# Patient Record
Sex: FEMALE | Race: BLACK OR AFRICAN AMERICAN | NOT HISPANIC OR LATINO | Employment: UNEMPLOYED | URBAN - METROPOLITAN AREA
[De-identification: names, ages, dates, MRNs, and addresses within clinical notes are randomized per-mention and may not be internally consistent; named-entity substitution may affect disease eponyms.]

---

## 2023-03-14 ENCOUNTER — OFFICE VISIT (OUTPATIENT)
Dept: PODIATRY | Facility: CLINIC | Age: 68
End: 2023-03-14
Payer: MEDICARE

## 2023-03-14 DIAGNOSIS — M72.2 PLANTAR FASCIITIS: Primary | ICD-10-CM

## 2023-03-14 DIAGNOSIS — M20.41 HAMMER TOES OF BOTH FEET: ICD-10-CM

## 2023-03-14 DIAGNOSIS — E66.01 MORBID OBESITY: ICD-10-CM

## 2023-03-14 DIAGNOSIS — E11.8 TYPE 2 DIABETES WITH COMPLICATION: ICD-10-CM

## 2023-03-14 DIAGNOSIS — M20.42 HAMMER TOES OF BOTH FEET: ICD-10-CM

## 2023-03-14 DIAGNOSIS — E11.69 TYPE 2 DIABETES MELLITUS WITH MORBID OBESITY: ICD-10-CM

## 2023-03-14 DIAGNOSIS — E66.01 TYPE 2 DIABETES MELLITUS WITH MORBID OBESITY: ICD-10-CM

## 2023-03-14 PROCEDURE — 3288F PR FALLS RISK ASSESSMENT DOCUMENTED: ICD-10-PCS | Mod: CPTII,S$GLB,, | Performed by: PODIATRIST

## 2023-03-14 PROCEDURE — 99999 PR PBB SHADOW E&M-NEW PATIENT-LVL II: CPT | Mod: PBBFAC,,, | Performed by: PODIATRIST

## 2023-03-14 PROCEDURE — 3288F FALL RISK ASSESSMENT DOCD: CPT | Mod: CPTII,S$GLB,, | Performed by: PODIATRIST

## 2023-03-14 PROCEDURE — 1159F PR MEDICATION LIST DOCUMENTED IN MEDICAL RECORD: ICD-10-PCS | Mod: CPTII,S$GLB,, | Performed by: PODIATRIST

## 2023-03-14 PROCEDURE — 99204 PR OFFICE/OUTPT VISIT, NEW, LEVL IV, 45-59 MIN: ICD-10-PCS | Mod: S$GLB,,, | Performed by: PODIATRIST

## 2023-03-14 PROCEDURE — 1160F RVW MEDS BY RX/DR IN RCRD: CPT | Mod: CPTII,S$GLB,, | Performed by: PODIATRIST

## 2023-03-14 PROCEDURE — 1101F PR PT FALLS ASSESS DOC 0-1 FALLS W/OUT INJ PAST YR: ICD-10-PCS | Mod: CPTII,S$GLB,, | Performed by: PODIATRIST

## 2023-03-14 PROCEDURE — 1160F PR REVIEW ALL MEDS BY PRESCRIBER/CLIN PHARMACIST DOCUMENTED: ICD-10-PCS | Mod: CPTII,S$GLB,, | Performed by: PODIATRIST

## 2023-03-14 PROCEDURE — 1159F MED LIST DOCD IN RCRD: CPT | Mod: CPTII,S$GLB,, | Performed by: PODIATRIST

## 2023-03-14 PROCEDURE — 99999 PR PBB SHADOW E&M-NEW PATIENT-LVL II: ICD-10-PCS | Mod: PBBFAC,,, | Performed by: PODIATRIST

## 2023-03-14 PROCEDURE — 99204 OFFICE O/P NEW MOD 45 MIN: CPT | Mod: S$GLB,,, | Performed by: PODIATRIST

## 2023-03-14 PROCEDURE — 1101F PT FALLS ASSESS-DOCD LE1/YR: CPT | Mod: CPTII,S$GLB,, | Performed by: PODIATRIST

## 2023-03-14 RX ORDER — POTASSIUM CHLORIDE 20 MEQ/1
TABLET, EXTENDED RELEASE ORAL
COMMUNITY
Start: 2023-02-28 | End: 2024-02-19 | Stop reason: SDUPTHER

## 2023-03-14 RX ORDER — PEN NEEDLE, DIABETIC 31 GX5/16"
NEEDLE, DISPOSABLE MISCELLANEOUS
COMMUNITY
Start: 2023-02-28 | End: 2024-02-07

## 2023-03-14 RX ORDER — GABAPENTIN 300 MG/1
CAPSULE ORAL
COMMUNITY
Start: 2023-02-27

## 2023-03-14 RX ORDER — ATORVASTATIN CALCIUM 20 MG/1
TABLET, FILM COATED ORAL
COMMUNITY
Start: 2023-02-28 | End: 2024-02-19 | Stop reason: SDUPTHER

## 2023-03-14 RX ORDER — METOPROLOL SUCCINATE 50 MG/1
TABLET, EXTENDED RELEASE ORAL
COMMUNITY
Start: 2023-02-28 | End: 2024-02-19 | Stop reason: SDUPTHER

## 2023-03-14 RX ORDER — SEMAGLUTIDE 1.34 MG/ML
INJECTION, SOLUTION SUBCUTANEOUS
COMMUNITY
Start: 2023-03-01 | End: 2023-12-17

## 2023-03-14 RX ORDER — MIRABEGRON 50 MG/1
TABLET, FILM COATED, EXTENDED RELEASE ORAL
COMMUNITY
Start: 2023-02-28

## 2023-03-14 RX ORDER — INSULIN DEGLUDEC 200 U/ML
INJECTION, SOLUTION SUBCUTANEOUS
COMMUNITY
Start: 2023-02-20 | End: 2024-03-28

## 2023-03-14 RX ORDER — DILTIAZEM HYDROCHLORIDE 120 MG/1
CAPSULE, EXTENDED RELEASE ORAL
COMMUNITY
Start: 2023-02-28 | End: 2024-02-19 | Stop reason: SDUPTHER

## 2023-03-14 RX ORDER — ALBUTEROL SULFATE 90 UG/1
AEROSOL, METERED RESPIRATORY (INHALATION)
COMMUNITY
Start: 2023-03-10 | End: 2024-02-19 | Stop reason: SDUPTHER

## 2023-03-14 RX ORDER — FUROSEMIDE 40 MG/1
TABLET ORAL
COMMUNITY
Start: 2023-02-28 | End: 2024-02-19 | Stop reason: SDUPTHER

## 2023-03-14 RX ORDER — FLUTICASONE PROPIONATE AND SALMETEROL 250; 50 UG/1; UG/1
POWDER RESPIRATORY (INHALATION)
COMMUNITY
Start: 2023-02-28

## 2023-03-14 RX ORDER — OLMESARTAN MEDOXOMIL AND HYDROCHLOROTHIAZIDE 40/25 40; 25 MG/1; MG/1
TABLET ORAL
COMMUNITY
Start: 2023-02-28 | End: 2024-02-19 | Stop reason: SDUPTHER

## 2023-03-14 RX ORDER — ESOMEPRAZOLE MAGNESIUM 40 MG/1
CAPSULE, DELAYED RELEASE ORAL
COMMUNITY
Start: 2023-02-28 | End: 2024-02-19 | Stop reason: SDUPTHER

## 2023-03-14 RX ORDER — BUPROPION HYDROCHLORIDE 100 MG/1
TABLET, EXTENDED RELEASE ORAL
COMMUNITY
Start: 2023-01-10 | End: 2023-11-02

## 2023-03-14 NOTE — PROGRESS NOTES
Subjective:       Patient ID: Stephanie Raza is a 67 y.o. female.    Chief Complaint: Diabetic Foot Exam (Patient is a diabetic and denies pain at present. )      HPI: Stephanie Raza complains of Moderate pains to the left foot. States pains are sharp and stabbing-like in nature. Pains are to the plantar foot, mostly with walking and standing. Rates the pains at approx. 0/10 currently but does increase with weight-bearing.  She states this is been ongoing for some time.  She does have history of diabetes mellitus type 2.  She also has pain to the right 2nd toe secondary to callus formation and hammertoe deformity.  Denies any recent trauma or injury.  Not currently utilizing any medication. States walking and standing causes and/or exacerbates the symptoms. Patient has had 0 corticosteroid injection(s) prior. Patient's Primary Care Provider is Reyna Pate MD.     Review of patient's allergies indicates:  No Known Allergies    History reviewed. No pertinent past medical history.    History reviewed. No pertinent family history.    Social History     Socioeconomic History    Marital status:        History reviewed. No pertinent surgical history.    Review of Systems      Objective:   There were no vitals taken for this visit.    No image results found.      Physical Exam  LOWER EXTREMITY PHYSICAL EXAMINATION    VASCULAR: The right DP pulse is 2/4 and the left DP is 2/4. The right PT pulse is 2/4 and the left PT pulse is 2/4. Proximal to distal, warm to warm. No dependent rubor or elevation palor is noted. Capillary refill time is less than 3 seconds. Hair growth is appreciated to the dorsal foot and digits.    NEUROLOGY: Proprioception is intact, bilateral. Sensation to light touch is intact. Negative Tinel's Sign and negative Valleix Sign. No neurological sensations with compression of the area of Moore's Nerve in the area of the Abductor Hallucis muscle belly.    ORTHOPEDIC:  Moderate  tenderness to palpation of the area around the plantar medial calcaneal tubercle on the left foot. Pains are characterized as sharp and stabbing-like with direct palpation of the area. There is also mild pain to palpation of the immediate plantar aspect of the heel, and no pains to the lateral band of the fascia. No edema is noted. No fullness is noted. No pains or defects are noted within the plantar fascia at the arch. No plantar fibromas are noted. No defects are noted within the ligament. Dorsiflexion of the MTPJs with simultaneous palpation of the fascia at the arch, does worsen and exacerbate the pains. No pains with medial to lateral compression of the heel. Equinus contracture is noted. Antalgic gait pattern is noted.  Reducible hammertoe deformity present to the right 2nd toe.  Small callus formation noted distally without underlying ulceration    DERMATOLOGY: No ecchymosis is noted.  Skin is supple, dry and intact. Skin is supple.  No hyperkeratosis noted. No calluses.  No open wounds or ulcerations are noted.  No palpable plantar fibromas noted.    Assessment:     1. Plantar fasciitis - Left Foot    2. Type 2 diabetes with complication    3. Hammer toes of both feet    4. Morbid obesity    5. Type 2 diabetes mellitus with morbid obesity          Plan:     Plantar fasciitis - Left Foot    Type 2 diabetes with complication  -     DIABETIC SHOES FOR HOME USE    Hammer toes of both feet  -     DIABETIC SHOES FOR HOME USE    Morbid obesity    Type 2 diabetes mellitus with morbid obesity        Thorough discussion is had with the patient today concerning the diagnosis, its etiology, and the treatment algorithm at present.    I explained to the patient that etiology and all treatment options for heel pain including rest,  ice messages, stretching exercises, strappings/tappings, NSAID's, injections, new shoegear with orthotic inserts, and/or surgical treatment. I also discussed a possible injection of steroid to  help calm down the inflammation sooner. I expressed the importance of wearing the orthotics in culmination of other treatment modalities. Patient agreed to stretching exercises and inserts. I gave written and verbal instructions on heel cord stretching and this was demonstrated for the patient. Patient expressed understanding and had the patient teach back the instructions.  Patient instructed on adequate icing techniques which should be done for acute pain and inflammation.    Discussed the importance of stretching to the posterior muscle groups of the gastrocnemius and the soleus.  A stretching sheet was provided to the patient in conjunction with a Thera-Band.  I do recommend patient perform stretching exercises 4-6 times per day and holding the stretches for approximately 15-30 seconds apiece.  We discussed importance of stretching as relates to lengthening the posterior muscle group which can decrease drain on the posterior aspect of the heel as well as the plantar aspect of the heel.  This will also decrease pain associated with post static dyskinesia.  Teach back mechanism was performed with the patient demonstrating the stretching exercises.    Patient is counseled and reminded concerning the importance of good nutrition and healthy eating habits, which may include blood sugar control, to prevent and/or help podiatric foot and ankle complications.    Patient would benefit from diabetic shoes with custom insert given her history of plantar fasciitis, diabetes mellitus with complications, and hammertoe deformity with evidence of callus formation to the distal aspect the toe.  This will help offload high pressure area and prevent recurrent formation and can prevent possible wound formation as well.  Patient to use the shoes at all periods of time.      Future Appointments   Date Time Provider Department Center   3/16/2023 10:45 AM Pedro Pablo Mares OD [facility]

## 2023-03-24 ENCOUNTER — OFFICE VISIT (OUTPATIENT)
Dept: OPHTHALMOLOGY | Facility: CLINIC | Age: 68
End: 2023-03-24
Payer: MEDICARE

## 2023-03-24 DIAGNOSIS — H52.221 REGULAR ASTIGMATISM OF RIGHT EYE: ICD-10-CM

## 2023-03-24 DIAGNOSIS — H52.4 PRESBYOPIA: ICD-10-CM

## 2023-03-24 DIAGNOSIS — E11.9 TYPE 2 DIABETES MELLITUS WITHOUT RETINOPATHY: Primary | ICD-10-CM

## 2023-03-24 DIAGNOSIS — H25.13 NUCLEAR SCLEROSIS, BILATERAL: ICD-10-CM

## 2023-03-24 DIAGNOSIS — H25.013 CORTICAL AGE-RELATED CATARACT OF BOTH EYES: ICD-10-CM

## 2023-03-24 DIAGNOSIS — E11.36 DIABETIC CATARACT OF BOTH EYES: ICD-10-CM

## 2023-03-24 PROCEDURE — 1159F MED LIST DOCD IN RCRD: CPT | Mod: CPTII,S$GLB,, | Performed by: OPTOMETRIST

## 2023-03-24 PROCEDURE — 99999 PR PBB SHADOW E&M-EST. PATIENT-LVL I: ICD-10-PCS | Mod: PBBFAC,,, | Performed by: OPTOMETRIST

## 2023-03-24 PROCEDURE — 92014 COMPRE OPH EXAM EST PT 1/>: CPT | Mod: S$GLB,,, | Performed by: OPTOMETRIST

## 2023-03-24 PROCEDURE — 2023F PR DILATED RETINAL EXAM W/O EVID OF RETINOPATHY: ICD-10-PCS | Mod: CPTII,S$GLB,, | Performed by: OPTOMETRIST

## 2023-03-24 PROCEDURE — 1160F RVW MEDS BY RX/DR IN RCRD: CPT | Mod: CPTII,S$GLB,, | Performed by: OPTOMETRIST

## 2023-03-24 PROCEDURE — 1159F PR MEDICATION LIST DOCUMENTED IN MEDICAL RECORD: ICD-10-PCS | Mod: CPTII,S$GLB,, | Performed by: OPTOMETRIST

## 2023-03-24 PROCEDURE — 92015 PR REFRACTION: ICD-10-PCS | Mod: S$GLB,,, | Performed by: OPTOMETRIST

## 2023-03-24 PROCEDURE — 1160F PR REVIEW ALL MEDS BY PRESCRIBER/CLIN PHARMACIST DOCUMENTED: ICD-10-PCS | Mod: CPTII,S$GLB,, | Performed by: OPTOMETRIST

## 2023-03-24 PROCEDURE — 2023F DILAT RTA XM W/O RTNOPTHY: CPT | Mod: CPTII,S$GLB,, | Performed by: OPTOMETRIST

## 2023-03-24 PROCEDURE — 92015 DETERMINE REFRACTIVE STATE: CPT | Mod: S$GLB,,, | Performed by: OPTOMETRIST

## 2023-03-24 PROCEDURE — 99999 PR PBB SHADOW E&M-EST. PATIENT-LVL I: CPT | Mod: PBBFAC,,, | Performed by: OPTOMETRIST

## 2023-03-24 PROCEDURE — 92014 PR EYE EXAM, EST PATIENT,COMPREHESV: ICD-10-PCS | Mod: S$GLB,,, | Performed by: OPTOMETRIST

## 2023-03-24 NOTE — PROGRESS NOTES
HPI     Annual Exam            Comments: NP          Comments    Vision changes since last eye exam?: stable for her distance but wears   reading glasses but forgot them today.     Any eye pain today: no    Other ocular symptoms: no    Interested in contact lens fitting today? no                     Last edited by Roxi Bernal on 3/24/2023 10:49 AM.            Assessment /Plan     For exam results, see Encounter Report.    Type 2 diabetes mellitus without retinopathy  There was no diabetic retinopathy present in either eye today.   Recommended that pt continue care with PCP and/or specialists regarding diabetes.  Follow-up dilated eye exam recommended in 12 months, sooner with any vision changes or new concerns.    Nuclear sclerosis, bilateral  Cortical age-related cataract of both eyes  Diabetic cataract of both eyes  Cataracts not significantly affecting activities of daily living and therefore surgery is not indicated at this time.   Will continue to monitor over the next 12 months. Pt to call or RTC with any significant change in vision prior to next visit.     Regular astigmatism of right eye  Presbyopia  Eyeglass Final Rx       Eyeglass Final Rx         Sphere Cylinder Axis Add    Right [place] +1.25 180 +2.50    Left +1.00   +2.50      Expiration Date: 3/24/2024                    RTC 1 yr for dilated eye exam or PRN if any problems.   Discussed above and answered questions.

## 2023-11-02 ENCOUNTER — OFFICE VISIT (OUTPATIENT)
Dept: INTERNAL MEDICINE | Facility: CLINIC | Age: 68
End: 2023-11-02
Payer: MEDICARE

## 2023-11-02 ENCOUNTER — LAB VISIT (OUTPATIENT)
Dept: LAB | Facility: HOSPITAL | Age: 68
End: 2023-11-02
Attending: FAMILY MEDICINE
Payer: MEDICARE

## 2023-11-02 VITALS
SYSTOLIC BLOOD PRESSURE: 130 MMHG | WEIGHT: 262.38 LBS | DIASTOLIC BLOOD PRESSURE: 80 MMHG | HEART RATE: 102 BPM | BODY MASS INDEX: 48.28 KG/M2 | OXYGEN SATURATION: 97 % | TEMPERATURE: 98 F | HEIGHT: 62 IN

## 2023-11-02 DIAGNOSIS — E11.42 TYPE 2 DIABETES MELLITUS WITH DIABETIC POLYNEUROPATHY, WITH LONG-TERM CURRENT USE OF INSULIN: ICD-10-CM

## 2023-11-02 DIAGNOSIS — Z13.31 POSITIVE DEPRESSION SCREENING: ICD-10-CM

## 2023-11-02 DIAGNOSIS — Z79.4 TYPE 2 DIABETES MELLITUS WITH DIABETIC POLYNEUROPATHY, WITH LONG-TERM CURRENT USE OF INSULIN: ICD-10-CM

## 2023-11-02 DIAGNOSIS — M16.0 PRIMARY OSTEOARTHRITIS OF BOTH HIPS: ICD-10-CM

## 2023-11-02 DIAGNOSIS — F33.0 MILD EPISODE OF RECURRENT MAJOR DEPRESSIVE DISORDER: ICD-10-CM

## 2023-11-02 DIAGNOSIS — I10 PRIMARY HYPERTENSION: ICD-10-CM

## 2023-11-02 DIAGNOSIS — E78.5 HYPERLIPIDEMIA, UNSPECIFIED HYPERLIPIDEMIA TYPE: ICD-10-CM

## 2023-11-02 DIAGNOSIS — Z11.3 SCREEN FOR STD (SEXUALLY TRANSMITTED DISEASE): ICD-10-CM

## 2023-11-02 DIAGNOSIS — I10 PRIMARY HYPERTENSION: Primary | ICD-10-CM

## 2023-11-02 LAB
ALBUMIN SERPL BCP-MCNC: 3.7 G/DL (ref 3.5–5.2)
ALBUMIN/CREAT UR: 34.6 UG/MG (ref 0–30)
ALP SERPL-CCNC: 83 U/L (ref 55–135)
ALT SERPL W/O P-5'-P-CCNC: 13 U/L (ref 10–44)
ANION GAP SERPL CALC-SCNC: 19 MMOL/L (ref 8–16)
AST SERPL-CCNC: 17 U/L (ref 10–40)
BASOPHILS # BLD AUTO: 0.05 K/UL (ref 0–0.2)
BASOPHILS NFR BLD: 0.8 % (ref 0–1.9)
BILIRUB SERPL-MCNC: 0.5 MG/DL (ref 0.1–1)
BUN SERPL-MCNC: 26 MG/DL (ref 8–23)
CALCIUM SERPL-MCNC: 9.9 MG/DL (ref 8.7–10.5)
CHLORIDE SERPL-SCNC: 100 MMOL/L (ref 95–110)
CHOLEST SERPL-MCNC: 176 MG/DL (ref 120–199)
CHOLEST/HDLC SERPL: 3.7 {RATIO} (ref 2–5)
CO2 SERPL-SCNC: 24 MMOL/L (ref 23–29)
CREAT SERPL-MCNC: 0.9 MG/DL (ref 0.5–1.4)
CREAT UR-MCNC: 136 MG/DL (ref 15–325)
DIFFERENTIAL METHOD: ABNORMAL
EOSINOPHIL # BLD AUTO: 0.2 K/UL (ref 0–0.5)
EOSINOPHIL NFR BLD: 3.6 % (ref 0–8)
ERYTHROCYTE [DISTWIDTH] IN BLOOD BY AUTOMATED COUNT: 14.2 % (ref 11.5–14.5)
EST. GFR  (NO RACE VARIABLE): >60 ML/MIN/1.73 M^2
ESTIMATED AVG GLUCOSE: 143 MG/DL (ref 68–131)
GLUCOSE SERPL-MCNC: 98 MG/DL (ref 70–110)
HBA1C MFR BLD: 6.6 % (ref 4–5.6)
HCT VFR BLD AUTO: 44.7 % (ref 37–48.5)
HCV AB SERPL QL IA: NORMAL
HDLC SERPL-MCNC: 47 MG/DL (ref 40–75)
HDLC SERPL: 26.7 % (ref 20–50)
HGB BLD-MCNC: 14.1 G/DL (ref 12–16)
HIV 1+2 AB+HIV1 P24 AG SERPL QL IA: NORMAL
IMM GRANULOCYTES # BLD AUTO: 0.01 K/UL (ref 0–0.04)
IMM GRANULOCYTES NFR BLD AUTO: 0.2 % (ref 0–0.5)
LDLC SERPL CALC-MCNC: 106.2 MG/DL (ref 63–159)
LYMPHOCYTES # BLD AUTO: 2.7 K/UL (ref 1–4.8)
LYMPHOCYTES NFR BLD: 43.5 % (ref 18–48)
MCH RBC QN AUTO: 26.2 PG (ref 27–31)
MCHC RBC AUTO-ENTMCNC: 31.5 G/DL (ref 32–36)
MCV RBC AUTO: 83 FL (ref 82–98)
MICROALBUMIN UR DL<=1MG/L-MCNC: 47 UG/ML
MONOCYTES # BLD AUTO: 0.6 K/UL (ref 0.3–1)
MONOCYTES NFR BLD: 10 % (ref 4–15)
NEUTROPHILS # BLD AUTO: 2.6 K/UL (ref 1.8–7.7)
NEUTROPHILS NFR BLD: 41.9 % (ref 38–73)
NONHDLC SERPL-MCNC: 129 MG/DL
NRBC BLD-RTO: 0 /100 WBC
PLATELET # BLD AUTO: 240 K/UL (ref 150–450)
PMV BLD AUTO: 13.4 FL (ref 9.2–12.9)
POTASSIUM SERPL-SCNC: 4.2 MMOL/L (ref 3.5–5.1)
PROT SERPL-MCNC: 7.7 G/DL (ref 6–8.4)
RBC # BLD AUTO: 5.39 M/UL (ref 4–5.4)
SODIUM SERPL-SCNC: 143 MMOL/L (ref 136–145)
T4 FREE SERPL-MCNC: 0.99 NG/DL (ref 0.71–1.51)
TRIGL SERPL-MCNC: 114 MG/DL (ref 30–150)
TSH SERPL DL<=0.005 MIU/L-ACNC: 1.58 UIU/ML (ref 0.4–4)
WBC # BLD AUTO: 6.18 K/UL (ref 3.9–12.7)

## 2023-11-02 PROCEDURE — 3060F PR POS MICROALBUMINURIA RESULT DOCUMENTED/REVIEW: ICD-10-PCS | Mod: CPTII,S$GLB,, | Performed by: FAMILY MEDICINE

## 2023-11-02 PROCEDURE — 82043 UR ALBUMIN QUANTITATIVE: CPT | Performed by: FAMILY MEDICINE

## 2023-11-02 PROCEDURE — 84443 ASSAY THYROID STIM HORMONE: CPT | Performed by: FAMILY MEDICINE

## 2023-11-02 PROCEDURE — 99999 PR PBB SHADOW E&M-EST. PATIENT-LVL IV: CPT | Mod: PBBFAC,,, | Performed by: FAMILY MEDICINE

## 2023-11-02 PROCEDURE — 99214 PR OFFICE/OUTPT VISIT, EST, LEVL IV, 30-39 MIN: ICD-10-PCS | Mod: S$GLB,,, | Performed by: FAMILY MEDICINE

## 2023-11-02 PROCEDURE — 87389 HIV-1 AG W/HIV-1&-2 AB AG IA: CPT | Performed by: FAMILY MEDICINE

## 2023-11-02 PROCEDURE — 1160F RVW MEDS BY RX/DR IN RCRD: CPT | Mod: CPTII,S$GLB,, | Performed by: FAMILY MEDICINE

## 2023-11-02 PROCEDURE — 1160F PR REVIEW ALL MEDS BY PRESCRIBER/CLIN PHARMACIST DOCUMENTED: ICD-10-PCS | Mod: CPTII,S$GLB,, | Performed by: FAMILY MEDICINE

## 2023-11-02 PROCEDURE — 80061 LIPID PANEL: CPT | Performed by: FAMILY MEDICINE

## 2023-11-02 PROCEDURE — 3066F NEPHROPATHY DOC TX: CPT | Mod: CPTII,S$GLB,, | Performed by: FAMILY MEDICINE

## 2023-11-02 PROCEDURE — 85025 COMPLETE CBC W/AUTO DIFF WBC: CPT | Performed by: FAMILY MEDICINE

## 2023-11-02 PROCEDURE — 3066F PR DOCUMENTATION OF TREATMENT FOR NEPHROPATHY: ICD-10-PCS | Mod: CPTII,S$GLB,, | Performed by: FAMILY MEDICINE

## 2023-11-02 PROCEDURE — 3079F PR MOST RECENT DIASTOLIC BLOOD PRESSURE 80-89 MM HG: ICD-10-PCS | Mod: CPTII,S$GLB,, | Performed by: FAMILY MEDICINE

## 2023-11-02 PROCEDURE — 83036 HEMOGLOBIN GLYCOSYLATED A1C: CPT | Performed by: FAMILY MEDICINE

## 2023-11-02 PROCEDURE — 3060F POS MICROALBUMINURIA REV: CPT | Mod: CPTII,S$GLB,, | Performed by: FAMILY MEDICINE

## 2023-11-02 PROCEDURE — 3075F SYST BP GE 130 - 139MM HG: CPT | Mod: CPTII,S$GLB,, | Performed by: FAMILY MEDICINE

## 2023-11-02 PROCEDURE — 1125F AMNT PAIN NOTED PAIN PRSNT: CPT | Mod: CPTII,S$GLB,, | Performed by: FAMILY MEDICINE

## 2023-11-02 PROCEDURE — 99999 PR PBB SHADOW E&M-EST. PATIENT-LVL IV: ICD-10-PCS | Mod: PBBFAC,,, | Performed by: FAMILY MEDICINE

## 2023-11-02 PROCEDURE — 3044F HG A1C LEVEL LT 7.0%: CPT | Mod: CPTII,S$GLB,, | Performed by: FAMILY MEDICINE

## 2023-11-02 PROCEDURE — 80053 COMPREHEN METABOLIC PANEL: CPT | Performed by: FAMILY MEDICINE

## 2023-11-02 PROCEDURE — 3075F PR MOST RECENT SYSTOLIC BLOOD PRESS GE 130-139MM HG: ICD-10-PCS | Mod: CPTII,S$GLB,, | Performed by: FAMILY MEDICINE

## 2023-11-02 PROCEDURE — 84439 ASSAY OF FREE THYROXINE: CPT | Performed by: FAMILY MEDICINE

## 2023-11-02 PROCEDURE — 1125F PR PAIN SEVERITY QUANTIFIED, PAIN PRESENT: ICD-10-PCS | Mod: CPTII,S$GLB,, | Performed by: FAMILY MEDICINE

## 2023-11-02 PROCEDURE — 3288F PR FALLS RISK ASSESSMENT DOCUMENTED: ICD-10-PCS | Mod: CPTII,S$GLB,, | Performed by: FAMILY MEDICINE

## 2023-11-02 PROCEDURE — 1159F PR MEDICATION LIST DOCUMENTED IN MEDICAL RECORD: ICD-10-PCS | Mod: CPTII,S$GLB,, | Performed by: FAMILY MEDICINE

## 2023-11-02 PROCEDURE — 3079F DIAST BP 80-89 MM HG: CPT | Mod: CPTII,S$GLB,, | Performed by: FAMILY MEDICINE

## 2023-11-02 PROCEDURE — 3008F BODY MASS INDEX DOCD: CPT | Mod: CPTII,S$GLB,, | Performed by: FAMILY MEDICINE

## 2023-11-02 PROCEDURE — 3288F FALL RISK ASSESSMENT DOCD: CPT | Mod: CPTII,S$GLB,, | Performed by: FAMILY MEDICINE

## 2023-11-02 PROCEDURE — 36415 COLL VENOUS BLD VENIPUNCTURE: CPT | Mod: PO | Performed by: FAMILY MEDICINE

## 2023-11-02 PROCEDURE — 86803 HEPATITIS C AB TEST: CPT | Performed by: FAMILY MEDICINE

## 2023-11-02 PROCEDURE — 1159F MED LIST DOCD IN RCRD: CPT | Mod: CPTII,S$GLB,, | Performed by: FAMILY MEDICINE

## 2023-11-02 PROCEDURE — 1100F PR PT FALLS ASSESS DOC 2+ FALLS/FALL W/INJURY/YR: ICD-10-PCS | Mod: CPTII,S$GLB,, | Performed by: FAMILY MEDICINE

## 2023-11-02 PROCEDURE — 3008F PR BODY MASS INDEX (BMI) DOCUMENTED: ICD-10-PCS | Mod: CPTII,S$GLB,, | Performed by: FAMILY MEDICINE

## 2023-11-02 PROCEDURE — 99214 OFFICE O/P EST MOD 30 MIN: CPT | Mod: S$GLB,,, | Performed by: FAMILY MEDICINE

## 2023-11-02 PROCEDURE — 3044F PR MOST RECENT HEMOGLOBIN A1C LEVEL <7.0%: ICD-10-PCS | Mod: CPTII,S$GLB,, | Performed by: FAMILY MEDICINE

## 2023-11-02 PROCEDURE — 1100F PTFALLS ASSESS-DOCD GE2>/YR: CPT | Mod: CPTII,S$GLB,, | Performed by: FAMILY MEDICINE

## 2023-11-02 RX ORDER — DICLOFENAC SODIUM 10 MG/G
GEL TOPICAL 4 TIMES DAILY
COMMUNITY
Start: 2023-08-22

## 2023-11-02 RX ORDER — BUPROPION HYDROCHLORIDE 150 MG/1
150 TABLET ORAL DAILY
Qty: 30 TABLET | Refills: 3 | Status: SHIPPED | OUTPATIENT
Start: 2023-11-02 | End: 2024-02-19 | Stop reason: SDUPTHER

## 2023-11-02 RX ORDER — VALSARTAN AND HYDROCHLOROTHIAZIDE 320; 25 MG/1; MG/1
TABLET, FILM COATED ORAL
COMMUNITY

## 2023-11-02 RX ORDER — OXYBUTYNIN CHLORIDE 5 MG/5ML
SYRUP ORAL
COMMUNITY

## 2023-11-02 RX ORDER — SEMAGLUTIDE 2.68 MG/ML
INJECTION, SOLUTION SUBCUTANEOUS
COMMUNITY
Start: 2023-11-01 | End: 2024-02-19 | Stop reason: SDUPTHER

## 2023-11-02 RX ORDER — TOBRAMYCIN 3 MG/ML
SOLUTION/ DROPS OPHTHALMIC
COMMUNITY
Start: 2023-08-06 | End: 2023-12-17

## 2023-11-02 RX ORDER — CALCIUM CARBONATE 160(400)MG
1 TABLET,CHEWABLE ORAL DAILY
Qty: 1 EACH | Refills: 0 | Status: SHIPPED | OUTPATIENT
Start: 2023-11-02

## 2023-11-02 RX ORDER — TOBRAMYCIN 3 MG/ML
SOLUTION/ DROPS OPHTHALMIC
COMMUNITY
Start: 2023-08-06

## 2023-11-02 NOTE — PROGRESS NOTES
Subjective     Patient ID: Stephanie Raza is a 68 y.o. female.    Chief Complaint: Establish Care    Patient presents to [facility]. She suffers with hypertension and diabetes. Patient she was down slightly but she moved and is doing much better.  She is having problems focusing.  She is not going to  on aging 3x week. She is using her cpap for nura and on 2mg of ozempic.  Patient requesting a walker.      Review of Systems   Constitutional: Negative.    HENT: Negative.     Eyes:  Negative for discharge and redness.   Respiratory: Negative.     Cardiovascular: Negative.  Negative for chest pain, palpitations and leg swelling.   Gastrointestinal: Negative.  Negative for constipation and diarrhea.   Musculoskeletal: Negative.  Negative for arthralgias and gait problem.   Neurological: Negative.  Negative for coordination difficulties.   Psychiatric/Behavioral: Negative.            Objective     Physical Exam  Vitals and nursing note reviewed.   Constitutional:       Appearance: Normal appearance. She is normal weight.   HENT:      Head: Normocephalic and atraumatic.   Eyes:      Extraocular Movements: Extraocular movements intact.   Cardiovascular:      Rate and Rhythm: Normal rate and regular rhythm.      Pulses: Normal pulses.      Heart sounds: Normal heart sounds.   Pulmonary:      Effort: Pulmonary effort is normal.      Breath sounds: Normal breath sounds.   Abdominal:      General: Abdomen is flat. Bowel sounds are normal.      Palpations: Abdomen is soft.   Musculoskeletal:      Right lower leg: Edema present.      Left lower leg: Edema present.   Skin:     General: Skin is warm and dry.   Neurological:      General: No focal deficit present.      Mental Status: She is alert and oriented to person, place, and time.   Psychiatric:         Mood and Affect: Mood normal.         Behavior: Behavior normal.            Assessment and Plan     1. Primary hypertension  Comments:  bp controlled cont  current tx  Orders:  -     CBC W/ AUTO DIFFERENTIAL; Future; Expected date: 11/02/2023  -     COMPREHENSIVE METABOLIC PANEL; Future; Expected date: 11/02/2023  -     Microalbumin/creatinine urine ratio  -     TSH; Future; Expected date: 11/02/2023  -     T4, free; Future; Expected date: 11/02/2023    2. Type 2 diabetes mellitus with diabetic polyneuropathy, with long-term current use of insulin  Comments:  will check a1c level cont current tx for now  Orders:  -     HEMOGLOBIN A1C; Future; Expected date: 11/02/2023    3. Mild episode of recurrent major depressive disorder  Comments:  increase dosage of wellbutrin to also help with focus  Orders:  -     buPROPion (WELLBUTRIN XL) 150 MG TB24 tablet; Take 1 tablet (150 mg total) by mouth once daily.  Dispense: 30 tablet; Refill: 3    4. Primary osteoarthritis of both hips  Comments:  will order rollator to provide support while ambulating  Orders:  -     walker (ULTRA-LIGHT ROLLATOR) Misc; 1 Device by Misc.(Non-Drug; Combo Route) route once daily.  Dispense: 1 each; Refill: 0    5. Hyperlipidemia, unspecified hyperlipidemia type  Comments:  on statin therapy, will check lipid levels  Orders:  -     LIPID PANEL; Future; Expected date: 11/02/2023    6. Screen for STD (sexually transmitted disease)  -     HIV 1/2 Ag/Ab (4th Gen); Future; Expected date: 11/02/2023  -     HEPATITIS C ANTIBODY; Future; Expected date: 11/02/2023    7. Positive depression screening  Comments:  I have reviewed the positive depression score which warrants active treatment with psychotherapy and/or medications.                 Follow up in about 3 months (around 2/2/2024).

## 2023-11-08 ENCOUNTER — TELEPHONE (OUTPATIENT)
Dept: INTERNAL MEDICINE | Facility: CLINIC | Age: 68
End: 2023-11-08
Payer: MEDICARE

## 2024-01-04 ENCOUNTER — TELEPHONE (OUTPATIENT)
Dept: INTERNAL MEDICINE | Facility: CLINIC | Age: 69
End: 2024-01-04
Payer: MEDICARE

## 2024-01-04 NOTE — TELEPHONE ENCOUNTER
----- Message from Shilpa Garcia sent at 1/4/2024  2:29 PM CST -----  Contact: Laurence/daughter  Pt daughter is calling in regards to needing an medical brianna so she is  needing a sooner appt.please call back at [phone]           Thanks  ANDRE

## 2024-01-04 NOTE — TELEPHONE ENCOUNTER
----- Message from Shilpa Garcia sent at 1/4/2024  2:29 PM CST -----  Contact: Laurence/daughter  Pt daughter is calling in regards to needing an medical brianna so she is  needing a sooner appt.please call back at [phone]           Thanks  ANDRE

## 2024-01-18 ENCOUNTER — OFFICE VISIT (OUTPATIENT)
Dept: INTERNAL MEDICINE | Facility: CLINIC | Age: 69
End: 2024-01-18
Payer: MEDICARE

## 2024-01-18 VITALS
DIASTOLIC BLOOD PRESSURE: 70 MMHG | BODY MASS INDEX: 48.28 KG/M2 | HEART RATE: 81 BPM | WEIGHT: 262.38 LBS | TEMPERATURE: 97 F | SYSTOLIC BLOOD PRESSURE: 120 MMHG | HEIGHT: 62 IN

## 2024-01-18 DIAGNOSIS — Z79.4 TYPE 2 DIABETES MELLITUS WITH DIABETIC POLYNEUROPATHY, WITH LONG-TERM CURRENT USE OF INSULIN: ICD-10-CM

## 2024-01-18 DIAGNOSIS — E66.01 CLASS 3 SEVERE OBESITY DUE TO EXCESS CALORIES WITH SERIOUS COMORBIDITY AND BODY MASS INDEX (BMI) OF 45.0 TO 49.9 IN ADULT: ICD-10-CM

## 2024-01-18 DIAGNOSIS — F33.0 MILD EPISODE OF RECURRENT MAJOR DEPRESSIVE DISORDER: ICD-10-CM

## 2024-01-18 DIAGNOSIS — R94.31 ABNORMAL EKG: ICD-10-CM

## 2024-01-18 DIAGNOSIS — E11.42 TYPE 2 DIABETES MELLITUS WITH DIABETIC POLYNEUROPATHY, WITH LONG-TERM CURRENT USE OF INSULIN: ICD-10-CM

## 2024-01-18 DIAGNOSIS — Z01.818 PREOP EXAMINATION: Primary | ICD-10-CM

## 2024-01-18 DIAGNOSIS — M16.12 PRIMARY OSTEOARTHRITIS OF LEFT HIP: ICD-10-CM

## 2024-01-18 PROCEDURE — 3008F BODY MASS INDEX DOCD: CPT | Mod: CPTII,S$GLB,, | Performed by: FAMILY MEDICINE

## 2024-01-18 PROCEDURE — 3078F DIAST BP <80 MM HG: CPT | Mod: CPTII,S$GLB,, | Performed by: FAMILY MEDICINE

## 2024-01-18 PROCEDURE — 99214 OFFICE O/P EST MOD 30 MIN: CPT | Mod: S$GLB,,, | Performed by: FAMILY MEDICINE

## 2024-01-18 PROCEDURE — 3288F FALL RISK ASSESSMENT DOCD: CPT | Mod: CPTII,S$GLB,, | Performed by: FAMILY MEDICINE

## 2024-01-18 PROCEDURE — 3074F SYST BP LT 130 MM HG: CPT | Mod: CPTII,S$GLB,, | Performed by: FAMILY MEDICINE

## 2024-01-18 PROCEDURE — 3072F LOW RISK FOR RETINOPATHY: CPT | Mod: CPTII,S$GLB,, | Performed by: FAMILY MEDICINE

## 2024-01-18 PROCEDURE — 1160F RVW MEDS BY RX/DR IN RCRD: CPT | Mod: CPTII,S$GLB,, | Performed by: FAMILY MEDICINE

## 2024-01-18 PROCEDURE — 1159F MED LIST DOCD IN RCRD: CPT | Mod: CPTII,S$GLB,, | Performed by: FAMILY MEDICINE

## 2024-01-18 PROCEDURE — 1125F AMNT PAIN NOTED PAIN PRSNT: CPT | Mod: CPTII,S$GLB,, | Performed by: FAMILY MEDICINE

## 2024-01-18 PROCEDURE — 1100F PTFALLS ASSESS-DOCD GE2>/YR: CPT | Mod: CPTII,S$GLB,, | Performed by: FAMILY MEDICINE

## 2024-01-18 PROCEDURE — 99999 PR PBB SHADOW E&M-EST. PATIENT-LVL IV: CPT | Mod: PBBFAC,,, | Performed by: FAMILY MEDICINE

## 2024-01-18 NOTE — PROGRESS NOTES
" Subjective:     Stephanie Raza is a 68 y.o. female who presents to the office today for a preoperative consultation at the request of surgeon Dr. Alex Hamlin who plans on performing Left JULIO-Revision on . This consultation is requested for the specific conditions prompting preoperative evaluation (i.e. because of potential affect on operative risk). Planned anesthesia: general. The patient has no known known anesthesia issues. Patients bleeding risk: no recent abnormal bleeding. Patient does not have objections to receiving blood products if needed.    The following portions of the patient's history were reviewed and updated as appropriate:  Past Medical History:   Diagnosis Date    Diabetes mellitus type I     Heart murmur     Hypertension      Past Medical History:   Diagnosis Date    Diabetes mellitus type I     Heart murmur     Hypertension      Family History   Problem Relation Age of Onset    Lung cancer Mother     Hypertension Mother     Stroke Father     Cancer Father      Past Surgical History:   Procedure Laterality Date     SECTION       Social History     Socioeconomic History    Marital status:    Tobacco Use    Smoking status: Never    Smokeless tobacco: Never     Review of patient's allergies indicates:   Allergen Reactions    Adhesive tape-silicones Swelling     Paper tape     Medication List with Changes/Refills   Current Medications    ALBUTEROL (PROVENTIL/VENTOLIN HFA) 90 MCG/ACTUATION INHALER    Inhale into the lungs.    ATORVASTATIN (LIPITOR) 20 MG TABLET    Take by mouth.    BUPROPION (WELLBUTRIN XL) 150 MG TB24 TABLET    Take 1 tablet (150 mg total) by mouth once daily.    DICLOFENAC SODIUM (VOLTAREN) 1 % GEL    Apply topically 4 (four) times daily.    DILTIAZEM  MG CP12    Take by mouth.    DROPLET PEN NEEDLE 31 GAUGE X 5/16" NDLE        ESOMEPRAZOLE (NEXIUM) 40 MG CAPSULE    Take by mouth.    FLUTICASONE-SALMETEROL DISKUS INHALER 250-50 MCG    Inhale " into the lungs.    FUROSEMIDE (LASIX) 40 MG TABLET    Take by mouth.    GABAPENTIN (NEURONTIN) 300 MG CAPSULE    Take by mouth.    METOPROLOL SUCCINATE (TOPROL-XL) 50 MG 24 HR TABLET    Take by mouth.    MYRBETRIQ 50 MG TB24    Take by mouth.    OLMESARTAN-HYDROCHLOROTHIAZIDE (BENICAR HCT) 40-25 MG PER TABLET    Take by mouth.    OXYBUTYNIN (DITROPAN) 5 MG/5 ML SYRUP    oxybutynin chloride Take No date recorded No form recorded No frequency recorded No route recorded No set duration recorded No set duration amount recorded active No dosage strength recorded No dosage strength units of measure recorded    OZEMPIC 2 MG/DOSE (8 MG/3 ML) PNIJ    Inject into the skin.    POTASSIUM CHLORIDE SA (K-DUR,KLOR-CON) 20 MEQ TABLET    Take by mouth.    TOBRAMYCIN SULFATE 0.3% (TOBREX) 0.3 % OPHTHALMIC SOLUTION    Tobrex Apply 1-2 drops (ophthalmic) 4 times per day for 7 Days into the affected eye [id number] drops 4 times per day ophthalmic 7 Days active 0.3 %    TRESIBA FLEXTOUCH U-200 200 UNIT/ML (3 ML) INSULIN PEN    Inject into the skin.    VALSARTAN-HYDROCHLOROTHIAZIDE (DIOVAN-HCT) 320-25 MG PER TABLET    valsartan-hydrochlorothiazide Take 1 time per day No date recorded tablet 1 time per day No route recorded No set duration recorded No set duration amount recorded active 320-25 mg    WALKER (ULTRA-LIGHT ROLLATOR) MISC    1 Device by Misc.(Non-Drug; Combo Route) route once daily.     Patient Active Problem List   Diagnosis    Arthralgia of hip    Arthralgia of shoulder    Primary hypertension    Type 2 diabetes mellitus with diabetic polyneuropathy, with long-term current use of insulin    Mild episode of recurrent major depressive disorder    Class 3 severe obesity due to excess calories with serious comorbidity and body mass index (BMI) of 45.0 to 49.9 in adult       Review of Systems  Review of Systems   Constitutional: Negative.    HENT:  Negative for ear discharge and sore throat.    Eyes:  Negative for pain, discharge,  redness and itching.   Respiratory:  Negative for cough, chest tightness and shortness of breath.    Cardiovascular: Negative.    Gastrointestinal:  Negative for abdominal distention, abdominal pain, blood in stool, constipation, diarrhea and nausea.   Musculoskeletal:  Positive for arthralgias.   Neurological:  Negative for dizziness, weakness, light-headedness, numbness and headaches.   Psychiatric/Behavioral: Negative.          Objective:     Vitals:    01/18/24 1423   BP: 120/70   Pulse: 81   Temp: 96.8 °F (36 °C)       Physical Exam  Vitals and nursing note reviewed.   Constitutional:       Appearance: Normal appearance. She is obese.   HENT:      Head: Normocephalic and atraumatic.   Eyes:      Extraocular Movements: Extraocular movements intact.      Conjunctiva/sclera: Conjunctivae normal.   Cardiovascular:      Rate and Rhythm: Normal rate and regular rhythm.      Pulses: Normal pulses.      Heart sounds: Normal heart sounds.   Pulmonary:      Effort: Pulmonary effort is normal.      Breath sounds: Normal breath sounds.   Abdominal:      General: Abdomen is flat. Bowel sounds are normal.      Palpations: Abdomen is soft.   Musculoskeletal:      Right lower leg: No edema.      Left lower leg: No edema.   Lymphadenopathy:      Cervical: No cervical adenopathy.   Skin:     General: Skin is warm and dry.   Neurological:      General: No focal deficit present.      Mental Status: She is alert and oriented to person, place, and time.   Psychiatric:         Mood and Affect: Mood normal.         Behavior: Behavior normal.          Assessment:       Encounter Diagnoses   Name Primary?    Preop examination Yes    Primary osteoarthritis of left hip     Type 2 diabetes mellitus with diabetic polyneuropathy, with long-term current use of insulin     Mild episode of recurrent major depressive disorder     Class 3 severe obesity due to excess calories with serious comorbidity and body mass index (BMI) of 45.0 to 49.9 in  adult     Abnormal EKG         Plan:     Preop examination  Comments:  I have reviewed labs and imaging provided by surgeon. Patient is medically cleared for surgery but she will require cardiac clearance    Primary osteoarthritis of left hip    Type 2 diabetes mellitus with diabetic polyneuropathy, with long-term current use of insulin    Mild episode of recurrent major depressive disorder    Class 3 severe obesity due to excess calories with serious comorbidity and body mass index (BMI) of 45.0 to 49.9 in adult    Abnormal EKG  Comments:  It has been 1 to 2 years since cards has seen patient. She will require cardiac clearance prior to surgery        I reviewed the patient's past medical, surgical, social and family history and with  physical exam findings and the proposed surgery and I make the following recommendations:       From a pulmonary standpoint the patient presents as a good candidate as well. The patient has no history of lung disease or pulmonary symptoms. Good pulmonary toilet, incentive spirometry, early ambulation are all recommended to improve the pulmonary outcome. No further pulmonary workup as noted prior to surgery.     DVT prophylaxis should be per standard. Venous compression devices are recommended. Early ambulation. Patient has been educated on signs and symptoms of both DVT and pulmonary embolus and instructed on what to do if there are symptoms postop.     The patient has been instructed to take blood pressure medication the morning of surgery with a sip of water. Avoid any aspirin or anti-inflammatories between now and surgery.     If there is any further I can do to assist in the care of this patient please not hesitate to contact me. I will forward the lab results upon my receipt.    RAJ MILLARD MD

## 2024-01-22 PROBLEM — E11.42 TYPE 2 DIABETES MELLITUS WITH DIABETIC POLYNEUROPATHY, WITH LONG-TERM CURRENT USE OF INSULIN: Status: ACTIVE | Noted: 2024-01-22

## 2024-01-22 PROBLEM — M25.519 ARTHRALGIA OF SHOULDER: Status: ACTIVE | Noted: 2020-07-26

## 2024-01-22 PROBLEM — Z79.4 TYPE 2 DIABETES MELLITUS WITH DIABETIC POLYNEUROPATHY, WITH LONG-TERM CURRENT USE OF INSULIN: Status: ACTIVE | Noted: 2024-01-22

## 2024-01-22 PROBLEM — M25.559 ARTHRALGIA OF HIP: Status: ACTIVE | Noted: 2020-07-26

## 2024-01-22 PROBLEM — F33.0 MILD EPISODE OF RECURRENT MAJOR DEPRESSIVE DISORDER: Status: ACTIVE | Noted: 2024-01-22

## 2024-01-22 PROBLEM — E66.813 CLASS 3 SEVERE OBESITY DUE TO EXCESS CALORIES WITH SERIOUS COMORBIDITY AND BODY MASS INDEX (BMI) OF 45.0 TO 49.9 IN ADULT: Status: ACTIVE | Noted: 2024-01-22

## 2024-01-22 PROBLEM — I10 PRIMARY HYPERTENSION: Status: ACTIVE | Noted: 2018-10-29

## 2024-01-22 PROBLEM — E66.01 CLASS 3 SEVERE OBESITY DUE TO EXCESS CALORIES WITH SERIOUS COMORBIDITY AND BODY MASS INDEX (BMI) OF 45.0 TO 49.9 IN ADULT: Status: ACTIVE | Noted: 2024-01-22

## 2024-01-31 DIAGNOSIS — Z78.0 MENOPAUSE: ICD-10-CM

## 2024-02-06 DIAGNOSIS — E11.42 TYPE 2 DIABETES MELLITUS WITH DIABETIC POLYNEUROPATHY, WITH LONG-TERM CURRENT USE OF INSULIN: Primary | ICD-10-CM

## 2024-02-06 DIAGNOSIS — Z12.11 COLON CANCER SCREENING: ICD-10-CM

## 2024-02-06 DIAGNOSIS — Z79.4 TYPE 2 DIABETES MELLITUS WITH DIABETIC POLYNEUROPATHY, WITH LONG-TERM CURRENT USE OF INSULIN: Primary | ICD-10-CM

## 2024-02-07 RX ORDER — FLASH GLUCOSE SCANNING READER
EACH MISCELLANEOUS
Qty: 1 EACH | Refills: 3 | Status: SHIPPED | OUTPATIENT
Start: 2024-02-07 | End: 2024-02-19

## 2024-02-07 RX ORDER — FLASH GLUCOSE SENSOR
KIT MISCELLANEOUS
Qty: 6 KIT | Refills: 3 | Status: SHIPPED | OUTPATIENT
Start: 2024-02-07 | End: 2024-02-19

## 2024-02-07 RX ORDER — PEN NEEDLE, DIABETIC 31 GX5/16"
NEEDLE, DISPOSABLE MISCELLANEOUS
Qty: 100 EACH | Refills: 3 | Status: SHIPPED | OUTPATIENT
Start: 2024-02-07

## 2024-02-19 ENCOUNTER — OFFICE VISIT (OUTPATIENT)
Dept: INTERNAL MEDICINE | Facility: CLINIC | Age: 69
End: 2024-02-19
Payer: MEDICARE

## 2024-02-19 ENCOUNTER — LAB VISIT (OUTPATIENT)
Dept: LAB | Facility: HOSPITAL | Age: 69
End: 2024-02-19
Attending: FAMILY MEDICINE
Payer: MEDICARE

## 2024-02-19 VITALS
HEART RATE: 85 BPM | WEIGHT: 254 LBS | OXYGEN SATURATION: 98 % | DIASTOLIC BLOOD PRESSURE: 82 MMHG | SYSTOLIC BLOOD PRESSURE: 128 MMHG | HEIGHT: 62 IN | BODY MASS INDEX: 46.74 KG/M2 | TEMPERATURE: 98 F | RESPIRATION RATE: 16 BRPM

## 2024-02-19 DIAGNOSIS — Z79.4 TYPE 2 DIABETES MELLITUS WITH DIABETIC POLYNEUROPATHY, WITH LONG-TERM CURRENT USE OF INSULIN: Chronic | ICD-10-CM

## 2024-02-19 DIAGNOSIS — E11.42 TYPE 2 DIABETES MELLITUS WITH DIABETIC POLYNEUROPATHY, WITH LONG-TERM CURRENT USE OF INSULIN: ICD-10-CM

## 2024-02-19 DIAGNOSIS — K21.9 GASTROESOPHAGEAL REFLUX DISEASE WITHOUT ESOPHAGITIS: Chronic | ICD-10-CM

## 2024-02-19 DIAGNOSIS — E11.42 TYPE 2 DIABETES MELLITUS WITH DIABETIC POLYNEUROPATHY, WITH LONG-TERM CURRENT USE OF INSULIN: Chronic | ICD-10-CM

## 2024-02-19 DIAGNOSIS — D50.0 IRON DEFICIENCY ANEMIA DUE TO CHRONIC BLOOD LOSS: Primary | ICD-10-CM

## 2024-02-19 DIAGNOSIS — I10 PRIMARY HYPERTENSION: Chronic | ICD-10-CM

## 2024-02-19 DIAGNOSIS — F33.0 MILD EPISODE OF RECURRENT MAJOR DEPRESSIVE DISORDER: Chronic | ICD-10-CM

## 2024-02-19 DIAGNOSIS — Z79.4 TYPE 2 DIABETES MELLITUS WITH DIABETIC POLYNEUROPATHY, WITH LONG-TERM CURRENT USE OF INSULIN: ICD-10-CM

## 2024-02-19 DIAGNOSIS — J45.20 MILD INTERMITTENT ASTHMA WITHOUT COMPLICATION: Chronic | ICD-10-CM

## 2024-02-19 DIAGNOSIS — Z96.649 S/P REVISION OF TOTAL HIP: ICD-10-CM

## 2024-02-19 DIAGNOSIS — E78.5 HYPERLIPIDEMIA, UNSPECIFIED HYPERLIPIDEMIA TYPE: Chronic | ICD-10-CM

## 2024-02-19 DIAGNOSIS — D50.0 IRON DEFICIENCY ANEMIA DUE TO CHRONIC BLOOD LOSS: ICD-10-CM

## 2024-02-19 LAB
ALBUMIN SERPL BCP-MCNC: 3.3 G/DL (ref 3.5–5.2)
ALP SERPL-CCNC: 110 U/L (ref 55–135)
ALT SERPL W/O P-5'-P-CCNC: 11 U/L (ref 10–44)
ANION GAP SERPL CALC-SCNC: 8 MMOL/L (ref 8–16)
AST SERPL-CCNC: 12 U/L (ref 10–40)
BASOPHILS # BLD AUTO: 0.06 K/UL (ref 0–0.2)
BASOPHILS NFR BLD: 0.8 % (ref 0–1.9)
BILIRUB SERPL-MCNC: 0.4 MG/DL (ref 0.1–1)
BUN SERPL-MCNC: 24 MG/DL (ref 8–23)
CALCIUM SERPL-MCNC: 9.6 MG/DL (ref 8.7–10.5)
CHLORIDE SERPL-SCNC: 105 MMOL/L (ref 95–110)
CO2 SERPL-SCNC: 32 MMOL/L (ref 23–29)
CREAT SERPL-MCNC: 0.7 MG/DL (ref 0.5–1.4)
DIFFERENTIAL METHOD BLD: ABNORMAL
EOSINOPHIL # BLD AUTO: 0.6 K/UL (ref 0–0.5)
EOSINOPHIL NFR BLD: 7.9 % (ref 0–8)
ERYTHROCYTE [DISTWIDTH] IN BLOOD BY AUTOMATED COUNT: 14.1 % (ref 11.5–14.5)
EST. GFR  (NO RACE VARIABLE): >60 ML/MIN/1.73 M^2
FERRITIN SERPL-MCNC: 197 NG/ML (ref 20–300)
GLUCOSE SERPL-MCNC: 130 MG/DL (ref 70–110)
HCT VFR BLD AUTO: 36 % (ref 37–48.5)
HGB BLD-MCNC: 11.3 G/DL (ref 12–16)
IMM GRANULOCYTES # BLD AUTO: 0.02 K/UL (ref 0–0.04)
IMM GRANULOCYTES NFR BLD AUTO: 0.3 % (ref 0–0.5)
IRON SERPL-MCNC: 36 UG/DL (ref 30–160)
LYMPHOCYTES # BLD AUTO: 2.5 K/UL (ref 1–4.8)
LYMPHOCYTES NFR BLD: 32.9 % (ref 18–48)
MCH RBC QN AUTO: 27 PG (ref 27–31)
MCHC RBC AUTO-ENTMCNC: 31.4 G/DL (ref 32–36)
MCV RBC AUTO: 86 FL (ref 82–98)
MONOCYTES # BLD AUTO: 0.6 K/UL (ref 0.3–1)
MONOCYTES NFR BLD: 7.9 % (ref 4–15)
NEUTROPHILS # BLD AUTO: 3.9 K/UL (ref 1.8–7.7)
NEUTROPHILS NFR BLD: 50.2 % (ref 38–73)
NRBC BLD-RTO: 0 /100 WBC
PLATELET # BLD AUTO: 309 K/UL (ref 150–450)
PMV BLD AUTO: 12.6 FL (ref 9.2–12.9)
POTASSIUM SERPL-SCNC: 3.5 MMOL/L (ref 3.5–5.1)
PROT SERPL-MCNC: 7.1 G/DL (ref 6–8.4)
RBC # BLD AUTO: 4.19 M/UL (ref 4–5.4)
SATURATED IRON: 11 % (ref 20–50)
SODIUM SERPL-SCNC: 145 MMOL/L (ref 136–145)
TOTAL IRON BINDING CAPACITY: 318 UG/DL (ref 250–450)
TRANSFERRIN SERPL-MCNC: 215 MG/DL (ref 200–375)
WBC # BLD AUTO: 7.71 K/UL (ref 3.9–12.7)

## 2024-02-19 PROCEDURE — 1160F RVW MEDS BY RX/DR IN RCRD: CPT | Mod: CPTII,S$GLB,, | Performed by: FAMILY MEDICINE

## 2024-02-19 PROCEDURE — 3044F HG A1C LEVEL LT 7.0%: CPT | Mod: CPTII,S$GLB,, | Performed by: FAMILY MEDICINE

## 2024-02-19 PROCEDURE — 99214 OFFICE O/P EST MOD 30 MIN: CPT | Mod: S$GLB,,, | Performed by: FAMILY MEDICINE

## 2024-02-19 PROCEDURE — 3008F BODY MASS INDEX DOCD: CPT | Mod: CPTII,S$GLB,, | Performed by: FAMILY MEDICINE

## 2024-02-19 PROCEDURE — 85025 COMPLETE CBC W/AUTO DIFF WBC: CPT | Performed by: FAMILY MEDICINE

## 2024-02-19 PROCEDURE — 1126F AMNT PAIN NOTED NONE PRSNT: CPT | Mod: CPTII,S$GLB,, | Performed by: FAMILY MEDICINE

## 2024-02-19 PROCEDURE — 3072F LOW RISK FOR RETINOPATHY: CPT | Mod: CPTII,S$GLB,, | Performed by: FAMILY MEDICINE

## 2024-02-19 PROCEDURE — 3074F SYST BP LT 130 MM HG: CPT | Mod: CPTII,S$GLB,, | Performed by: FAMILY MEDICINE

## 2024-02-19 PROCEDURE — 83036 HEMOGLOBIN GLYCOSYLATED A1C: CPT | Performed by: FAMILY MEDICINE

## 2024-02-19 PROCEDURE — 3288F FALL RISK ASSESSMENT DOCD: CPT | Mod: CPTII,S$GLB,, | Performed by: FAMILY MEDICINE

## 2024-02-19 PROCEDURE — 99999 PR PBB SHADOW E&M-EST. PATIENT-LVL V: CPT | Mod: PBBFAC,,, | Performed by: FAMILY MEDICINE

## 2024-02-19 PROCEDURE — 80053 COMPREHEN METABOLIC PANEL: CPT | Performed by: FAMILY MEDICINE

## 2024-02-19 PROCEDURE — 1159F MED LIST DOCD IN RCRD: CPT | Mod: CPTII,S$GLB,, | Performed by: FAMILY MEDICINE

## 2024-02-19 PROCEDURE — 82728 ASSAY OF FERRITIN: CPT | Performed by: FAMILY MEDICINE

## 2024-02-19 PROCEDURE — 83540 ASSAY OF IRON: CPT | Performed by: FAMILY MEDICINE

## 2024-02-19 PROCEDURE — 36415 COLL VENOUS BLD VENIPUNCTURE: CPT | Mod: PO | Performed by: FAMILY MEDICINE

## 2024-02-19 PROCEDURE — 3079F DIAST BP 80-89 MM HG: CPT | Mod: CPTII,S$GLB,, | Performed by: FAMILY MEDICINE

## 2024-02-19 PROCEDURE — 1101F PT FALLS ASSESS-DOCD LE1/YR: CPT | Mod: CPTII,S$GLB,, | Performed by: FAMILY MEDICINE

## 2024-02-19 RX ORDER — DOXYCYCLINE HYCLATE 100 MG
100 TABLET ORAL DAILY
COMMUNITY
Start: 2024-01-22

## 2024-02-19 RX ORDER — MELOXICAM 7.5 MG/1
7.5 TABLET ORAL
COMMUNITY
Start: 2024-01-22

## 2024-02-19 RX ORDER — CHOLECALCIFEROL (VITAMIN D3) 25 MCG
1 TABLET ORAL EVERY MORNING
COMMUNITY

## 2024-02-19 RX ORDER — METOPROLOL SUCCINATE 50 MG/1
50 TABLET, EXTENDED RELEASE ORAL 2 TIMES DAILY
Qty: 180 TABLET | Refills: 3 | Status: SHIPPED | OUTPATIENT
Start: 2024-02-19

## 2024-02-19 RX ORDER — SENNOSIDES 8.6 MG/1
2 TABLET ORAL 2 TIMES DAILY PRN
COMMUNITY
Start: 2024-01-22

## 2024-02-19 RX ORDER — DILTIAZEM HYDROCHLORIDE 120 MG/1
120 CAPSULE, EXTENDED RELEASE ORAL 2 TIMES DAILY
Qty: 180 CAPSULE | Refills: 3 | Status: SHIPPED | OUTPATIENT
Start: 2024-02-19

## 2024-02-19 RX ORDER — OLMESARTAN MEDOXOMIL AND HYDROCHLOROTHIAZIDE 40/25 40; 25 MG/1; MG/1
1 TABLET ORAL DAILY
Qty: 90 TABLET | Refills: 3 | Status: SHIPPED | OUTPATIENT
Start: 2024-02-19

## 2024-02-19 RX ORDER — SEMAGLUTIDE 2.68 MG/ML
2 INJECTION, SOLUTION SUBCUTANEOUS
Qty: 4 EACH | Refills: 3 | Status: SHIPPED | OUTPATIENT
Start: 2024-02-19 | End: 2024-05-29

## 2024-02-19 RX ORDER — BUPROPION HYDROCHLORIDE 150 MG/1
150 TABLET ORAL DAILY
Qty: 30 TABLET | Refills: 3 | Status: SHIPPED | OUTPATIENT
Start: 2024-02-19

## 2024-02-19 RX ORDER — POTASSIUM CHLORIDE 20 MEQ/1
20 TABLET, EXTENDED RELEASE ORAL ONCE
Qty: 90 TABLET | Refills: 3 | Status: SHIPPED | OUTPATIENT
Start: 2024-02-19 | End: 2024-02-28 | Stop reason: SDUPTHER

## 2024-02-19 RX ORDER — ESOMEPRAZOLE MAGNESIUM 40 MG/1
40 CAPSULE, DELAYED RELEASE ORAL
Qty: 90 CAPSULE | Refills: 3 | Status: SHIPPED | OUTPATIENT
Start: 2024-02-19

## 2024-02-19 RX ORDER — ALBUTEROL SULFATE 90 UG/1
2 AEROSOL, METERED RESPIRATORY (INHALATION) EVERY 4 HOURS PRN
Qty: 18 G | Refills: 3 | Status: SHIPPED | OUTPATIENT
Start: 2024-02-19

## 2024-02-19 RX ORDER — HYDROCODONE BITARTRATE AND ACETAMINOPHEN 10; 325 MG/1; MG/1
1 TABLET ORAL EVERY 6 HOURS PRN
COMMUNITY
Start: 2024-01-22

## 2024-02-19 RX ORDER — LANCETS
EACH MISCELLANEOUS
Qty: 200 EACH | Refills: 6 | Status: SHIPPED | OUTPATIENT
Start: 2024-02-19

## 2024-02-19 RX ORDER — FUROSEMIDE 40 MG/1
40 TABLET ORAL DAILY
Qty: 90 TABLET | Refills: 3 | Status: SHIPPED | OUTPATIENT
Start: 2024-02-19

## 2024-02-19 RX ORDER — TRAMADOL HYDROCHLORIDE 50 MG/1
50 TABLET ORAL EVERY 6 HOURS PRN
COMMUNITY
Start: 2024-01-22

## 2024-02-19 RX ORDER — INSULIN PUMP SYRINGE, 3 ML
EACH MISCELLANEOUS
Qty: 1 EACH | Refills: 0 | Status: SHIPPED | OUTPATIENT
Start: 2024-02-19

## 2024-02-19 RX ORDER — ATORVASTATIN CALCIUM 20 MG/1
20 TABLET, FILM COATED ORAL DAILY
Qty: 90 TABLET | Refills: 3 | Status: SHIPPED | OUTPATIENT
Start: 2024-02-19

## 2024-02-19 NOTE — PROGRESS NOTES
Subjective     Patient ID: Stephanie Raza is a 68 y.o. female.    Chief Complaint: Follow-up (3M F/U: Hypertension + Diabetes.)    Patient presents today to follow up s/p left hip replacement. Patient stay was complicated with acute blood loss anemia and had to receive several units of blood. Patient is currently doing well and almost complete with  physcial therapy and will be starting outpatient physical therapy.      Review of Systems   Constitutional: Negative.    HENT: Negative.     Eyes:  Negative for discharge and redness.   Respiratory: Negative.     Cardiovascular: Negative.  Negative for chest pain, palpitations and leg swelling.   Gastrointestinal: Negative.  Negative for constipation and diarrhea.   Musculoskeletal: Negative.  Negative for arthralgias and gait problem.   Neurological: Negative.  Negative for coordination difficulties.   Psychiatric/Behavioral: Negative.            Objective     Physical Exam  Vitals and nursing note reviewed.   Constitutional:       Appearance: Normal appearance. She is obese.   HENT:      Head: Normocephalic and atraumatic.   Eyes:      Extraocular Movements: Extraocular movements intact.   Cardiovascular:      Rate and Rhythm: Normal rate and regular rhythm.      Pulses: Normal pulses.      Heart sounds: Normal heart sounds.   Pulmonary:      Effort: Pulmonary effort is normal.      Breath sounds: Normal breath sounds.   Abdominal:      General: Abdomen is flat. Bowel sounds are normal.      Palpations: Abdomen is soft.   Musculoskeletal:      Right lower leg: No edema.      Left lower leg: No edema.   Skin:     General: Skin is warm and dry.   Neurological:      General: No focal deficit present.      Mental Status: She is alert and oriented to person, place, and time.   Psychiatric:         Mood and Affect: Mood normal.         Behavior: Behavior normal.            Assessment and Plan     1. Iron deficiency anemia due to chronic blood loss  Comments:  will  check levels due to anemia post surgery requiring transfusion  Orders:  -     CBC W/ AUTO DIFFERENTIAL; Future; Expected date: 02/19/2024  -     COMPREHENSIVE METABOLIC PANEL; Future; Expected date: 02/19/2024  -     IRON AND TIBC; Future; Expected date: 02/19/2024  -     FERRITIN; Future; Expected date: 02/19/2024    2. Type 2 diabetes mellitus with diabetic polyneuropathy, with long-term current use of insulin  Comments:  stable and controlledon ozempic and tresiba  Orders:  -     OZEMPIC 2 mg/dose (8 mg/3 mL) PnIj; Inject 2 mg into the skin every 7 days.  Dispense: 4 each; Refill: 3  -     blood-glucose meter kit; To check BG 3 times daily, to use with insurance preferred meter  Dispense: 1 each; Refill: 0  -     lancets Misc; To check BG 3 times daily, to use with insurance preferred meter  Dispense: 200 each; Refill: 6  -     blood sugar diagnostic Strp; To check BG 3 times daily, to use with insurance preferred meter  Dispense: 200 strip; Refill: 6  -     HEMOGLOBIN A1C; Future; Expected date: 02/19/2024    3. Mild episode of recurrent major depressive disorder  Comments:  increase dosage of wellbutrin to also help with focus  Orders:  -     buPROPion (WELLBUTRIN XL) 150 MG TB24 tablet; Take 1 tablet (150 mg total) by mouth once daily.  Dispense: 30 tablet; Refill: 3    4. Primary hypertension  Comments:  stable and controlled on olmesartan and diltiazem  Overview:  Essential hypertension (Problem)    Orders:  -     diltiaZEM HCl 120 mg Cp12; Take 1 capsule (120 mg total) by mouth 2 (two) times a day.  Dispense: 180 capsule; Refill: 3  -     furosemide (LASIX) 40 MG tablet; Take 1 tablet (40 mg total) by mouth once daily.  Dispense: 90 tablet; Refill: 3  -     metoprolol succinate (TOPROL-XL) 50 MG 24 hr tablet; Take 1 tablet (50 mg total) by mouth 2 (two) times daily.  Dispense: 180 tablet; Refill: 3  -     olmesartan-hydrochlorothiazide (BENICAR HCT) 40-25 mg per tablet; Take 1 tablet by mouth once daily.   Dispense: 90 tablet; Refill: 3  -     potassium chloride SA (K-DUR,KLOR-CON) 20 MEQ tablet; Take 1 tablet (20 mEq total) by mouth once. for 1 dose  Dispense: 90 tablet; Refill: 3    5. Mild intermittent asthma without complication  Comments:  stable and controlled on albuterol prn  Orders:  -     albuterol (PROVENTIL/VENTOLIN HFA) 90 mcg/actuation inhaler; Inhale 2 puffs into the lungs every 4 (four) hours as needed for Wheezing.  Dispense: 18 g; Refill: 3    6. Hyperlipidemia, unspecified hyperlipidemia type  Comments:  stable and controlled on atorastatin  Orders:  -     atorvastatin (LIPITOR) 20 MG tablet; Take 1 tablet (20 mg total) by mouth once daily.  Dispense: 90 tablet; Refill: 3    7. Gastroesophageal reflux disease without esophagitis  Comments:  stable and controlled on esomeprazole  Orders:  -     esomeprazole (NEXIUM) 40 MG capsule; Take 1 capsule (40 mg total) by mouth before breakfast.  Dispense: 90 capsule; Refill: 3    8. S/P revision of total hip  Overview:  Last Assessment & Plan:    Formatting of this note might be different from the original.   History & Physical management per orthopedic surgery. She is on Ancef post operatively. She received 1U PRBC and 1u FFP in surgery. Check CBC tomorrow. Touchdown weightbearing with posterior hip precautions, no active abduction. PT/OT. Lovenox for DVT prophylaxis.       Discharge Summary       Follow-up Ms. Blackwood is a 68-year-old -American female with history of underlying HTN, DM2 with associated peripheral neuropathy, depression, asthma, morbid obesity who presented to our [place] on 1/23 for planned left total hip replacement.  Patient underwent revision with Dr. Hamlin without any noted severe complications and [facility] was asked to following for routine medical management in the postoperative setting.  Will defer usual postoperative management to orthopedic surgery at this time.  Recommending avoiding significant opioid therapy.  Avoid all  NSAIDs at this time                   Follow up in about 3 months (around 5/19/2024).

## 2024-02-20 PROBLEM — N17.9 AKI (ACUTE KIDNEY INJURY): Status: ACTIVE | Noted: 2024-01-24

## 2024-02-20 PROBLEM — N17.9 AKI (ACUTE KIDNEY INJURY): Status: RESOLVED | Noted: 2024-01-24 | Resolved: 2024-02-20

## 2024-02-20 PROBLEM — D62 ACUTE BLOOD LOSS ANEMIA (ABLA): Status: ACTIVE | Noted: 2024-01-24

## 2024-02-20 PROBLEM — D62 ACUTE BLOOD LOSS ANEMIA (ABLA): Status: RESOLVED | Noted: 2024-01-24 | Resolved: 2024-02-20

## 2024-02-20 PROBLEM — Z96.649 S/P REVISION OF TOTAL HIP: Status: ACTIVE | Noted: 2024-01-23

## 2024-02-20 LAB
ESTIMATED AVG GLUCOSE: 117 MG/DL (ref 68–131)
HBA1C MFR BLD: 5.7 % (ref 4–5.6)

## 2024-02-28 DIAGNOSIS — I10 PRIMARY HYPERTENSION: Chronic | ICD-10-CM

## 2024-02-28 RX ORDER — POTASSIUM CHLORIDE 20 MEQ/1
20 TABLET, EXTENDED RELEASE ORAL DAILY
Qty: 90 TABLET | Refills: 3 | Status: SHIPPED | OUTPATIENT
Start: 2024-02-28

## 2024-02-28 NOTE — TELEPHONE ENCOUNTER
----- Message from Nikki Dunne sent at 2/28/2024 12:24 PM CST -----  Carlos Hernández called in this afternoon stating the prescription for Potassium chloride is missing dosing frequency . Please call back [phone] or [phone]

## 2024-03-27 NOTE — TELEPHONE ENCOUNTER
No care due was identified.  [facility] Embedded Care Due Messages. Reference number: [id number].   3/27/2024 10:27:58 AM CDT

## 2024-03-28 RX ORDER — INSULIN DEGLUDEC 200 U/ML
INJECTION, SOLUTION SUBCUTANEOUS
Qty: 9 PEN | Refills: 1 | Status: SHIPPED | OUTPATIENT
Start: 2024-03-28

## 2024-03-28 NOTE — TELEPHONE ENCOUNTER
Refill Decision Note   Stephanie Raza  is requesting a refill authorization.  Brief Assessment and Rationale for Refill:  Approve     Medication Therapy Plan:         Comments:     Note composed:1:09 PM 03/28/2024

## 2024-05-28 DIAGNOSIS — Z00.00 ENCOUNTER FOR MEDICARE ANNUAL WELLNESS EXAM: ICD-10-CM

## 2024-08-27 ENCOUNTER — TELEPHONE (OUTPATIENT)
Dept: INTERNAL MEDICINE | Facility: CLINIC | Age: 69
End: 2024-08-27
Payer: MEDICARE

## 2024-08-27 NOTE — TELEPHONE ENCOUNTER
----- Message from Jennifer Dunne sent at 8/27/2024  2:51 PM CDT -----  Regarding: Appt Access  Contact: pt [phone]  Patient calling to schedule annual visit. Pls call

## 2024-08-27 NOTE — TELEPHONE ENCOUNTER
Called and spoke to pt. Pt actually needs an appointment for muscle pain in hip. Scheduled pt with Kristy Mackenzie NP

## 2024-08-28 DIAGNOSIS — E11.42 TYPE 2 DIABETES MELLITUS WITH DIABETIC POLYNEUROPATHY, WITH LONG-TERM CURRENT USE OF INSULIN: Chronic | ICD-10-CM

## 2024-08-28 DIAGNOSIS — E11.9 TYPE 2 DIABETES MELLITUS WITHOUT COMPLICATION: ICD-10-CM

## 2024-08-28 DIAGNOSIS — Z79.4 TYPE 2 DIABETES MELLITUS WITH DIABETIC POLYNEUROPATHY, WITH LONG-TERM CURRENT USE OF INSULIN: Chronic | ICD-10-CM

## 2024-08-28 NOTE — TELEPHONE ENCOUNTER
Care Due:                  Date            Visit Type   Department     Provider  --------------------------------------------------------------------------------                                EP -                              PRIMARY      [facility] INTERNAL  Reyna  Last Visit: 02-      CARE ([facility])   MEDICINE       Pate-Pedescleaux  Next Visit: None Scheduled  None         None Found                                                            Last  Test          Frequency    Reason                     Performed    Due Date  --------------------------------------------------------------------------------    HBA1C.......  6 months...  CHELSEY BURCIAGA.........  02- 08-    Lipid Panel.  12 months..  atorvastatin.............  11-   10-    [facility] Embedded Care Due Messages. Reference number: [id number].   8/28/2024 11:08:33 AM CDT

## 2024-08-28 NOTE — TELEPHONE ENCOUNTER
Refill Routing Note   Medication(s) are not appropriate for processing by [facility] for the following reason(s):        Required labs outdated    [facility] action(s):  Defer     Requires labs : Yes             Appointments  past 12m or future 3m with PCP    Date Provider   Last Visit   2/19/2024 Reyna Suarez MD   Next Visit   Visit date not found Reyna Suarez MD   ED visits in past 90 days: 0        Note composed:3:16 PM 08/28/2024

## 2024-08-29 RX ORDER — SEMAGLUTIDE 2.68 MG/ML
INJECTION, SOLUTION SUBCUTANEOUS
Qty: 9 EACH | Refills: 0 | Status: SHIPPED | OUTPATIENT
Start: 2024-08-29

## 2024-09-06 ENCOUNTER — OFFICE VISIT (OUTPATIENT)
Dept: INTERNAL MEDICINE | Facility: CLINIC | Age: 69
End: 2024-09-06
Payer: MEDICARE

## 2024-09-06 VITALS
SYSTOLIC BLOOD PRESSURE: 132 MMHG | WEIGHT: 252.19 LBS | DIASTOLIC BLOOD PRESSURE: 78 MMHG | HEART RATE: 108 BPM | OXYGEN SATURATION: 97 % | TEMPERATURE: 97 F | BODY MASS INDEX: 46.13 KG/M2

## 2024-09-06 DIAGNOSIS — Z63.9 FAMILY RELATIONSHIP PROBLEM: ICD-10-CM

## 2024-09-06 DIAGNOSIS — G57.02 PIRIFORMIS SYNDROME OF LEFT SIDE: Primary | ICD-10-CM

## 2024-09-06 DIAGNOSIS — R10.32 LEFT GROIN PAIN: ICD-10-CM

## 2024-09-06 DIAGNOSIS — M79.18 LEFT BUTTOCK PAIN: ICD-10-CM

## 2024-09-06 PROCEDURE — 99999 PR PBB SHADOW E&M-EST. PATIENT-LVL V: CPT | Mod: PBBFAC,HCNC,, | Performed by: NURSE PRACTITIONER

## 2024-09-06 RX ORDER — TIZANIDINE 4 MG/1
4 TABLET ORAL EVERY 6 HOURS PRN
Qty: 30 TABLET | Refills: 0 | Status: SHIPPED | OUTPATIENT
Start: 2024-09-06 | End: 2024-09-16

## 2024-09-06 SDOH — SOCIAL DETERMINANTS OF HEALTH (SDOH): PROBLEM RELATED TO PRIMARY SUPPORT GROUP, UNSPECIFIED: Z63.9

## 2024-09-06 NOTE — PROGRESS NOTES
Subjective:       Patient ID: Stephanie Raza is a 68 y.o. female.    Chief Complaint: Leg Pain    Patient here for pain below left buttock/upper thigh started a few days ago   Denies trauma   Had hip surgery in January of this year.  Then did physical therapy for her back.  The pain in this area has been going on for the last few days.  She took Tylenol which did not help.  Heating pad gave her temporary relief but pain returned shortly afterwards  Pain is worse with movement, position changes  Some relationship issues with her 40-year-old daughter.  She thinks this stems to her losing her own mother in the past.  She is asking to do some counseling.    Leg Pain         /78   Pulse 108   Temp 97.2 °F (36.2 °C)   Wt 114.4 kg (252 lb 3.3 oz)   SpO2 97%   BMI 46.13 kg/m²     Review of Systems   Constitutional:  Positive for activity change. Negative for appetite change, chills, diaphoresis, fatigue, fever and unexpected weight change.   HENT: Negative.     Respiratory:  Negative for cough and shortness of breath.    Cardiovascular:  Negative for chest pain, palpitations and leg swelling.   Gastrointestinal: Negative.    Genitourinary: Negative.    Musculoskeletal:  Positive for gait problem and myalgias.   Skin:  Negative for color change, pallor, rash and wound.   Allergic/Immunologic: Negative for immunocompromised state.   Neurological:  Negative for dizziness and facial asymmetry.   Hematological:  Negative for adenopathy. Does not bruise/bleed easily.   Psychiatric/Behavioral:  Positive for dysphoric mood. Negative for agitation, behavioral problems and confusion.        Objective:      Physical Exam  Constitutional:       General: She is not in acute distress.     Appearance: Normal appearance. She is well-developed. She is not diaphoretic.   HENT:      Head: Normocephalic and atraumatic.      Right Ear: External ear normal.      Left Ear: External ear normal.   Eyes:      General: No scleral  icterus.        Right eye: No discharge.         Left eye: No discharge.      Conjunctiva/sclera: Conjunctivae normal.   Pulmonary:      Effort: Pulmonary effort is normal. No tachypnea, accessory muscle usage or respiratory distress.      Breath sounds: No stridor.   Musculoskeletal:      Cervical back: Normal range of motion and neck supple.        Legs:       Comments: Area per drawing is tender to touch.  Pain is worse with position changes/when patient stood up and sat down from the exam chair for examination area does not feel to be swollen   Skin:     Findings: No rash.   Neurological:      Mental Status: She is alert. She is not disoriented.   Psychiatric:         Attention and Perception: She is attentive.         Mood and Affect: Mood normal. Mood is not anxious or depressed. Affect is not labile, blunt, angry or inappropriate.         Speech: Speech normal.         Behavior: Behavior normal.         Thought Content: Thought content normal.         Judgment: Judgment normal.         Assessment:       1. Piriformis syndrome of left side    2. Left buttock pain    3. Left groin pain    4. Family relationship problem        Plan:       Stephanie was seen today for leg pain.    Diagnoses and all orders for this visit:    Piriformis syndrome of left side  -     tiZANidine (ZANAFLEX) 4 MG tablet; Take 1 tablet (4 mg total) by mouth every 6 (six) hours as needed (muscle tightness).  -     Ambulatory referral/consult to Physical/Occupational Therapy; Future  At denies any   Tylenol as needed   Heating pad as needed   Refer to [facility] physical therapy per request     Left buttock pain  -     tiZANidine (ZANAFLEX) 4 MG tablet; Take 1 tablet (4 mg total) by mouth every 6 (six) hours as needed (muscle tightness).  -     Ambulatory referral/consult to Physical/Occupational Therapy; Future    Left groin pain  -     tiZANidine (ZANAFLEX) 4 MG tablet; Take 1 tablet (4 mg total) by mouth every 6 (six) hours as needed (muscle  tightness).  -     Ambulatory referral/consult to Physical/Occupational Therapy; Future    Family relationship problem  -     Ambulatory referral/consult to Psychology; Future  Refer to [facility] for counseling

## 2024-09-10 ENCOUNTER — PATIENT MESSAGE (OUTPATIENT)
Dept: PSYCHIATRY | Facility: CLINIC | Age: 69
End: 2024-09-10
Payer: MEDICARE

## 2024-09-17 DIAGNOSIS — Z79.4 TYPE 2 DIABETES MELLITUS WITH DIABETIC POLYNEUROPATHY, WITH LONG-TERM CURRENT USE OF INSULIN: ICD-10-CM

## 2024-09-17 DIAGNOSIS — Z79.4 TYPE 2 DIABETES MELLITUS WITH DIABETIC POLYNEUROPATHY, WITH LONG-TERM CURRENT USE OF INSULIN: Primary | ICD-10-CM

## 2024-09-17 DIAGNOSIS — E11.42 TYPE 2 DIABETES MELLITUS WITH DIABETIC POLYNEUROPATHY, WITH LONG-TERM CURRENT USE OF INSULIN: ICD-10-CM

## 2024-09-17 DIAGNOSIS — E11.42 TYPE 2 DIABETES MELLITUS WITH DIABETIC POLYNEUROPATHY, WITH LONG-TERM CURRENT USE OF INSULIN: Primary | ICD-10-CM

## 2024-09-17 PROBLEM — E11.21 MICROALBUMINURIC DIABETIC NEPHROPATHY: Status: ACTIVE | Noted: 2024-09-17

## 2024-09-17 PROBLEM — M48.062 SPINAL STENOSIS OF LUMBAR REGION WITH NEUROGENIC CLAUDICATION: Status: ACTIVE | Noted: 2024-09-17

## 2024-09-17 PROBLEM — E11.36 DIABETIC CATARACT: Status: ACTIVE | Noted: 2024-09-17

## 2024-09-17 PROBLEM — E11.59 HYPERTENSION ASSOCIATED WITH DIABETES: Status: ACTIVE | Noted: 2018-10-29

## 2024-09-17 PROBLEM — M47.816 LUMBAR SPONDYLOSIS: Status: ACTIVE | Noted: 2024-09-17

## 2024-09-17 PROBLEM — M48.061 SPINAL STENOSIS OF LUMBAR REGION WITHOUT NEUROGENIC CLAUDICATION: Status: ACTIVE | Noted: 2024-09-17

## 2024-09-17 PROBLEM — I15.2 HYPERTENSION ASSOCIATED WITH DIABETES: Status: ACTIVE | Noted: 2018-10-29

## 2024-09-17 PROBLEM — J45.20 MILD INTERMITTENT ASTHMA WITHOUT COMPLICATION: Status: ACTIVE | Noted: 2024-01-23

## 2024-09-17 RX ORDER — INSULIN DEGLUDEC 200 U/ML
60 INJECTION, SOLUTION SUBCUTANEOUS DAILY
Qty: 9 PEN | Refills: 3 | Status: SHIPPED | OUTPATIENT
Start: 2024-09-17

## 2024-09-17 RX ORDER — INSULIN DEGLUDEC 200 U/ML
60 INJECTION, SOLUTION SUBCUTANEOUS DAILY
Qty: 9 PEN | Refills: 3 | Status: SHIPPED | OUTPATIENT
Start: 2024-09-17 | End: 2024-09-17 | Stop reason: SDUPTHER

## 2024-09-19 DIAGNOSIS — F33.0 MILD EPISODE OF RECURRENT MAJOR DEPRESSIVE DISORDER: Chronic | ICD-10-CM

## 2024-09-19 NOTE — TELEPHONE ENCOUNTER
No care due was identified.  [facility] Embedded Care Due Messages. Reference number: [id number].   9/19/2024 5:49:51 PM CDT

## 2024-09-20 ENCOUNTER — PATIENT MESSAGE (OUTPATIENT)
Dept: ADMINISTRATIVE | Facility: CLINIC | Age: 69
End: 2024-09-20
Payer: MEDICARE

## 2024-09-20 RX ORDER — BUPROPION HYDROCHLORIDE 150 MG/1
TABLET ORAL
Qty: 90 TABLET | Refills: 1 | Status: SHIPPED | OUTPATIENT
Start: 2024-09-20

## 2024-09-20 NOTE — TELEPHONE ENCOUNTER
Refill Decision Note   Stephanie Raza  is requesting a refill authorization.  Brief Assessment and Rationale for Refill:  Approve     Medication Therapy Plan:         Comments:     Note composed:4:42 AM 09/20/2024

## 2024-09-25 ENCOUNTER — TELEPHONE (OUTPATIENT)
Dept: ADMINISTRATIVE | Facility: CLINIC | Age: 69
End: 2024-09-25
Payer: MEDICARE

## 2024-09-25 NOTE — TELEPHONE ENCOUNTER
Called pt; informed pt I was calling to confirm her virtual EAWV on 9/27/24 at 2:30pm and to see if she needed any help; pt stated she did not need any help and would complete e-pre check later today; pt informed to login 10 minutes prior to appt time

## 2024-09-27 ENCOUNTER — OFFICE VISIT (OUTPATIENT)
Dept: INTERNAL MEDICINE | Facility: CLINIC | Age: 69
End: 2024-09-27
Payer: MEDICARE

## 2024-09-27 DIAGNOSIS — E11.21 MICROALBUMINURIC DIABETIC NEPHROPATHY: ICD-10-CM

## 2024-09-27 DIAGNOSIS — Z96.649 S/P REVISION OF TOTAL HIP: ICD-10-CM

## 2024-09-27 DIAGNOSIS — M47.816 LUMBAR SPONDYLOSIS: ICD-10-CM

## 2024-09-27 DIAGNOSIS — E11.42 TYPE 2 DIABETES MELLITUS WITH DIABETIC POLYNEUROPATHY, WITH LONG-TERM CURRENT USE OF INSULIN: ICD-10-CM

## 2024-09-27 DIAGNOSIS — M48.062 SPINAL STENOSIS OF LUMBAR REGION WITH NEUROGENIC CLAUDICATION: ICD-10-CM

## 2024-09-27 DIAGNOSIS — J45.20 MILD INTERMITTENT ASTHMA WITHOUT COMPLICATION: ICD-10-CM

## 2024-09-27 DIAGNOSIS — Z12.11 COLON CANCER SCREENING: ICD-10-CM

## 2024-09-27 DIAGNOSIS — Z00.00 ENCOUNTER FOR MEDICARE ANNUAL WELLNESS EXAM: Primary | ICD-10-CM

## 2024-09-27 DIAGNOSIS — Z12.31 BREAST CANCER SCREENING BY MAMMOGRAM: ICD-10-CM

## 2024-09-27 DIAGNOSIS — Z79.4 TYPE 2 DIABETES MELLITUS WITH DIABETIC POLYNEUROPATHY, WITH LONG-TERM CURRENT USE OF INSULIN: ICD-10-CM

## 2024-09-27 DIAGNOSIS — I15.2 HYPERTENSION ASSOCIATED WITH DIABETES: ICD-10-CM

## 2024-09-27 DIAGNOSIS — R26.9 ABNORMALITY OF GAIT AND MOBILITY: ICD-10-CM

## 2024-09-27 DIAGNOSIS — E11.59 HYPERTENSION ASSOCIATED WITH DIABETES: ICD-10-CM

## 2024-09-27 DIAGNOSIS — E11.36 DIABETIC CATARACT: ICD-10-CM

## 2024-09-27 DIAGNOSIS — E66.01 CLASS 3 SEVERE OBESITY DUE TO EXCESS CALORIES WITH SERIOUS COMORBIDITY AND BODY MASS INDEX (BMI) OF 45.0 TO 49.9 IN ADULT: ICD-10-CM

## 2024-09-27 DIAGNOSIS — F33.0 MILD EPISODE OF RECURRENT MAJOR DEPRESSIVE DISORDER: ICD-10-CM

## 2024-09-27 NOTE — Clinical Note
Good afternoon,   Can your team please contact this patient to schedule her diabetic foot exam? She requested a phone call rather than portal message.    Thank you,   Génesis

## 2024-09-27 NOTE — PATIENT INSTRUCTIONS
Counseling and Referral of Other Preventative  (Italic type indicates deductible and co-insurance are waived)    Patient Name: Stephanie Raza  Today's Date: 9/27/2024    Health Maintenance       Date Due Completion Date    Pneumococcal Vaccines (Age 65+) (1 of 2 - PCV) Never done ---    Foot Exam Never done ---    TETANUS VACCINE Never done ---    DEXA Scan Never done ---    Shingles Vaccine (1 of 2) Never done ---    RSV Vaccine (Age 60+ and Pregnant patients) (1 - 1-dose 60+ series) Never done ---    Eye Exam 03/24/2024 3/24/2023    Hemoglobin A1c 08/19/2024 2/19/2024    Influenza Vaccine (1) Never done ---    COVID-19 Vaccine (6 - 2023-24 season) 09/01/2024 10/31/2023    Mammogram 10/16/2024 10/16/2023    Diabetes Urine Screening 11/02/2024 11/2/2023    Lipid Panel 11/02/2024 11/2/2023    Colorectal Cancer Screening 11/29/2031 11/29/2021        Orders Placed This Encounter   Procedures    Mammo Digital Screening Bilat w/ Arturo    Ambulatory referral/consult to Ophthalmology    Ambulatory referral/consult to Podiatry     The following information is provided to all patients.  This information is to help you find resources for any of the problems found today that may be affecting your health:                  Living healthy guide: www.[facility].[place].gov      Understanding Diabetes: www.diabetes.org      Eating healthy: www.cdc.gov/healthyweight      CDC home safety checklist: www.cdc.gov/steadi/patient.html      Agency on Aging: www.goea.[place].[facility]      Alcoholics anonymous (AA): www.aa.org      Physical Activity: www.marc.nih.gov/kg0zfix      Tobacco use: www.quitwithusla.org

## 2024-09-27 NOTE — Clinical Note
Good afternoon,   Can your team please contact this patient to schedule her diabetic eye exam? She requested a phone call rather than portal message.    Thank you,   Génesis

## 2024-09-27 NOTE — PROGRESS NOTES
The patient location is: [place]  The chief complaint leading to consultation is: Medicare eAWV    Visit type: audiovisual    Face to Face time with patient: 33 minutes  60 minutes of total time spent on the encounter, which includes face to face time and non-face to face time preparing to see the patient (eg, review of tests), Obtaining and/or reviewing separately obtained history, Documenting clinical information in the electronic or other health record, Independently interpreting results (not separately reported) and communicating results to the patient/family/caregiver, or Care coordination (not separately reported).         Each patient to whom he or she provides medical services by telemedicine is:  (1) informed of the relationship between the physician and patient and the respective role of any other health care provider with respect to management of the patient; and (2) notified that he or she may decline to receive medical services by telemedicine and may withdraw from such care at any time.    Notes:       Stephanie Raza presented for a Medicare AWV today. The following components were reviewed and updated:    Medical history  Family History  Social history  Allergies and Current Medications  Health Risk Assessment  Health Maintenance  Care Team    **See Completed Assessments for Annual Wellness visit with in the encounter summary    The following assessments were completed:  Depression Screening  Cognitive function Screening  Timed Get Up Test  Whisper Test      Opioid documentation:      Patient does not have a current opioid prescription.          There were no vitals filed for this visit.  There is no height or weight on file to calculate BMI.       Physical Exam  Constitutional:       Appearance: Normal appearance. She is obese.   HENT:      Head: Normocephalic and atraumatic.   Neurological:      General: No focal deficit present.      Mental Status: She is alert and oriented to person, place, and time.  "  Psychiatric:         Mood and Affect: Mood normal.         Behavior: Behavior normal.         Thought Content: Thought content normal.         Judgment: Judgment normal.       Diagnoses and health risks identified today and associated recommendations/orders:  1. Encounter for Medicare annual wellness exam  Reviewed and discussed preventive health screenings and vaccinations with the patient. Willing to receive COVID-19 booster through local [facility] next month. Declines flu, RSV, PCV-20, and Shingrix.   Scheduled mammogram, DEXA, and A1c. Will message Dr. Khan's office to schedule eye exam and Dr. Davis's office for foot exam.     2. Type 2 diabetes mellitus with diabetic polyneuropathy, with long-term current use of insulin  Stable glycemic control  Continue plan per PCP and Podiatrist and follow up for updated A1c and foot exam as advised.     - Ambulatory referral/consult to Ophthalmology; Future  - Ambulatory referral/consult to Podiatry; Future    Diabetes Management Status    Statin: Taking  ACE/ARB: Taking    Screening or Prevention Patient's value Goal Complete/Controlled?   HgA1C Testing and Control   Lab Results   Component Value Date    HGBA1C 5.7 (H) 02/19/2024      Annually/Less than 8% Yes   Lipid profile : 11/02/2023 Annually Yes   LDL control Lab Results   Component Value Date    LDLCALC 106.2 11/02/2023    Annually/Less than 100 mg/dl  No   Nephropathy screening Lab Results   Component Value Date    LABMICR 47.0 11/02/2023     No results found for: "PROTEINUA" Annually Yes   Blood pressure BP Readings from Last 1 Encounters:   09/06/24 132/78    Less than 140/90 Yes   Dilated retinal exam : 03/24/2023 Annually No   Foot exam   Most Recent Foot Exam Date: Not Found Annually No     Lab Results   Component Value Date    HGBA1C 5.7 (H) 02/19/2024    HGBA1C 6.6 (H) 11/02/2023    EGFRNORACEVR >60.0 02/19/2024    MICALBCREAT 34.6 (H) 11/02/2023    LDLCALC 106.2 11/02/2023     No results found for: " ""GLUTAMICACID", "CPEPTIDE"   Last 5 Blood Glucose Readings           No data to display               HEALTH MAINTENANCE: Diabetic health maintenance interventions reviewed and are up to date except for:      Topic    Eye Exam     Hemoglobin A1C      3. Hypertension associated with diabetes  On Diltiazem, Metoprolol, Benicar-HCT.   Stable. Continue current treatment plan as previously prescribed with your PCP     BP Readings from Last 3 Encounters:   09/06/24 132/78   02/19/24 128/82   01/18/24 120/70     4. Microalbuminuric diabetic nephropathy  On RAAS inhibitor.   Stable. Continue current treatment plan as previously prescribed with your PCP     5. Diabetic cataract  Stable. Continue current treatment plan as previously prescribed with your Ophthalmologist.     6. Class 3 severe obesity due to excess calories with serious comorbidity and body mass index (BMI) of 45.0 to 49.9 in adult  On GLP-1 agonist in setting of DM2 bur hasn't noticed much weight-loss benefit with medication.   Discussed comprehensive lifestyle interventions for obesity, including focusing on a diet that is low-fat/low-calorie with emphasis on fresh vegetables, fruits, and lean meats, as well as an exercise regimen as tolerated.     7. Mild episode of recurrent major depressive disorder  On Wellbutrin. Interested in seeing counseling/therapy - she didn't receive portal message / doesn't really check portal but keeps active so her family can stay UTD on her appts. Will re-order referral requesting a phone call to schedule. Continue current treatment and follow up with therapist.     - Ambulatory referral/consult to Psychiatry; Future    8. Spinal stenosis of lumbar region with neurogenic claudication  9. Lumbar spondylosis  Chronic LBP. On Mobic and Gabapentin. Following with Orthopedics. Stable. Continue current treatment plan as previously prescribed with your specialists.     10. S/P revision of total hip  Stable, working with PT. Continue " current treatment plan as previously prescribed with your Orthopedic surgeon.     11. Mild intermittent asthma without complication  Stable on current regimen. Continue current treatment plan as previously prescribed with your PCP     12. Breast cancer screening by mammogram  - Mammo Digital Screening Bilat w/ Arturo; Future    13. Abnormality of gait and mobility  Fall precautions reviewed. Continue working with PT and continue consistent use of walker or cane for ambulation.     14. Colon cancer screening  She is unsure if she received fitkit. We discussed options for colon cancer screening; she'd like to proceed with cologuard.     - Cologuard Screening (Multitarget Stool DNA); Future  - Cologuard Screening (Multitarget Stool DNA)        Provided Stephanie with a 5-10 year written screening schedule and personal prevention plan. Recommendations were developed using the USPSTF age appropriate recommendations. Education, counseling, and referrals were provided as needed.  After Visit Summary printed and given to patient which includes a list of additional screenings\tests needed.    No follow-ups on file.      Génesis Bullard NP    I offered to discuss advanced care planning, including how to pick a person who would make decisions for you if you were unable to make them for yourself, called a health care power of , and what kind of decisions you might make such as use of life sustaining treatments such as ventilators and tube feeding when faced with a life limiting illness recorded on a living will that they will need to know. (How you want to be cared for as you near the end of your natural life)     X Patient is interested in learning more about how to make advanced directives.  I provided them paperwork and offered to discuss this with them.

## 2024-09-30 ENCOUNTER — TELEPHONE (OUTPATIENT)
Dept: PODIATRY | Facility: CLINIC | Age: 69
End: 2024-09-30
Payer: MEDICARE

## 2024-10-09 ENCOUNTER — APPOINTMENT (OUTPATIENT)
Dept: RADIOLOGY | Facility: HOSPITAL | Age: 69
End: 2024-10-09
Attending: FAMILY MEDICINE
Payer: MEDICARE

## 2024-10-09 DIAGNOSIS — Z78.0 MENOPAUSE: ICD-10-CM

## 2024-10-09 PROCEDURE — 77080 DXA BONE DENSITY AXIAL: CPT | Mod: TC,HCNC

## 2024-10-09 PROCEDURE — 77080 DXA BONE DENSITY AXIAL: CPT | Mod: 26,HCNC,, | Performed by: RADIOLOGY

## 2024-10-13 DIAGNOSIS — R10.32 LEFT GROIN PAIN: ICD-10-CM

## 2024-10-13 DIAGNOSIS — G57.02 PIRIFORMIS SYNDROME OF LEFT SIDE: ICD-10-CM

## 2024-10-13 DIAGNOSIS — M79.18 LEFT BUTTOCK PAIN: ICD-10-CM

## 2024-10-14 RX ORDER — TIZANIDINE 4 MG/1
TABLET ORAL
Qty: 30 TABLET | Refills: 0 | Status: SHIPPED | OUTPATIENT
Start: 2024-10-14

## 2024-10-21 ENCOUNTER — PATIENT OUTREACH (OUTPATIENT)
Dept: ADMINISTRATIVE | Facility: HOSPITAL | Age: 69
End: 2024-10-21
Payer: MEDICARE

## 2024-10-21 DIAGNOSIS — I15.2 HYPERTENSION ASSOCIATED WITH DIABETES: Primary | ICD-10-CM

## 2024-10-21 DIAGNOSIS — E11.59 HYPERTENSION ASSOCIATED WITH DIABETES: Primary | ICD-10-CM

## 2024-10-21 NOTE — PROGRESS NOTES
DM LAB REPORT: Ordered & Linked microalbumin urine, CMP, Lipid panel, & HA1c to upcoming LAB appt.

## 2024-10-25 ENCOUNTER — HOSPITAL ENCOUNTER (OUTPATIENT)
Dept: RADIOLOGY | Facility: HOSPITAL | Age: 69
Discharge: HOME | End: 2024-10-25
Attending: NURSE PRACTITIONER
Payer: MEDICARE

## 2024-10-25 DIAGNOSIS — Z12.31 BREAST CANCER SCREENING BY MAMMOGRAM: ICD-10-CM

## 2024-10-25 PROCEDURE — 77063 BREAST TOMOSYNTHESIS BI: CPT | Mod: 26,HCNC,, | Performed by: RADIOLOGY

## 2024-10-25 PROCEDURE — 77067 SCR MAMMO BI INCL CAD: CPT | Mod: TC,HCNC,PO

## 2024-10-25 PROCEDURE — 77063 BREAST TOMOSYNTHESIS BI: CPT | Mod: TC,HCNC,PO

## 2024-10-25 PROCEDURE — 77067 SCR MAMMO BI INCL CAD: CPT | Mod: 26,HCNC,, | Performed by: RADIOLOGY

## 2024-11-06 DIAGNOSIS — E11.9 TYPE 2 DIABETES MELLITUS WITHOUT COMPLICATION: ICD-10-CM

## 2024-11-11 DIAGNOSIS — E11.42 TYPE 2 DIABETES MELLITUS WITH DIABETIC POLYNEUROPATHY, WITH LONG-TERM CURRENT USE OF INSULIN: Chronic | ICD-10-CM

## 2024-11-11 DIAGNOSIS — Z79.4 TYPE 2 DIABETES MELLITUS WITH DIABETIC POLYNEUROPATHY, WITH LONG-TERM CURRENT USE OF INSULIN: Chronic | ICD-10-CM

## 2024-11-11 RX ORDER — SEMAGLUTIDE 2.68 MG/ML
INJECTION, SOLUTION SUBCUTANEOUS
Qty: 9 ML | Refills: 1 | Status: SHIPPED | OUTPATIENT
Start: 2024-11-11

## 2024-11-11 RX ORDER — MIRABEGRON 50 MG/1
1 TABLET, FILM COATED, EXTENDED RELEASE ORAL
Qty: 90 TABLET | Refills: 3 | Status: SHIPPED | OUTPATIENT
Start: 2024-11-11

## 2024-11-11 NOTE — TELEPHONE ENCOUNTER
No care due was identified.  [facility] Embedded Care Due Messages. Reference number: [id number].   11/11/2024 10:29:59 AM CST

## 2024-11-11 NOTE — TELEPHONE ENCOUNTER
Refill Routing Note   Medication(s) are not appropriate for processing by [facility] for the following reason(s):        No active prescription written by provider    [facility] action(s):  Defer  Approve             Appointments  past 12m or future 3m with PCP    Date Provider   Last Visit   2/19/2024 Reyna Suarez MD   Next Visit   12/24/2024 Reyna Suarez MD   ED visits in past 90 days: 0        Note composed:2:44 PM 11/11/2024

## 2024-11-25 RX ORDER — DEXTROSE 4 G
TABLET,CHEWABLE ORAL
Refills: 0 | OUTPATIENT
Start: 2024-11-25

## 2024-11-25 NOTE — TELEPHONE ENCOUNTER
No care due was identified.  [facility] Embedded Care Due Messages. Reference number: [id number].   11/25/2024 4:16:47 PM CST

## 2024-11-26 NOTE — TELEPHONE ENCOUNTER
Refill Decision Note   Stephanie Raza  is requesting a refill authorization.  Brief Assessment and Rationale for Refill:  Quick Discontinue     Medication Therapy Plan: Pharmacy is requesting new scripts for the following medications without required information, (sig/ frequency/qty/etc)       Medication Reconciliation Completed: No   Comments: Pharmacies have been requesting medications for patients without required information, (sig, frequency, qty, etc.). In addition, requests are sent for medication(s) pt. are currently not taking, and medications patients have never taken.    We have spoken to the pharmacies about these request types and advised their teams previously that we are unable to assess these New Script requests and require all details for these requests. This is a known issue and has been reported.     No Care Gaps recommended.     Note composed:8:00 PM 11/25/2024

## 2024-12-06 ENCOUNTER — OFFICE VISIT (OUTPATIENT)
Dept: PODIATRY | Facility: CLINIC | Age: 69
End: 2024-12-06
Payer: MEDICARE

## 2024-12-06 DIAGNOSIS — E11.69 TYPE 2 DIABETES MELLITUS WITH MORBID OBESITY: ICD-10-CM

## 2024-12-06 DIAGNOSIS — E11.8 TYPE 2 DIABETES WITH COMPLICATION: Primary | ICD-10-CM

## 2024-12-06 DIAGNOSIS — L60.3 ONYCHODYSTROPHY: ICD-10-CM

## 2024-12-06 DIAGNOSIS — E66.01 TYPE 2 DIABETES MELLITUS WITH MORBID OBESITY: ICD-10-CM

## 2024-12-06 PROCEDURE — 99999 PR PBB SHADOW E&M-EST. PATIENT-LVL III: CPT | Mod: PBBFAC,HCNC,, | Performed by: PODIATRIST

## 2024-12-06 NOTE — PROGRESS NOTES
Subjective:       Patient ID: Stephanie Raza is a 68 y.o. female.    Chief Complaint: Diabetic Foot Exam (DFE: C/o pain rate 1/10, pt is diabetic, pt is due for foot exam, pt was last seen on 9.27.24 by Juan Miguel Capps)      HPI: Stephanie Raza presents to the office today, under referral by , Reyna Suarez MD, for her annual diabetic foot assessment and risk evaluation.  Patient is a DMII. This patient last saw his/her internal/family medicine physician on 09/27/2024.     Hemoglobin A1C   Date Value Ref Range Status   10/25/2024 6.0 (H) 4.0 - 5.6 % Final     Comment:     ADA Screening Guidelines:  5.7-6.4%  Consistent with prediabetes  >or=6.5%  Consistent with diabetes    High levels of fetal hemoglobin interfere with the HbA1C  assay. Heterozygous hemoglobin variants (HbS, HgC, etc)do  not significantly interfere with this assay.   However, presence of multiple variants may affect accuracy.     02/19/2024 5.7 (H) 4.0 - 5.6 % Final     Comment:     ADA Screening Guidelines:  5.7-6.4%  Consistent with prediabetes  >or=6.5%  Consistent with diabetes    High levels of fetal hemoglobin interfere with the HbA1C  assay. Heterozygous hemoglobin variants (HbS, HgC, etc)do  not significantly interfere with this assay.   However, presence of multiple variants may affect accuracy.     11/02/2023 6.6 (H) 4.0 - 5.6 % Final     Comment:     ADA Screening Guidelines:  5.7-6.4%  Consistent with prediabetes  >or=6.5%  Consistent with diabetes    High levels of fetal hemoglobin interfere with the HbA1C  assay. Heterozygous hemoglobin variants (HbS, HgC, etc)do  not significantly interfere with this assay.   However, presence of multiple variants may affect accuracy.     .    Review of patient's allergies indicates:   Allergen Reactions    Adhesive tape-silicones Swelling     Paper tape       Past Medical History:   Diagnosis Date    Acute blood loss anemia (ABLA) 01/24/2024    Last Assessment & Plan:     Formatting of this note might be different from the original.   History & Physical       Discharge Summary       Follow-up  Patient did receive 1 unit packed red blood cells while in the OR secondary to blood loss.  No obvious bleeding at this time.  She received 2 additional units packed red blood cells per orthopedics.  Hemoglobin running stable at this time no need fo    YOVANA (acute kidney injury) 01/24/2024    Last Assessment & Plan:    Formatting of this note might be different from the original.   History & Physical       Discharge Summary       Follow-up patient with progressive oliguric YOVANA after surgery.  Resolved. Avoid nephrotoxic agents, renally dose all medications where necessary, and protect nondominant arm as much as possible    Anxiety     Arthritis     Asthma     Back pain     Diabetes mellitus type I     Heart murmur     Hyperlipidemia     Hypertension     Obesity     Polyneuropathy     Trouble in sleeping     Type 2 diabetes mellitus     Urinary incontinence        Family History   Problem Relation Name Age of Onset    Breast cancer Mother Naveed Blackwood     Lung cancer Mother Naveed Blackwood     Hypertension Mother Naveed Blackwood     Stroke Father Naveed Blackwood     Cancer Father Naveed Blackwood     Diabetes Sister [employer]     Breast cancer Maternal Aunt      Breast cancer Maternal Aunt      Breast cancer Paternal Aunt      Breast cancer Paternal Aunt         Social History     Socioeconomic History    Marital status:    Tobacco Use    Smoking status: Never    Smokeless tobacco: Never   Substance and Sexual Activity    Alcohol use: Not Currently     Alcohol/week: 3.0 standard drinks of alcohol     Types: 1 Glasses of wine, 2 Shots of liquor per week    Drug use: Never    Sexual activity: Not Currently     Partners: Female     Social Drivers of Health     Financial Resource Strain: Medium Risk (9/27/2024)    Overall Financial Resource Strain (CARDIA)     Difficulty of Paying Living  Expenses: Somewhat hard   Food Insecurity: Food Insecurity Present (2024)    Hunger Vital Sign     Worried About Running Out of Food in the Last Year: Sometimes true     Ran Out of Food in the Last Year: Sometimes true   Transportation Needs: Unmet Transportation Needs (2024)    PRAPARE - Transportation     Lack of Transportation (Medical): Yes     Lack of Transportation (Non-Medical): Yes   Physical Activity: Sufficiently Active (2024)    Exercise Vital Sign     Days of Exercise per Week: 4 days     Minutes of Exercise per Session: 60 min   Stress: Stress Concern Present (2024)    [employer] - Occupational Stress Questionnaire     Feeling of Stress : Very much   Housing Stability: Low Risk  (2024)    Housing Stability Vital Sign     Unable to Pay for Housing in the Last Year: No     Homeless in the Last Year: No       Past Surgical History:   Procedure Laterality Date    ADENOIDECTOMY      BREAST BIOPSY      , benign    BREAST SURGERY       SECTION      hip reconstruction Left 2024    HYSTERECTOMY      JOINT REPLACEMENT         Review of Systems        Objective:   There were no vitals taken for this visit.    Physical Exam  LOWER EXTREMITY PHYSICAL EXAMINATION    ORTHOPEDIC:  No pain on palpation of the foot or ankle.   Range of motion within normal limits of the ankle joint, rearfoot, and forefoot.  Manual muscle strength testing is 5/5 with dorsiflexion, plantar flexion, abduction and abduction of the lower extremity.  No pain with or without resistance.  The patient is a full to ambulate without pain or discomfort.  The patient's gait is non antalgic.  The patient does not utilize any assistive device for ambulation.    VASCULAR:  The right dorsalis pedis pulse 2/4 and the right posterior tibial pulse 2/4.  The left dorsalis pedis pulse 2/4 and posterior tibial pulse on the left is 2/4.  Capillary refill is intact.  Pedal hair growth  intact    NEUROLOGY:  Protective sensation is intact with [place] Alvin monofilament. Proprioception is intact. Intact sensation to light touch.  Vibratory sensation is diminished to the 1st metatarsal phalangeal joint.     DERMATOLOGY:  Skin is supple, moist, intact.  There is no signs of callusing, ulcerations, other lesions identified to the dorsal or plantar aspect of the right or left foot.  The R1, 2, 5 and left L1,2, 5 are thickened, discolored dystrophic.  There is subungual debris.  Nail plates have area of dark discoloration.  The remaining nails 3-4 on the right foot and the left foot are elongated but of normal color, thickness, and texture.   There is no signs of ingrowing into the medial or lateral borders.  There is no evidence of wounds or skin breakdown.  No edema or erythema.  No obvious lacerations or fissuring.  Interdigital spaces are clean, dry, intact.  No rashes or scars appreciated.    Assessment:     1. Type 2 diabetes with complication    2. Type 2 diabetes mellitus with morbid obesity    3. Onychodystrophy        Plan:     Type 2 diabetes with complication    Type 2 diabetes mellitus with morbid obesity    Onychodystrophy    Thorough discussion is had with the patient today, concerning the diagnosis, its etiology, and the treatment algorithm at present.    Thorough discussion is had with the patient concerning the diagnosis, its etiology, and the treatment algorithm at present. Shoe inspection. Foot Education. Patient reminded of the importance of good nutrition and blood sugar control to help prevent podiatric complications of diabetes. Patient instructed on proper foot hygeine. We discussed wearing proper and supportive shoe gear, daily foot inspections, never walking barefooted or sock footed, never putting sharp instruments to feet which can cause major complications associated with infection, ulcers, lacerations.  Diabetic education was discussed and provided. Discussed  appropriate foot care. Encourage compliance with diet and A1c.       Dystrophic nail plates, as outlined above (R#1,2,5  ; L#1,2,5 ), are sharply debrided with double action nail nipper, and/or with the assistance of a mechanical rotary reyes, with removal of all offending nail and nail border(s), for reduction of pains. Nails are reduced in terms of length, width and girth with removal of subungual debris to facilitate pain free weight bearing and ambulation. The elongated nails as outlined in the objective portion of this note, were trimmed to appropriate length, with a double action nail nipper, for alleviation/reduction of pains as well. Follow up in approx. 3-4 months.

## 2024-12-18 ENCOUNTER — OFFICE VISIT (OUTPATIENT)
Dept: OPHTHALMOLOGY | Facility: CLINIC | Age: 69
End: 2024-12-18
Payer: MEDICARE

## 2024-12-18 DIAGNOSIS — H52.7 REFRACTIVE ERROR: ICD-10-CM

## 2024-12-18 DIAGNOSIS — E11.9 DIABETES MELLITUS WITHOUT COMPLICATION: ICD-10-CM

## 2024-12-18 DIAGNOSIS — E11.36 DIABETIC CATARACT: Primary | ICD-10-CM

## 2024-12-18 DIAGNOSIS — H40.019: ICD-10-CM

## 2024-12-18 PROCEDURE — 2023F DILAT RTA XM W/O RTNOPTHY: CPT | Mod: HCNC,CPTII,S$GLB, | Performed by: OPTOMETRIST

## 2024-12-18 PROCEDURE — 92014 COMPRE OPH EXAM EST PT 1/>: CPT | Mod: HCNC,S$GLB,, | Performed by: OPTOMETRIST

## 2024-12-18 PROCEDURE — 99999 PR PBB SHADOW E&M-EST. PATIENT-LVL III: CPT | Mod: PBBFAC,HCNC,, | Performed by: OPTOMETRIST

## 2024-12-18 PROCEDURE — 92015 DETERMINE REFRACTIVE STATE: CPT | Mod: HCNC,S$GLB,, | Performed by: OPTOMETRIST

## 2024-12-18 PROCEDURE — 3044F HG A1C LEVEL LT 7.0%: CPT | Mod: HCNC,CPTII,S$GLB, | Performed by: OPTOMETRIST

## 2024-12-18 PROCEDURE — 1159F MED LIST DOCD IN RCRD: CPT | Mod: HCNC,CPTII,S$GLB, | Performed by: OPTOMETRIST

## 2024-12-19 NOTE — PROGRESS NOTES
HPI     Diabetic Eye Exam            Comments: Pt here for an annual DM eye exam. Pt was unable to fill last   gls rx due to health issues; she fell twice and had hip surgery. Wanting   to update RX. [facility] stable. No other concerns at this time.     Lab Results       Component                Value               Date                       HGBA1C                   6.0 (H)             10/25/2024                      Comments    History of glaucoma (sister)          Last edited by Susan Cruz on 12/18/2024  1:34 PM.            Assessment /Plan     For exam results, see Encounter Report.    1. Diabetic cataract  Cataracts are not visually significant and not affecting activities of daily living. Annual observation is recommended at this time. Patient to call or return to clinic with any significant change in vision prior to next visit.      2. Diabetes mellitus without complication  Last A1c 6 There was no diabetic retinopathy present on either eye on examination today. Recommend good blood pressure control, strict blood glucose control, and good cholesterol control.  Continue close care with Dr Suarez regarding diabetes.    3. OAG (open angle glaucoma) suspect, low risk  The patient has the following Glaucoma risk factors: Race and Optic Nerve Asymmetry     Discussed close observation off drops at this time with gOCT for changes.     4. Refractive error  Eyeglass Final Rx       Eyeglass Final Rx         Sphere Cylinder Axis Add    Right -0.25 +1.50 010 +2.50    Left +0.25   +2.50      Type: PAL    Expiration Date: 12/18/2025                     RTC 1 yr for dilated eye exam or sooner if any changes to vision.   Discussed above and answered questions.

## 2024-12-27 ENCOUNTER — OFFICE VISIT (OUTPATIENT)
Dept: INTERNAL MEDICINE | Facility: CLINIC | Age: 69
End: 2024-12-27
Payer: MEDICARE

## 2024-12-27 VITALS
SYSTOLIC BLOOD PRESSURE: 110 MMHG | WEIGHT: 255.75 LBS | TEMPERATURE: 98 F | OXYGEN SATURATION: 98 % | HEART RATE: 95 BPM | BODY MASS INDEX: 47.06 KG/M2 | HEIGHT: 62 IN | DIASTOLIC BLOOD PRESSURE: 74 MMHG

## 2024-12-27 DIAGNOSIS — Z79.4 TYPE 2 DIABETES MELLITUS WITH DIABETIC POLYNEUROPATHY, WITH LONG-TERM CURRENT USE OF INSULIN: Chronic | ICD-10-CM

## 2024-12-27 DIAGNOSIS — E11.42 TYPE 2 DIABETES MELLITUS WITH DIABETIC POLYNEUROPATHY, WITH LONG-TERM CURRENT USE OF INSULIN: Chronic | ICD-10-CM

## 2024-12-27 DIAGNOSIS — E66.01 CLASS 3 SEVERE OBESITY DUE TO EXCESS CALORIES WITH SERIOUS COMORBIDITY AND BODY MASS INDEX (BMI) OF 45.0 TO 49.9 IN ADULT: Chronic | ICD-10-CM

## 2024-12-27 DIAGNOSIS — E66.813 CLASS 3 SEVERE OBESITY DUE TO EXCESS CALORIES WITH SERIOUS COMORBIDITY AND BODY MASS INDEX (BMI) OF 45.0 TO 49.9 IN ADULT: Chronic | ICD-10-CM

## 2024-12-27 DIAGNOSIS — F33.0 MILD EPISODE OF RECURRENT MAJOR DEPRESSIVE DISORDER: Chronic | ICD-10-CM

## 2024-12-27 DIAGNOSIS — R05.1 ACUTE COUGH: ICD-10-CM

## 2024-12-27 DIAGNOSIS — I10 PRIMARY HYPERTENSION: Primary | Chronic | ICD-10-CM

## 2024-12-27 DIAGNOSIS — M54.32 SCIATICA OF LEFT SIDE: Chronic | ICD-10-CM

## 2024-12-27 PROCEDURE — 99999 PR PBB SHADOW E&M-EST. PATIENT-LVL IV: CPT | Mod: PBBFAC,HCNC,, | Performed by: FAMILY MEDICINE

## 2024-12-27 PROCEDURE — 3288F FALL RISK ASSESSMENT DOCD: CPT | Mod: HCNC,CPTII,S$GLB, | Performed by: FAMILY MEDICINE

## 2024-12-27 PROCEDURE — 1160F RVW MEDS BY RX/DR IN RCRD: CPT | Mod: HCNC,CPTII,S$GLB, | Performed by: FAMILY MEDICINE

## 2024-12-27 PROCEDURE — 3008F BODY MASS INDEX DOCD: CPT | Mod: HCNC,CPTII,S$GLB, | Performed by: FAMILY MEDICINE

## 2024-12-27 PROCEDURE — 99214 OFFICE O/P EST MOD 30 MIN: CPT | Mod: HCNC,S$GLB,, | Performed by: FAMILY MEDICINE

## 2024-12-27 PROCEDURE — 1159F MED LIST DOCD IN RCRD: CPT | Mod: HCNC,CPTII,S$GLB, | Performed by: FAMILY MEDICINE

## 2024-12-27 PROCEDURE — 3078F DIAST BP <80 MM HG: CPT | Mod: HCNC,CPTII,S$GLB, | Performed by: FAMILY MEDICINE

## 2024-12-27 PROCEDURE — G2211 COMPLEX E/M VISIT ADD ON: HCPCS | Mod: HCNC,S$GLB,, | Performed by: FAMILY MEDICINE

## 2024-12-27 PROCEDURE — 3074F SYST BP LT 130 MM HG: CPT | Mod: HCNC,CPTII,S$GLB, | Performed by: FAMILY MEDICINE

## 2024-12-27 PROCEDURE — 3072F LOW RISK FOR RETINOPATHY: CPT | Mod: HCNC,CPTII,S$GLB, | Performed by: FAMILY MEDICINE

## 2024-12-27 PROCEDURE — 1125F AMNT PAIN NOTED PAIN PRSNT: CPT | Mod: HCNC,CPTII,S$GLB, | Performed by: FAMILY MEDICINE

## 2024-12-27 PROCEDURE — 1101F PT FALLS ASSESS-DOCD LE1/YR: CPT | Mod: HCNC,CPTII,S$GLB, | Performed by: FAMILY MEDICINE

## 2024-12-27 RX ORDER — MELOXICAM 7.5 MG/1
7.5 TABLET ORAL DAILY
Qty: 30 TABLET | Refills: 0 | Status: SHIPPED | OUTPATIENT
Start: 2024-12-27

## 2024-12-27 RX ORDER — PROMETHAZINE HYDROCHLORIDE AND DEXTROMETHORPHAN HYDROBROMIDE 6.25; 15 MG/5ML; MG/5ML
5 SYRUP ORAL EVERY 4 HOURS PRN
Qty: 240 ML | Refills: 0 | Status: SHIPPED | OUTPATIENT
Start: 2024-12-27

## 2024-12-27 RX ORDER — FUROSEMIDE 40 MG/1
40 TABLET ORAL DAILY
Qty: 90 TABLET | Refills: 3 | Status: SHIPPED | OUTPATIENT
Start: 2024-12-27

## 2025-01-02 PROBLEM — E66.813 CLASS 3 SEVERE OBESITY DUE TO EXCESS CALORIES WITH SERIOUS COMORBIDITY AND BODY MASS INDEX (BMI) OF 45.0 TO 49.9 IN ADULT: Status: RESOLVED | Noted: 2024-01-22 | Resolved: 2025-01-02

## 2025-01-02 PROBLEM — E66.01 CLASS 3 SEVERE OBESITY DUE TO EXCESS CALORIES WITH SERIOUS COMORBIDITY AND BODY MASS INDEX (BMI) OF 45.0 TO 49.9 IN ADULT: Status: RESOLVED | Noted: 2024-01-22 | Resolved: 2025-01-02

## 2025-01-02 PROBLEM — M54.32 SCIATICA OF LEFT SIDE: Status: ACTIVE | Noted: 2025-01-02

## 2025-01-02 NOTE — PROGRESS NOTES
Subjective     Patient ID: Stephanie Raza is a 69 y.o. female.    Chief Complaint: Hypertension, Diabetes, Anemia, and Follow-up (3 months)    History of Present Illness    Stephanie presents to follow up on hypertension and diabetes.    Stephanie reports sciatic nerve pain following a recent surgery. The pain is localized to the left side, intermittent, and particularly noticeable in the morning. She expresses frustration with its persistence. She has had a dry cough for 1-2 weeks. She attempted self-treatment by consuming alcohol, which temporarily alleviated the cough and restored her voice, but the dry cough has persisted.    MEDICATIONS:  Stephanie is on Lasix (Furosemide) for fluid retention and potassium tablets. She is also taking Trazodone for sleep, Meloxicam as needed for nerve pain, and Wellbutrin for depression.    MEDICAL HISTORY:  Stephanie has a history of hypertension, diabetes, asthma, arthritis, acid reflux, high cholesterol, and depression.    SURGICAL HISTORY:  She recently underwent a hip replacement that resulted in sciatic nerve pain.    TEST RESULTS:  Stephanie's A1C was measured in October with a result of 6%. Her cholesterol and potassium levels were within normal limits. Kidney and liver function tests were also performed. A diabetic foot exam was conducted on December 6th by Dr. Davis.    SOCIAL HISTORY:  Stephanie reports alcohol consumption.      ROS:  General: -fever, -chills, -fatigue, -weight gain, -weight loss  Eyes: -vision changes, -redness, -discharge  ENT: -ear pain, -nasal congestion, -sore throat  Cardiovascular: -chest pain, -palpitations, -lower extremity edema  Respiratory: +cough, -shortness of breath  Gastrointestinal: -abdominal pain, -nausea, -vomiting, -diarrhea, -constipation, -blood in stool  Genitourinary: -dysuria, -hematuria, -frequency  Musculoskeletal: -joint pain, -muscle pain  Skin: -rash, -lesion  Neurological: -headache, -dizziness, -numbness, -tingling  Psychiatric: -anxiety,  -depression, -sleep difficulty            Objective     Physical Exam  Vitals and nursing note reviewed.   Constitutional:       Appearance: Normal appearance. She is obese.   HENT:      Head: Normocephalic and atraumatic.      Nose: Nose normal.      Mouth/Throat:      Mouth: Mucous membranes are moist.      Pharynx: Oropharynx is clear.   Eyes:      Extraocular Movements: Extraocular movements intact.      Conjunctiva/sclera: Conjunctivae normal.   Cardiovascular:      Rate and Rhythm: Normal rate and regular rhythm.      Pulses: Normal pulses.      Heart sounds: Normal heart sounds.   Pulmonary:      Effort: Pulmonary effort is normal.      Breath sounds: Normal breath sounds.   Abdominal:      General: Abdomen is flat. Bowel sounds are normal.      Palpations: Abdomen is soft.   Musculoskeletal:      Cervical back: Normal range of motion and neck supple.      Right lower leg: No edema.      Left lower leg: No edema.   Skin:     General: Skin is warm and dry.   Neurological:      General: No focal deficit present.      Mental Status: She is alert and oriented to person, place, and time.   Psychiatric:         Mood and Affect: Mood normal.         Behavior: Behavior normal.                Assessment and Plan     1. Primary hypertension  Comments:  stable and controlled on olmesartan and diltiazem  Orders:  -     furosemide (LASIX) 40 MG tablet; Take 1 tablet (40 mg total) by mouth once daily.  Dispense: 90 tablet; Refill: 3    2. Type 2 diabetes mellitus with diabetic polyneuropathy, with long-term current use of insulin  Comments:  stable cont tresiba and ozempic    3. Acute cough  -     promethazine-dextromethorphan (PROMETHAZINE-DM) 6.25-15 mg/5 mL Syrp; Take 5 mLs by mouth every 4 (four) hours as needed (cough).  Dispense: 240 mL; Refill: 0    4. Sciatica of left side  -     meloxicam (MOBIC) 7.5 MG tablet; Take 1 tablet (7.5 mg total) by mouth once daily.  Dispense: 30 tablet; Refill: 0    5. Class 3 severe  obesity due to excess calories with serious comorbidity and body mass index (BMI) of 45.0 to 49.9 in adult    6. Mild episode of recurrent major depressive disorder        Assessment & Plan    Assessed post-surgical recovery, noting persistent sciatica symptoms  Reviewed blood pressure, weight management, and recent lab results including A1C, cholesterol, kidney and liver function, and potassium levels  Evaluated recent diabetic foot exam performed by Dr. Davis on 12/6  Considered report of dry cough lasting 1-2 weeks  Determined need for cough medication and pain management for nerve-related discomfort  Reviewed current medications, including antidepressant (Wellbutrin) and diuretic (Lasix/Furosemide)    PATIENT EDUCATION:   Discussed importance of maintaining movement to manage sciatica symptoms   Explained sugar content in peanut butter snacks and its potential impact on health    ACTION ITEMS/LIFESTYLE:   Stephanie to continue current efforts in weight management    MEDICATIONS:   Started cough medicine, sent to [facility]   Restarted Meloxicam for pain management   Refilled Lasix (Furosemide) and sent to [facility]   Continued Wellbutrin for depression management   Continued potassium tablets    ORDERS:   Urine test for protein ordered    FOLLOW UP:   Follow up in 3 months              Follow up in about 3 months (around 3/27/2025).    This note was generated with the assistance of ambient listening technology. Verbal consent was obtained by the patient and accompanying visitor(s) for the recording of patient appointment to facilitate this note. I attest to having reviewed and edited the generated note for accuracy, though some syntax or spelling errors may persist. Please contact the author of this note for any clarification.

## 2025-01-21 DIAGNOSIS — M54.32 SCIATICA OF LEFT SIDE: Chronic | ICD-10-CM

## 2025-01-21 RX ORDER — MELOXICAM 7.5 MG/1
TABLET ORAL
Qty: 30 TABLET | Refills: 0 | Status: SHIPPED | OUTPATIENT
Start: 2025-01-21

## 2025-01-21 NOTE — TELEPHONE ENCOUNTER
No care due was identified.  [facility] Embedded Care Due Messages. Reference number: [id number].   1/21/2025 5:42:13 AM CST

## 2025-02-06 ENCOUNTER — TELEPHONE (OUTPATIENT)
Dept: INTERNAL MEDICINE | Facility: CLINIC | Age: 70
End: 2025-02-06
Payer: MEDICARE

## 2025-02-07 RX ORDER — OLMESARTAN MEDOXOMIL 20 MG/1
20 TABLET ORAL
COMMUNITY
Start: 2025-01-29

## 2025-02-07 RX ORDER — FUROSEMIDE 80 MG/1
80 TABLET ORAL
COMMUNITY
Start: 2025-01-29

## 2025-02-07 RX ORDER — POTASSIUM CHLORIDE 1500 MG/1
40 TABLET, EXTENDED RELEASE ORAL
COMMUNITY
Start: 2025-01-29

## 2025-02-07 RX ORDER — VIBEGRON 75 MG/1
1 TABLET, FILM COATED ORAL
COMMUNITY
Start: 2025-01-28

## 2025-02-07 NOTE — TELEPHONE ENCOUNTER
Received a fax from [facility] informing Dr Pate that pt has 2 active prescriptions of similar medication, and wanted her to be aware of it. I called and spoke with patient and she is unclear if she was still taking the Myrbetriq ER 50mg that Dr Pate prescribed or refilled for her or not, but she says that she is definitely taking the Gemtesa 75 mg's that Dr Christopher Arauz is prescribing for her . I checked with Dr Mata' nurse and she states the pt was instructed to d/c the Myrbetriq . I called [facility] and gave them clarification and  will remove the Myrbetiq from pt's current medication and cancel any refills   I d/c medication from pts chart.

## 2025-02-18 DIAGNOSIS — Z79.4 TYPE 2 DIABETES MELLITUS WITH DIABETIC POLYNEUROPATHY, WITH LONG-TERM CURRENT USE OF INSULIN: Chronic | ICD-10-CM

## 2025-02-18 DIAGNOSIS — E11.42 TYPE 2 DIABETES MELLITUS WITH DIABETIC POLYNEUROPATHY, WITH LONG-TERM CURRENT USE OF INSULIN: Chronic | ICD-10-CM

## 2025-02-18 RX ORDER — CALCIUM CITRATE/VITAMIN D3 200MG-6.25
TABLET ORAL 3 TIMES DAILY
Qty: 300 STRIP | Refills: 3 | Status: SHIPPED | OUTPATIENT
Start: 2025-02-18

## 2025-02-18 NOTE — TELEPHONE ENCOUNTER
Care Due:                  Date            Visit Type   Department     Provider  --------------------------------------------------------------------------------                                EP -                              PRIMARY      [facility] INTER[facility]  Reyna  Last Visit: 12-      CARE ([facility])   MEDICINE       Pate-Pedescleaux                              EP -                              PRIMARY      [facility] INTER[facility]  Reyna  Next Visit: 04-      CARE ([facility])   MEDICINE       Pate-Pedescleaux                                                            Last  Test          Frequency    Reason                     Performed    Due Date  --------------------------------------------------------------------------------    HBA1C.......  6 months...  CHELSEY BURCIAGA.........  10-   04-    Health [facility] Embedded Care Due Messages. Reference number: [id number].   2/18/2025 1:58:39 PM CST

## 2025-02-19 RX ORDER — LANCETS 33 GAUGE
EACH MISCELLANEOUS 3 TIMES DAILY
Qty: 300 EACH | Refills: 3 | Status: SHIPPED | OUTPATIENT
Start: 2025-02-19

## 2025-02-19 NOTE — TELEPHONE ENCOUNTER
Refill Routing Note   Medication(s) are not appropriate for processing by [facility] for the following reason(s):        No active prescription written by provider    [facility] action(s):  Defer  Approve   Requires labs : Yes             Appointments  past 12m or future 3m with PCP    Date Provider   Last Visit   12/27/2024 Reyna Suarez MD   Next Visit   4/11/2025 Reyna Suarez MD   ED visits in past 90 days: 0        Note composed:8:27 PM 02/18/2025

## 2025-02-20 DIAGNOSIS — F33.0 MILD EPISODE OF RECURRENT MAJOR DEPRESSIVE DISORDER: Chronic | ICD-10-CM

## 2025-02-20 DIAGNOSIS — M54.32 SCIATICA OF LEFT SIDE: Chronic | ICD-10-CM

## 2025-02-20 RX ORDER — MELOXICAM 7.5 MG/1
TABLET ORAL
Qty: 30 TABLET | Refills: 0 | Status: SHIPPED | OUTPATIENT
Start: 2025-02-20

## 2025-02-20 RX ORDER — BUPROPION HYDROCHLORIDE 150 MG/1
TABLET ORAL
Qty: 90 TABLET | Refills: 3 | Status: SHIPPED | OUTPATIENT
Start: 2025-02-20

## 2025-02-20 NOTE — TELEPHONE ENCOUNTER
Refill Routing Note   Medication(s) are not appropriate for processing by [facility] for the following reason(s):        Outside of protocol    [facility] action(s):  Route  Approve             Appointments  past 12m or future 3m with PCP    Date Provider   Last Visit   12/27/2024 Reyna Suarez MD   Next Visit   4/11/2025 Reyna Suarez MD   ED visits in past 90 days: 0        Note composed:9:19 AM 02/20/2025

## 2025-02-20 NOTE — TELEPHONE ENCOUNTER
No care due was identified.  Health [facility] Embedded Care Due Messages. Reference number: [id number].   2/20/2025 5:43:04 AM CST

## 2025-02-21 DIAGNOSIS — I10 PRIMARY HYPERTENSION: Chronic | ICD-10-CM

## 2025-02-21 RX ORDER — DILTIAZEM HYDROCHLORIDE 120 MG/1
1 CAPSULE, EXTENDED RELEASE ORAL 2 TIMES DAILY
Qty: 180 CAPSULE | Refills: 3 | Status: SHIPPED | OUTPATIENT
Start: 2025-02-21

## 2025-02-21 RX ORDER — METOPROLOL SUCCINATE 50 MG/1
50 TABLET, EXTENDED RELEASE ORAL 2 TIMES DAILY
Qty: 180 TABLET | Refills: 3 | Status: SHIPPED | OUTPATIENT
Start: 2025-02-21

## 2025-02-21 RX ORDER — POTASSIUM CHLORIDE 20 MEQ/1
20 TABLET, EXTENDED RELEASE ORAL
Qty: 90 TABLET | Refills: 3 | Status: SHIPPED | OUTPATIENT
Start: 2025-02-21

## 2025-02-21 NOTE — TELEPHONE ENCOUNTER
Refill Routing Note   Medication(s) are not appropriate for processing by [facility] for the following reason(s):        No active prescription written by provider    [facility] action(s):  Defer  Approve             Appointments  past 12m or future 3m with PCP    Date Provider   Last Visit   12/27/2024 Reyna Suarez MD   Next Visit   4/11/2025 Reyna Suarez MD   ED visits in past 90 days: 0        Note composed:2:17 PM 02/21/2025

## 2025-02-21 NOTE — TELEPHONE ENCOUNTER
No care due was identified.  Health [facility] Embedded Care Due Messages. Reference number: [id number].   2/21/2025 10:32:48 AM CST

## 2025-02-24 DIAGNOSIS — Z00.00 ENCOUNTER FOR MEDICARE ANNUAL WELLNESS EXAM: ICD-10-CM

## 2025-03-05 ENCOUNTER — TELEPHONE (OUTPATIENT)
Dept: INTERNAL MEDICINE | Facility: CLINIC | Age: 70
End: 2025-03-05
Payer: MEDICARE

## 2025-03-05 NOTE — TELEPHONE ENCOUNTER
Pt is calling to schedule a follow up appt post hospital stay, I scheduled her appointment for 03/07/2025. I requested medical records of hospital stay from [facility] Med records , verbally and I sent a release form .

## 2025-03-05 NOTE — TELEPHONE ENCOUNTER
----- Message from Hannah sent at 3/5/2025  8:50 AM CST -----  Contact: Patient, [phone]  Calling to schedule an appointment for Follow up [facility] discharged 03/04/2025 for fluid. Please call her. Thanks.

## 2025-03-07 ENCOUNTER — OFFICE VISIT (OUTPATIENT)
Dept: INTERNAL MEDICINE | Facility: CLINIC | Age: 70
End: 2025-03-07
Payer: MEDICARE

## 2025-03-07 VITALS
SYSTOLIC BLOOD PRESSURE: 138 MMHG | BODY MASS INDEX: 46.78 KG/M2 | HEIGHT: 62 IN | DIASTOLIC BLOOD PRESSURE: 72 MMHG | HEART RATE: 96 BPM | OXYGEN SATURATION: 94 % | TEMPERATURE: 97 F | WEIGHT: 254.19 LBS | RESPIRATION RATE: 18 BRPM

## 2025-03-07 DIAGNOSIS — Z79.4 TYPE 2 DIABETES MELLITUS WITH DIABETIC POLYNEUROPATHY, WITH LONG-TERM CURRENT USE OF INSULIN: ICD-10-CM

## 2025-03-07 DIAGNOSIS — I10 PRIMARY HYPERTENSION: ICD-10-CM

## 2025-03-07 DIAGNOSIS — I11.0 HYPERTENSIVE HEART DISEASE WITH HEART FAILURE: ICD-10-CM

## 2025-03-07 DIAGNOSIS — E11.42 TYPE 2 DIABETES MELLITUS WITH DIABETIC POLYNEUROPATHY, WITH LONG-TERM CURRENT USE OF INSULIN: ICD-10-CM

## 2025-03-07 DIAGNOSIS — Z09 HOSPITAL DISCHARGE FOLLOW-UP: Primary | ICD-10-CM

## 2025-03-07 DIAGNOSIS — E66.813 CLASS 3 SEVERE OBESITY DUE TO EXCESS CALORIES WITH SERIOUS COMORBIDITY AND BODY MASS INDEX (BMI) OF 45.0 TO 49.9 IN ADULT: ICD-10-CM

## 2025-03-07 DIAGNOSIS — E66.01 CLASS 3 SEVERE OBESITY DUE TO EXCESS CALORIES WITH SERIOUS COMORBIDITY AND BODY MASS INDEX (BMI) OF 45.0 TO 49.9 IN ADULT: ICD-10-CM

## 2025-03-07 DIAGNOSIS — R60.0 BILATERAL LOWER EXTREMITY EDEMA: ICD-10-CM

## 2025-03-07 PROCEDURE — 99999 PR PBB SHADOW E&M-EST. PATIENT-LVL V: CPT | Mod: PBBFAC,HCNC,,

## 2025-03-07 RX ORDER — NAPROXEN SODIUM 220 MG/1
81 TABLET, FILM COATED ORAL DAILY
COMMUNITY

## 2025-03-07 NOTE — PROGRESS NOTES
Subjective:       Patient ID: Stephanie Raza is a 69 y.o. female.    Chief Complaint: Follow-up (Hospital F/U (3/2): Heart Failure & Shortness of Breath)    History of Present Illness    CHIEF COMPLAINT:  Patient presents today for follow up after recent hospitalization for heart failure exacerbation.    Transitional Care Note    Family and/or Caretaker present at visit?  Yes.  Diagnostic tests reviewed/disposition: No diagnosic tests pending after this hospitalization.  Disease/illness education: CHF exacerbation s/s  Home health/community services discussion/referrals: Patient does not have home health established from hospital visit.  They do not need home health.  If needed, we will set up home health for the patient.   Establishment or re-establishment of referral orders for community resources: No other necessary community resources.   Discussion with other health care providers: No discussion with other health care providers necessary.     HEART FAILURE:  Patient was admitted to [facility] on [facility] on 03/02.  Discharged on 03/04.  Report not uploaded into epic yet.  She was recently hospitalized for heart failure exacerbation with symptoms of difficulty breathing and swelling in hands and legs. She continues to experience shortness of breath with activity such as while walking down the hallway to today's appointment. She last saw cardiologist Dr. Saxena in January and typically follows up with cardiology every six months.    EDEMA:  She reports intermittent swelling occurring at various times throughout the day. Her left ankle experiences more swelling than the right, which she attributes to previous ankle surgery. At time of visit, she describes her ankles as appearing normal without significant swelling.    BLOOD SUGAR MANAGEMENT:  She reports blood sugar fluctuations with lowest recorded value of 40, accompanied by dry mouth and headache. Her highest blood sugar reaches approximately 140. She is  "currently managed with weekly Ozempic injections without insulin.  Patient was told to hold insulin after discharge until follow-up with PCP.    CURRENT MEDICATIONS:  She takes Lasix 80mg and potassium supplementation twice daily. She reports taking medications at different times throughout the day, with some medications taken in the morning and others at night.  She states when she takes all of her medications at once it causes swelling.          /72 (BP Location: Right forearm, Patient Position: Sitting)   Pulse 96   Temp 97.4 °F (36.3 °C) (Tympanic)   Resp 18   Ht 5' 2" (1.575 m)   Wt 115.3 kg (254 lb 3.1 oz)   SpO2 (!) 94%   BMI 46.49 kg/m²     Review of Systems   Constitutional:  Negative for chills and fever.   Eyes:  Negative for visual disturbance.   Respiratory:  Negative for chest tightness and shortness of breath.    Cardiovascular:  Negative for chest pain, palpitations and leg swelling.   Gastrointestinal:  Negative for constipation, diarrhea, nausea and vomiting.   Genitourinary:  Negative for difficulty urinating.   Neurological:  Negative for headaches.   All other systems reviewed and are negative.      Objective:      Physical Exam  Constitutional:       General: She is not in acute distress.     Appearance: Normal appearance. She is obese. She is not ill-appearing or toxic-appearing.   HENT:      Head: Normocephalic and atraumatic.   Eyes:      Pupils: Pupils are equal, round, and reactive to light.   Cardiovascular:      Rate and Rhythm: Normal rate and regular rhythm.      Comments: Trace LLE edema  Pulmonary:      Effort: Pulmonary effort is normal.      Breath sounds: Normal breath sounds.   Abdominal:      General: Bowel sounds are normal.      Palpations: Abdomen is soft.   Musculoskeletal:         General: Normal range of motion.      Cervical back: Normal range of motion and neck supple.      Left lower le+ Pitting Edema present.   Skin:     General: Skin is warm and dry. "   Neurological:      General: No focal deficit present.      Mental Status: She is alert and oriented to person, place, and time.   Psychiatric:         Mood and Affect: Mood normal.         Behavior: Behavior normal.         Thought Content: Thought content normal.         Judgment: Judgment normal.         Assessment:       1. Hospital discharge follow-up    2. Type 2 diabetes mellitus with diabetic polyneuropathy, with long-term current use of insulin    3. Primary hypertension    4. Class 3 severe obesity due to excess calories with serious comorbidity and body mass index (BMI) of 45.0 to 49.9 in adult        Plan:       Stephanie was seen today for follow-up.    Diagnoses and all orders for this visit:    Hospital discharge follow-up  Patient doing better overall.  Current medications reviewed.  Patient reports they increased her Lasix dosage to 80 mg daily.  Patient encouraged to let cardiologist Dr. Sifuentes know about her recent hospital stay.    Type 2 diabetes mellitus with diabetic polyneuropathy, with long-term current use of insulin  -     Hemoglobin A1C; Future  -     Microalbumin/Creatinine Ratio, Urine; Future  Based on patient's home glucose readings, patient instructed to hold Tresiba at this time.  Patient to keep a home log of blood sugar readings 3 times a day and bring to next appointment.    Primary hypertension  Chronic, stable.  Normotensive today in office at 138/72.    Taking baby aspirin, diltiazem, Lasix, Toprol, and Benicar.  Continue current medication regimen.    Encouraged to follow up with Cardiology.    Bilateral lower extremity edema  Patient encouraged to elevate feet whenever resting.  Patient to continue low-sodium intake.    Class 3 severe obesity due to excess calories with serious comorbidity and body mass index (BMI) of 45.0 to 49.9 in adult  Patient encouraged to eat a healthy diet and increase physical activity as tolerated.    Follow up in about 3 months (around 6/7/2025) for  Blood sugar.

## 2025-03-12 RX ORDER — OLMESARTAN MEDOXOMIL AND HYDROCHLOROTHIAZIDE 40/25 40; 25 MG/1; MG/1
1 TABLET ORAL
Qty: 90 TABLET | Refills: 3 | OUTPATIENT
Start: 2025-03-12

## 2025-03-12 NOTE — TELEPHONE ENCOUNTER
Refill Decision Note   Stephanie Raza  is requesting a refill authorization.  Brief Assessment and Rationale for Refill:  Quick Discontinue     Medication Therapy Plan:  Discontinued by: Helena Barron MA on 2/7/2025      Comments:     Note composed:5:02 PM 03/12/2025

## 2025-03-12 NOTE — TELEPHONE ENCOUNTER
Care Due:                  Date            Visit Type   Department     Provider  --------------------------------------------------------------------------------                                EP -                              PRIMARY      [facility]  Reyna  Last Visit: 12-      CARE ([facility])   MEDICINE       Pate-Pedescleaux                              EP -                              PRIMARY      [facility]  Reyna  Next Visit: 06-      CARE ([facility])   MEDICINE       Pate-Pedescleaux                                                            Last  Test          Frequency    Reason                     Performed    Due Date  --------------------------------------------------------------------------------    CBC.........  12 months..  meloxicam................  02- 02-    [facility] Embedded Care Due Messages. Reference number: [id number].   3/12/2025 11:28:30 AM CDT

## 2025-03-22 DIAGNOSIS — M54.32 SCIATICA OF LEFT SIDE: Chronic | ICD-10-CM

## 2025-03-22 NOTE — TELEPHONE ENCOUNTER
No care due was identified.  [facility] Embedded Care Due Messages. Reference number: [id number].   3/22/2025 5:33:39 AM CDT

## 2025-03-24 RX ORDER — MELOXICAM 7.5 MG/1
TABLET ORAL
Qty: 30 TABLET | Refills: 0 | Status: SHIPPED | OUTPATIENT
Start: 2025-03-24

## 2025-04-10 ENCOUNTER — TELEPHONE (OUTPATIENT)
Dept: INTERNAL MEDICINE | Facility: CLINIC | Age: 70
End: 2025-04-10
Payer: MEDICARE

## 2025-04-10 NOTE — TELEPHONE ENCOUNTER
Pt will in for Hospital follow up on 04/14 at 10 40, I can't schedule so I will have to wait for tomorrow and have either Emily or Jaleesa schedule. Another pt is scheduled at that time, but I need the time slot so whoever schedule this for me, will first have to cancel pt in current time slot      ----- Message from Katy sent at 4/10/2025  1:22 PM CDT -----  Contact: Lizbeth   .[email or web address] Patient is calling for Medical Advice regarding:SERENAow long has patient had these symptoms:NAPharmacy name and phone#:Josefa wants a call back or thru [facility]:Comments:Lizbeth would like a call back. She is being discharged from [facility] today & needs to schedule a follow up with Dr Pate Please advise patient replies from provider may take up to 48 hours.

## 2025-04-13 ENCOUNTER — HOSPITAL ENCOUNTER (INPATIENT)
Facility: HOSPITAL | Age: 70
LOS: 9 days | Discharge: HOME HEALTH | DRG: 871 | End: 2025-04-22
Attending: EMERGENCY MEDICINE | Admitting: INTERNAL MEDICINE
Payer: MEDICARE

## 2025-04-13 DIAGNOSIS — L03.115 CELLULITIS OF RIGHT LOWER EXTREMITY: ICD-10-CM

## 2025-04-13 DIAGNOSIS — A41.9 SEVERE SEPSIS: ICD-10-CM

## 2025-04-13 DIAGNOSIS — M79.89 RIGHT LEG SWELLING: ICD-10-CM

## 2025-04-13 DIAGNOSIS — R65.20 SEVERE SEPSIS: ICD-10-CM

## 2025-04-13 DIAGNOSIS — E66.01 CLASS 3 SEVERE OBESITY DUE TO EXCESS CALORIES WITH SERIOUS COMORBIDITY AND BODY MASS INDEX (BMI) OF 45.0 TO 49.9 IN ADULT: ICD-10-CM

## 2025-04-13 DIAGNOSIS — I50.43 ACUTE ON CHRONIC COMBINED SYSTOLIC AND DIASTOLIC HEART FAILURE: ICD-10-CM

## 2025-04-13 DIAGNOSIS — E66.813 CLASS 3 SEVERE OBESITY DUE TO EXCESS CALORIES WITH SERIOUS COMORBIDITY AND BODY MASS INDEX (BMI) OF 45.0 TO 49.9 IN ADULT: ICD-10-CM

## 2025-04-13 DIAGNOSIS — E66.01 MORBID OBESITY: ICD-10-CM

## 2025-04-13 DIAGNOSIS — L03.115 CELLULITIS OF RIGHT LOWER LEG: Primary | ICD-10-CM

## 2025-04-13 DIAGNOSIS — Z13.6 SCREENING FOR CARDIOVASCULAR CONDITION: ICD-10-CM

## 2025-04-13 DIAGNOSIS — R07.9 CHEST PAIN: ICD-10-CM

## 2025-04-13 DIAGNOSIS — I50.9 CHF (CONGESTIVE HEART FAILURE): ICD-10-CM

## 2025-04-13 PROBLEM — I10 PRIMARY HYPERTENSION: Status: ACTIVE | Noted: 2025-04-13

## 2025-04-13 LAB
ABSOLUTE NEUTROPHIL MANUAL (OHS): 9.2 K/UL
ALBUMIN SERPL BCP-MCNC: 2.3 G/DL (ref 3.5–5.2)
ALP SERPL-CCNC: 91 UNIT/L (ref 40–150)
ALT SERPL W/O P-5'-P-CCNC: 21 UNIT/L (ref 10–44)
ANION GAP (OHS): 10 MMOL/L (ref 8–16)
APTT PPP: 31.2 SECONDS (ref 21–32)
AST SERPL-CCNC: 20 UNIT/L (ref 11–45)
BACTERIA #/AREA URNS AUTO: NORMAL /HPF
BASOPHILS NFR BLD MANUAL: 1 %
BILIRUB SERPL-MCNC: 0.4 MG/DL (ref 0.1–1)
BILIRUB UR QL STRIP.AUTO: NEGATIVE
BNP SERPL-MCNC: 150 PG/ML (ref 0–99)
BUN SERPL-MCNC: 19 MG/DL (ref 8–23)
CALCIUM SERPL-MCNC: 8.5 MG/DL (ref 8.7–10.5)
CHLORIDE SERPL-SCNC: 101 MMOL/L (ref 95–110)
CK SERPL-CCNC: 59 U/L (ref 20–180)
CLARITY UR: CLEAR
CO2 SERPL-SCNC: 25 MMOL/L (ref 23–29)
COLOR UR AUTO: YELLOW
CREAT SERPL-MCNC: 0.9 MG/DL (ref 0.5–1.4)
CRP SERPL-MCNC: 204.1 MG/L
EOSINOPHIL NFR BLD MANUAL: 3 % (ref 0–8)
ERYTHROCYTE [DISTWIDTH] IN BLOOD BY AUTOMATED COUNT: 14.8 % (ref 11.5–14.5)
ERYTHROCYTE [SEDIMENTATION RATE] IN BLOOD: >120 MM/HR
GFR SERPLBLD CREATININE-BSD FMLA CKD-EPI: >60 ML/MIN/1.73/M2
GLUCOSE SERPL-MCNC: 142 MG/DL (ref 70–110)
GLUCOSE UR QL STRIP: ABNORMAL
HCT VFR BLD AUTO: 41.4 % (ref 37–48.5)
HCV AB SERPL QL IA: NEGATIVE
HGB BLD-MCNC: 12.9 GM/DL (ref 12–16)
HGB UR QL STRIP: NEGATIVE
HIV 1+2 AB+HIV1 P24 AG SERPL QL IA: NEGATIVE
INR PPP: 1 (ref 0.8–1.2)
KETONES UR QL STRIP: NEGATIVE
LACTATE SERPL-SCNC: 1.4 MMOL/L (ref 0.5–2.2)
LACTATE SERPL-SCNC: 1.7 MMOL/L (ref 0.5–2.2)
LARGE/GIANT PLATELETS (OHS): PRESENT
LEUKOCYTE ESTERASE UR QL STRIP: NEGATIVE
LYMPHOCYTES NFR BLD MANUAL: 10 % (ref 18–48)
MAGNESIUM SERPL-MCNC: 2.2 MG/DL (ref 1.6–2.6)
MCH RBC QN AUTO: 25.7 PG (ref 27–31)
MCHC RBC AUTO-ENTMCNC: 31.2 G/DL (ref 32–36)
MCV RBC AUTO: 83 FL (ref 82–98)
METAMYELOCYTES NFR BLD MANUAL: 1 %
MICROSCOPIC COMMENT: NORMAL
MONOCYTES NFR BLD MANUAL: 12 % (ref 4–15)
MYELOCYTES NFR BLD MANUAL: 4 %
NEUTROPHILS NFR BLD MANUAL: 68 % (ref 38–73)
NEUTS BAND NFR BLD MANUAL: 1 %
NITRITE UR QL STRIP: NEGATIVE
NUCLEATED RBC (/100WBC) (OHS): 0 /100 WBC
OHS QRS DURATION: 124 MS
OHS QRS DURATION: 126 MS
OHS QTC CALCULATION: 492 MS
OHS QTC CALCULATION: 499 MS
PH UR STRIP: 7 [PH]
PLATELET # BLD AUTO: 307 K/UL (ref 150–450)
PLATELET BLD QL SMEAR: ABNORMAL
PMV BLD AUTO: 11.9 FL (ref 9.2–12.9)
POCT GLUCOSE: 204 MG/DL (ref 70–110)
POCT GLUCOSE: 83 MG/DL (ref 70–110)
POTASSIUM SERPL-SCNC: 4.7 MMOL/L (ref 3.5–5.1)
PROCALCITONIN SERPL-MCNC: 2.25 NG/ML
PROT SERPL-MCNC: 6.9 GM/DL (ref 6–8.4)
PROT UR QL STRIP: ABNORMAL
PROTHROMBIN TIME: 11.7 SECONDS (ref 9–12.5)
RBC # BLD AUTO: 5.01 M/UL (ref 4–5.4)
SODIUM SERPL-SCNC: 136 MMOL/L (ref 136–145)
SP GR UR STRIP: 1.01
SQUAMOUS #/AREA URNS AUTO: <1 /HPF
UROBILINOGEN UR STRIP-ACNC: NEGATIVE EU/DL
WBC # BLD AUTO: 13.4 K/UL (ref 3.9–12.7)
YEAST UR QL AUTO: NORMAL /HPF

## 2025-04-13 PROCEDURE — 86803 HEPATITIS C AB TEST: CPT | Mod: HCNC | Performed by: EMERGENCY MEDICINE

## 2025-04-13 PROCEDURE — 63600175 PHARM REV CODE 636 W HCPCS: Mod: HCNC | Performed by: INTERNAL MEDICINE

## 2025-04-13 PROCEDURE — 83880 ASSAY OF NATRIURETIC PEPTIDE: CPT | Mod: HCNC | Performed by: EMERGENCY MEDICINE

## 2025-04-13 PROCEDURE — 25000003 PHARM REV CODE 250: Mod: HCNC | Performed by: INTERNAL MEDICINE

## 2025-04-13 PROCEDURE — 93010 ELECTROCARDIOGRAM REPORT: CPT | Mod: HCNC,,, | Performed by: INTERNAL MEDICINE

## 2025-04-13 PROCEDURE — 83605 ASSAY OF LACTIC ACID: CPT | Mod: HCNC | Performed by: EMERGENCY MEDICINE

## 2025-04-13 PROCEDURE — 93005 ELECTROCARDIOGRAM TRACING: CPT | Mod: HCNC

## 2025-04-13 PROCEDURE — 25500020 PHARM REV CODE 255: Mod: HCNC | Performed by: EMERGENCY MEDICINE

## 2025-04-13 PROCEDURE — 96375 TX/PRO/DX INJ NEW DRUG ADDON: CPT | Mod: HCNC

## 2025-04-13 PROCEDURE — 81001 URINALYSIS AUTO W/SCOPE: CPT | Mod: HCNC | Performed by: EMERGENCY MEDICINE

## 2025-04-13 PROCEDURE — 85730 THROMBOPLASTIN TIME PARTIAL: CPT | Mod: HCNC | Performed by: EMERGENCY MEDICINE

## 2025-04-13 PROCEDURE — 83036 HEMOGLOBIN GLYCOSYLATED A1C: CPT | Mod: HCNC | Performed by: INTERNAL MEDICINE

## 2025-04-13 PROCEDURE — 85652 RBC SED RATE AUTOMATED: CPT | Mod: HCNC | Performed by: EMERGENCY MEDICINE

## 2025-04-13 PROCEDURE — 21400001 HC TELEMETRY ROOM: Mod: HCNC

## 2025-04-13 PROCEDURE — 83735 ASSAY OF MAGNESIUM: CPT | Mod: HCNC | Performed by: EMERGENCY MEDICINE

## 2025-04-13 PROCEDURE — 82550 ASSAY OF CK (CPK): CPT | Mod: HCNC | Performed by: EMERGENCY MEDICINE

## 2025-04-13 PROCEDURE — 87389 HIV-1 AG W/HIV-1&-2 AB AG IA: CPT | Mod: HCNC | Performed by: EMERGENCY MEDICINE

## 2025-04-13 PROCEDURE — 36415 COLL VENOUS BLD VENIPUNCTURE: CPT | Mod: HCNC | Performed by: INTERNAL MEDICINE

## 2025-04-13 PROCEDURE — 85610 PROTHROMBIN TIME: CPT | Mod: HCNC | Performed by: EMERGENCY MEDICINE

## 2025-04-13 PROCEDURE — 99291 CRITICAL CARE FIRST HOUR: CPT | Mod: HCNC

## 2025-04-13 PROCEDURE — 96374 THER/PROPH/DIAG INJ IV PUSH: CPT | Mod: 59,HCNC

## 2025-04-13 PROCEDURE — 63600175 PHARM REV CODE 636 W HCPCS: Mod: HCNC | Performed by: EMERGENCY MEDICINE

## 2025-04-13 PROCEDURE — 85025 COMPLETE CBC W/AUTO DIFF WBC: CPT | Mod: HCNC | Performed by: EMERGENCY MEDICINE

## 2025-04-13 PROCEDURE — 99223 1ST HOSP IP/OBS HIGH 75: CPT | Mod: HCNC,,, | Performed by: SURGERY

## 2025-04-13 PROCEDURE — 11000001 HC ACUTE MED/SURG PRIVATE ROOM: Mod: HCNC

## 2025-04-13 PROCEDURE — 96365 THER/PROPH/DIAG IV INF INIT: CPT | Mod: HCNC

## 2025-04-13 PROCEDURE — 86140 C-REACTIVE PROTEIN: CPT | Mod: HCNC | Performed by: EMERGENCY MEDICINE

## 2025-04-13 PROCEDURE — 25000003 PHARM REV CODE 250: Mod: HCNC | Performed by: EMERGENCY MEDICINE

## 2025-04-13 PROCEDURE — 84145 PROCALCITONIN (PCT): CPT | Mod: HCNC | Performed by: EMERGENCY MEDICINE

## 2025-04-13 PROCEDURE — 87040 BLOOD CULTURE FOR BACTERIA: CPT | Mod: HCNC | Performed by: EMERGENCY MEDICINE

## 2025-04-13 PROCEDURE — 80053 COMPREHEN METABOLIC PANEL: CPT | Mod: HCNC | Performed by: EMERGENCY MEDICINE

## 2025-04-13 RX ORDER — POLYETHYLENE GLYCOL 3350 17 G/17G
17 POWDER, FOR SOLUTION ORAL DAILY
Status: DISCONTINUED | OUTPATIENT
Start: 2025-04-14 | End: 2025-04-22 | Stop reason: HOSPADM

## 2025-04-13 RX ORDER — IBUPROFEN 200 MG
24 TABLET ORAL
Status: DISCONTINUED | OUTPATIENT
Start: 2025-04-13 | End: 2025-04-22 | Stop reason: HOSPADM

## 2025-04-13 RX ORDER — NALOXONE HCL 0.4 MG/ML
0.02 VIAL (ML) INJECTION
Status: DISCONTINUED | OUTPATIENT
Start: 2025-04-13 | End: 2025-04-22 | Stop reason: HOSPADM

## 2025-04-13 RX ORDER — ACETAMINOPHEN 325 MG/1
650 TABLET ORAL EVERY 8 HOURS PRN
Status: DISCONTINUED | OUTPATIENT
Start: 2025-04-13 | End: 2025-04-22 | Stop reason: HOSPADM

## 2025-04-13 RX ORDER — SODIUM CHLORIDE 0.9 % (FLUSH) 0.9 %
2 SYRINGE (ML) INJECTION EVERY 12 HOURS PRN
Status: DISCONTINUED | OUTPATIENT
Start: 2025-04-13 | End: 2025-04-22 | Stop reason: HOSPADM

## 2025-04-13 RX ORDER — IBUPROFEN 200 MG
16 TABLET ORAL
Status: DISCONTINUED | OUTPATIENT
Start: 2025-04-13 | End: 2025-04-22 | Stop reason: HOSPADM

## 2025-04-13 RX ORDER — ACETAMINOPHEN 325 MG/1
650 TABLET ORAL EVERY 4 HOURS PRN
Status: DISCONTINUED | OUTPATIENT
Start: 2025-04-13 | End: 2025-04-22 | Stop reason: HOSPADM

## 2025-04-13 RX ORDER — CLINDAMYCIN PHOSPHATE 600 MG/50ML
600 INJECTION, SOLUTION INTRAVENOUS
Status: DISCONTINUED | OUTPATIENT
Start: 2025-04-13 | End: 2025-04-13

## 2025-04-13 RX ORDER — CEFEPIME HYDROCHLORIDE 2 G/1
2 INJECTION, POWDER, FOR SOLUTION INTRAVENOUS
Status: COMPLETED | OUTPATIENT
Start: 2025-04-13 | End: 2025-04-13

## 2025-04-13 RX ORDER — SIMETHICONE 80 MG
1 TABLET,CHEWABLE ORAL 4 TIMES DAILY PRN
Status: DISCONTINUED | OUTPATIENT
Start: 2025-04-13 | End: 2025-04-22 | Stop reason: HOSPADM

## 2025-04-13 RX ORDER — GLUCAGON 1 MG
1 KIT INJECTION
Status: DISCONTINUED | OUTPATIENT
Start: 2025-04-13 | End: 2025-04-22 | Stop reason: HOSPADM

## 2025-04-13 RX ORDER — INSULIN ASPART 100 [IU]/ML
0-5 INJECTION, SOLUTION INTRAVENOUS; SUBCUTANEOUS
Status: DISCONTINUED | OUTPATIENT
Start: 2025-04-13 | End: 2025-04-22 | Stop reason: HOSPADM

## 2025-04-13 RX ORDER — BUPROPION HYDROCHLORIDE 150 MG/1
150 TABLET ORAL DAILY
Status: DISCONTINUED | OUTPATIENT
Start: 2025-04-14 | End: 2025-04-13

## 2025-04-13 RX ORDER — CEFEPIME HYDROCHLORIDE 2 G/1
2 INJECTION, POWDER, FOR SOLUTION INTRAVENOUS
Status: DISCONTINUED | OUTPATIENT
Start: 2025-04-13 | End: 2025-04-13

## 2025-04-13 RX ORDER — NAPROXEN SODIUM 220 MG/1
81 TABLET, FILM COATED ORAL DAILY
Status: DISCONTINUED | OUTPATIENT
Start: 2025-04-14 | End: 2025-04-22 | Stop reason: HOSPADM

## 2025-04-13 RX ORDER — METOPROLOL SUCCINATE 50 MG/1
50 TABLET, EXTENDED RELEASE ORAL 2 TIMES DAILY
Status: DISCONTINUED | OUTPATIENT
Start: 2025-04-13 | End: 2025-04-22 | Stop reason: HOSPADM

## 2025-04-13 RX ORDER — CLINDAMYCIN PHOSPHATE 900 MG/50ML
900 INJECTION, SOLUTION INTRAVENOUS
Status: COMPLETED | OUTPATIENT
Start: 2025-04-13 | End: 2025-04-16

## 2025-04-13 RX ORDER — LINEZOLID 600 MG/1
600 TABLET, FILM COATED ORAL EVERY 12 HOURS
Status: DISCONTINUED | OUTPATIENT
Start: 2025-04-13 | End: 2025-04-22 | Stop reason: HOSPADM

## 2025-04-13 RX ORDER — HYDROCODONE BITARTRATE AND ACETAMINOPHEN 10; 325 MG/1; MG/1
1 TABLET ORAL EVERY 6 HOURS PRN
Refills: 0 | Status: DISCONTINUED | OUTPATIENT
Start: 2025-04-13 | End: 2025-04-15

## 2025-04-13 RX ORDER — OLMESARTAN MEDOXOMIL 40 MG/1
40 TABLET ORAL DAILY
COMMUNITY
Start: 2025-03-27

## 2025-04-13 RX ORDER — ENOXAPARIN SODIUM 100 MG/ML
40 INJECTION SUBCUTANEOUS EVERY 12 HOURS
Status: DISCONTINUED | OUTPATIENT
Start: 2025-04-13 | End: 2025-04-22 | Stop reason: HOSPADM

## 2025-04-13 RX ORDER — ONDANSETRON HYDROCHLORIDE 2 MG/ML
4 INJECTION, SOLUTION INTRAVENOUS EVERY 8 HOURS PRN
Status: DISCONTINUED | OUTPATIENT
Start: 2025-04-13 | End: 2025-04-22 | Stop reason: HOSPADM

## 2025-04-13 RX ORDER — ENOXAPARIN SODIUM 100 MG/ML
40 INJECTION SUBCUTANEOUS EVERY 24 HOURS
Status: DISCONTINUED | OUTPATIENT
Start: 2025-04-13 | End: 2025-04-13 | Stop reason: DRUGHIGH

## 2025-04-13 RX ORDER — TALC
6 POWDER (GRAM) TOPICAL NIGHTLY PRN
Status: DISCONTINUED | OUTPATIENT
Start: 2025-04-13 | End: 2025-04-22 | Stop reason: HOSPADM

## 2025-04-13 RX ORDER — HYDROCODONE BITARTRATE AND ACETAMINOPHEN 5; 325 MG/1; MG/1
1 TABLET ORAL EVERY 6 HOURS PRN
Refills: 0 | Status: DISCONTINUED | OUTPATIENT
Start: 2025-04-13 | End: 2025-04-15

## 2025-04-13 RX ORDER — CEFEPIME HYDROCHLORIDE 2 G/1
2 INJECTION, POWDER, FOR SOLUTION INTRAVENOUS
Status: DISCONTINUED | OUTPATIENT
Start: 2025-04-13 | End: 2025-04-13 | Stop reason: DRUGHIGH

## 2025-04-13 RX ORDER — FUROSEMIDE 10 MG/ML
60 INJECTION INTRAMUSCULAR; INTRAVENOUS
Status: COMPLETED | OUTPATIENT
Start: 2025-04-13 | End: 2025-04-13

## 2025-04-13 RX ORDER — DILTIAZEM HYDROCHLORIDE 120 MG/1
120 CAPSULE, COATED, EXTENDED RELEASE ORAL DAILY
Status: DISCONTINUED | OUTPATIENT
Start: 2025-04-14 | End: 2025-04-14

## 2025-04-13 RX ADMIN — IOHEXOL 100 ML: 350 INJECTION, SOLUTION INTRAVENOUS at 01:04

## 2025-04-13 RX ADMIN — ENOXAPARIN SODIUM 40 MG: 40 INJECTION SUBCUTANEOUS at 08:04

## 2025-04-13 RX ADMIN — PIPERACILLIN SODIUM AND TAZOBACTAM SODIUM 4.5 G: 4; .5 INJECTION, POWDER, FOR SOLUTION INTRAVENOUS at 08:04

## 2025-04-13 RX ADMIN — TACROLIMUS 900 MG: 0.5 CAPSULE ORAL at 06:04

## 2025-04-13 RX ADMIN — CEFEPIME 2 G: 2 INJECTION, POWDER, FOR SOLUTION INTRAVENOUS at 11:04

## 2025-04-13 RX ADMIN — FUROSEMIDE 60 MG: 10 INJECTION, SOLUTION INTRAMUSCULAR; INTRAVENOUS at 12:04

## 2025-04-13 RX ADMIN — LINEZOLID 600 MG: 600 TABLET, FILM COATED ORAL at 08:04

## 2025-04-13 RX ADMIN — HYDROCODONE BITARTRATE AND ACETAMINOPHEN 1 TABLET: 10; 325 TABLET ORAL at 10:04

## 2025-04-13 RX ADMIN — INSULIN ASPART 1 UNITS: 100 INJECTION, SOLUTION INTRAVENOUS; SUBCUTANEOUS at 10:04

## 2025-04-13 RX ADMIN — VANCOMYCIN HYDROCHLORIDE 2000 MG: 2 INJECTION, POWDER, LYOPHILIZED, FOR SOLUTION INTRAVENOUS at 12:04

## 2025-04-13 NOTE — PHARMACY MED REC
"Admission Medication History     The home medication history was taken by Rogelio Dunne.    You may go to "Admission" then "Reconcile Home Medications" tabs to review and/or act upon these items.     The home medication list has been updated by the Pharmacy department.   Please read ALL comments highlighted in yellow.   Please address this information as you see fit.    Feel free to contact us if you have any questions or require assistance.      The medications listed below were removed from the home medication list. Please reorder if appropriate:  Patient reports no longer taking the following medication(s):  TRESIBA U-200  PHENERGAN 25MG    Medications listed below were obtained from: Patient/family and Analytic software- [employer]  (Not in a hospital admission)        Rogelio Dunne  [id number]      Current Outpatient Medications on File Prior to Encounter   Medication Sig Dispense Refill Last Dose/Taking    aspirin 81 MG Chew Take 81 mg by mouth once daily.   4/12/2025    buPROPion (WELLBUTRIN XL) 150 MG TB24 tablet TAKE 1 TABLET(150 MG) BY MOUTH DAILY 90 tablet 3 4/12/2025    diltiaZEM HCl 120 mg Cp12 TAKE 1 CAPSULE TWICE DAILY 180 capsule 3 4/12/2025    esomeprazole (NEXIUM) 40 MG capsule Take 1 capsule (40 mg total) by mouth before breakfast. 90 capsule 3 4/12/2025    metoprolol succinate (TOPROL-XL) 50 MG 24 hr tablet TAKE 1 TABLET TWICE DAILY 180 tablet 3 4/12/2025    olmesartan (BENICAR) 40 MG tablet Take 40 mg by mouth once daily.   4/12/2025    vibegron 75 mg Tab Take 1 tablet by mouth once daily.   4/12/2025    albuterol (PROVENTIL/VENTOLIN HFA) 90 mcg/actuation inhaler Inhale 2 puffs into the lungs every 4 (four) hours as needed for Wheezing. 18 g 3     blood-glucose meter (TRUE METRIX AIR GLUCOSE METER) [facility] To check BG 3 times daily, to use with insurance preferred meter 1 each 0     DROPLET PEN NEEDLE 31 gauge x 5/16" Ndle USE ONCE DAILY AS DIRECTED 100 each 3     fluticasone-salmeterol " diskus inhaler 250-50 mcg Inhale 1 puff into the lungs once daily.       furosemide (LASIX) 80 MG tablet Take 80 mg by mouth.       gabapentin (NEURONTIN) 300 MG capsule Take 300 mg by mouth every evening. As needed (Patient not taking: Reported on 4/13/2025)   Not Taking    meloxicam (MOBIC) 7.5 MG tablet TAKE 1 TABLET(7.5 MG) BY MOUTH DAILY 30 tablet 0     OZEMPIC 2 mg/dose (8 mg/3 mL) PnIj INJECT 2MG UNDER THE SKIN EVERY 7 DAYS 9 mL 1     potassium chloride (K-TAB) 20 mEq Take 40 mEq by mouth 2 (two) times a day.       TRUE METRIX GLUCOSE TEST STRIP Strp TEST BLOOD SUGAR THREE TIMES DAILY 300 strip 3     TRUEPLUS LANCETS 33 gauge Misc TEST BLOOD SUGAR THREE TIMES DAILY 300 each 3     vitamin D (VITAMIN D3) 1000 units Tab Take 1 tablet by mouth every morning.        .

## 2025-04-13 NOTE — ASSESSMENT & PLAN NOTE
Body mass index is 48.75 kg/m². Morbid obesity complicates all aspects of disease management from diagnostic modalities to treatment. Weight loss encouraged and health benefits explained to patient.

## 2025-04-13 NOTE — PROGRESS NOTES
"Pharmacokinetic Initial Assessment: IV Vancomycin    Assessment/Plan:    Initiate intravenous vancomycin with loading dose of 2000 mg once followed by a maintenance dose of vancomycin 1250 mg IV every 12 hours  Desired empiric serum trough concentration is 15 to 20 mcg/mL  Draw vancomycin trough level 60 min prior to fourth dose on 04/14 at approximately 1223  Pharmacy will continue to follow and monitor vancomycin.      Please contact pharmacy at extension [phone]   with any questions regarding this assessment.     Thank you for the consult,   Magda Mixon       Patient brief summary:  Stephanie Raza is a 69 y.o. female initiated on antimicrobial therapy with IV Vancomycin for treatment of suspected skin & soft tissue infection    Drug Allergies:   Review of patient's allergies indicates:   Allergen Reactions    Adhesive tape-silicones Swelling     Paper tape       Actual Body Weight:   120.9 kg    Renal Function:   Estimated Creatinine Clearance: 75.1 mL/min (based on SCr of 0.9 mg/dL).,     Dialysis Method (if applicable):  N/A    CBC (last 72 hours):  Recent Labs   Lab Result Units 04/13/25  1125   WBC K/uL 13.40*   HGB gm/dL 12.9   HCT % 41.4   Platelet Count K/uL 307   Lymphocyte % % 10.0*   Monocyte % % 12.0   Eosinophil % % 3.0   Basophil % % 1.0       Metabolic Panel (last 72 hours):  Recent Labs   Lab Result Units 04/13/25  1021 04/13/25  1227   Sodium mmol/L  --  136   Potassium mmol/L  --  4.7   Chloride mmol/L  --  101   CO2 mmol/L  --  25   Glucose mg/dL  --  142*   Glucose, UA  4+*  --    BUN mg/dL  --  19   Creatinine mg/dL  --  0.9   Albumin g/dL  --  2.3*   Bilirubin Total mg/dL  --  0.4   ALP unit/L  --  91   AST unit/L  --  20   ALT unit/L  --  21   Magnesium  mg/dL  --  2.2       Drug levels (last 3 results):  No results for input(s): "VANCOMYCINRA", "VANCORANDOM", "VANCOMYCINPE", "VANCOPEAK", "VANCOMYCINTR", "VANCOTROUGH" in the last 72 hours.    Microbiologic Results:  Microbiology " Results (last 7 days)       Procedure Component Value Units Date/Time    Blood culture x two cultures. Draw prior to antibiotics. [[id number]] Collected: 04/13/25 1116    Order Status: Sent Specimen: Blood from Peripheral, Antecubital, Left Updated: 04/13/25 1134    Blood culture x two cultures. Draw prior to antibiotics. [[id number]] Collected: 04/13/25 1124    Order Status: Sent Specimen: Blood from Peripheral, Hand, Left Updated: 04/13/25 1134

## 2025-04-13 NOTE — HOSPITAL COURSE
Extensive imaging was done in the emergency room that is showed no drainable fluid collections.  The patient also had an ultrasound that is reported to show no DVT.  The patient was started on vancomycin and Zosyn by the hospital medicine service.      Surgery was asked to see the patient for cellulitis.      04/14/2025.  Patient reports leg is no worse.  Afebrile.  No leukocytosis    04/16/2025  Pain is improved.  She feels her leg is better left erythema.  Blister was aspirated and sent for culture.  Her white count however it increasing.  Infectious Disease has seen the patient    04/17/2025.  Still having pain.  States that has slightly worse.  Edema and erythema is no better.  No labs for this morning

## 2025-04-13 NOTE — ASSESSMENT & PLAN NOTE
Patient's blood pressure range in the last 24 hours was: BP  Min: 111/59  Max: 140/61.The patient's inpatient anti-hypertensive regimen is listed below:  Current Antihypertensives  , Daily, Oral  diltiaZEM 24 hr capsule 120 mg, Daily, Oral  metoprolol succinate (TOPROL-XL) 24 hr tablet 50 mg, 2 times daily, Oral    Plan  - BP is controlled, no changes needed to their regimen

## 2025-04-13 NOTE — SUBJECTIVE & OBJECTIVE
Past Medical History:   Diagnosis Date    Acute blood loss anemia (ABLA) 2024    Last Assessment & Plan:    Formatting of this note might be different from the original.   History & Physical       Discharge Summary       Follow-up  Patient did receive 1 unit packed red blood cells while in the OR secondary to blood loss.  No obvious bleeding at this time.  She received 2 additional units packed red blood cells per orthopedics.  Hemoglobin running stable at this time no need fo    YOVANA (acute kidney injury) 2024    Last Assessment & Plan:    Formatting of this note might be different from the original.   History & Physical       Discharge Summary       Follow-up patient with progressive oliguric YOVANA after surgery.  Resolved. Avoid nephrotoxic agents, renally dose all medications where necessary, and protect nondominant arm as much as possible    Anxiety     Arthritis     Asthma     Back pain     Diabetes mellitus type I     Heart murmur     Hyperlipidemia     Hypertension     Obesity     Polyneuropathy     Trouble in sleeping     Type 2 diabetes mellitus     Urinary incontinence        Past Surgical History:   Procedure Laterality Date    ADENOIDECTOMY      BREAST BIOPSY      , benign    BREAST SURGERY       SECTION      hip reconstruction Left 2024    HYSTERECTOMY      JOINT REPLACEMENT         Review of patient's allergies indicates:   Allergen Reactions    Adhesive tape-silicones Swelling     Paper tape       No current facility-administered medications on file prior to encounter.     Current Outpatient Medications on File Prior to Encounter   Medication Sig    aspirin 81 MG Chew Take 81 mg by mouth once daily.    buPROPion (WELLBUTRIN XL) 150 MG TB24 tablet TAKE 1 TABLET(150 MG) BY MOUTH DAILY    diltiaZEM HCl 120 mg Cp12 TAKE 1 CAPSULE TWICE DAILY    esomeprazole (NEXIUM) 40 MG capsule Take 1 capsule (40 mg total) by mouth before breakfast.    metoprolol succinate (TOPROL-XL) 50 MG  "24 hr tablet TAKE 1 TABLET TWICE DAILY    olmesartan (BENICAR) 40 MG tablet Take 40 mg by mouth once daily.    vibegron 75 mg Tab Take 1 tablet by mouth once daily.    albuterol (PROVENTIL/VENTOLIN HFA) 90 mcg/actuation inhaler Inhale 2 puffs into the lungs every 4 (four) hours as needed for Wheezing.    blood-glucose meter (TRUE METRIX AIR GLUCOSE METER) Misc To check BG 3 times daily, to use with insurance preferred meter    DROPLET PEN NEEDLE 31 gauge x 5/16" Ndle USE ONCE DAILY AS DIRECTED    fluticasone-salmeterol diskus inhaler 250-50 mcg Inhale 1 puff into the lungs once daily.    furosemide (LASIX) 80 MG tablet Take 80 mg by mouth.    gabapentin (NEURONTIN) 300 MG capsule Take 300 mg by mouth every evening. As needed (Patient not taking: Reported on 4/13/2025)    meloxicam (MOBIC) 7.5 MG tablet TAKE 1 TABLET(7.5 MG) BY MOUTH DAILY    OZEMPIC 2 mg/dose (8 mg/3 mL) PnIj INJECT 2MG UNDER THE SKIN EVERY 7 DAYS    potassium chloride (K-TAB) 20 mEq Take 40 mEq by mouth 2 (two) times a day.    TRUE METRIX GLUCOSE TEST STRIP Strp TEST BLOOD SUGAR THREE TIMES DAILY    TRUEPLUS LANCETS 33 gauge Misc TEST BLOOD SUGAR THREE TIMES DAILY    vitamin D (VITAMIN D3) 1000 units Tab Take 1 tablet by mouth every morning.    [DISCONTINUED] atorvastatin (LIPITOR) 20 MG tablet Take 1 tablet (20 mg total) by mouth once daily.    [DISCONTINUED] diclofenac sodium (VOLTAREN) 1 % Gel Apply topically 4 (four) times daily.    [DISCONTINUED] lancets Misc To check BG 3 times daily, to use with insurance preferred meter    [DISCONTINUED] olmesartan (BENICAR) 20 MG tablet Take 20 mg by mouth.    [DISCONTINUED] promethazine-dextromethorphan (PROMETHAZINE-DM) 6.25-15 mg/5 mL Syrp Take 5 mLs by mouth every 4 (four) hours as needed (cough).    [DISCONTINUED] tiZANidine (ZANAFLEX) 4 MG tablet TAKE 1 TABLET(4 MG) BY MOUTH EVERY 6 HOURS AS NEEDED FOR MUSCLE TIGHTNESS    [DISCONTINUED] TRESIBA FLEXTOUCH U-200 200 unit/mL (3 mL) insulin pen Inject 60 " Units into the skin once daily. Brand name medically necessary     Family History       Problem Relation (Age of Onset)    Breast cancer Mother, Maternal Aunt, Maternal Aunt, Paternal Aunt, Paternal Aunt    Cancer Father    Diabetes Sister    Hypertension Mother    Lung cancer Mother    Stroke Father          Tobacco Use    Smoking status: Never     Passive exposure: Never    Smokeless tobacco: Never   Substance and Sexual Activity    Alcohol use: Yes     Alcohol/week: 3.0 standard drinks of alcohol     Types: 1 Glasses of wine, 2 Shots of liquor per week    Drug use: Never    Sexual activity: Not Currently     Partners: Male     Review of Systems   Constitutional:  Positive for activity change. Negative for chills, fatigue and fever.   Respiratory:  Negative for cough and shortness of breath.    Cardiovascular:  Negative for chest pain, palpitations and leg swelling.   Gastrointestinal:  Negative for abdominal distention, abdominal pain, nausea and vomiting.   Genitourinary:  Negative for difficulty urinating.   Musculoskeletal:  Positive for joint swelling. Negative for arthralgias.   Skin:  Positive for color change.   All other systems reviewed and are negative.    Objective:     Vital Signs (Most Recent):  Temp: 97.8 °F (36.6 °C) (04/13/25 1647)  Pulse: 98 (04/13/25 1647)  Resp: 16 (04/13/25 1647)  BP: 130/60 (04/13/25 1647)  SpO2: 98 % (04/13/25 1647) Vital Signs (24h Range):  Temp:  [97.8 °F (36.6 °C)-99.4 °F (37.4 °C)] 97.8 °F (36.6 °C)  Pulse:  [91-99] 98  Resp:  [16-22] 16  SpO2:  [94 %-98 %] 98 %  BP: (111-140)/(56-66) 130/60     Weight: 120.9 kg (266 lb 8.6 oz)  Body mass index is 48.75 kg/m².     Physical Exam  Vitals reviewed.   Constitutional:       Appearance: Normal appearance. She is obese.   HENT:      Head: Normocephalic and atraumatic.      Mouth/Throat:      Mouth: Mucous membranes are moist.      Pharynx: Oropharynx is clear.   Eyes:      Extraocular Movements: Extraocular movements intact.       Conjunctiva/sclera: Conjunctivae normal.   Cardiovascular:      Rate and Rhythm: Normal rate and regular rhythm.      Pulses: Normal pulses.      Heart sounds: Normal heart sounds.   Pulmonary:      Effort: Pulmonary effort is normal.      Breath sounds: Normal breath sounds. No wheezing or rales.   Abdominal:      General: Bowel sounds are normal. There is no distension.      Palpations: Abdomen is soft.      Tenderness: There is no abdominal tenderness.   Musculoskeletal:         General: Swelling (Right leg from mid thigh down to ankle) and tenderness present. Normal range of motion.      Cervical back: Normal range of motion and neck supple.   Skin:     General: Skin is warm and dry.      Findings: Erythema (areas of erythema, circumferentially from right mid thigh down to leg decreased range of motion of right knee) present.   Neurological:      General: No focal deficit present.      Mental Status: She is alert and oriented to person, place, and time. Mental status is at baseline.   Psychiatric:         Mood and Affect: Mood normal.         Behavior: Behavior normal.         Thought Content: Thought content normal.             Significant Labs: All pertinent labs within the past 24 hours have been reviewed.  A1C:   Recent Labs   Lab 10/25/24  1016   HGBA1C 6.0*     Blood Culture: Pending  CBC:   Recent Labs   Lab 04/13/25  1125   WBC 13.40*   HGB 12.9   HCT 41.4        CMP:   Recent Labs   Lab 04/13/25  1227      K 4.7      CO2 25   BUN 19   CREATININE 0.9   CALCIUM 8.5*   ALBUMIN 2.3*   BILITOT 0.4   ALKPHOS 91   AST 20   ALT 21   ANIONGAP 10     Cardiac Markers:   Recent Labs   Lab 04/13/25  1125   *     Coagulation:   Recent Labs   Lab 04/13/25  1129   INR 1.0   APTT 31.2     Lactic Acid:   Recent Labs   Lab 04/13/25  1125 04/13/25  1441   LACTATE 1.7 1.4     Magnesium:   Recent Labs   Lab 04/13/25  1227   MG 2.2     Urine Studies:   Recent Labs   Lab 04/13/25  1021   COLORU  Yellow   APPEARANCEUA Clear   PHUR 7.0   SPECGRAV 1.010   PROTEINUA Trace*   GLUCUA 4+*   BILIRUBINUA Negative   OCCULTUA Negative   NITRITE Negative   UROBILINOGEN Negative   LEUKOCYTESUR Negative   BACTERIA None       Significant Imaging: I have reviewed all pertinent imaging results/findings within the past 24 hours.

## 2025-04-13 NOTE — PLAN OF CARE
Discussed poc with pt, pt verbalized understanding    Purposeful rounding every 2hours    VS wnl  Cardiac monitoring in use, pt is NSR, tele monitor # [id number]  Blood glucose monitoring   Fall precautions in place, remains injury free  Pt denies c/o pain  Abx given as prescribed  Bed locked at lowest position  Call light within reach    Chart check complete  Will cont with POC

## 2025-04-13 NOTE — ED NOTES
Pt reports RLE painful, red, and swollen; pt states she was recently hospitalized for an infection to the RLE and was discharged x2 day ago. pt reports that since discharge, her leg looks worse and she is now unable to bear weight on the RLE. states she has a follow-up appt tomorrow.

## 2025-04-13 NOTE — ASSESSMENT & PLAN NOTE
Concern for neck fascia however patient's white blood cell count, creatinine, glucose are not consistent.  However picture may be mixed due to already having been on antibiotics.  Patient reports began 4/7  She was hospitalized at [facility] 4/7 through 4/10 reportedly she was febrile.  She was treated with Ancef and by 4/10 she was afebrile.  There are surgical pen marks on her right upper thigh which shows that the redness has receded some.  Blood cultures at the [facility] were negative.  She was discharged on Augmentin.  Patient had worsening swelling and pain and she re-presented to [facility].  See results of CT scan and Doppler of lower extremity above  Was given vancomycin and cefepime in ER  We will change to Zyvox for better skin penetration for Staph, Zosyn for anaerobes and strep and add clindamycin for toxin.  General surgery and ID consulted

## 2025-04-13 NOTE — PROGRESS NOTES
Therapy with vancomycin complete and/or consult discontinued by provider.  Pharmacy will sign off, please re-consult as needed.    Thank you for allowing us to participate in this patient's care.   Katherine McArdle, Pharm.D. 4/13/2025 4:51 PM

## 2025-04-13 NOTE — H&P
[facility] Medicine  History & Physical    Patient Name: Stephanie Raza  MRN: [id number]  Patient Class: IP- Inpatient  Admission Date: 4/13/2025  Attending Physician: Sarahi Franco MD   Primary Care Provider: Reyna Suarez MD         Patient information was obtained from patient and ER records.     Subjective:     Principal Problem:Cellulitis of right lower extremity    Chief Complaint:   Chief Complaint   Patient presents with    Leg Swelling     Left leg swelling and redness, unable to ambulate         HPI: Patient is a 65-year-old female with a history of diabetes, hyperlipidemia, hypertension, obesity who presented emergency department for evaluation of right leg swelling.  Patient reports on 04/06 she woke up vomiting and did not feel well.  When her daughter saw her on 04/08 she took her to the [facility].  She reports she had swelling in her right leg that was painful all the way up to her groin.  She was admitted for 3 days reportedly treated with Ancef at which time the marks of erythema had improved her fever had subsided and she was discharged home on Augmentin (blood cultures remain negative).  After being home she noted that the swelling worsened and she re-presented to the hospital.  In the emergency department she was afebrile, laboratory exam revealed protocol of 2.25, BNP of 150, sed rate greater 120, , white blood cell count of 13.4.  Lactic acid level was normal at 1.7.  Blood cultures were obtained and she was begun on vancomycin and meropenem.  CT scan of the leg revealed large left inguinal lymph nodes, fat stranding in the thigh most notably in the medial portion right hip arthroplasty in place intrapelvic contents unremarkable  CTA tib-fib soft tissue thickening and fat stranding in the anterior aspect of the lower extremity with disorganized fluid throughout the superficial compartment with no definitive involvement of the deep  intramuscular compartment, no definitive abscess  Doppler exam of lower extremity negative for DVT    Consult general surgery and Infectious Disease for evaluation.        Past Medical History:   Diagnosis Date    Acute blood loss anemia (ABLA) 2024    Last Assessment & Plan:    Formatting of this note might be different from the original.   History & Physical       Discharge Summary       Follow-up  Patient did receive 1 unit packed red blood cells while in the OR secondary to blood loss.  No obvious bleeding at this time.  She received 2 additional units packed red blood cells per orthopedics.  Hemoglobin running stable at this time no need fo    YOVANA (acute kidney injury) 2024    Last Assessment & Plan:    Formatting of this note might be different from the original.   History & Physical       Discharge Summary       Follow-up patient with progressive oliguric YOVANA after surgery.  Resolved. Avoid nephrotoxic agents, renally dose all medications where necessary, and protect nondominant arm as much as possible    Anxiety     Arthritis     Asthma     Back pain     Diabetes mellitus type I     Heart murmur     Hyperlipidemia     Hypertension     Obesity     Polyneuropathy     Trouble in sleeping     Type 2 diabetes mellitus     Urinary incontinence        Past Surgical History:   Procedure Laterality Date    ADENOIDECTOMY      BREAST BIOPSY      , benign    BREAST SURGERY       SECTION      hip reconstruction Left 2024    HYSTERECTOMY      JOINT REPLACEMENT         Review of patient's allergies indicates:   Allergen Reactions    Adhesive tape-silicones Swelling     Paper tape       No current facility-administered medications on file prior to encounter.     Current Outpatient Medications on File Prior to Encounter   Medication Sig    aspirin 81 MG Chew Take 81 mg by mouth once daily.    buPROPion (WELLBUTRIN XL) 150 MG TB24 tablet TAKE 1 TABLET(150 MG) BY MOUTH DAILY    diltiaZEM HCl 120  "mg Cp12 TAKE 1 CAPSULE TWICE DAILY    esomeprazole (NEXIUM) 40 MG capsule Take 1 capsule (40 mg total) by mouth before breakfast.    metoprolol succinate (TOPROL-XL) 50 MG 24 hr tablet TAKE 1 TABLET TWICE DAILY    olmesartan (BENICAR) 40 MG tablet Take 40 mg by mouth once daily.    vibegron 75 mg Tab Take 1 tablet by mouth once daily.    albuterol (PROVENTIL/VENTOLIN HFA) 90 mcg/actuation inhaler Inhale 2 puffs into the lungs every 4 (four) hours as needed for Wheezing.    blood-glucose meter (TRUE METRIX AIR GLUCOSE METER) Misc To check BG 3 times daily, to use with insurance preferred meter    DROPLET PEN NEEDLE 31 gauge x 5/16" Ndle USE ONCE DAILY AS DIRECTED    fluticasone-salmeterol diskus inhaler 250-50 mcg Inhale 1 puff into the lungs once daily.    furosemide (LASIX) 80 MG tablet Take 80 mg by mouth.    gabapentin (NEURONTIN) 300 MG capsule Take 300 mg by mouth every evening. As needed (Patient not taking: Reported on 4/13/2025)    meloxicam (MOBIC) 7.5 MG tablet TAKE 1 TABLET(7.5 MG) BY MOUTH DAILY    OZEMPIC 2 mg/dose (8 mg/3 mL) PnIj INJECT 2MG UNDER THE SKIN EVERY 7 DAYS    potassium chloride (K-TAB) 20 mEq Take 40 mEq by mouth 2 (two) times a day.    TRUE METRIX GLUCOSE TEST STRIP Strp TEST BLOOD SUGAR THREE TIMES DAILY    TRUEPLUS LANCETS 33 gauge Misc TEST BLOOD SUGAR THREE TIMES DAILY    vitamin D (VITAMIN D3) 1000 units Tab Take 1 tablet by mouth every morning.    [DISCONTINUED] atorvastatin (LIPITOR) 20 MG tablet Take 1 tablet (20 mg total) by mouth once daily.    [DISCONTINUED] diclofenac sodium (VOLTAREN) 1 % Gel Apply topically 4 (four) times daily.    [DISCONTINUED] lancets Misc To check BG 3 times daily, to use with insurance preferred meter    [DISCONTINUED] olmesartan (BENICAR) 20 MG tablet Take 20 mg by mouth.    [DISCONTINUED] promethazine-dextromethorphan (PROMETHAZINE-DM) 6.25-15 mg/5 mL Syrp Take 5 mLs by mouth every 4 (four) hours as needed (cough).    [DISCONTINUED] tiZANidine " (ZANAFLEX) 4 MG tablet TAKE 1 TABLET(4 MG) BY MOUTH EVERY 6 HOURS AS NEEDED FOR MUSCLE TIGHTNESS    [DISCONTINUED] TRESIBA FLEXTOUCH U-200 200 unit/mL (3 mL) insulin pen Inject 60 Units into the skin once daily. Brand name medically necessary     Family History       Problem Relation (Age of Onset)    Breast cancer Mother, Maternal Aunt, Maternal Aunt, Paternal Aunt, Paternal Aunt    Cancer Father    Diabetes Sister    Hypertension Mother    Lung cancer Mother    Stroke Father          Tobacco Use    Smoking status: Never     Passive exposure: Never    Smokeless tobacco: Never   Substance and Sexual Activity    Alcohol use: Yes     Alcohol/week: 3.0 standard drinks of alcohol     Types: 1 Glasses of wine, 2 Shots of liquor per week    Drug use: Never    Sexual activity: Not Currently     Partners: Male     Review of Systems   Constitutional:  Positive for activity change. Negative for chills, fatigue and fever.   Respiratory:  Negative for cough and shortness of breath.    Cardiovascular:  Negative for chest pain, palpitations and leg swelling.   Gastrointestinal:  Negative for abdominal distention, abdominal pain, nausea and vomiting.   Genitourinary:  Negative for difficulty urinating.   Musculoskeletal:  Positive for joint swelling. Negative for arthralgias.   Skin:  Positive for color change.   All other systems reviewed and are negative.    Objective:     Vital Signs (Most Recent):  Temp: 97.8 °F (36.6 °C) (04/13/25 1647)  Pulse: 98 (04/13/25 1647)  Resp: 16 (04/13/25 1647)  BP: 130/60 (04/13/25 1647)  SpO2: 98 % (04/13/25 1647) Vital Signs (24h Range):  Temp:  [97.8 °F (36.6 °C)-99.4 °F (37.4 °C)] 97.8 °F (36.6 °C)  Pulse:  [91-99] 98  Resp:  [16-22] 16  SpO2:  [94 %-98 %] 98 %  BP: (111-140)/(56-66) 130/60     Weight: 120.9 kg (266 lb 8.6 oz)  Body mass index is 48.75 kg/m².     Physical Exam  Vitals reviewed.   Constitutional:       Appearance: Normal appearance. She is obese.   HENT:      Head:  Normocephalic and atraumatic.      Mouth/Throat:      Mouth: Mucous membranes are moist.      Pharynx: Oropharynx is clear.   Eyes:      Extraocular Movements: Extraocular movements intact.      Conjunctiva/sclera: Conjunctivae normal.   Cardiovascular:      Rate and Rhythm: Normal rate and regular rhythm.      Pulses: Normal pulses.      Heart sounds: Normal heart sounds.   Pulmonary:      Effort: Pulmonary effort is normal.      Breath sounds: Normal breath sounds. No wheezing or rales.   Abdominal:      General: Bowel sounds are normal. There is no distension.      Palpations: Abdomen is soft.      Tenderness: There is no abdominal tenderness.   Musculoskeletal:         General: Swelling (Right leg from mid thigh down to ankle) and tenderness present. Normal range of motion.      Cervical back: Normal range of motion and neck supple.   Skin:     General: Skin is warm and dry.      Findings: Erythema (areas of erythema, circumferentially from right mid thigh down to leg decreased range of motion of right knee) present.   Neurological:      General: No focal deficit present.      Mental Status: She is alert and oriented to person, place, and time. Mental status is at baseline.   Psychiatric:         Mood and Affect: Mood normal.         Behavior: Behavior normal.         Thought Content: Thought content normal.             Significant Labs: All pertinent labs within the past 24 hours have been reviewed.  A1C:   Recent Labs   Lab 10/25/24  1016   HGBA1C 6.0*     Blood Culture: Pending  CBC:   Recent Labs   Lab 04/13/25  1125   WBC 13.40*   HGB 12.9   HCT 41.4        CMP:   Recent Labs   Lab 04/13/25  1227      K 4.7      CO2 25   BUN 19   CREATININE 0.9   CALCIUM 8.5*   ALBUMIN 2.3*   BILITOT 0.4   ALKPHOS 91   AST 20   ALT 21   ANIONGAP 10     Cardiac Markers:   Recent Labs   Lab 04/13/25  1125   *     Coagulation:   Recent Labs   Lab 04/13/25  1129   INR 1.0   APTT 31.2     Lactic Acid:    Recent Labs   Lab 04/13/25  1125 04/13/25  1441   LACTATE 1.7 1.4     Magnesium:   Recent Labs   Lab 04/13/25  1227   MG 2.2     Urine Studies:   Recent Labs   Lab 04/13/25  1021   COLORU Yellow   APPEARANCEUA Clear   PHUR 7.0   SPECGRAV 1.010   PROTEINUA Trace*   GLUCUA 4+*   BILIRUBINUA Negative   OCCULTUA Negative   NITRITE Negative   UROBILINOGEN Negative   LEUKOCYTESUR Negative   BACTERIA None       Significant Imaging: I have reviewed all pertinent imaging results/findings within the past 24 hours.  Assessment/Plan:     Assessment & Plan  Type 2 diabetes mellitus with diabetic polyneuropathy, with long-term current use of insulin    Patient with diabetes currently well-controlled is taking Ozempic outpatient  Sliding scale insulin with Accu-Cheks a.c. HS  Morbid obesity  Body mass index is 48.75 kg/m². Morbid obesity complicates all aspects of disease management from diagnostic modalities to treatment. Weight loss encouraged and health benefits explained to patient.       Cellulitis of right lower extremity  Concern for neck fascia however patient's white blood cell count, creatinine, glucose are not consistent.  However picture may be mixed due to already having been on antibiotics.  Patient reports began 4/7  She was hospitalized at [facility] 4/7 through 4/10 reportedly she was febrile.  She was treated with Ancef and by 4/10 she was afebrile.  There are surgical pen marks on her right upper thigh which shows that the redness has receded some.  Blood cultures at the [facility] were negative.  She was discharged on Augmentin.  Patient had worsening swelling and pain and she re-presented to [facility].  See results of CT scan and Doppler of lower extremity above  Was given vancomycin and cefepime in ER  We will change to Zyvox for better skin penetration for Staph, Zosyn for anaerobes and strep and add clindamycin for toxin.  General surgery and ID consulted  Primary  hypertension  Patient's blood pressure range in the last 24 hours was: BP  Min: 111/59  Max: 140/61.The patient's inpatient anti-hypertensive regimen is listed below:  Current Antihypertensives  , Daily, Oral  diltiaZEM 24 hr capsule 120 mg, Daily, Oral  metoprolol succinate (TOPROL-XL) 24 hr tablet 50 mg, 2 times daily, Oral    Plan  - BP is controlled, no changes needed to their regimen  Acute on chronic combined systolic and diastolic congestive heart failure  Patient has Combined Systolic and Diastolic heart failure that is Acute on chronic. On presentation their CHF was decompensated. Evidence of decompensated CHF on presentation includes: edema. The etiology of their decompensation is likely increased fluid intake. Most recent BNP and echo results are listed below.  Recent Labs     04/13/25  1125   *     Latest ECHO  No results found for this or any previous visit.    Current Heart Failure Medications  , Daily, Oral  metoprolol succinate (TOPROL-XL) 24 hr tablet 50 mg, 2 times daily, Oral    Plan  - Monitor strict I&Os and daily weights.    - Place on telemetry  - Low sodium diet  - Place on fluid restriction of 1.5 L.   - Cardiology has not been consulted  - The patient's volume status is at their baseline  -patient follows with Dr. Mikie Saxena whom she saw about 3 weeks ago.  She has follow up appointment in May.  We will check echo        VTE Risk Mitigation (From admission, onward)           Ordered     enoxaparin injection 40 mg  Every 12 hours         04/13/25 1626     IP VTE HIGH RISK PATIENT  Once         04/13/25 1622     Place sequential compression device  Until discontinued         04/13/25 1622                                 Therapy with vancomycin complete and/or consult discontinued by provider.  Pharmacy will sign off, please re-consult as needed.    Thank you for allowing us to participate in this patient's care.   Katherine McArdle, Pharm.D. 4/13/2025 4:51 PM          Sarahi GRAY  MD Edilberto  Department of Hospital Medicine  '[facility] Surg

## 2025-04-13 NOTE — SUBJECTIVE & OBJECTIVE
"No current facility-administered medications on file prior to encounter.     Current Outpatient Medications on File Prior to Encounter   Medication Sig    aspirin 81 MG Chew Take 81 mg by mouth once daily.    buPROPion (WELLBUTRIN XL) 150 MG TB24 tablet TAKE 1 TABLET(150 MG) BY MOUTH DAILY    diltiaZEM HCl 120 mg Cp12 TAKE 1 CAPSULE TWICE DAILY    esomeprazole (NEXIUM) 40 MG capsule Take 1 capsule (40 mg total) by mouth before breakfast.    metoprolol succinate (TOPROL-XL) 50 MG 24 hr tablet TAKE 1 TABLET TWICE DAILY    olmesartan (BENICAR) 40 MG tablet Take 40 mg by mouth once daily.    vibegron 75 mg Tab Take 1 tablet by mouth once daily.    albuterol (PROVENTIL/VENTOLIN HFA) 90 mcg/actuation inhaler Inhale 2 puffs into the lungs every 4 (four) hours as needed for Wheezing.    blood-glucose meter (TRUE METRIX AIR GLUCOSE METER) Misc To check BG 3 times daily, to use with insurance preferred meter    DROPLET PEN NEEDLE 31 gauge x 5/16" Ndle USE ONCE DAILY AS DIRECTED    fluticasone-salmeterol diskus inhaler 250-50 mcg Inhale 1 puff into the lungs once daily.    furosemide (LASIX) 80 MG tablet Take 80 mg by mouth.    gabapentin (NEURONTIN) 300 MG capsule Take 300 mg by mouth every evening. As needed (Patient not taking: Reported on 4/13/2025)    meloxicam (MOBIC) 7.5 MG tablet TAKE 1 TABLET(7.5 MG) BY MOUTH DAILY    OZEMPIC 2 mg/dose (8 mg/3 mL) PnIj INJECT 2MG UNDER THE SKIN EVERY 7 DAYS    potassium chloride (K-TAB) 20 mEq Take 40 mEq by mouth 2 (two) times a day.    TRUE METRIX GLUCOSE TEST STRIP Strp TEST BLOOD SUGAR THREE TIMES DAILY    TRUEPLUS LANCETS 33 gauge Misc TEST BLOOD SUGAR THREE TIMES DAILY    vitamin D (VITAMIN D3) 1000 units Tab Take 1 tablet by mouth every morning.    [DISCONTINUED] atorvastatin (LIPITOR) 20 MG tablet Take 1 tablet (20 mg total) by mouth once daily.    [DISCONTINUED] diclofenac sodium (VOLTAREN) 1 % Gel Apply topically 4 (four) times daily.    [DISCONTINUED] lancets Misc To check " BG 3 times daily, to use with insurance preferred meter    [DISCONTINUED] olmesartan (BENICAR) 20 MG tablet Take 20 mg by mouth.    [DISCONTINUED] promethazine-dextromethorphan (PROMETHAZINE-DM) 6.25-15 mg/5 mL Syrp Take 5 mLs by mouth every 4 (four) hours as needed (cough).    [DISCONTINUED] tiZANidine (ZANAFLEX) 4 MG tablet TAKE 1 TABLET(4 MG) BY MOUTH EVERY 6 HOURS AS NEEDED FOR MUSCLE TIGHTNESS    [DISCONTINUED] TRESIBA FLEXTOUCH U-200 200 unit/mL (3 mL) insulin pen Inject 60 Units into the skin once daily. Brand name medically necessary       Review of patient's allergies indicates:   Allergen Reactions    Adhesive tape-silicones Swelling     Paper tape       Past Medical History:   Diagnosis Date    Acute blood loss anemia (ABLA) 2024    Last Assessment & Plan:    Formatting of this note might be different from the original.   History & Physical       Discharge Summary       Follow-up  Patient did receive 1 unit packed red blood cells while in the OR secondary to blood loss.  No obvious bleeding at this time.  She received 2 additional units packed red blood cells per orthopedics.  Hemoglobin running stable at this time no need fo    YOVANA (acute kidney injury) 2024    Last Assessment & Plan:    Formatting of this note might be different from the original.   History & Physical       Discharge Summary       Follow-up patient with progressive oliguric YOVANA after surgery.  Resolved. Avoid nephrotoxic agents, renally dose all medications where necessary, and protect nondominant arm as much as possible    Anxiety     Arthritis     Asthma     Back pain     Diabetes mellitus type I     Heart murmur     Hyperlipidemia     Hypertension     Obesity     Polyneuropathy     Trouble in sleeping     Type 2 diabetes mellitus     Urinary incontinence      Past Surgical History:   Procedure Laterality Date    ADENOIDECTOMY      BREAST BIOPSY      2018, benign    BREAST SURGERY       SECTION      hip  reconstruction Left 01/23/2024    HYSTERECTOMY      JOINT REPLACEMENT       Family History       Problem Relation (Age of Onset)    Breast cancer Mother, Maternal Aunt, Maternal Aunt, Paternal Aunt, Paternal Aunt    Cancer Father    Diabetes Sister    Hypertension Mother    Lung cancer Mother    Stroke Father          Tobacco Use    Smoking status: Never     Passive exposure: Never    Smokeless tobacco: Never   Substance and Sexual Activity    Alcohol use: Yes     Alcohol/week: 3.0 standard drinks of alcohol     Types: 1 Glasses of wine, 2 Shots of liquor per week    Drug use: Never    Sexual activity: Not Currently     Partners: Male     Review of Systems   Constitutional:  Negative for appetite change, chills, fatigue, fever and unexpected weight change.   HENT:  Negative for hearing loss and rhinorrhea.    Eyes:  Negative for visual disturbance.   Respiratory:  Negative for apnea, cough, shortness of breath and wheezing.    Cardiovascular:  Negative for chest pain and palpitations.   Gastrointestinal:  Negative for abdominal distention, abdominal pain, blood in stool, constipation, diarrhea, nausea and vomiting.   Genitourinary:  Negative for dysuria, frequency and urgency.   Musculoskeletal:  Negative for arthralgias and neck pain.        Right leg pain   Skin:  Negative for rash.        Right leg swelling and redness   Neurological:  Negative for seizures, weakness, numbness and headaches.   Hematological:  Negative for adenopathy. Does not bruise/bleed easily.   Psychiatric/Behavioral:  Negative for hallucinations. The patient is not nervous/anxious.      Objective:     Vital Signs (Most Recent):  Temp: 97.8 °F (36.6 °C) (04/13/25 1647)  Pulse: 98 (04/13/25 1647)  Resp: 16 (04/13/25 1647)  BP: 130/60 (04/13/25 1647)  SpO2: 98 % (04/13/25 1647) Vital Signs (24h Range):  Temp:  [97.8 °F (36.6 °C)-99.4 °F (37.4 °C)] 97.8 °F (36.6 °C)  Pulse:  [91-99] 98  Resp:  [16-22] 16  SpO2:  [94 %-98 %] 98 %  BP:  (111-140)/(56-66) 130/60     Weight: 120.9 kg (266 lb 8.6 oz)  Body mass index is 48.75 kg/m².     Physical Exam  Vitals reviewed.   Constitutional:       Appearance: She is well-developed. She is obese. She is ill-appearing.   HENT:      Head: Normocephalic.   Eyes:      Pupils: Pupils are equal, round, and reactive to light.   Neck:      Thyroid: No thyromegaly.      Vascular: No JVD.      Trachea: No tracheal deviation.   Cardiovascular:      Rate and Rhythm: Normal rate and regular rhythm.      Heart sounds: Normal heart sounds.      Comments: Difficult to feel right lower extremity pulses due to with the edema.  But no yoni evidence of ischemia  Pulmonary:      Breath sounds: Normal breath sounds. No wheezing.   Abdominal:      General: Bowel sounds are normal. There is no distension.      Palpations: Abdomen is soft. Abdomen is not rigid. There is no mass.      Tenderness: There is no abdominal tenderness. There is no guarding or rebound.      Comments: Significantly obese.  Lower midline scar   Musculoskeletal:         General: Normal range of motion.      Right lower leg: Edema present.      Left lower leg: No edema.   Lymphadenopathy:      Cervical: No cervical adenopathy.   Skin:     General: Skin is warm and dry.      Findings: No erythema or rash.      Comments: There is significant erythema and edema from the ankle to the upper thigh of the right lower extremity.  Minimal blistering   Neurological:      Mental Status: She is alert and oriented to person, place, and time.      Comments: Positive sensation and motor   Psychiatric:         Mood and Affect: Mood normal.         Behavior: Behavior normal.         Thought Content: Thought content normal.         Judgment: Judgment normal.                  I have reviewed all pertinent lab results within the past 24 hours.  CBC:   Recent Labs   Lab 04/13/25  1125   WBC 13.40*   RBC 5.01   HGB 12.9   HCT 41.4      MCV 83   MCH 25.7*   MCHC 31.2*      BMP:   Recent Labs   Lab 04/13/25  1227      K 4.7      CO2 25   BUN 19   CREATININE 0.9   CALCIUM 8.5*   MG 2.2     CMP:   Recent Labs   Lab 04/13/25  1227   CALCIUM 8.5*   ALBUMIN 2.3*      K 4.7   CO2 25      BUN 19   CREATININE 0.9   ALKPHOS 91   ALT 21   AST 20   BILITOT 0.4       Significant Diagnostics:  I have reviewed all pertinent imaging results/findings within the past 24 hours.  Imaging study    Reading Physician Reading Date Result Priority   Christopher Olivier MD  [phone]  4/13/2025 STAT     Narrative & Impression  EXAMINATION:  US LOWER EXTREMITY VEINS RIGHT     CLINICAL HISTORY:  Other specified soft tissue disorders     TECHNIQUE:  Duplex and color flow Doppler evaluation and graded compression of the right lower extremity veins was performed.     COMPARISON:  None     FINDINGS:  Right thigh veins: The common femoral, femoral, popliteal, upper greater saphenous, and deep femoral veins are patent and free of thrombus. The veins are normally compressible and have normal phasic flow and augmentation response.     Right calf veins: The visualized calf veins are patent.     Contralateral CFV: The contralateral (left) common femoral vein is patent and free of thrombus.     Miscellaneous: None     Impression:     No evidence of deep venous thrombosis in the right lower extremity.        Electronically signed by:Christopher Olivier  Date:                                            04/13/2025  Time:                                           12:13    CT Thigh With Contrast Right  Order: [id number]   Status: Final result       Next appt: 04/14/2025 at 10:40 AM in Internal Medicine (RAJ MILLARD MD)    Test Result Released: No ([facility])    0 Result Notes  Details    Reading Physician Reading Date Result Priority   Christopher Olivier MD  [phone]  4/13/2025      Narrative & Impression  EXAMINATION:  CT LEG (TIBIA-FIBULA) WITH CONTRAST RIGHT; CT THIGH  WITH CONTRAST RIGHT     CLINICAL HISTORY:  cellulitis;     TECHNIQUE:  Low-dose axial CT images of the thigh and tibia and fibula.  Multiplanar reformats.  Images obtained after the administration 100 mL Omnipaque 350 IV contrast.     COMPARISON:  None     FINDINGS:  Large left inguinal lymph nodes possibly reactive to infection.  Clinical follow-up to resolution recommended to exclude other etiologies.     Skin thickening and soft tissue fat stranding over the thigh most notable along the medial portion.  This could relate to infection.  Edema or trauma less favored but not excluded on the basis of this exam.     Along the thigh the muscular bulk is maintained.  No definite fracture identified.  Right hip arthroplasty with long femoral stem in place.     Intrapelvic contents appear unremarkable.  Metal artifact degrades there evaluation.  Vascular structures are patent to the level of the knee.     CT tibia and fibula.     Soft tissue thickening fat stranding and skin thickening most notable along the anterior aspect of the lower extremity.  Disorganized fluid throughout the superficial compartment.  No definite involvement of the deep/intramuscular compartment.     Vascular structures grossly patent on this nondedicated exam.     No definite abscess identified.  Age expected degenerative changes of the osseous structures.  No fracture or traumatic malalignment identified.     Impression:     Extensive soft tissue swelling and skin thickening with suspected reactive right inguinal adenopathy.  No definite abscess identified at this time.        Electronically signed by:Christopher Olivier  Date:                                            04/13/2025  Time:                                           14:33     CT Leg (Tibia-Fibula) ih Contrast Right  Order: [id number]   Status: Final result       Next appt: 04/14/2025 at 10:40 AM in Internal Medicine (RAJ MILLARD MD)    Test Result Released: No (inaccessible  in MyChart)    0 Result Notes  Details    Reading Physician Reading Date Result Priority   Christopher Olivier MD  [phone]  4/13/2025 STAT     Narrative & Impression  EXAMINATION:  CT LEG (TIBIA-FIBULA) WITH CONTRAST RIGHT; CT THIGH WITH CONTRAST RIGHT     CLINICAL HISTORY:  cellulitis;     TECHNIQUE:  Low-dose axial CT images of the thigh and tibia and fibula.  Multiplanar reformats.  Images obtained after the administration 100 mL Omnipaque 350 IV contrast.     COMPARISON:  None     FINDINGS:  Large left inguinal lymph nodes possibly reactive to infection.  Clinical follow-up to resolution recommended to exclude other etiologies.     Skin thickening and soft tissue fat stranding over the thigh most notable along the medial portion.  This could relate to infection.  Edema or trauma less favored but not excluded on the basis of this exam.     Along the thigh the muscular bulk is maintained.  No definite fracture identified.  Right hip arthroplasty with long femoral stem in place.     Intrapelvic contents appear unremarkable.  Metal artifact degrades there evaluation.  Vascular structures are patent to the level of the knee.     CT tibia and fibula.     Soft tissue thickening fat stranding and skin thickening most notable along the anterior aspect of the lower extremity.  Disorganized fluid throughout the superficial compartment.  No definite involvement of the deep/intramuscular compartment.     Vascular structures grossly patent on this nondedicated exam.     No definite abscess identified.  Age expected degenerative changes of the osseous structures.  No fracture or traumatic malalignment identified.     Impression:     Extensive soft tissue swelling and skin thickening with suspected reactive right inguinal adenopathy.  No definite abscess identified at this time.        Electronically signed by:Christopher Olivier  Date:                                            04/13/2025  Time:                                            14:33     Normal sinus rhythm  Right axis deviation  Nonspecific intraventricular block  Nonspecific T wave abnormality  Abnormal ECG  When compared with ECG of 13-Apr-2025 11:31,  No significant change was found

## 2025-04-13 NOTE — ASSESSMENT & PLAN NOTE
Patient with diabetes currently well-controlled is taking Ozempic outpatient  Sliding scale insulin with Accu-Cheks a.c. HS

## 2025-04-13 NOTE — ASSESSMENT & PLAN NOTE
No drainable fluid collections.      I would recommend antibiotics to cover g positives, g negatives, Streptococcus and methicillin-resistant Staph aureus especially   Elevate right lower extremity   Infectious disease consult if that has no significant improvement    My concern is that Augmentin that has not effective of methicillin-resistant Staph aureus and that has a typical skin pathogen in our region

## 2025-04-13 NOTE — ED PROVIDER NOTES
SCRIBE #1 NOTE: I, Carlos Gan, am scribing for, and in the presence of, Yesenia Watts MD. I have scribed the entire note.       History     Chief Complaint   Patient presents with    Leg Swelling     Left leg swelling and redness, unable to ambulate      Review of patient's allergies indicates:   Allergen Reactions    Adhesive tape-silicones Swelling     Paper tape         History of Present Illness     HPI    4/13/2025, 10:18 AM  History obtained from the patient, medical records, and daughter      History of Present Illness: Stephanie Raza is a 69 y.o. female patient with a PMHx of ABLA, YOVANA,anxiety, arthritis, asthma, back pain, T1DM, heart murmur, HLD, HTN, obesity, polyneuropathy, T2DM, and urinary incontinence who presents to the Emergency Department for evaluation of R-leg swelling which began around one week ago. On 4/7 she was admitted at [facility] for three days and discharged with antibiotics which she reports compliance with. Swelling was improved upon discharge but began to worsen two days ago. Pt reports that her legs are of equal size at baseline. Per her daughter, she has been vomiting. Patient denies any fever, CP or SOB. Prior Tx includes abx from [facility].  Pt is very poor historian. No further complaints or concerns at this time.      Arrival mode: Personal Transportation    PCP: Reyna Suarez MD        Past Medical History:  Past Medical History:   Diagnosis Date    Acute blood loss anemia (ABLA) 01/24/2024    Last Assessment & Plan:    Formatting of this note might be different from the original.   History & Physical       Discharge Summary       Follow-up  Patient did receive 1 unit packed red blood cells while in the OR secondary to blood loss.  No obvious bleeding at this time.  She received 2 additional units packed red blood cells per orthopedics.  Hemoglobin running stable at this time no need fo    YOVANA (acute kidney injury) 01/24/2024    Last Assessment & Plan:     Formatting of this note might be different from the original.   History & Physical       Discharge Summary       Follow-up patient with progressive oliguric YOVANA after surgery.  Resolved. Avoid nephrotoxic agents, renally dose all medications where necessary, and protect nondominant arm as much as possible    Anxiety     Arthritis     Asthma     Back pain     Diabetes mellitus type I     Heart murmur     Hyperlipidemia     Hypertension     Obesity     Polyneuropathy     Trouble in sleeping     Type 2 diabetes mellitus     Urinary incontinence        Past Surgical History:  Past Surgical History:   Procedure Laterality Date    ADENOIDECTOMY      BREAST BIOPSY      2018, benign    BREAST SURGERY       SECTION      hip reconstruction Left 2024    HYSTERECTOMY      JOINT REPLACEMENT           Family History:  Family History   Problem Relation Name Age of Onset    Breast cancer Mother Naveed Blackwood     Lung cancer Mother Naveed Blackwood     Hypertension Mother Naveed Blackwood     Stroke Father Naveed Blackwood     Cancer Father Naveed Blackwood     Diabetes Sister [employer]     Breast cancer Maternal Aunt      Breast cancer Maternal Aunt      Breast cancer Paternal Aunt      Breast cancer Paternal Aunt         Social History:  Social History     Tobacco Use    Smoking status: Never     Passive exposure: Never    Smokeless tobacco: Never   Substance and Sexual Activity    Alcohol use: Yes     Alcohol/week: 3.0 standard drinks of alcohol     Types: 1 Glasses of wine, 2 Shots of liquor per week    Drug use: Never    Sexual activity: Not Currently     Partners: Male        Review of Systems     Review of Systems   Constitutional:  Negative for fever.   HENT:  Negative for sore throat.    Respiratory:  Negative for shortness of breath.    Cardiovascular:  Positive for leg swelling (R-leg). Negative for chest pain.   Gastrointestinal:  Positive for vomiting. Negative for nausea.   Genitourinary:  Negative for  dysuria.   Musculoskeletal:  Negative for back pain.   Skin:  Negative for rash.   Neurological:  Negative for weakness.   Hematological:  Does not bruise/bleed easily.   All other systems reviewed and are negative.     Physical Exam     Initial Vitals [04/13/25 0956]   BP Pulse Resp Temp SpO2   (!) 131/58 91 18 98.7 °F (37.1 °C) 97 %      MAP       --          Physical Exam  Nursing Notes and Vital Signs Reviewed.  Constitutional: Patient is in no acute distress.   Head: Atraumatic. Normocephalic.  Eyes: PERRL. EOM intact. Conjunctivae are not pale. No scleral icterus.  ENT: Mucous membranes are moist. Oropharynx is clear and symmetric.    Neck: Supple. Full ROM No lymphadenopathy.  Cardiovascular: Regular rate. Regular rhythm. No murmurs, rubs, or gallops. Distal pulses are in tact and symmetric.  Pulmonary/Chest: No respiratory distress. Clear to auscultation bilaterally. No wheezing or rales.  Abdominal: Soft and non-distended.  There is no tenderness.  No rebound, guarding, or rigidity. Good bowel sounds.  Genitourinary: No CVA tenderness.  Musculoskeletal: Moves all extremities. Extensive soft tissue swelling. No calf tenderness. Tenderness from right foot extending up towards the medial aspect of the right thigh.   Skin: Erythema and warmth to RLE. No obvious abscess. There is some straw colored drainage noted from distal part of the leg.  Neurological:  Alert, awake, and appropriate.  Normal speech.  No acute focal neurological deficits are appreciated.  Psychiatric: Normal affect. Good eye contact. Appropriate in content.                ED Course   Critical Care    Date/Time: 4/13/2025 1:30 PM    Performed by: Yesenia Watts MD  Authorized by: Yesenia Watts MD  Direct patient critical care time: 40 minutes  Additional history critical care time: 7 minutes  Ordering / reviewing critical care time: 8 minutes  Documentation critical care time: 7 minutes  Consulting other physicians critical care time: 5  "minutes  Total critical care time (exclusive of procedural time) : 67 minutes  Critical care was necessary to treat or prevent imminent or life-threatening deterioration of the following conditions: sepsis and cardiac failure.  Critical care was time spent personally by me on the following activities: blood draw for specimens, development of treatment plan with patient or surrogate, discussions with consultants, interpretation of cardiac output measurements, evaluation of patient's response to treatment, examination of patient, obtaining history from patient or surrogate, ordering and performing treatments and interventions, ordering and review of laboratory studies, ordering and review of radiographic studies, pulse oximetry, re-evaluation of patient's condition and review of old charts.        ED Vital Signs:  Vitals:    04/13/25 0956 04/13/25 1017 04/13/25 1030 04/13/25 1032   BP: (!) 131/58  (!) 124/59    Pulse: 91  93    Resp: 18  19    Temp: 98.7 °F (37.1 °C)      TempSrc: Oral      SpO2: 97%  97%    Weight:  113.4 kg (250 lb)  120.9 kg (266 lb 8.6 oz)   Height: 5' 2" (1.575 m)       04/13/25 1045 04/13/25 1100 04/13/25 1130 04/13/25 1200   BP: (!) 132/59 125/60  (!) 120/58   Pulse: 94 93  94   Resp: 18 18 20 18   Temp:       TempSrc:       SpO2: 97% 97%  97%   Weight:       Height:        04/13/25 1230 04/13/25 1300 04/13/25 1330 04/13/25 1435   BP: (!) 116/56 (!) 118/56 (!) 111/59 119/66   Pulse: 93 95 94 97   Resp: 20 20 18 (!) 22   Temp:    99.4 °F (37.4 °C)   TempSrc:    Oral   SpO2: 97% 96% 96% 97%   Weight:       Height:           Abnormal Lab Results:  Labs Reviewed   COMPREHENSIVE METABOLIC PANEL - Abnormal       Result Value    Sodium 136      Potassium 4.7      Chloride 101      CO2 25      Glucose 142 (*)     BUN 19      Creatinine 0.9      Calcium 8.5 (*)     Protein Total 6.9      Albumin 2.3 (*)     Bilirubin Total 0.4      ALP 91      AST 20      ALT 21      Anion Gap 10      eGFR >60   "   URINALYSIS, REFLEX TO URINE CULTURE - Abnormal    Color, UA Yellow      Appearance, UA Clear      pH, UA 7.0      Spec Grav UA 1.010      Protein, UA Trace (*)     Glucose, UA 4+ (*)     Ketones, UA Negative      Bilirubin, UA Negative      Blood, UA Negative      Nitrites, UA Negative      Urobilinogen, UA Negative      Leukocyte Esterase, UA Negative     PROCALCITONIN - Abnormal    Procalcitonin 2.25 (*)    B-TYPE NATRIURETIC PEPTIDE - Abnormal     (*)    SEDIMENTATION RATE - Abnormal    Sed Rate >120 (*)    C-REACTIVE PROTEIN - Abnormal    .1 (*)    CBC WITH DIFFERENTIAL - Abnormal    WBC 13.40 (*)     RBC 5.01      HGB 12.9      HCT 41.4      MCV 83      MCH 25.7 (*)     MCHC 31.2 (*)     RDW 14.8 (*)     Platelet Count 307      MPV 11.9      Nucleated RBC 0     MANUAL DIFFERENTIAL - Abnormal    Gran # (ANC) 9.2      Segmented Neutrophil % 68.0      Bands % 1.0      Lymphocyte % 10.0 (*)     Monocyte % 12.0      Eosinophil % 3.0      Basophil % 1.0      Metamyelocyte % 1.0      Myelocyte % 4.0      Platelet Estimate Appears Normal      Large/Giant Platelets Present     HEPATITIS C ANTIBODY - Normal    Hep C Ab Interp Negative     HIV 1 / 2 ANTIBODY - Normal    HIV 1/2 Ag/Ab Negative     LACTIC ACID, PLASMA - Normal    Lactic Acid Level 1.7      Narrative:     Falsely low lactic acid results can be found in samples containing >=13.0 mg/dL total bilirubin and/or >=3.5 mg/dL direct bilirubin.    PROTIME-INR - Normal    PT 11.7      INR 1.0     APTT - Normal    PTT 31.2     MAGNESIUM - Normal    Magnesium  2.2     CK - Normal    CPK 59     CULTURE, BLOOD   CULTURE, BLOOD   CBC W/ AUTO DIFFERENTIAL    Narrative:     The following orders were created for panel order CBC auto differential.  Procedure                               Abnormality         Status                     ---------                               -----------         ------                     CBC with Differential[[id number]]        Abnormal            Final result               Manual Differential[[id number]]         Abnormal            Final result                 Please view results for these tests on the individual orders.   URINALYSIS MICROSCOPIC    Bacteria, UA None      Yeast, UA None      Squamous Epithelial Cells, UA <1      Microscopic Comment       HEP C VIRUS HOLD SPECIMEN   LACTIC ACID, PLASMA   GREY TOP URINE HOLD        All Lab Results:  Results for orders placed or performed during the hospital encounter of 04/13/25   Urinalysis, Reflex to Urine Culture    Collection Time: 04/13/25 10:21 AM    Specimen: Urine, Catheterized   Result Value Ref Range    Color, UA Yellow Straw, [place], Yellow, Light-Orange    Appearance, UA Clear Clear    pH, UA 7.0 5.0 - 8.0    Spec Grav UA 1.010 1.005 - 1.030    Protein, UA Trace (A) Negative    Glucose, UA 4+ (A) Negative    Ketones, UA Negative Negative    Bilirubin, UA Negative Negative    Blood, UA Negative Negative    Nitrites, UA Negative Negative    Urobilinogen, UA Negative <2.0 EU/dL    Leukocyte Esterase, UA Negative Negative   Urinalysis Microscopic    Collection Time: 04/13/25 10:21 AM   Result Value Ref Range    Bacteria, UA None None, Rare, Occasional /HPF    Yeast, UA None None /HPF    Squamous Epithelial Cells, UA <1 /HPF    Microscopic Comment     Hepatitis C Antibody    Collection Time: 04/13/25 11:25 AM   Result Value Ref Range    Hep C Ab Interp Negative Negative   HIV 1/2 Ag/Ab (4th Gen)    Collection Time: 04/13/25 11:25 AM   Result Value Ref Range    HIV 1/2 Ag/Ab Negative Negative   Lactic acid, plasma #1    Collection Time: 04/13/25 11:25 AM   Result Value Ref Range    Lactic Acid Level 1.7 0.5 - 2.2 mmol/L   Procalcitonin    Collection Time: 04/13/25 11:25 AM   Result Value Ref Range    Procalcitonin 2.25 (H) <0.25 ng/mL   Brain natriuretic peptide    Collection Time: 04/13/25 11:25 AM   Result Value Ref Range     (H) 0 - 99 pg/mL   Sedimentation rate    Collection  Time: 04/13/25 11:25 AM   Result Value Ref Range    Sed Rate >120 (H) <=36 mm/hr   CBC with Differential    Collection Time: 04/13/25 11:25 AM   Result Value Ref Range    WBC 13.40 (H) 3.90 - 12.70 K/uL    RBC 5.01 4.00 - 5.40 M/uL    HGB 12.9 12.0 - 16.0 gm/dL    HCT 41.4 37.0 - 48.5 %    MCV 83 82 - 98 fL    MCH 25.7 (L) 27.0 - 31.0 pg    MCHC 31.2 (L) 32.0 - 36.0 g/dL    RDW 14.8 (H) 11.5 - 14.5 %    Platelet Count 307 150 - 450 K/uL    MPV 11.9 9.2 - 12.9 fL    Nucleated RBC 0 <=0 /100 WBC   Manual Differential    Collection Time: 04/13/25 11:25 AM   Result Value Ref Range    Gran # (ANC) 9.2 K/uL    Segmented Neutrophil % 68.0 38.0 - 73.0 %    Bands % 1.0 %    Lymphocyte % 10.0 (L) 18.0 - 48.0 %    Monocyte % 12.0 4.0 - 15.0 %    Eosinophil % 3.0 0.0 - 8.0 %    Basophil % 1.0 <=1.9 %    Metamyelocyte % 1.0 %    Myelocyte % 4.0 %    Platelet Estimate Appears Normal      Large/Giant Platelets Present    Protime-INR    Collection Time: 04/13/25 11:29 AM   Result Value Ref Range    PT 11.7 9.0 - 12.5 seconds    INR 1.0 0.8 - 1.2   APTT    Collection Time: 04/13/25 11:29 AM   Result Value Ref Range    PTT 31.2 21.0 - 32.0 seconds   EKG 12-lead    Collection Time: 04/13/25 11:43 AM   Result Value Ref Range    QRS Duration 126 ms    [facility] QTC Calculation 492 ms   Comprehensive metabolic panel    Collection Time: 04/13/25 12:27 PM   Result Value Ref Range    Sodium 136 136 - 145 mmol/L    Potassium 4.7 3.5 - 5.1 mmol/L    Chloride 101 95 - 110 mmol/L    CO2 25 23 - 29 mmol/L    Glucose 142 (H) 70 - 110 mg/dL    BUN 19 8 - 23 mg/dL    Creatinine 0.9 0.5 - 1.4 mg/dL    Calcium 8.5 (L) 8.7 - 10.5 mg/dL    Protein Total 6.9 6.0 - 8.4 gm/dL    Albumin 2.3 (L) 3.5 - 5.2 g/dL    Bilirubin Total 0.4 0.1 - 1.0 mg/dL    ALP 91 40 - 150 unit/L    AST 20 11 - 45 unit/L    ALT 21 10 - 44 unit/L    Anion Gap 10 8 - 16 mmol/L    eGFR >60 >60 mL/min/1.73/m2   Magnesium    Collection Time: 04/13/25 12:27 PM   Result Value Ref Range     Magnesium  2.2 1.6 - 2.6 mg/dL   CPK    Collection Time: 04/13/25 12:27 PM   Result Value Ref Range    CPK 59 20 - 180 U/L   C-reactive protein    Collection Time: 04/13/25 12:27 PM   Result Value Ref Range    .1 (H) <=8.2 mg/L       Imaging Results:  Imaging Results              CT Leg (Tibia-Fibula) Wtih Contrast Right (Final result)  Result time 04/13/25 14:33:47      Final result by Christopher Olivier MD (04/13/25 14:33:47)                   Impression:      Extensive soft tissue swelling and skin thickening with suspected reactive right inguinal adenopathy.  No definite abscess identified at this time.      Electronically signed by: Christopher Olivier  Date:    04/13/2025  Time:    14:33               Narrative:    EXAMINATION:  CT LEG (TIBIA-FIBULA) WITH CONTRAST RIGHT; CT THIGH WITH CONTRAST RIGHT    CLINICAL HISTORY:  cellulitis;    TECHNIQUE:  Low-dose axial CT images of the thigh and tibia and fibula.  Multiplanar reformats.  Images obtained after the administration 100 mL Omnipaque 350 IV contrast.    COMPARISON:  None    FINDINGS:  Large left inguinal lymph nodes possibly reactive to infection.  Clinical follow-up to resolution recommended to exclude other etiologies.    Skin thickening and soft tissue fat stranding over the thigh most notable along the medial portion.  This could relate to infection.  Edema or trauma less favored but not excluded on the basis of this exam.    Along the thigh the muscular bulk is maintained.  No definite fracture identified.  Right hip arthroplasty with long femoral stem in place.    Intrapelvic contents appear unremarkable.  Metal artifact degrades there evaluation.  Vascular structures are patent to the level of the knee.    CT tibia and fibula.    Soft tissue thickening fat stranding and skin thickening most notable along the anterior aspect of the lower extremity.  Disorganized fluid throughout the superficial compartment.  No definite involvement of the  deep/intramuscular compartment.    Vascular structures grossly patent on this nondedicated exam.    No definite abscess identified.  Age expected degenerative changes of the osseous structures.  No fracture or traumatic malalignment identified.                                       CT Thigh With Contrast Right (Final result)  Result time 04/13/25 14:33:47      Final result by Christopher Olivier MD (04/13/25 14:33:47)                   Impression:      Extensive soft tissue swelling and skin thickening with suspected reactive right inguinal adenopathy.  No definite abscess identified at this time.      Electronically signed by: Christopher Olivier  Date:    04/13/2025  Time:    14:33               Narrative:    EXAMINATION:  CT LEG (TIBIA-FIBULA) WITH CONTRAST RIGHT; CT THIGH WITH CONTRAST RIGHT    CLINICAL HISTORY:  cellulitis;    TECHNIQUE:  Low-dose axial CT images of the thigh and tibia and fibula.  Multiplanar reformats.  Images obtained after the administration 100 mL Omnipaque 350 IV contrast.    COMPARISON:  None    FINDINGS:  Large left inguinal lymph nodes possibly reactive to infection.  Clinical follow-up to resolution recommended to exclude other etiologies.    Skin thickening and soft tissue fat stranding over the thigh most notable along the medial portion.  This could relate to infection.  Edema or trauma less favored but not excluded on the basis of this exam.    Along the thigh the muscular bulk is maintained.  No definite fracture identified.  Right hip arthroplasty with long femoral stem in place.    Intrapelvic contents appear unremarkable.  Metal artifact degrades there evaluation.  Vascular structures are patent to the level of the knee.    CT tibia and fibula.    Soft tissue thickening fat stranding and skin thickening most notable along the anterior aspect of the lower extremity.  Disorganized fluid throughout the superficial compartment.  No definite involvement of the deep/intramuscular  compartment.    Vascular structures grossly patent on this nondedicated exam.    No definite abscess identified.  Age expected degenerative changes of the osseous structures.  No fracture or traumatic malalignment identified.                                       US Lower Extremity Veins Right (Final result)  Result time 04/13/25 12:13:48      Final result by Christopher Olivier MD (04/13/25 12:13:48)                   Impression:      No evidence of deep venous thrombosis in the right lower extremity.      Electronically signed by: Christopher Olivier  Date:    04/13/2025  Time:    12:13               Narrative:    EXAMINATION:  US LOWER EXTREMITY VEINS RIGHT    CLINICAL HISTORY:  Other specified soft tissue disorders    TECHNIQUE:  Duplex and color flow Doppler evaluation and graded compression of the right lower extremity veins was performed.    COMPARISON:  None    FINDINGS:  Right thigh veins: The common femoral, femoral, popliteal, upper greater saphenous, and deep femoral veins are patent and free of thrombus. The veins are normally compressible and have normal phasic flow and augmentation response.    Right calf veins: The visualized calf veins are patent.    Contralateral CFV: The contralateral (left) common femoral vein is patent and free of thrombus.    Miscellaneous: None                                       X-Ray Chest AP Portable (Final result)  Result time 04/13/25 11:18:36      Final result by Christopher Olivier MD (04/13/25 11:18:36)                   Impression:     As above.    Finalized on: 4/13/2025 11:18 AM By:  Christopher Olivier MD  [facility]# [id number]      2025-04-13 11:20:39.894     [facility]               Narrative:    EXAM: XR CHEST AP PORTABLE    CLINICAL HISTORY: Sepsis    FINDINGS:  Heart is enlarged.  Bibasilar pulmonary opacities which could reflect edema or infection.  No pleural fluid or pneumothorax.                                         The EKG was ordered, reviewed, and independently interpreted  by the ED provider.  Interpretation time: 11:43  Rate: 95 BPM  Rhythm: normal sinus rhythm  Interpretation: Right axis deviation. Nonspecific intraventricular block. Nonspecific T wave abnormality. No STEMI.         The Emergency Provider reviewed the vital signs and test results, which are outlined above.     ED Discussion     1:35 PM: Discussed case with Sarahi Franco MD (Internal Medicine). Dr. Franco agrees with current care and management of pt and accepts admission.   Admitting Service: Internal Medicine  Admitting Physician: Dr. Franco  Admit to: Inpatient    1:37 PM: Re-evaluated pt. I have discussed test results, shared treatment plan, and the need for admission with patient/family/caretaker at bedside. Pt and/or family/caretaker express understanding at this time and agree with all information. All questions answered. Pt/caretaker/family member(s) have no further questions or concerns at this time. Pt is ready for admit.         Medical Decision Making  DDX: 1. Cellulitis of leg 2. Abscess of Leg 3. Necrotizing Fascitis     WBC mildly elevated, cmp otherwise normal, sed rate and crp markedly elevated, lactate normal, procal elevated, US negative for DVT, IV antibiotics started, diuresis initiated, CXR consistent with pulmonary edema, hospital med to admit, CT of leg still pending upon admission.     Amount and/or Complexity of Data Reviewed  Independent Historian: caregiver     Details: Per pt's daughter, pt was seen at [facility] on Monday for nausea, vomiting, and right leg swelling/redness. She was admitted and discharged on Thursday on Augmentin, but leg continued to worsen.   Labs: ordered. Decision-making details documented in ED Course.  Radiology: ordered. Decision-making details documented in ED Course.  ECG/medicine tests: ordered and independent interpretation performed. Decision-making details documented in ED Course.    Risk  Prescription drug management.  Decision regarding  hospitalization.       Additional MDM:   Sepsis:   This patient does have evidence of infective focus  My overall impression is sepsis.  Source: Skin and Soft Tissue (location right leg)  Antibiotics given- Antibiotics     Patient Encounter Information Not Found      Latest lactate reviewed- 1.7  Organ dysfunction indicated by Acute heart failure    Fluid challenge Fluid Not Needed - Patient is not hypotensive and/or lactate is less than 4.0.     Post- resuscitation assessment No - Post resuscitation assessment not needed       Will Not start Pressors- Levophed for MAP of 65  Source control achieved by: IV vanc and cefepime                ED Medication(s):  Medications   vancomycin - pharmacy to dose (has no administration in time range)   ceFEPIme injection 2 g (has no administration in time range)   ceFEPIme injection 2 g (2 g Intravenous Given 4/13/25 1131)   furosemide injection 60 mg (60 mg Intravenous Given 4/13/25 1228)   vancomycin 2,000 mg in 0.9% NaCl 500 mL IVPB (admixture device) (0 mg Intravenous Stopped 4/13/25 1435)   iohexoL (OMNIPAQUE 350) injection 100 mL (100 mLs Intravenous Given 4/13/25 1340)       New Prescriptions    No medications on file               Scribe Attestation:   Scribe #1: I performed the above scribed service and the documentation accurately describes the services I performed. I attest to the accuracy of the note.     Attending:   Physician Attestation Statement for Scribe #1: I, Yesenia Watts MD, personally performed the services described in this documentation, as scribed by Carlos Gan, in my presence, and it is both accurate and complete.           Clinical Impression       ICD-10-CM ICD-9-CM   1. Cellulitis of right lower leg  L03.115 682.6   2. Screening for cardiovascular condition  Z13.6 V81.2   3. Right leg swelling  M79.89 729.81   4. Morbid obesity  E66.01 278.01   5. Severe sepsis  A41.9 038.9    R65.20 995.92   6. CHF (congestive heart failure)  I50.9  428.0   7. Acute on chronic combined systolic and diastolic heart failure  I50.43 428.43       Disposition:   Disposition: Admitted  Condition: Yesenia Lyons MD  04/13/25 2838

## 2025-04-13 NOTE — ASSESSMENT & PLAN NOTE
Patient has Combined Systolic and Diastolic heart failure that is Acute on chronic. On presentation their CHF was decompensated. Evidence of decompensated CHF on presentation includes: edema. The etiology of their decompensation is likely increased fluid intake. Most recent BNP and echo results are listed below.  Recent Labs     04/13/25  1125   *     Latest ECHO  No results found for this or any previous visit.    Current Heart Failure Medications  , Daily, Oral  metoprolol succinate (TOPROL-XL) 24 hr tablet 50 mg, 2 times daily, Oral    Plan  - Monitor strict I&Os and daily weights.    - Place on telemetry  - Low sodium diet  - Place on fluid restriction of 1.5 L.   - Cardiology has not been consulted  - The patient's volume status is at their baseline  -patient follows with Dr. Mikie Saxena whom she saw about 3 weeks ago.  She has follow up appointment in May.  We will check echo

## 2025-04-13 NOTE — PROGRESS NOTES
Pharmacist Renal Dose Adjustment Note    Stephanie Raza is a 69 y.o. female being treated with the medication enoxaparin.     Patient Data:    Vital Signs (Most Recent):  Temp: 98.4 °F (36.9 °C) (04/13/25 1532)  Pulse: 96 (04/13/25 1539)  Resp: 18 (04/13/25 1532)  BP: (!) 140/61 (04/13/25 1532)  SpO2: (!) 94 % (04/13/25 1532) Vital Signs (72h Range):  Temp:  [98.4 °F (36.9 °C)-99.4 °F (37.4 °C)]   Pulse:  [91-99]   Resp:  [18-22]   BP: (111-140)/(56-66)   SpO2:  [94 %-98 %]      Recent Labs   Lab 04/13/25  1227   CREATININE 0.9     Serum creatinine: 0.9 mg/dL 04/13/25 1227  Estimated creatinine clearance: 75.1 mL/min    Medication: Enoxaparin 40 mg SQ daily will be changed to enoxaparin 40 mg SQ every 12 hours per pharmacy renal dose adjustment protocol for patients with CrCl greater than 30 mL/min and BMI greater than 40 kg/m².    Body mass index is 48.75 kg/m².    Pharmacist's Name: Estephania Morton PharmD  Pharmacist's Extension: [phone]     Thank you for allowing us to participate in this patient's care.     Estephania Morton PharmD 04/13/2025 4:27 PM

## 2025-04-13 NOTE — CONSULTS
[facility] Surg  General Surgery  Consult Note    Patient Name: Stephanie Raza  MRN: [id number]  Code Status: Full Code  Admission Date: 4/13/2025  Hospital Length of Stay: 0 days  Attending Physician: Sarahi Franco MD  Primary Care Provider: Reyna Suarez MD    Patient information was obtained from patient, relative(s), past medical records, and ER records.     Inpatient consult to General Surgery  Consult performed by: Lance Dangelo MD  Consult ordered by: Sarahi Franco MD  Reason for consult: Cellulitis right lower extremity  Assessment/Recommendations: No drainable fluid collections, no indications for surgical intervention at this time   Agree with antibiotics.  Cover it is particularly for staph aureus and Streptococcus as well as other Gram-positive and Gram-negative given her diabetes.      My concern is that the outpatient medication Augmentin that has not effective against methicillin-resistant Staph.      The patient is leg we will need to be monitored for the abscess develop    Obtain records from the [facility], as these records are not readily available  Imaging study  Culture  Antibiotics administered        Subjective:     Principal Problem: Cellulitis of right lower extremity    History of Present Illness: 69-year-old morbidly obese female presented to the [facility] with significant erythema of the right lower extremity from the ankle to the thigh.  The patient also states that she is now not able to walk on the extremity because of the discomfort.  The patient states that she was admitted to the [facility] starting last Monday with the similar condition.  She was treated with IV antibiotics, unsure of which ones, and then discharge on Augmentin.    The patient presented to [facility].  She was evaluated.  In surgery was asked to see the patient    The patient states that the redness has gone down a little bit from the  "lines drawn around the upper thigh erythema at the [facility]    No current facility-administered medications on file prior to encounter.     Current Outpatient Medications on File Prior to Encounter   Medication Sig    aspirin 81 MG Chew Take 81 mg by mouth once daily.    buPROPion (WELLBUTRIN XL) 150 MG TB24 tablet TAKE 1 TABLET(150 MG) BY MOUTH DAILY    diltiaZEM HCl 120 mg Cp12 TAKE 1 CAPSULE TWICE DAILY    esomeprazole (NEXIUM) 40 MG capsule Take 1 capsule (40 mg total) by mouth before breakfast.    metoprolol succinate (TOPROL-XL) 50 MG 24 hr tablet TAKE 1 TABLET TWICE DAILY    olmesartan (BENICAR) 40 MG tablet Take 40 mg by mouth once daily.    vibegron 75 mg Tab Take 1 tablet by mouth once daily.    albuterol (PROVENTIL/VENTOLIN HFA) 90 mcg/actuation inhaler Inhale 2 puffs into the lungs every 4 (four) hours as needed for Wheezing.    blood-glucose meter (TRUE METRIX AIR GLUCOSE METER) [facility] To check BG 3 times daily, to use with insurance preferred meter    DROPLET PEN NEEDLE 31 gauge x 5/16" Ndle USE ONCE DAILY AS DIRECTED    fluticasone-salmeterol diskus inhaler 250-50 mcg Inhale 1 puff into the lungs once daily.    furosemide (LASIX) 80 MG tablet Take 80 mg by mouth.    gabapentin (NEURONTIN) 300 MG capsule Take 300 mg by mouth every evening. As needed (Patient not taking: Reported on 4/13/2025)    meloxicam (MOBIC) 7.5 MG tablet TAKE 1 TABLET(7.5 MG) BY MOUTH DAILY    OZEMPIC 2 mg/dose (8 mg/3 mL) PnIj INJECT 2MG UNDER THE SKIN EVERY 7 DAYS    potassium chloride (K-TAB) 20 mEq Take 40 mEq by mouth 2 (two) times a day.    TRUE METRIX GLUCOSE TEST STRIP Strp TEST BLOOD SUGAR THREE TIMES DAILY    TRUEPLUS LANCETS 33 gauge Misc TEST BLOOD SUGAR THREE TIMES DAILY    vitamin D (VITAMIN D3) 1000 units Tab Take 1 tablet by mouth every morning.    [DISCONTINUED] atorvastatin (LIPITOR) 20 MG tablet Take 1 tablet (20 mg total) by mouth once daily.    [DISCONTINUED] diclofenac sodium (VOLTAREN) 1 % Gel " Apply topically 4 (four) times daily.    [DISCONTINUED] lancets Misc To check BG 3 times daily, to use with insurance preferred meter    [DISCONTINUED] olmesartan (BENICAR) 20 MG tablet Take 20 mg by mouth.    [DISCONTINUED] promethazine-dextromethorphan (PROMETHAZINE-DM) 6.25-15 mg/5 mL Syrp Take 5 mLs by mouth every 4 (four) hours as needed (cough).    [DISCONTINUED] tiZANidine (ZANAFLEX) 4 MG tablet TAKE 1 TABLET(4 MG) BY MOUTH EVERY 6 HOURS AS NEEDED FOR MUSCLE TIGHTNESS    [DISCONTINUED] TRESIBA FLEXTOUCH U-200 200 unit/mL (3 mL) insulin pen Inject 60 Units into the skin once daily. Brand name medically necessary       Review of patient's allergies indicates:   Allergen Reactions    Adhesive tape-silicones Swelling     Paper tape       Past Medical History:   Diagnosis Date    Acute blood loss anemia (ABLA) 01/24/2024    Last Assessment & Plan:    Formatting of this note might be different from the original.   History & Physical       Discharge Summary       Follow-up  Patient did receive 1 unit packed red blood cells while in the OR secondary to blood loss.  No obvious bleeding at this time.  She received 2 additional units packed red blood cells per orthopedics.  Hemoglobin running stable at this time no need fo    YOVANA (acute kidney injury) 01/24/2024    Last Assessment & Plan:    Formatting of this note might be different from the original.   History & Physical       Discharge Summary       Follow-up patient with progressive oliguric YOVANA after surgery.  Resolved. Avoid nephrotoxic agents, renally dose all medications where necessary, and protect nondominant arm as much as possible    Anxiety     Arthritis     Asthma     Back pain     Diabetes mellitus type I     Heart murmur     Hyperlipidemia     Hypertension     Obesity     Polyneuropathy     Trouble in sleeping     Type 2 diabetes mellitus     Urinary incontinence      Past Surgical History:   Procedure Laterality Date    ADENOIDECTOMY      BREAST BIOPSY       , benign    BREAST SURGERY       SECTION      hip reconstruction Left 2024    HYSTERECTOMY      JOINT REPLACEMENT       Family History       Problem Relation (Age of Onset)    Breast cancer Mother, Maternal Aunt, Maternal Aunt, Paternal Aunt, Paternal Aunt    Cancer Father    Diabetes Sister    Hypertension Mother    Lung cancer Mother    Stroke Father          Tobacco Use    Smoking status: Never     Passive exposure: Never    Smokeless tobacco: Never   Substance and Sexual Activity    Alcohol use: Yes     Alcohol/week: 3.0 standard drinks of alcohol     Types: 1 Glasses of wine, 2 Shots of liquor per week    Drug use: Never    Sexual activity: Not Currently     Partners: Male     Review of Systems   Constitutional:  Negative for appetite change, chills, fatigue, fever and unexpected weight change.   HENT:  Negative for hearing loss and rhinorrhea.    Eyes:  Negative for visual disturbance.   Respiratory:  Negative for apnea, cough, shortness of breath and wheezing.    Cardiovascular:  Negative for chest pain and palpitations.   Gastrointestinal:  Negative for abdominal distention, abdominal pain, blood in stool, constipation, diarrhea, nausea and vomiting.   Genitourinary:  Negative for dysuria, frequency and urgency.   Musculoskeletal:  Negative for arthralgias and neck pain.        Right leg pain   Skin:  Negative for rash.        Right leg swelling and redness   Neurological:  Negative for seizures, weakness, numbness and headaches.   Hematological:  Negative for adenopathy. Does not bruise/bleed easily.   Psychiatric/Behavioral:  Negative for hallucinations. The patient is not nervous/anxious.      Objective:     Vital Signs (Most Recent):  Temp: 97.8 °F (36.6 °C) (25 1647)  Pulse: 98 (25 164)  Resp: 16 (25)  BP: 130/60 (25 164)  SpO2: 98 % (25) Vital Signs (24h Range):  Temp:  [97.8 °F (36.6 °C)-99.4 °F (37.4 °C)] 97.8 °F (36.6 °C)  Pulse:   [91-99] 98  Resp:  [16-22] 16  SpO2:  [94 %-98 %] 98 %  BP: (111-140)/(56-66) 130/60     Weight: 120.9 kg (266 lb 8.6 oz)  Body mass index is 48.75 kg/m².     Physical Exam  Vitals reviewed.   Constitutional:       Appearance: She is well-developed. She is obese. She is ill-appearing.   HENT:      Head: Normocephalic.   Eyes:      Pupils: Pupils are equal, round, and reactive to light.   Neck:      Thyroid: No thyromegaly.      Vascular: No JVD.      Trachea: No tracheal deviation.   Cardiovascular:      Rate and Rhythm: Normal rate and regular rhythm.      Heart sounds: Normal heart sounds.      Comments: Difficult to feel right lower extremity pulses due to with the edema.  But no yoni evidence of ischemia  Pulmonary:      Breath sounds: Normal breath sounds. No wheezing.   Abdominal:      General: Bowel sounds are normal. There is no distension.      Palpations: Abdomen is soft. Abdomen is not rigid. There is no mass.      Tenderness: There is no abdominal tenderness. There is no guarding or rebound.      Comments: Significantly obese.  Lower midline scar   Musculoskeletal:         General: Normal range of motion.      Right lower leg: Edema present.      Left lower leg: No edema.   Lymphadenopathy:      Cervical: No cervical adenopathy.   Skin:     General: Skin is warm and dry.      Findings: No erythema or rash.      Comments: There is significant erythema and edema from the ankle to the upper thigh of the right lower extremity.  Minimal blistering   Neurological:      Mental Status: She is alert and oriented to person, place, and time.      Comments: Positive sensation and motor   Psychiatric:         Mood and Affect: Mood normal.         Behavior: Behavior normal.         Thought Content: Thought content normal.         Judgment: Judgment normal.                  I have reviewed all pertinent lab results within the past 24 hours.  CBC:   Recent Labs   Lab 04/13/25  1125   WBC 13.40*   RBC 5.01   HGB 12.9    HCT 41.4      MCV 83   MCH 25.7*   MCHC 31.2*     BMP:   Recent Labs   Lab 04/13/25  1227      K 4.7      CO2 25   BUN 19   CREATININE 0.9   CALCIUM 8.5*   MG 2.2     CMP:   Recent Labs   Lab 04/13/25  1227   CALCIUM 8.5*   ALBUMIN 2.3*      K 4.7   CO2 25      BUN 19   CREATININE 0.9   ALKPHOS 91   ALT 21   AST 20   BILITOT 0.4       Significant Diagnostics:  I have reviewed all pertinent imaging results/findings within the past 24 hours.  Imaging study    Reading Physician Reading Date Result Priority   Christopher Olivier MD  [phone]  4/13/2025 STAT     Narrative & Impression  EXAMINATION:  US LOWER EXTREMITY VEINS RIGHT     CLINICAL HISTORY:  Other specified soft tissue disorders     TECHNIQUE:  Duplex and color flow Doppler evaluation and graded compression of the right lower extremity veins was performed.     COMPARISON:  None     FINDINGS:  Right thigh veins: The common femoral, femoral, popliteal, upper greater saphenous, and deep femoral veins are patent and free of thrombus. The veins are normally compressible and have normal phasic flow and augmentation response.     Right calf veins: The visualized calf veins are patent.     Contralateral CFV: The contralateral (left) common femoral vein is patent and free of thrombus.     Miscellaneous: None     Impression:     No evidence of deep venous thrombosis in the right lower extremity.        Electronically signed by:Christopher Olivier  Date:                                            04/13/2025  Time:                                           12:13    CT Thigh With Contrast Right  Order: [id number]   Status: Final result       Next appt: 04/14/2025 at 10:40 AM in Internal Medicine (RAJ MILLARD MD)    Test Result Released: No (inaccessible in MyChart)    0 Result Notes  Details    Reading Physician Reading Date Result Priority   Christopher Olivier MD  [phone]  4/13/2025      Narrative &  Impression  EXAMINATION:  CT LEG (TIBIA-FIBULA) WITH CONTRAST RIGHT; CT THIGH WITH CONTRAST RIGHT     CLINICAL HISTORY:  cellulitis;     TECHNIQUE:  Low-dose axial CT images of the thigh and tibia and fibula.  Multiplanar reformats.  Images obtained after the administration 100 mL Omnipaque 350 IV contrast.     COMPARISON:  None     FINDINGS:  Large left inguinal lymph nodes possibly reactive to infection.  Clinical follow-up to resolution recommended to exclude other etiologies.     Skin thickening and soft tissue fat stranding over the thigh most notable along the medial portion.  This could relate to infection.  Edema or trauma less favored but not excluded on the basis of this exam.     Along the thigh the muscular bulk is maintained.  No definite fracture identified.  Right hip arthroplasty with long femoral stem in place.     Intrapelvic contents appear unremarkable.  Metal artifact degrades there evaluation.  Vascular structures are patent to the level of the knee.     CT tibia and fibula.     Soft tissue thickening fat stranding and skin thickening most notable along the anterior aspect of the lower extremity.  Disorganized fluid throughout the superficial compartment.  No definite involvement of the deep/intramuscular compartment.     Vascular structures grossly patent on this nondedicated exam.     No definite abscess identified.  Age expected degenerative changes of the osseous structures.  No fracture or traumatic malalignment identified.     Impression:     Extensive soft tissue swelling and skin thickening with suspected reactive right inguinal adenopathy.  No definite abscess identified at this time.        Electronically signed by:Christopher Olivier  Date:                                            04/13/2025  Time:                                           14:33     CT Leg (Tibia-Fibula) Wtih Contrast Right  Order: [id number]   Status: Final result       Next appt: 04/14/2025 at 10:40 AM in Internal  Medicine (RAJ MILLARD MD)    Test Result Released: No (inaccessible in MyChart)    0 Result Notes  Details    Reading Physician Reading Date Result Priority   Christopher Olivier MD  [phone]  4/13/2025 STAT     Narrative & Impression  EXAMINATION:  CT LEG (TIBIA-FIBULA) WITH CONTRAST RIGHT; CT THIGH WITH CONTRAST RIGHT     CLINICAL HISTORY:  cellulitis;     TECHNIQUE:  Low-dose axial CT images of the thigh and tibia and fibula.  Multiplanar reformats.  Images obtained after the administration 100 mL Omnipaque 350 IV contrast.     COMPARISON:  None     FINDINGS:  Large left inguinal lymph nodes possibly reactive to infection.  Clinical follow-up to resolution recommended to exclude other etiologies.     Skin thickening and soft tissue fat stranding over the thigh most notable along the medial portion.  This could relate to infection.  Edema or trauma less favored but not excluded on the basis of this exam.     Along the thigh the muscular bulk is maintained.  No definite fracture identified.  Right hip arthroplasty with long femoral stem in place.     Intrapelvic contents appear unremarkable.  Metal artifact degrades there evaluation.  Vascular structures are patent to the level of the knee.     CT tibia and fibula.     Soft tissue thickening fat stranding and skin thickening most notable along the anterior aspect of the lower extremity.  Disorganized fluid throughout the superficial compartment.  No definite involvement of the deep/intramuscular compartment.     Vascular structures grossly patent on this nondedicated exam.     No definite abscess identified.  Age expected degenerative changes of the osseous structures.  No fracture or traumatic malalignment identified.     Impression:     Extensive soft tissue swelling and skin thickening with suspected reactive right inguinal adenopathy.  No definite abscess identified at this time.        Electronically signed by:Christopher Olivier  Date:                                             04/13/2025  Time:                                           14:33     Normal sinus rhythm  Right axis deviation  Nonspecific intraventricular block  Nonspecific T wave abnormality  Abnormal ECG  When compared with ECG of 13-Apr-2025 11:31,  No significant change was found      Assessment/Plan:     * Cellulitis of right lower extremity  No drainable fluid collections.      I would recommend antibiotics to cover g positives, g negatives, Streptococcus and methicillin-resistant Staph aureus especially   Elevate right lower extremity   Infectious disease consult if that has no significant improvement    My concern is that Augmentin that has not effective of methicillin-resistant Staph aureus and that has a typical skin pathogen in our region    Primary hypertension  Management per hospital Medicine    Morbid obesity  Patient would benefit from weight loss but this is highly unlikely    Type 2 diabetes mellitus with diabetic polyneuropathy, with long-term current use of insulin  Management per hospital Medicine      VTE Risk Mitigation (From admission, onward)           Ordered     enoxaparin injection 40 mg  Every 12 hours         04/13/25 1626     IP VTE HIGH RISK PATIENT  Once         04/13/25 1622     Place sequential compression device  Until discontinued         04/13/25 1622                    Thank you for your consult. I will follow-up with patient. Please contact us if you have any additional questions.    Lance Dangelo MD  General Surgery  [facility]

## 2025-04-13 NOTE — PROGRESS NOTES
Pharmacist Renal Dose Adjustment Note    Stephanie Raza is a 69 y.o. female being treated with the medication cefepime.     Patient Data:    Vital Signs (Most Recent):  Temp: 98.7 °F (37.1 °C) (04/13/25 0956)  Pulse: 93 (04/13/25 1230)  Resp: 20 (04/13/25 1230)  BP: (!) 116/56 (04/13/25 1230)  SpO2: 97 % (04/13/25 1230) Vital Signs (72h Range):  Temp:  [98.7 °F (37.1 °C)]   Pulse:  [91-94]   Resp:  [18-20]   BP: (116-132)/(56-60)   SpO2:  [97 %]      Recent Labs   Lab 04/13/25  1227   CREATININE 0.9     Serum creatinine: 0.9 mg/dL 04/13/25 1227  Estimated creatinine clearance: 75.1 mL/min    Medication: cefepime 2 g IV every 12 hours will be changed to cefepime 2 g IV every 8 hours per pharmacy renal dose adjustment protocol for patients with CrCl greater than 60 mL/min.    Pharmacist's Name: Estephania Morton PharmD  Pharmacist's Extension: [phone]     Thank you for allowing us to participate in this patient's care.     Estephania Morton PharmD 04/13/2025 2:28 PM

## 2025-04-13 NOTE — HPI
69-year-old morbidly obese female presented to the [facility] with significant erythema of the right lower extremity from the ankle to the thigh.  The patient also states that she is now not able to walk on the extremity because of the discomfort.  The patient states that she was admitted to the [facility] starting last Monday with the similar condition.  She was treated with IV antibiotics, unsure of which ones, and then discharge on Augmentin.    The patient presented to [facility].  She was evaluated.  In surgery was asked to see the patient    The patient states that the redness has gone down a little bit from the lines drawn around the upper thigh erythema at the [facility]

## 2025-04-13 NOTE — HPI
Patient is a 65-year-old female with a history of diabetes, hyperlipidemia, hypertension, obesity who presented emergency department for evaluation of right leg swelling.  Patient reports on 04/06 she woke up vomiting and did not feel well.  When her daughter saw her on 04/08 she took her to the [facility].  She reports she had swelling in her right leg that was painful all the way up to her groin.  She was admitted for 3 days reportedly treated with Ancef at which time the marks of erythema had improved her fever had subsided and she was discharged home on Augmentin (blood cultures remain negative).  After being home she noted that the swelling worsened and she re-presented to the hospital.  In the emergency department she was afebrile, laboratory exam revealed protocol of 2.25, BNP of 150, sed rate greater 120, , white blood cell count of 13.4.  Lactic acid level was normal at 1.7.  Blood cultures were obtained and she was begun on vancomycin and meropenem.  CT scan of the leg revealed large left inguinal lymph nodes, fat stranding in the thigh most notably in the medial portion right hip arthroplasty in place intrapelvic contents unremarkable  CTA tib-fib soft tissue thickening and fat stranding in the anterior aspect of the lower extremity with disorganized fluid throughout the superficial compartment with no definitive involvement of the deep intramuscular compartment, no definitive abscess  Doppler exam of lower extremity negative for DVT    Consult general surgery and Infectious Disease for evaluation.

## 2025-04-14 ENCOUNTER — PATIENT OUTREACH (OUTPATIENT)
Dept: ADMINISTRATIVE | Facility: CLINIC | Age: 70
End: 2025-04-14
Payer: MEDICARE

## 2025-04-14 LAB
ABSOLUTE EOSINOPHIL (OHS): 0.37 K/UL
ABSOLUTE MONOCYTE (OHS): 1.04 K/UL (ref 0.3–1)
ABSOLUTE NEUTROPHIL COUNT (OHS): 6.56 K/UL (ref 1.8–7.7)
ALBUMIN SERPL BCP-MCNC: 2 G/DL (ref 3.5–5.2)
ALP SERPL-CCNC: 92 UNIT/L (ref 40–150)
ALT SERPL W/O P-5'-P-CCNC: 21 UNIT/L (ref 10–44)
ANION GAP (OHS): 9 MMOL/L (ref 8–16)
AORTIC ROOT ANNULUS: 3.01 CM
ASCENDING AORTA: 2.86 CM
AST SERPL-CCNC: 22 UNIT/L (ref 11–45)
AV INDEX (PROSTH): 0.71
AV MEAN GRADIENT: 4 MMHG
AV PEAK GRADIENT: 6 MMHG
AV VALVE AREA BY VELOCITY RATIO: 2.4 CM²
AV VALVE AREA: 2.2 CM²
AV VELOCITY RATIO: 0.75
BASOPHILS # BLD AUTO: 0.05 K/UL
BASOPHILS NFR BLD AUTO: 0.5 %
BILIRUB SERPL-MCNC: 0.4 MG/DL (ref 0.1–1)
BSA FOR ECHO PROCEDURE: 2.29 M2
BUN SERPL-MCNC: 16 MG/DL (ref 8–23)
CALCIUM SERPL-MCNC: 8.2 MG/DL (ref 8.7–10.5)
CHLORIDE SERPL-SCNC: 101 MMOL/L (ref 95–110)
CO2 SERPL-SCNC: 27 MMOL/L (ref 23–29)
CREAT SERPL-MCNC: 0.9 MG/DL (ref 0.5–1.4)
CV ECHO LV RWT: 0.49 CM
DOP CALC AO PEAK VEL: 1.2 M/S
DOP CALC AO VTI: 24.2 CM
DOP CALC LVOT AREA: 3.1 CM2
DOP CALC LVOT DIAMETER: 2 CM
DOP CALC LVOT PEAK VEL: 0.9 M/S
DOP CALC LVOT STROKE VOLUME: 53.7 CM3
DOP CALC MV VTI: 30.3 CM
DOP CALC RVOT PEAK VEL: 0.7 M/S
DOP CALC RVOT VTI: 14 CM
DOP CALCLVOT PEAK VEL VTI: 17.1 CM
E WAVE DECELERATION TIME: 134 MSEC
E/A RATIO: 0.84
E/E' RATIO: 10 M/S
EAG (OHS): 134 MG/DL (ref 68–131)
ECHO LV POSTERIOR WALL: 1.2 CM (ref 0.6–1.1)
ERYTHROCYTE [DISTWIDTH] IN BLOOD BY AUTOMATED COUNT: 14.9 % (ref 11.5–14.5)
FRACTIONAL SHORTENING: 28.6 % (ref 28–44)
GFR SERPLBLD CREATININE-BSD FMLA CKD-EPI: >60 ML/MIN/1.73/M2
GLUCOSE SERPL-MCNC: 119 MG/DL (ref 70–110)
HBA1C MFR BLD: 6.3 % (ref 4–5.6)
HCT VFR BLD AUTO: 33.9 % (ref 37–48.5)
HGB BLD-MCNC: 10.4 GM/DL (ref 12–16)
IMM GRANULOCYTES # BLD AUTO: 0.36 K/UL (ref 0–0.04)
IMM GRANULOCYTES NFR BLD AUTO: 3.6 % (ref 0–0.5)
INTERVENTRICULAR SEPTUM: 1.2 CM (ref 0.6–1.1)
IVC DIAMETER: 2.03 CM
IVRT: 65 MSEC
LA MAJOR: 4.8 CM
LA MINOR: 5 CM
LA WIDTH: 4.1 CM
LEFT ATRIUM SIZE: 4.5 CM
LEFT ATRIUM VOLUME INDEX: 36 ML/M2
LEFT ATRIUM VOLUME: 77 CM3
LEFT INTERNAL DIMENSION IN SYSTOLE: 3.5 CM (ref 2.1–4)
LEFT VENTRICLE DIASTOLIC VOLUME INDEX: 53.49 ML/M2
LEFT VENTRICLE DIASTOLIC VOLUME: 115 ML
LEFT VENTRICLE MASS INDEX: 105.3 G/M2
LEFT VENTRICLE SYSTOLIC VOLUME INDEX: 24.2 ML/M2
LEFT VENTRICLE SYSTOLIC VOLUME: 52 ML
LEFT VENTRICULAR INTERNAL DIMENSION IN DIASTOLE: 4.9 CM (ref 3.5–6)
LEFT VENTRICULAR MASS: 226.4 G
LV LATERAL E/E' RATIO: 8.5 M/S
LV SEPTAL E/E' RATIO: 11.3 M/S
LVED V (TEICH): 114.7 ML
LVES V (TEICH): 51.87 ML
LVOT MG: 2.31 MMHG
LVOT MV: 0.76 CM/S
LYMPHOCYTES # BLD AUTO: 1.72 K/UL (ref 1–4.8)
MCH RBC QN AUTO: 25.2 PG (ref 27–31)
MCHC RBC AUTO-ENTMCNC: 30.7 G/DL (ref 32–36)
MCV RBC AUTO: 82 FL (ref 82–98)
MV MEAN GRADIENT: 4 MMHG
MV PEAK A VEL: 0.81 M/S
MV PEAK E VEL: 0.68 M/S
MV PEAK GRADIENT: 8 MMHG
MV STENOSIS PRESSURE HALF TIME: 38.85 MS
MV VALVE AREA BY CONTINUITY EQUATION: 1.77 CM2
MV VALVE AREA P 1/2 METHOD: 5.66 CM2
NUCLEATED RBC (/100WBC) (OHS): 0 /100 WBC
PISA MRMAX VEL: 3.62 M/S
PLATELET # BLD AUTO: 271 K/UL (ref 150–450)
PMV BLD AUTO: 11.4 FL (ref 9.2–12.9)
POCT GLUCOSE: 148 MG/DL (ref 70–110)
POCT GLUCOSE: 177 MG/DL (ref 70–110)
POCT GLUCOSE: 199 MG/DL (ref 70–110)
POCT GLUCOSE: 242 MG/DL (ref 70–110)
POTASSIUM SERPL-SCNC: 4 MMOL/L (ref 3.5–5.1)
PROT SERPL-MCNC: 6.5 GM/DL (ref 6–8.4)
PV MEAN GRADIENT: 1 MMHG
RA MAJOR: 3.57 CM
RA PRESSURE ESTIMATED: 8 MMHG
RA WIDTH: 2.5 CM
RBC # BLD AUTO: 4.12 M/UL (ref 4–5.4)
RELATIVE EOSINOPHIL (OHS): 3.7 %
RELATIVE LYMPHOCYTE (OHS): 17 % (ref 18–48)
RELATIVE MONOCYTE (OHS): 10.3 % (ref 4–15)
RELATIVE NEUTROPHIL (OHS): 64.9 % (ref 38–73)
SODIUM SERPL-SCNC: 137 MMOL/L (ref 136–145)
STJ: 3.01 CM
TDI LATERAL: 0.08 M/S
TDI SEPTAL: 0.06 M/S
TDI: 0.07 M/S
TRICUSPID ANNULAR PLANE SYSTOLIC EXCURSION: 1.9 CM
WBC # BLD AUTO: 10.1 K/UL (ref 3.9–12.7)
Z-SCORE OF LEFT VENTRICULAR DIMENSION IN END DIASTOLE: -3.54
Z-SCORE OF LEFT VENTRICULAR DIMENSION IN END SYSTOLE: -1.54

## 2025-04-14 PROCEDURE — 25000003 PHARM REV CODE 250: Mod: HCNC | Performed by: INTERNAL MEDICINE

## 2025-04-14 PROCEDURE — 82040 ASSAY OF SERUM ALBUMIN: CPT | Mod: HCNC | Performed by: INTERNAL MEDICINE

## 2025-04-14 PROCEDURE — 63600175 PHARM REV CODE 636 W HCPCS: Mod: HCNC | Performed by: INTERNAL MEDICINE

## 2025-04-14 PROCEDURE — 36415 COLL VENOUS BLD VENIPUNCTURE: CPT | Mod: HCNC | Performed by: INTERNAL MEDICINE

## 2025-04-14 PROCEDURE — 99233 SBSQ HOSP IP/OBS HIGH 50: CPT | Mod: HCNC,,, | Performed by: SURGERY

## 2025-04-14 PROCEDURE — 25000003 PHARM REV CODE 250: Mod: HCNC | Performed by: FAMILY MEDICINE

## 2025-04-14 PROCEDURE — 85025 COMPLETE CBC W/AUTO DIFF WBC: CPT | Mod: HCNC | Performed by: INTERNAL MEDICINE

## 2025-04-14 PROCEDURE — 21400001 HC TELEMETRY ROOM: Mod: HCNC

## 2025-04-14 PROCEDURE — 25000242 PHARM REV CODE 250 ALT 637 W/ HCPCS: Mod: HCNC | Performed by: FAMILY MEDICINE

## 2025-04-14 PROCEDURE — 99223 1ST HOSP IP/OBS HIGH 75: CPT | Mod: NSCH,HCNC,, | Performed by: INTERNAL MEDICINE

## 2025-04-14 RX ORDER — FLUTICASONE PROPIONATE 50 MCG
2 SPRAY, SUSPENSION (ML) NASAL DAILY
Status: DISCONTINUED | OUTPATIENT
Start: 2025-04-14 | End: 2025-04-22 | Stop reason: HOSPADM

## 2025-04-14 RX ORDER — CETIRIZINE HYDROCHLORIDE 5 MG/1
5 TABLET ORAL DAILY
Status: DISCONTINUED | OUTPATIENT
Start: 2025-04-14 | End: 2025-04-14

## 2025-04-14 RX ORDER — GUAIFENESIN AND DEXTROMETHORPHAN HYDROBROMIDE 10; 100 MG/5ML; MG/5ML
10 SYRUP ORAL EVERY 4 HOURS PRN
Status: DISCONTINUED | OUTPATIENT
Start: 2025-04-15 | End: 2025-04-17

## 2025-04-14 RX ORDER — CETIRIZINE HYDROCHLORIDE 10 MG/1
10 TABLET ORAL DAILY
Status: DISCONTINUED | OUTPATIENT
Start: 2025-04-15 | End: 2025-04-22 | Stop reason: HOSPADM

## 2025-04-14 RX ADMIN — PIPERACILLIN SODIUM AND TAZOBACTAM SODIUM 4.5 G: 4; .5 INJECTION, POWDER, FOR SOLUTION INTRAVENOUS at 08:04

## 2025-04-14 RX ADMIN — LINEZOLID 600 MG: 600 TABLET, FILM COATED ORAL at 09:04

## 2025-04-14 RX ADMIN — TACROLIMUS 900 MG: 0.5 CAPSULE ORAL at 09:04

## 2025-04-14 RX ADMIN — HYDROCODONE BITARTRATE AND ACETAMINOPHEN 1 TABLET: 10; 325 TABLET ORAL at 10:04

## 2025-04-14 RX ADMIN — POLYETHYLENE GLYCOL 3350 17 G: 17 POWDER, FOR SOLUTION ORAL at 09:04

## 2025-04-14 RX ADMIN — CETIRIZINE HYDROCHLORIDE 5 MG: 5 TABLET ORAL at 04:04

## 2025-04-14 RX ADMIN — PIPERACILLIN SODIUM AND TAZOBACTAM SODIUM 4.5 G: 4; .5 INJECTION, POWDER, FOR SOLUTION INTRAVENOUS at 11:04

## 2025-04-14 RX ADMIN — HYDROCODONE BITARTRATE AND ACETAMINOPHEN 1 TABLET: 10; 325 TABLET ORAL at 04:04

## 2025-04-14 RX ADMIN — LINEZOLID 600 MG: 600 TABLET, FILM COATED ORAL at 08:04

## 2025-04-14 RX ADMIN — ASPIRIN 81 MG CHEWABLE TABLET 81 MG: 81 TABLET CHEWABLE at 09:04

## 2025-04-14 RX ADMIN — FLUTICASONE PROPIONATE 100 MCG: 50 SPRAY, METERED NASAL at 04:04

## 2025-04-14 RX ADMIN — PIPERACILLIN SODIUM AND TAZOBACTAM SODIUM 4.5 G: 4; .5 INJECTION, POWDER, FOR SOLUTION INTRAVENOUS at 02:04

## 2025-04-14 RX ADMIN — METOPROLOL SUCCINATE 50 MG: 50 TABLET, EXTENDED RELEASE ORAL at 09:04

## 2025-04-14 RX ADMIN — INSULIN ASPART 1 UNITS: 100 INJECTION, SOLUTION INTRAVENOUS; SUBCUTANEOUS at 09:04

## 2025-04-14 RX ADMIN — TACROLIMUS 900 MG: 0.5 CAPSULE ORAL at 02:04

## 2025-04-14 RX ADMIN — ENOXAPARIN SODIUM 40 MG: 40 INJECTION SUBCUTANEOUS at 08:04

## 2025-04-14 RX ADMIN — ENOXAPARIN SODIUM 40 MG: 40 INJECTION SUBCUTANEOUS at 09:04

## 2025-04-14 RX ADMIN — TACROLIMUS 900 MG: 0.5 CAPSULE ORAL at 06:04

## 2025-04-14 NOTE — ASSESSMENT & PLAN NOTE
Patient's blood pressure range in the last 24 hours was: BP  Min: 100/47  Max: 140/61.The patient's inpatient anti-hypertensive regimen is listed below:  Current Antihypertensives  , Daily, Oral  metoprolol succinate (TOPROL-XL) 24 hr tablet 50 mg, 2 times daily, Oral    Plan  - BP is controlled, no changes needed to their regimen  Holding arb due to intermittent hypotension

## 2025-04-14 NOTE — ASSESSMENT & PLAN NOTE
No drainable fluid collections.      I would recommend antibiotics to cover g positives, g negatives, Streptococcus and methicillin-resistant Staph aureus especially   Elevate right lower extremity   Infectious disease consult that has recommend    We will continue to monitor the patient but if she does that has not developed any drainable fluid collections over the next 48 hours then surgery will sign off

## 2025-04-14 NOTE — SUBJECTIVE & OBJECTIVE
Interval History:  Leg is no worse.  Erythema appears improved.  Edema is slightly improved.  No drainable fluid collections noted    Medications:  Continuous Infusions:  Scheduled Meds:   aspirin  81 mg Oral Daily    clindamycin IV (PEDS and ADULTS)  900 mg Intravenous Q8H    diltiaZEM  120 mg Oral Daily    enoxparin  40 mg Subcutaneous Q12H (prophylaxis, 0900/2100)    linezolid  600 mg Oral Q12H    metoprolol succinate  50 mg Oral BID    piperacillin-tazobactam (Zosyn) IV (PEDS and ADULTS) (extended infusion is not appropriate)  4.5 g Intravenous Q8H    polyethylene glycol  17 g Oral Daily     PRN Meds:  Current Facility-Administered Medications:     acetaminophen, 650 mg, Oral, Q8H PRN    acetaminophen, 650 mg, Oral, Q4H PRN    dextrose 50%, 12.5 g, Intravenous, PRN    dextrose 50%, 12.5 g, Intravenous, PRN    dextrose 50%, 25 g, Intravenous, PRN    dextrose 50%, 25 g, Intravenous, PRN    glucagon (human recombinant), 1 mg, Intramuscular, PRN    glucagon (human recombinant), 1 mg, Intramuscular, PRN    glucose, 16 g, Oral, PRN    glucose, 16 g, Oral, PRN    glucose, 24 g, Oral, PRN    glucose, 24 g, Oral, PRN    HYDROcodone-acetaminophen, 1 tablet, Oral, Q6H PRN    HYDROcodone-acetaminophen, 1 tablet, Oral, Q6H PRN    insulin aspart U-100, 0-5 Units, Subcutaneous, QID (AC + HS) PRN    melatonin, 6 mg, Oral, Nightly PRN    naloxone, 0.02 mg, Intravenous, PRN    ondansetron, 4 mg, Intravenous, Q8H PRN    simethicone, 1 tablet, Oral, QID PRN    sodium chloride 0.9%, 2 mL, Intravenous, Q12H PRN     Review of patient's allergies indicates:   Allergen Reactions    Adhesive tape-silicones Swelling     Paper tape     Objective:     Vital Signs (Most Recent):  Temp: 98.1 °F (36.7 °C) (04/14/25 0724)  Pulse: 86 (04/14/25 0724)  Resp: 18 (04/14/25 0724)  BP: (!) 102/54 (04/14/25 0724)  SpO2: 95 % (04/14/25 0724) Vital Signs (24h Range):  Temp:  [97.8 °F (36.6 °C)-99.4 °F (37.4 °C)] 98.1 °F (36.7 °C)  Pulse:  []  86  Resp:  [16-22] 18  SpO2:  [94 %-98 %] 95 %  BP: (100-140)/(47-66) 102/54     Weight: 120 kg (264 lb 8.8 oz)  Body mass index is 48.39 kg/m².    Intake/Output - Last 3 Shifts         04/12 0700  04/13 0659 04/13 0700  04/14 0659 04/14 0700  04/15 0659    IV Piggyback  500     Total Intake(mL/kg)  500 (4.2)     Urine (mL/kg/hr)  650     Total Output  650     Net  -150                     Physical Exam  Vitals and nursing note reviewed.   Constitutional:       Appearance: She is well-developed. She is obese.   HENT:      Head: Normocephalic.   Eyes:      Pupils: Pupils are equal, round, and reactive to light.   Neck:      Thyroid: No thyromegaly.      Vascular: No JVD.      Trachea: No tracheal deviation.   Cardiovascular:      Rate and Rhythm: Normal rate and regular rhythm.      Heart sounds: Normal heart sounds.   Pulmonary:      Breath sounds: Normal breath sounds. No wheezing.   Abdominal:      General: Bowel sounds are normal. There is no distension.      Palpations: Abdomen is soft. Abdomen is not rigid. There is no mass.      Tenderness: There is no abdominal tenderness. There is no guarding or rebound.      Comments: Obese   Musculoskeletal:         General: Normal range of motion.      Right lower leg: Edema present.      Left lower leg: No edema.   Lymphadenopathy:      Cervical: No cervical adenopathy.   Skin:     General: Skin is warm and dry.      Findings: Erythema (right lower extremity) present. No rash.      Comments: There is erythema of the right leg.  It is slightly less intense and slightly less in area.  There is still tenderness over the area   Neurological:      Mental Status: She is oriented to person, place, and time.   Psychiatric:         Mood and Affect: Mood normal.         Behavior: Behavior normal.         Thought Content: Thought content normal.          Significant Labs:  I have reviewed all pertinent lab results within the past 24 hours.  CBC:   Recent Labs   Lab 04/14/25  0545    WBC 10.10   RBC 4.12   HGB 10.4*   HCT 33.9*      MCV 82   MCH 25.2*   MCHC 30.7*     BMP:   Recent Labs   Lab 04/13/25  1227      K 4.7      CO2 25   BUN 19   CREATININE 0.9   CALCIUM 8.5*   MG 2.2     Microbiology Results (last 7 days)       Procedure Component Value Units Date/Time    Blood culture x two cultures. Draw prior to antibiotics. [[id number]] Collected: 04/13/25 1116    Order Status: Resulted Specimen: Blood from Peripheral, Antecubital, Left Updated: 04/13/25 1134    Blood culture x two cultures. Draw prior to antibiotics. [[id number]] Collected: 04/13/25 1124    Order Status: Resulted Specimen: Blood from Peripheral, Hand, Left Updated: 04/13/25 1134            Significant Diagnostics:  I have reviewed all pertinent imaging results/findings within the past 24 hours.  No new

## 2025-04-14 NOTE — ASSESSMENT & PLAN NOTE
Patient has Combined Systolic and Diastolic heart failure that is Acute on chronic. On presentation their CHF was decompensated. Evidence of decompensated CHF on presentation includes: edema. The etiology of their decompensation is likely increased fluid intake. Most recent BNP and echo results are listed below.  Recent Labs     04/13/25  1125   *     Latest ECHO  No results found for this or any previous visit.    Current Heart Failure Medications  , Daily, Oral  metoprolol succinate (TOPROL-XL) 24 hr tablet 50 mg, 2 times daily, Oral    Plan  - Monitor strict I&Os and daily weights.    - Place on telemetry  - Low sodium diet  - Place on fluid restriction of 1.5 L.   - Cardiology has not been consulted  - The patient's volume status is at their baseline  -patient follows with Dr. Mikie Saxena whom she saw about 3 weeks ago.  She has follow up appointment in May.    Echocardiogram pending

## 2025-04-14 NOTE — PLAN OF CARE
Discussed poc with pt, pt verbalized understanding  Purposeful rounding every 2hours  VS wnl  Cardiac monitoring in use, pt is NSR, tele monitor #[id number]  Blood glucose monitoring   Fall precautions in place, remains injury free  Pain and nausea under control with PRN meds  IVFs  Accurate I&Os  Abx given as prescribed  Bed locked at lowest position  Call light within reach  Chart check complete  Will cont with POC

## 2025-04-14 NOTE — HOSPITAL COURSE
4/14 admitted for right leg cellulitis. Denies insect bite, trauma. States improvement in symptoms of erythema, edema and pain. Intravenous antibiotic(s) per infectious disease. Surgery following.  4/15 antibiotic(s) per infectious disease. Cellulitis modestly improving. Pain regimen adjusted.  4/16 interventional radiology aspirated blister. Body fluid studies pending. Patient endorses improvement in symptoms of edema and erythema. Us liliana pending  4/17 us with moderate stenosis in superficial femoral artery. Patient unable to tolerate us liliana studydue to pain. Vascular surgery and nephrology consulted due to unimproved cellulitis and acute kidney injury. Us abdomen complete with medical renal disease, no hydronephrosis. Infectious disease aware. Surgery recommending mri pending renal improvement  4/18 creatinine improving. Awaiting mri  4/19 ortho consulted while awaiting mri official read. Mri without myositis or abscess. Continue treatment for lymphedema with cellulitis with compression stockings as tolerated and topical antimicrobials, elevation and cold compress.  4/20 labs improving. Continue antibiotic(s) per infectious disease. Encourage elevation, cold compress and scds vs robyn hose. No surgical intervention warranted.  04/21/2025  Patient reports swelling improving. Cont abx. Will establish 1 time dose of dalvance outpatient. Patient refused snf, will set up home health.   04/22/2025  Patients stable for dc. Will cont on po zyvox. 1 time dose of dalvance to be set up outpatient.

## 2025-04-14 NOTE — PROGRESS NOTES
[facility] S[facility]  General Surgery  Progress Note    Subjective:     History of Present Illness:  69-year-old morbidly obese female presented to the [facility] with significant erythema of the right lower extremity from the ankle to the thigh.  The patient also states that she is now not able to walk on the extremity because of the discomfort.  The patient states that she was admitted to the [facility] starting last Monday with the similar condition.  She was treated with IV antibiotics, unsure of which ones, and then discharge on Augmentin.    The patient presented to [facility].  She was evaluated.  In surgery was asked to see the patient    The patient states that the redness has gone down a little bit from the lines drawn around the upper thigh erythema at the [facility]    Post-Op Info:  * No surgery found *         Interval History:  Leg is no worse.  Erythema appears improved.  Edema is slightly improved.  No drainable fluid collections noted    Medications:  Continuous Infusions:  Scheduled Meds:   aspirin  81 mg Oral Daily    clindamycin IV (PEDS and ADULTS)  900 mg Intravenous Q8H    diltiaZEM  120 mg Oral Daily    enoxparin  40 mg Subcutaneous Q12H (prophylaxis, 0900/2100)    linezolid  600 mg Oral Q12H    metoprolol succinate  50 mg Oral BID    piperacillin-tazobactam (Zosyn) IV (PEDS and ADULTS) (extended infusion is not appropriate)  4.5 g Intravenous Q8H    polyethylene glycol  17 g Oral Daily     PRN Meds:  Current Facility-Administered Medications:     acetaminophen, 650 mg, Oral, Q8H PRN    acetaminophen, 650 mg, Oral, Q4H PRN    dextrose 50%, 12.5 g, Intravenous, PRN    dextrose 50%, 12.5 g, Intravenous, PRN    dextrose 50%, 25 g, Intravenous, PRN    dextrose 50%, 25 g, Intravenous, PRN    glucagon (human recombinant), 1 mg, Intramuscular, PRN    glucagon (human recombinant), 1 mg, Intramuscular, PRN    glucose, 16 g, Oral, PRN    glucose, 16 g, Oral,  PRN    glucose, 24 g, Oral, PRN    glucose, 24 g, Oral, PRN    HYDROcodone-acetaminophen, 1 tablet, Oral, Q6H PRN    HYDROcodone-acetaminophen, 1 tablet, Oral, Q6H PRN    insulin aspart U-100, 0-5 Units, Subcutaneous, QID (AC + HS) PRN    melatonin, 6 mg, Oral, Nightly PRN    naloxone, 0.02 mg, Intravenous, PRN    ondansetron, 4 mg, Intravenous, Q8H PRN    simethicone, 1 tablet, Oral, QID PRN    sodium chloride 0.9%, 2 mL, Intravenous, Q12H PRN     Review of patient's allergies indicates:   Allergen Reactions    Adhesive tape-silicones Swelling     Paper tape     Objective:     Vital Signs (Most Recent):  Temp: 98.1 °F (36.7 °C) (04/14/25 0724)  Pulse: 86 (04/14/25 0724)  Resp: 18 (04/14/25 0724)  BP: (!) 102/54 (04/14/25 0724)  SpO2: 95 % (04/14/25 0724) Vital Signs (24h Range):  Temp:  [97.8 °F (36.6 °C)-99.4 °F (37.4 °C)] 98.1 °F (36.7 °C)  Pulse:  [] 86  Resp:  [16-22] 18  SpO2:  [94 %-98 %] 95 %  BP: (100-140)/(47-66) 102/54     Weight: 120 kg (264 lb 8.8 oz)  Body mass index is 48.39 kg/m².    Intake/Output - Last 3 Shifts         04/12 0700  04/13 0659 04/13 0700 04/14 0659 04/14 0700  04/15 0659    IV Piggyback  500     Total Intake(mL/kg)  500 (4.2)     Urine (mL/kg/hr)  650     Total Output  650     Net  -150                     Physical Exam  Vitals and nursing note reviewed.   Constitutional:       Appearance: She is well-developed. She is obese.   HENT:      Head: Normocephalic.   Eyes:      Pupils: Pupils are equal, round, and reactive to light.   Neck:      Thyroid: No thyromegaly.      Vascular: No JVD.      Trachea: No tracheal deviation.   Cardiovascular:      Rate and Rhythm: Normal rate and regular rhythm.      Heart sounds: Normal heart sounds.   Pulmonary:      Breath sounds: Normal breath sounds. No wheezing.   Abdominal:      General: Bowel sounds are normal. There is no distension.      Palpations: Abdomen is soft. Abdomen is not rigid. There is no mass.      Tenderness: There is no  abdominal tenderness. There is no guarding or rebound.      Comments: Obese   Musculoskeletal:         General: Normal range of motion.      Right lower leg: Edema present.      Left lower leg: No edema.   Lymphadenopathy:      Cervical: No cervical adenopathy.   Skin:     General: Skin is warm and dry.      Findings: Erythema (right lower extremity) present. No rash.      Comments: There is erythema of the right leg.  It is slightly less intense and slightly less in area.  There is still tenderness over the area   Neurological:      Mental Status: She is oriented to person, place, and time.   Psychiatric:         Mood and Affect: Mood normal.         Behavior: Behavior normal.         Thought Content: Thought content normal.          Significant Labs:  I have reviewed all pertinent lab results within the past 24 hours.  CBC:   Recent Labs   Lab 04/14/25  0555   WBC 10.10   RBC 4.12   HGB 10.4*   HCT 33.9*      MCV 82   MCH 25.2*   MCHC 30.7*     BMP:   Recent Labs   Lab 04/13/25  1227      K 4.7      CO2 25   BUN 19   CREATININE 0.9   CALCIUM 8.5*   MG 2.2     Microbiology Results (last 7 days)       Procedure Component Value Units Date/Time    Blood culture x two cultures. Draw prior to antibiotics. [[id number]] Collected: 04/13/25 1116    Order Status: Resulted Specimen: Blood from Peripheral, Antecubital, Left Updated: 04/13/25 1134    Blood culture x two cultures. Draw prior to antibiotics. [[id number]] Collected: 04/13/25 1124    Order Status: Resulted Specimen: Blood from Peripheral, Hand, Left Updated: 04/13/25 1134            Significant Diagnostics:  I have reviewed all pertinent imaging results/findings within the past 24 hours.  No new  Assessment/Plan:     * Cellulitis of right lower extremity  No drainable fluid collections.      I would recommend antibiotics to cover g positives, g negatives, Streptococcus and methicillin-resistant Staph aureus especially   Elevate right lower  extremity   Infectious disease consult that has recommend    We will continue to monitor the patient but if she does that has not developed any drainable fluid collections over the next 48 hours then surgery will sign off        Primary hypertension  Management per hospital Medicine    Morbid obesity  Patient would benefit from weight loss but this is highly unlikely    Type 2 diabetes mellitus with diabetic polyneuropathy, with long-term current use of insulin  Management per hospital Medicine        Lance Dangelo MD  General Surgery  [facility]

## 2025-04-14 NOTE — ASSESSMENT & PLAN NOTE
Concern for neck fascia however patient's white blood cell count, creatinine, glucose are not consistent.  However picture may be mixed due to already having been on antibiotics.  Patient reports began 4/7  She was hospitalized at [facility] 4/7 through 4/10 reportedly she was febrile.  She was treated with Ancef and by 4/10 she was afebrile.  There are surgical pen marks on her right upper thigh which shows that the redness has receded some.  Blood cultures at the [facility] were negative.  She was discharged on Augmentin.  Patient had worsening swelling and pain and she re-presented to [facility].  See results of CT scan and Doppler of lower extremity above  Was given vancomycin and cefepime in ER  We will change to Zyvox for better skin penetration for Staph, Zosyn for anaerobes and strep and add clindamycin for toxin.  General surgery following, no surgical intervention warranted  Ct imaging negative for abscess  Deferring final antibiotic(s) per infectious disease   Psh right total hip revision  Consider mri to evaluate prosthesis though erythema has receded from knee and leukocytosis resolved

## 2025-04-14 NOTE — ASSESSMENT & PLAN NOTE
Body mass index is 48.39 kg/m². Morbid obesity complicates all aspects of disease management from diagnostic modalities to treatment. Weight loss encouraged and health benefits explained to patient.   Physical/occupational therapy when able

## 2025-04-14 NOTE — PLAN OF CARE
Discussed poc with pt, pt verbalized understanding    Purposeful rounding every 2hours    VS wnl  Cardiac monitoring in use, pt is NSR, tele monitor # [id number]  Blood glucose monitoring   Fall precautions in place, remains injury free  Pt denies c/o nausea  Pain managed with PRN meds      Accurate I&Os  Abx given as prescribed  Bed locked at lowest position  Call light within reach    Chart check complete  Will cont with POC

## 2025-04-14 NOTE — PROGRESS NOTES
[facility] Medicine  Progress Note    Patient Name: Stephanie Raza  MRN: [id number]  Patient Class: IP- Inpatient   Admission Date: 4/13/2025  Length of Stay: 1 days  Attending Physician: Delfino Johansen MD  Primary Care Provider: Reyna Suarez MD        Subjective     Principal Problem:Cellulitis of right lower extremity        HPI:  Patient is a 65-year-old female with a history of diabetes, hyperlipidemia, hypertension, obesity who presented emergency department for evaluation of right leg swelling.  Patient reports on 04/06 she woke up vomiting and did not feel well.  When her daughter saw her on 04/08 she took her to the [facility].  She reports she had swelling in her right leg that was painful all the way up to her groin.  She was admitted for 3 days reportedly treated with Ancef at which time the marks of erythema had improved her fever had subsided and she was discharged home on Augmentin (blood cultures remain negative).  After being home she noted that the swelling worsened and she re-presented to the hospital.  In the emergency department she was afebrile, laboratory exam revealed protocol of 2.25, BNP of 150, sed rate greater 120, , white blood cell count of 13.4.  Lactic acid level was normal at 1.7.  Blood cultures were obtained and she was begun on vancomycin and meropenem.  CT scan of the leg revealed large left inguinal lymph nodes, fat stranding in the thigh most notably in the medial portion right hip arthroplasty in place intrapelvic contents unremarkable  CTA tib-fib soft tissue thickening and fat stranding in the anterior aspect of the lower extremity with disorganized fluid throughout the superficial compartment with no definitive involvement of the deep intramuscular compartment, no definitive abscess  Doppler exam of lower extremity negative for DVT    Consult general surgery and Infectious Disease for evaluation.        Overview/Hospital  Course:  4/14 admitted for right leg cellulitis. Denies insect bite, trauma. States improvement in symptoms of erythema, edema and pain. Intravenous antibiotic(s) per infectious disease. Surgery following.    Interval History: See hospital course for today      Review of Systems   Constitutional:  Positive for activity change and fatigue. Negative for appetite change and fever.   Respiratory:  Negative for shortness of breath.    Cardiovascular:  Positive for leg swelling.   Gastrointestinal:  Positive for constipation (improving). Negative for abdominal pain and diarrhea.   Musculoskeletal:  Positive for gait problem.   Skin:  Positive for color change. Negative for wound.   Neurological:  Positive for weakness.   Psychiatric/Behavioral:  Negative for agitation, behavioral problems, confusion, decreased concentration and dysphoric mood. The patient is not nervous/anxious.      Objective:     Vital Signs (Most Recent):  Temp: 98.1 °F (36.7 °C) (04/14/25 0724)  Pulse: 96 (04/14/25 0925)  Resp: 18 (04/14/25 0724)  BP: (!) 102/54 (04/14/25 0724)  SpO2: 95 % (04/14/25 0724) Vital Signs (24h Range):  Temp:  [97.8 °F (36.6 °C)-99.4 °F (37.4 °C)] 98.1 °F (36.7 °C)  Pulse:  [] 96  Resp:  [16-22] 18  SpO2:  [94 %-98 %] 95 %  BP: (100-140)/(47-66) 102/54     Weight: 120 kg (264 lb 8.8 oz)  Body mass index is 48.39 kg/m².    Intake/Output Summary (Last 24 hours) at 4/14/2025 1036  Last data filed at 4/13/2025 1442  Gross per 24 hour   Intake 500 ml   Output 650 ml   Net -150 ml         Physical Exam  Vitals and nursing note reviewed. Exam conducted with a chaperone present (family, us tech).   Constitutional:       General: She is not in acute distress.     Appearance: She is morbidly obese. She is ill-appearing. She is not toxic-appearing.   HENT:      Head: Normocephalic and atraumatic.   Cardiovascular:      Rate and Rhythm: Normal rate.   Pulmonary:      Effort: Pulmonary effort is normal. No respiratory distress.  "  Abdominal:      Palpations: Abdomen is soft.      Tenderness: There is no abdominal tenderness.   Genitourinary:     Comments: External ascencio  Musculoskeletal:         General: Tenderness present.      Right lower leg: Edema present.      Left lower leg: No edema.   Skin:     General: Skin is warm.      Findings: Erythema (nonblanchable) present.      Comments: Tense skin proximal to knee  Less tense distal, surrounding ankle   Neurological:      Mental Status: She is alert and oriented to person, place, and time.      Motor: Weakness present.   Psychiatric:         Mood and Affect: Mood normal.         Behavior: Behavior normal. Behavior is cooperative.               Significant Labs: All pertinent labs within the past 24 hours have been reviewed.  Blood Culture: No results for input(s): "LABBLOO" in the last 48 hours.  CBC:   Recent Labs   Lab 04/13/25  1125 04/14/25  0555   WBC 13.40* 10.10   HGB 12.9 10.4*   HCT 41.4 33.9*    271     CMP:   Recent Labs   Lab 04/13/25  1227 04/14/25  0555    137   K 4.7 4.0    101   CO2 25 27   BUN 19 16   CREATININE 0.9 0.9   CALCIUM 8.5* 8.2*   ALBUMIN 2.3* 2.0*   BILITOT 0.4 0.4   ALKPHOS 91 92   AST 20 22   ALT 21 21   ANIONGAP 10 9     POCT Glucose:   Recent Labs   Lab 04/13/25  1709 04/13/25  2101 04/14/25  0522   POCTGLUCOSE 83 204* 199*     Urine Studies:   Recent Labs   Lab 04/13/25  1021   COLORU Yellow   APPEARANCEUA Clear   PHUR 7.0   SPECGRAV 1.010   PROTEINUA Trace*   GLUCUA 4+*   BILIRUBINUA Negative   OCCULTUA Negative   NITRITE Negative   UROBILINOGEN Negative   LEUKOCYTESUR Negative   BACTERIA None       Significant Imaging: I have reviewed all pertinent imaging results/findings within the past 24 hours.  CT: I have reviewed all pertinent results/findings within the past 24 hours and my personal findings are:  leg-no abscess  CXR: I have reviewed all pertinent results/findings within the past 24 hours and my personal findings are:  no " significant interval change  U/S: I have reviewed all pertinent results/findings within the past 24 hours and my personal findings are:  negative for dvt      Assessment & Plan  Type 2 diabetes mellitus with diabetic polyneuropathy, with long-term current use of insulin  Patient with diabetes currently well-controlled is taking Ozempic outpatient  Sliding scale insulin with Accu-Cheks a.c. HS  Morbid obesity  Body mass index is 48.39 kg/m². Morbid obesity complicates all aspects of disease management from diagnostic modalities to treatment. Weight loss encouraged and health benefits explained to patient.   Physical/occupational therapy when able      Cellulitis of right lower extremity  Concern for neck fascia however patient's white blood cell count, creatinine, glucose are not consistent.  However picture may be mixed due to already having been on antibiotics.  Patient reports began 4/7  She was hospitalized at [facility] 4/7 through 4/10 reportedly she was febrile.  She was treated with Ancef and by 4/10 she was afebrile.  There are surgical pen marks on her right upper thigh which shows that the redness has receded some.  [facility]  She was discharged on Augmentin.  Patient had worsening swelling and pain and she re-presented to [facility].  See results of CT scan and Doppler of lower extremity above  Was given vancomycin and cefepime in ER  We will change to Zyvox for better skin penetration for Staph, Zosyn for anaerobes and strep and add clindamycin for toxin.  General surgery following, no surgical intervention warranted  Ct imaging negative for abscess  Deferring final antibiotic(s) per infectious disease   Psh right total hip revision  Consider mri to evaluate prosthesis though erythema has receded from knee and leukocytosis resolved  Primary hypertension  Patient's blood pressure range in the last 24 hours was: BP  Min: 100/47  Max: 140/61.The  patient's inpatient anti-hypertensive regimen is listed below:  Current Antihypertensives  , Daily, Oral  metoprolol succinate (TOPROL-XL) 24 hr tablet 50 mg, 2 times daily, Oral    Plan  - BP is controlled, no changes needed to their regimen  Holding arb due to intermittent hypotension  Acute on chronic combined systolic and diastolic congestive heart failure  Patient has Combined Systolic and Diastolic heart failure that is Acute on chronic. On presentation their CHF was decompensated. Evidence of decompensated CHF on presentation includes: edema. The etiology of their decompensation is likely increased fluid intake. Most recent BNP and echo results are listed below.  Recent Labs     04/13/25  1125   *     Latest ECHO  No results found for this or any previous visit.    Current Heart Failure Medications  , Daily, Oral  metoprolol succinate (TOPROL-XL) 24 hr tablet 50 mg, 2 times daily, Oral    Plan  - Monitor strict I&Os and daily weights.    - Place on telemetry  - Low sodium diet  - Place on fluid restriction of 1.5 L.   - Cardiology has not been consulted  - The patient's volume status is at their baseline  -patient follows with Dr. Mikie Saxena whom she saw about 3 weeks ago.  She has follow up appointment in May.    Echocardiogram pending      VTE Risk Mitigation (From admission, onward)           Ordered     enoxaparin injection 40 mg  Every 12 hours         04/13/25 1626     IP VTE HIGH RISK PATIENT  Once         04/13/25 1622     Place sequential compression device  Until discontinued         04/13/25 1622                    Discharge Planning   RONNIE:      Code Status: Full Code   Medical Readiness for Discharge Date:                            Delfino Johansen MD  Department of Hospital Medicine   [facility] [facility] Surg

## 2025-04-14 NOTE — PLAN OF CARE
[facility] - Med Surg  Initial Discharge Assessment       Primary Care Provider: Reyna Suarez MD    Admission Diagnosis: Morbid obesity [E66.01]  Acute on chronic combined systolic and diastolic heart failure [I50.43]  CHF (congestive heart failure) [I50.9]  Screening for cardiovascular condition [Z13.6]  Cellulitis of right lower leg [L03.115]  Severe sepsis [A41.9, R65.20]  Right leg swelling [M79.89]    Admission Date: 4/13/2025  Expected Discharge Date: Per Attending     Transition of Care Barriers: None    Payor: [employer] MANAGED MEDICARE / Plan: [email or web address] H[employer] PPO SPECIAL NEEDS / Product Type: Medicare Advantage /     Extended Emergency Contact Information  Primary Emergency Contact: contact,no  Relation: None  Secondary Emergency Contact: ida dahl  Mobile Phone: [phone]  Relation: Daughter    Discharge Plan A: Home Health  Discharge Plan B: Skilled Nursing Facility      [facility] | [facility] ST[facility][id number] [address] & [address] [address]  Phone: [phone] Fax: [phone]    [facility] - [facility] [address]  [address] [facility] [address]  Phone: [phone] Fax: [phone]      Initial Assessment (most recent)       Adult Discharge Assessment - 04/14/25 1106          Discharge Assessment    Assessment Type Discharge Planning Assessment     Confirmed/corrected address, phone number and insurance Yes     Confirmed Demographics Correct on Facesheet     Source of Information patient     Communicated RONNIE with patient/caregiver Date not available/Unable to determine     Reason For Admission cellulitis of right lower extremity     People in Home grandchild(nallely)     Do you expect to return to your current living situation? Yes     Do you have help at home or someone to help you manage your care at home? No     Prior to hospitilization cognitive status: Alert/Oriented      Current cognitive status: Alert/Oriented     Walking or Climbing Stairs Difficulty yes     Walking or Climbing Stairs ambulation difficulty, requires equipment     Dressing/Bathing Difficulty no     Home Accessibility wheelchair accessible     Home Layout Able to live on 1st floor     Equipment Currently Used at Home cane, straight;walker, rolling     Readmission within 30 days? No     Patient currently being followed by outpatient case management? No     Do you currently have service(s) that help you manage your care at home? No     Do you take prescription medications? Yes     Do you have prescription coverage? Yes     Coverage [employer]     Do you have any problems affording any of your prescribed medications? No     Is the patient taking medications as prescribed? yes     Who is going to help you get home at discharge? Laurence shepard     How do you get to doctors appointments? car, drives self     Are you on dialysis? No     Do you take coumadin? No     Discharge Plan A Home Health     Discharge Plan B Skilled Nursing Facility     DME Needed Upon Discharge  none     Discharge Plan discussed with: Patient     Transition of Care Barriers None                   Anticipated DC dispo: Home vs SNF   Prior Level of Function: Independent   People in home:  granddaughter     Comments:  CM met with patient at bedside to introduce role and discuss discharge planning. Laurence Shepard,  will be help at home and can provide transport at time of discharge. CM discharge needs depends on hospital progress. CM will continue following to assist with other needs.     Patient had Lewis GRIFFIN in the past.

## 2025-04-14 NOTE — SUBJECTIVE & OBJECTIVE
Interval History: See hospital course for today      Review of Systems   Constitutional:  Positive for activity change and fatigue. Negative for appetite change and fever.   Respiratory:  Negative for shortness of breath.    Cardiovascular:  Positive for leg swelling.   Gastrointestinal:  Positive for constipation (improving). Negative for abdominal pain and diarrhea.   Musculoskeletal:  Positive for gait problem.   Skin:  Positive for color change. Negative for wound.   Neurological:  Positive for weakness.   Psychiatric/Behavioral:  Negative for agitation, behavioral problems, confusion, decreased concentration and dysphoric mood. The patient is not nervous/anxious.      Objective:     Vital Signs (Most Recent):  Temp: 98.1 °F (36.7 °C) (04/14/25 0724)  Pulse: 96 (04/14/25 0925)  Resp: 18 (04/14/25 0724)  BP: (!) 102/54 (04/14/25 0724)  SpO2: 95 % (04/14/25 0724) Vital Signs (24h Range):  Temp:  [97.8 °F (36.6 °C)-99.4 °F (37.4 °C)] 98.1 °F (36.7 °C)  Pulse:  [] 96  Resp:  [16-22] 18  SpO2:  [94 %-98 %] 95 %  BP: (100-140)/(47-66) 102/54     Weight: 120 kg (264 lb 8.8 oz)  Body mass index is 48.39 kg/m².    Intake/Output Summary (Last 24 hours) at 4/14/2025 1036  Last data filed at 4/13/2025 1442  Gross per 24 hour   Intake 500 ml   Output 650 ml   Net -150 ml         Physical Exam  Vitals and nursing note reviewed. Exam conducted with a chaperone present (family, us tech).   Constitutional:       General: She is not in acute distress.     Appearance: She is morbidly obese. She is ill-appearing. She is not toxic-appearing.   HENT:      Head: Normocephalic and atraumatic.   Cardiovascular:      Rate and Rhythm: Normal rate.   Pulmonary:      Effort: Pulmonary effort is normal. No respiratory distress.   Abdominal:      Palpations: Abdomen is soft.      Tenderness: There is no abdominal tenderness.   Genitourinary:     Comments: External ascencio  Musculoskeletal:         General: Tenderness present.      Right  lower leg: Edema present.      Left lower leg: No edema.   Skin:     General: Skin is warm.      Findings: Erythema (nonblanchable) present.      Comments: Tense skin proximal to knee  Less tense distal, surrounding ankle   Neurological:      Mental Status: She is alert and oriented to person, place, and time.      Motor: Weakness present.   Psychiatric:         Mood and Affect: Mood normal.         Behavior: Behavior normal. Behavior is cooperative.               Significant Labs: All pertinent labs within the past 24 hours have been reviewed.  Blood Culture: pending  CBC:   Recent Labs   Lab 04/13/25  1125 04/14/25  0555   WBC 13.40* 10.10   HGB 12.9 10.4*   HCT 41.4 33.9*    271     CMP:   Recent Labs   Lab 04/13/25  1227 04/14/25  0555    137   K 4.7 4.0    101   CO2 25 27   BUN 19 16   CREATININE 0.9 0.9   CALCIUM 8.5* 8.2*   ALBUMIN 2.3* 2.0*   BILITOT 0.4 0.4   ALKPHOS 91 92   AST 20 22   ALT 21 21   ANIONGAP 10 9     POCT Glucose:   Recent Labs   Lab 04/13/25  1709 04/13/25  2101 04/14/25  0522   POCTGLUCOSE 83 204* 199*     Urine Studies:   Recent Labs   Lab 04/13/25  1021   COLORU Yellow   APPEARANCEUA Clear   PHUR 7.0   SPECGRAV 1.010   PROTEINUA Trace*   GLUCUA 4+*   BILIRUBINUA Negative   OCCULTUA Negative   NITRITE Negative   UROBILINOGEN Negative   LEUKOCYTESUR Negative   BACTERIA None       Significant Imaging: I have reviewed all pertinent imaging results/findings within the past 24 hours.  CT: I have reviewed all pertinent results/findings within the past 24 hours and my personal findings are:  leg-no abscess  CXR: I have reviewed all pertinent results/findings within the past 24 hours and my personal findings are:  no significant interval change  U/S: I have reviewed all pertinent results/findings within the past 24 hours and my personal findings are:  negative for dvt

## 2025-04-14 NOTE — PROGRESS NOTES
TCC call not required; pt not applicable, currently inpatient at [facility] [place].

## 2025-04-15 LAB
ABSOLUTE EOSINOPHIL (OHS): 0.36 K/UL
ABSOLUTE MONOCYTE (OHS): 1.06 K/UL (ref 0.3–1)
ABSOLUTE NEUTROPHIL COUNT (OHS): 11.32 K/UL (ref 1.8–7.7)
ALBUMIN SERPL BCP-MCNC: 2.1 G/DL (ref 3.5–5.2)
ALP SERPL-CCNC: 104 UNIT/L (ref 40–150)
ALT SERPL W/O P-5'-P-CCNC: 19 UNIT/L (ref 10–44)
ANION GAP (OHS): 8 MMOL/L (ref 8–16)
AST SERPL-CCNC: 21 UNIT/L (ref 11–45)
BASOPHILS # BLD AUTO: 0.05 K/UL
BASOPHILS NFR BLD AUTO: 0.3 %
BILIRUB SERPL-MCNC: 0.6 MG/DL (ref 0.1–1)
BUN SERPL-MCNC: 21 MG/DL (ref 8–23)
CALCIUM SERPL-MCNC: 8.4 MG/DL (ref 8.7–10.5)
CHLORIDE SERPL-SCNC: 96 MMOL/L (ref 95–110)
CO2 SERPL-SCNC: 29 MMOL/L (ref 23–29)
CREAT SERPL-MCNC: 1.3 MG/DL (ref 0.5–1.4)
ERYTHROCYTE [DISTWIDTH] IN BLOOD BY AUTOMATED COUNT: 14.9 % (ref 11.5–14.5)
GFR SERPLBLD CREATININE-BSD FMLA CKD-EPI: 45 ML/MIN/1.73/M2
GLUCOSE SERPL-MCNC: 124 MG/DL (ref 70–110)
HCT VFR BLD AUTO: 34.3 % (ref 37–48.5)
HGB BLD-MCNC: 10.8 GM/DL (ref 12–16)
IMM GRANULOCYTES # BLD AUTO: 0.25 K/UL (ref 0–0.04)
IMM GRANULOCYTES NFR BLD AUTO: 1.7 % (ref 0–0.5)
LYMPHOCYTES # BLD AUTO: 1.6 K/UL (ref 1–4.8)
MCH RBC QN AUTO: 26.2 PG (ref 27–31)
MCHC RBC AUTO-ENTMCNC: 31.5 G/DL (ref 32–36)
MCV RBC AUTO: 83 FL (ref 82–98)
NUCLEATED RBC (/100WBC) (OHS): 0 /100 WBC
PLATELET # BLD AUTO: 317 K/UL (ref 150–450)
PMV BLD AUTO: 10.8 FL (ref 9.2–12.9)
POCT GLUCOSE: 118 MG/DL (ref 70–110)
POCT GLUCOSE: 156 MG/DL (ref 70–110)
POCT GLUCOSE: 166 MG/DL (ref 70–110)
POCT GLUCOSE: 170 MG/DL (ref 70–110)
POTASSIUM SERPL-SCNC: 3.7 MMOL/L (ref 3.5–5.1)
PROT SERPL-MCNC: 7 GM/DL (ref 6–8.4)
RBC # BLD AUTO: 4.13 M/UL (ref 4–5.4)
RELATIVE EOSINOPHIL (OHS): 2.5 %
RELATIVE LYMPHOCYTE (OHS): 10.9 % (ref 18–48)
RELATIVE MONOCYTE (OHS): 7.2 % (ref 4–15)
RELATIVE NEUTROPHIL (OHS): 77.4 % (ref 38–73)
SODIUM SERPL-SCNC: 133 MMOL/L (ref 136–145)
WBC # BLD AUTO: 14.64 K/UL (ref 3.9–12.7)

## 2025-04-15 PROCEDURE — 85025 COMPLETE CBC W/AUTO DIFF WBC: CPT | Mod: HCNC | Performed by: INTERNAL MEDICINE

## 2025-04-15 PROCEDURE — 25000003 PHARM REV CODE 250: Mod: HCNC | Performed by: FAMILY MEDICINE

## 2025-04-15 PROCEDURE — 21400001 HC TELEMETRY ROOM: Mod: HCNC

## 2025-04-15 PROCEDURE — 99232 SBSQ HOSP IP/OBS MODERATE 35: CPT | Mod: HCNC,,,

## 2025-04-15 PROCEDURE — 83615 LACTATE (LD) (LDH) ENZYME: CPT | Mod: HCNC | Performed by: FAMILY MEDICINE

## 2025-04-15 PROCEDURE — 25000003 PHARM REV CODE 250: Mod: HCNC | Performed by: INTERNAL MEDICINE

## 2025-04-15 PROCEDURE — 63600175 PHARM REV CODE 636 W HCPCS: Mod: HCNC | Performed by: INTERNAL MEDICINE

## 2025-04-15 PROCEDURE — 82945 GLUCOSE OTHER FLUID: CPT | Mod: HCNC | Performed by: FAMILY MEDICINE

## 2025-04-15 PROCEDURE — 0H9KXZZ DRAINAGE OF RIGHT LOWER LEG SKIN, EXTERNAL APPROACH: ICD-10-PCS | Performed by: RADIOLOGY

## 2025-04-15 PROCEDURE — 36415 COLL VENOUS BLD VENIPUNCTURE: CPT | Mod: HCNC | Performed by: INTERNAL MEDICINE

## 2025-04-15 PROCEDURE — 25000242 PHARM REV CODE 250 ALT 637 W/ HCPCS: Mod: HCNC | Performed by: FAMILY MEDICINE

## 2025-04-15 PROCEDURE — 25000003 PHARM REV CODE 250: Mod: HCNC | Performed by: HOSPITALIST

## 2025-04-15 PROCEDURE — 99233 SBSQ HOSP IP/OBS HIGH 50: CPT | Mod: NSCH,HCNC,, | Performed by: INTERNAL MEDICINE

## 2025-04-15 PROCEDURE — 82947 ASSAY GLUCOSE BLOOD QUANT: CPT | Mod: HCNC | Performed by: INTERNAL MEDICINE

## 2025-04-15 PROCEDURE — 87205 SMEAR GRAM STAIN: CPT | Mod: HCNC | Performed by: FAMILY MEDICINE

## 2025-04-15 RX ORDER — OXYCODONE HYDROCHLORIDE 5 MG/1
5 TABLET ORAL EVERY 6 HOURS PRN
Refills: 0 | Status: DISCONTINUED | OUTPATIENT
Start: 2025-04-15 | End: 2025-04-22 | Stop reason: HOSPADM

## 2025-04-15 RX ORDER — HYDROCODONE BITARTRATE AND ACETAMINOPHEN 5; 325 MG/1; MG/1
1 TABLET ORAL EVERY 4 HOURS PRN
Refills: 0 | Status: DISCONTINUED | OUTPATIENT
Start: 2025-04-15 | End: 2025-04-22 | Stop reason: HOSPADM

## 2025-04-15 RX ORDER — HYDROCODONE BITARTRATE AND ACETAMINOPHEN 10; 325 MG/1; MG/1
1 TABLET ORAL EVERY 4 HOURS PRN
Refills: 0 | Status: DISCONTINUED | OUTPATIENT
Start: 2025-04-15 | End: 2025-04-22 | Stop reason: HOSPADM

## 2025-04-15 RX ADMIN — FLUTICASONE PROPIONATE 100 MCG: 50 SPRAY, METERED NASAL at 08:04

## 2025-04-15 RX ADMIN — PIPERACILLIN SODIUM AND TAZOBACTAM SODIUM 4.5 G: 4; .5 INJECTION, POWDER, FOR SOLUTION INTRAVENOUS at 08:04

## 2025-04-15 RX ADMIN — ASPIRIN 81 MG CHEWABLE TABLET 81 MG: 81 TABLET CHEWABLE at 08:04

## 2025-04-15 RX ADMIN — HYDROCODONE BITARTRATE AND ACETAMINOPHEN 1 TABLET: 10; 325 TABLET ORAL at 06:04

## 2025-04-15 RX ADMIN — TACROLIMUS 900 MG: 0.5 CAPSULE ORAL at 03:04

## 2025-04-15 RX ADMIN — OXYCODONE HYDROCHLORIDE 5 MG: 5 TABLET ORAL at 08:04

## 2025-04-15 RX ADMIN — OXYCODONE HYDROCHLORIDE 5 MG: 5 TABLET ORAL at 04:04

## 2025-04-15 RX ADMIN — METOPROLOL SUCCINATE 50 MG: 50 TABLET, EXTENDED RELEASE ORAL at 08:04

## 2025-04-15 RX ADMIN — TACROLIMUS 900 MG: 0.5 CAPSULE ORAL at 06:04

## 2025-04-15 RX ADMIN — ENOXAPARIN SODIUM 40 MG: 40 INJECTION SUBCUTANEOUS at 08:04

## 2025-04-15 RX ADMIN — HYDROCODONE BITARTRATE AND ACETAMINOPHEN 1 TABLET: 10; 325 TABLET ORAL at 05:04

## 2025-04-15 RX ADMIN — GUAIFENESIN AND DEXTROMETHORPHAN 10 ML: 100; 10 SYRUP ORAL at 01:04

## 2025-04-15 RX ADMIN — POLYETHYLENE GLYCOL 3350 17 G: 17 POWDER, FOR SOLUTION ORAL at 08:04

## 2025-04-15 RX ADMIN — CETIRIZINE HYDROCHLORIDE 10 MG: 10 TABLET, FILM COATED ORAL at 08:04

## 2025-04-15 RX ADMIN — GUAIFENESIN AND DEXTROMETHORPHAN 10 ML: 100; 10 SYRUP ORAL at 09:04

## 2025-04-15 RX ADMIN — LINEZOLID 600 MG: 600 TABLET, FILM COATED ORAL at 08:04

## 2025-04-15 RX ADMIN — HYDROCODONE BITARTRATE AND ACETAMINOPHEN 1 TABLET: 10; 325 TABLET ORAL at 09:04

## 2025-04-15 RX ADMIN — HYDROCODONE BITARTRATE AND ACETAMINOPHEN 1 TABLET: 10; 325 TABLET ORAL at 02:04

## 2025-04-15 RX ADMIN — PIPERACILLIN SODIUM AND TAZOBACTAM SODIUM 4.5 G: 4; .5 INJECTION, POWDER, FOR SOLUTION INTRAVENOUS at 03:04

## 2025-04-15 RX ADMIN — TACROLIMUS 900 MG: 0.5 CAPSULE ORAL at 09:04

## 2025-04-15 RX ADMIN — PIPERACILLIN SODIUM AND TAZOBACTAM SODIUM 4.5 G: 4; .5 INJECTION, POWDER, FOR SOLUTION INTRAVENOUS at 11:04

## 2025-04-15 NOTE — ASSESSMENT & PLAN NOTE
Concern for neck fascia however patient's white blood cell count, creatinine, glucose are not consistent.  However picture may be mixed due to already having been on antibiotics.  Patient reports began 4/7  She was hospitalized at [facility] 4/7 through 4/10 reportedly she was febrile.  She was treated with Ancef and by 4/10 she was afebrile.  There are surgical pen marks on her right upper thigh which shows that the redness has receded some.  Blood cultures at the [facility] were negative.  She was discharged on Augmentin.  Patient had worsening swelling and pain and she re-presented to [facility].  See results of CT scan and Doppler of lower extremity above  Was given vancomycin and cefepime in ER  We will change to Zyvox for better skin penetration for Staph, Zosyn for anaerobes and strep and add clindamycin for toxin.  General surgery following, no surgical intervention warranted  Ct imaging negative for abscess  Deferring final antibiotic(s) per infectious disease   Psh right total hip revision  Consider mri to evaluate prosthesis though erythema has receded from knee and leukocytosis resolved  Interventional radiology to aspirate blister

## 2025-04-15 NOTE — NURSING
Case Leonie SEQUEIRA performed needle aspiration without US. [facility] was at bedside with Case for procedure. Labs were sent off for testing.

## 2025-04-15 NOTE — HPI
65-year-old female with a history of diabetes, hyperlipidemia, hypertension, obesity .   she was admitted with worsening right lower leg erythema and swelling.  She was initially admitted 0408- treated with Ancef and discharged on Augmentin.  She has she was now admitted with worsening infection    Labs and imaging tests reviewed sed rate greater 120, , white blood cell count of 13.4.  Lactic acid level was normal at 1.7..  CT scan of the leg revealed large left inguinal lymph nodes, fat stranding in the thigh most notably in the medial portion right hip arthroplasty in place intrapelvic contents unremarkable  CTA tib-fib soft tissue thickening and fat stranding in the anterior aspect of the lower extremity with disorganized fluid throughout the superficial compartment with no definitive involvement of the deep intramuscular compartment, no definitive abscess

## 2025-04-15 NOTE — ASSESSMENT & PLAN NOTE
Patient has Combined Systolic and Diastolic heart failure that is Acute on chronic. On presentation their CHF was decompensated. Evidence of decompensated CHF on presentation includes: edema. The etiology of their decompensation is likely increased fluid intake. Most recent BNP and echo results are listed below.  Recent Labs     04/13/25  1125   *     Latest ECHO  No results found for this or any previous visit.    Current Heart Failure Medications  , Daily, Oral  metoprolol succinate (TOPROL-XL) 24 hr tablet 50 mg, 2 times daily, Oral    Plan  - Monitor strict I&Os and daily weights.    - Place on telemetry  - Low sodium diet  - Place on fluid restriction of 1.5 L.   - Cardiology has not been consulted  - The patient's volume status is at their baseline  -patient follows with Dr. Mikie Saxena whom she saw about 3 weeks ago.  She has follow up appointment in May.    Echocardiogram reviewed

## 2025-04-15 NOTE — PROGRESS NOTES
[facility] Medicine  Progress Note    Patient Name: Stephanie Raza  MRN: [id number]  Patient Class: IP- Inpatient   Admission Date: 4/13/2025  Length of Stay: 2 days  Attending Physician: Delfino Johansen MD  Primary Care Provider: Reyna Suarez MD        Subjective     Principal Problem:Cellulitis of right lower extremity        HPI:  Patient is a 65-year-old female with a history of diabetes, hyperlipidemia, hypertension, obesity who presented emergency department for evaluation of right leg swelling.  Patient reports on 04/06 she woke up vomiting and did not feel well.  When her daughter saw her on 04/08 she took her to the [facility].  She reports she had swelling in her right leg that was painful all the way up to her groin.  She was admitted for 3 days reportedly treated with Ancef at which time the marks of erythema had improved her fever had subsided and she was discharged home on Augmentin (blood cultures remain negative).  After being home she noted that the swelling worsened and she re-presented to the hospital.  In the emergency department she was afebrile, laboratory exam revealed protocol of 2.25, BNP of 150, sed rate greater 120, , white blood cell count of 13.4.  Lactic acid level was normal at 1.7.  Blood cultures were obtained and she was begun on vancomycin and meropenem.  CT scan of the leg revealed large left inguinal lymph nodes, fat stranding in the thigh most notably in the medial portion right hip arthroplasty in place intrapelvic contents unremarkable  CTA tib-fib soft tissue thickening and fat stranding in the anterior aspect of the lower extremity with disorganized fluid throughout the superficial compartment with no definitive involvement of the deep intramuscular compartment, no definitive abscess  Doppler exam of lower extremity negative for DVT    Consult general surgery and Infectious Disease for evaluation.        Overview/Hospital  Course:  4/14 admitted for right leg cellulitis. Denies insect bite, trauma. States improvement in symptoms of erythema, edema and pain. Intravenous antibiotic(s) per infectious disease. Surgery following.  4/15 antibiotic(s) per infectious disease. Cellulitis modestly improving. Pain regimen adjusted.    Interval History: See hospital course for today      Review of Systems   Constitutional:  Positive for activity change. Negative for appetite change.   Respiratory:  Negative for shortness of breath.    Cardiovascular:  Positive for leg swelling.   Gastrointestinal:  Negative for diarrhea.   Musculoskeletal:  Positive for myalgias.   Skin:  Positive for color change and wound.   Neurological:  Positive for weakness.   Psychiatric/Behavioral:  Negative for agitation, behavioral problems, confusion, decreased concentration and dysphoric mood. The patient is not nervous/anxious.      Objective:     Vital Signs (Most Recent):  Temp: 98.1 °F (36.7 °C) (04/15/25 1203)  Pulse: 92 (04/15/25 1203)  Resp: 15 (04/15/25 1407)  BP: (!) 109/50 (04/15/25 1203)  SpO2: (!) 92 % (04/15/25 1203) Vital Signs (24h Range):  Temp:  [97.9 °F (36.6 °C)-98.8 °F (37.1 °C)] 98.1 °F (36.7 °C)  Pulse:  [80-97] 92  Resp:  [15-20] 15  SpO2:  [92 %-98 %] 92 %  BP: (102-137)/(50-74) 109/50     Weight: 119.7 kg (263 lb 14.3 oz)  Body mass index is 48.27 kg/m².    Intake/Output Summary (Last 24 hours) at 4/15/2025 1453  Last data filed at 4/15/2025 0605  Gross per 24 hour   Intake 120 ml   Output 215 ml   Net -95 ml         Physical Exam  Vitals and nursing note reviewed.   Constitutional:       General: She is not in acute distress.     Appearance: She is morbidly obese. She is ill-appearing. She is not toxic-appearing.   HENT:      Head: Normocephalic and atraumatic.   Cardiovascular:      Rate and Rhythm: Normal rate.   Pulmonary:      Effort: Pulmonary effort is normal. No respiratory distress.   Abdominal:      Palpations: Abdomen is soft.       Tenderness: There is no abdominal tenderness.   Musculoskeletal:         General: Tenderness present.      Right lower leg: Edema present.      Left lower leg: No edema.   Skin:     General: Skin is warm.      Findings: Erythema and lesion (blister posterior side, right lower leg) present.   Neurological:      Mental Status: She is alert and oriented to person, place, and time.      Motor: Weakness present.   Psychiatric:         Mood and Affect: Mood normal.         Behavior: Behavior normal. Behavior is cooperative.               Significant Labs: All pertinent labs within the past 24 hours have been reviewed.  Blood Culture: negative growth to date x 1 day  CBC:   Recent Labs   Lab 04/14/25  0555 04/15/25  0724   WBC 10.10 14.64*   HGB 10.4* 10.8*   HCT 33.9* 34.3*    317     CMP:   Recent Labs   Lab 04/14/25  0555 04/15/25  0724    133*   K 4.0 3.7    96   CO2 27 29   BUN 16 21   CREATININE 0.9 1.3   CALCIUM 8.2* 8.4*   ALBUMIN 2.0* 2.1*   BILITOT 0.4 0.6   ALKPHOS 92 104   AST 22 21   ALT 21 19   ANIONGAP 9 8       Significant Imaging: I have reviewed all pertinent imaging results/findings within the past 24 hours.  CXR: I have reviewed all pertinent results/findings within the past 24 hours and my personal findings are:  no interval change      Assessment & Plan  Type 2 diabetes mellitus with diabetic polyneuropathy, with long-term current use of insulin  Patient with diabetes currently well-controlled is taking Ozempic outpatient  Sliding scale insulin with Accu-Cheks a.c. HS  Morbid obesity  Body mass index is 48.27 kg/m². Morbid obesity complicates all aspects of disease management from diagnostic modalities to treatment. Weight loss encouraged and health benefits explained to patient.   Physical/occupational therapy when able      Cellulitis of right lower extremity  Concern for neck fascia however patient's white blood cell count, creatinine, glucose are not consistent.  However picture  may be mixed due to already having been on antibiotics.  Patient reports began 4/7  She was hospitalized at [facility] 4/7 through 4/10 reportedly she was febrile.  She was treated with Ancef and by 4/10 she was afebrile.  There are surgical pen marks on her right upper thigh which shows that the redness has receded some.  Blood cultures at the [facility] were negative.  She was discharged on Augmentin.  Patient had worsening swelling and pain and she re-presented to [facility].  See results of CT scan and Doppler of lower extremity above  Was given vancomycin and cefepime in ER  We will change to Zyvox for better skin penetration for Staph, Zosyn for anaerobes and strep and add clindamycin for toxin.  General surgery following, no surgical intervention warranted  Ct imaging negative for abscess  Deferring final antibiotic(s) per infectious disease   Psh right total hip revision  Consider mri to evaluate prosthesis though erythema has receded from knee and leukocytosis resolved  Interventional radiology to aspirate blister  Primary hypertension  Patient's blood pressure range in the last 24 hours was: BP  Min: 102/54  Max: 137/74.The patient's inpatient anti-hypertensive regimen is listed below:  Current Antihypertensives  , Daily, Oral  metoprolol succinate (TOPROL-XL) 24 hr tablet 50 mg, 2 times daily, Oral    Plan  - BP is controlled, no changes needed to their regimen  Holding arb due to intermittent hypotension  Acute on chronic combined systolic and diastolic congestive heart failure  Patient has Combined Systolic and Diastolic heart failure that is Acute on chronic. On presentation their CHF was decompensated. Evidence of decompensated CHF on presentation includes: edema. The etiology of their decompensation is likely increased fluid intake. Most recent BNP and echo results are listed below.  Recent Labs     04/13/25  1125   *     Latest ECHO  No results found for this or any previous  visit.    Current Heart Failure Medications  , Daily, Oral  metoprolol succinate (TOPROL-XL) 24 hr tablet 50 mg, 2 times daily, Oral    Plan  - Monitor strict I&Os and daily weights.    - Place on telemetry  - Low sodium diet  - Place on fluid restriction of 1.5 L.   - Cardiology has not been consulted  - The patient's volume status is at their baseline  -patient follows with Dr. Mikie Saxena whom she saw about 3 weeks ago.  She has follow up appointment in May.    Echocardiogram reviewed         VTE Risk Mitigation (From admission, onward)           Ordered     enoxaparin injection 40 mg  Every 12 hours         04/13/25 1626     IP VTE HIGH RISK PATIENT  Once         04/13/25 1622     Place sequential compression device  Until discontinued         04/13/25 1622                    Discharge Planning   RONINE: 4/15/2025     Code Status: Full Code   Medical Readiness for Discharge Date:   Discharge Plan A: Home Health                        Delfino Johansen MD  Department of Hospital Medicine   [facility] - Med Surg

## 2025-04-15 NOTE — ASSESSMENT & PLAN NOTE
No drainable fluid collections.      I would recommend antibiotics to cover g positives, g negatives, Streptococcus and methicillin-resistant Staph aureus especially   Elevate right lower extremity   Infectious disease input    We will continue to monitor the patient but if she does that has not developed any drainable fluid collections over the next 24 hours surgery will sign off

## 2025-04-15 NOTE — ASSESSMENT & PLAN NOTE
She had extensive right lower extremity cellulitis.  Will continue Zyvox, clindamycin, Zosyn.  Will monitor clinical response in a.m.a.m. she needs close follow-up

## 2025-04-15 NOTE — PROGRESS NOTES
[facility]  General Surgery  Progress Note    Subjective:     History of Present Illness:  69-year-old morbidly obese female presented to the [facility] with significant erythema of the right lower extremity from the ankle to the thigh.  The patient also states that she is now not able to walk on the extremity because of the discomfort.  The patient states that she was admitted to the [facility] starting last Monday with the similar condition.  She was treated with IV antibiotics, unsure of which ones, and then discharge on Augmentin.    The patient presented to [facility].  She was evaluated.  In surgery was asked to see the patient    The patient states that the redness has gone down a little bit from the lines drawn around the upper thigh erythema at the [facility]    Post-Op Info:  * No surgery found *         Interval History: Erythema and edema are improving. Pain control OK. No new blistering or evidence of abscess.     Medications:  Continuous Infusions:  Scheduled Meds:   aspirin  81 mg Oral Daily    cetirizine  10 mg Oral Daily    clindamycin IV (PEDS and ADULTS)  900 mg Intravenous Q8H    enoxparin  40 mg Subcutaneous Q12H (prophylaxis, 0900/2100)    fluticasone propionate  2 spray Each Nostril Daily    linezolid  600 mg Oral Q12H    metoprolol succinate  50 mg Oral BID    piperacillin-tazobactam (Zosyn) IV (PEDS and ADULTS) (extended infusion is not appropriate)  4.5 g Intravenous Q8H    polyethylene glycol  17 g Oral Daily     PRN Meds:  Current Facility-Administered Medications:     acetaminophen, 650 mg, Oral, Q8H PRN    acetaminophen, 650 mg, Oral, Q4H PRN    dextromethorphan-guaiFENesin  mg/5 ml, 10 mL, Oral, Q4H PRN    dextrose 50%, 12.5 g, Intravenous, PRN    dextrose 50%, 12.5 g, Intravenous, PRN    dextrose 50%, 25 g, Intravenous, PRN    dextrose 50%, 25 g, Intravenous, PRN    glucagon (human recombinant), 1 mg, Intramuscular, PRN     glucagon (human recombinant), 1 mg, Intramuscular, PRN    glucose, 16 g, Oral, PRN    glucose, 16 g, Oral, PRN    glucose, 24 g, Oral, PRN    glucose, 24 g, Oral, PRN    HYDROcodone-acetaminophen, 1 tablet, Oral, Q4H PRN    HYDROcodone-acetaminophen, 1 tablet, Oral, Q4H PRN    insulin aspart U-100, 0-5 Units, Subcutaneous, QID (AC + HS) PRN    melatonin, 6 mg, Oral, Nightly PRN    naloxone, 0.02 mg, Intravenous, PRN    ondansetron, 4 mg, Intravenous, Q8H PRN    oxyCODONE, 5 mg, Oral, Q6H PRN    simethicone, 1 tablet, Oral, QID PRN    sodium chloride 0.9%, 2 mL, Intravenous, Q12H PRN     Review of patient's allergies indicates:   Allergen Reactions    Adhesive tape-silicones Swelling     Paper tape     Objective:     Vital Signs (Most Recent):  Temp: 98 °F (36.7 °C) (04/15/25 0759)  Pulse: 86 (04/15/25 0800)  Resp: 16 (04/15/25 0836)  BP: (!) 102/54 (04/15/25 0759)  SpO2: (!) 94 % (04/15/25 0759) Vital Signs (24h Range):  Temp:  [97.9 °F (36.6 °C)-98.8 °F (37.1 °C)] 98 °F (36.7 °C)  Pulse:  [] 86  Resp:  [15-20] 16  SpO2:  [94 %-98 %] 94 %  BP: (102-144)/(52-74) 102/54     Weight: 119.7 kg (263 lb 14.3 oz)  Body mass index is 48.27 kg/m².    Intake/Output - Last 3 Shifts         04/13 0700  04/14 0659 04/14 0700  04/15 0659 04/15 0700 04/16 0659    P.O.  120     IV Piggyback 500 399     Total Intake(mL/kg) 500 (4.2) 519 (4.3)     Urine (mL/kg/hr) 650 215 (0.1)     Total Output 650 215     Net -150 +304            Urine Occurrence  2 x     Stool Occurrence  0 x              Physical Exam  Vitals and nursing note reviewed.   Constitutional:       Appearance: She is well-developed. She is obese.   HENT:      Head: Normocephalic.   Eyes:      Pupils: Pupils are equal, round, and reactive to light.   Neck:      Thyroid: No thyromegaly.      Vascular: No JVD.      Trachea: No tracheal deviation.   Cardiovascular:      Rate and Rhythm: Normal rate and regular rhythm.      Heart sounds: Normal heart sounds.    Pulmonary:      Breath sounds: Normal breath sounds. No wheezing.   Abdominal:      General: Bowel sounds are normal. There is no distension.      Palpations: Abdomen is soft. Abdomen is not rigid. There is no mass.      Tenderness: There is no abdominal tenderness. There is no guarding or rebound.      Comments: Obese   Musculoskeletal:         General: Normal range of motion.      Right lower leg: Edema present.      Left lower leg: No edema.   Lymphadenopathy:      Cervical: No cervical adenopathy.   Skin:     General: Skin is warm and dry.      Findings: Erythema (right lower extremity) present. No rash.      Comments: There is erythema of the right leg.  It is slightly less intense and slightly less in area.  There is still tenderness over the area   Neurological:      Mental Status: She is oriented to person, place, and time.   Psychiatric:         Mood and Affect: Mood normal.         Behavior: Behavior normal.         Thought Content: Thought content normal.          Significant Labs:  I have reviewed all pertinent lab results within the past 24 hours.  CBC:   Recent Labs   Lab 04/15/25  0724   WBC 14.64*   RBC 4.13   HGB 10.8*   HCT 34.3*      MCV 83   MCH 26.2*   MCHC 31.5*     BMP:   Recent Labs   Lab 04/13/25  1227 04/14/25  0555 04/15/25  0724      < > 133*   K 4.7   < > 3.7      < > 96   CO2 25   < > 29   BUN 19   < > 21   CREATININE 0.9   < > 1.3   CALCIUM 8.5*   < > 8.4*   MG 2.2  --   --     < > = values in this interval not displayed.     Microbiology Results (last 7 days)       Procedure Component Value Units Date/Time    Blood culture x two cultures. Draw prior to antibiotics. [[id number]]  (Normal) Collected: 04/13/25 1124    Order Status: Completed Specimen: Blood from Peripheral, Hand, Left Updated: 04/15/25 0502     Blood Culture No Growth After 24 Hours    Blood culture x two cultures. Draw prior to antibiotics. [[id number]]  (Normal) Collected: 04/13/25 1116    Order  Status: Completed Specimen: Blood from Peripheral, Antecubital, Left Updated: 04/15/25 0502     Blood Culture No Growth After 24 Hours            Significant Diagnostics:  I have reviewed all pertinent imaging results/findings within the past 24 hours.  No new  Assessment/Plan:     * Cellulitis of right lower extremity  No drainable fluid collections.      I would recommend antibiotics to cover g positives, g negatives, Streptococcus and methicillin-resistant Staph aureus especially   Elevate right lower extremity   Infectious disease input    We will continue to monitor the patient but if she does that has not developed any drainable fluid collections over the next 24 hours surgery will sign off        Primary hypertension  Management per hospital Medicine    Morbid obesity  Patient would benefit from weight loss but this is highly unlikely    Type 2 diabetes mellitus with diabetic polyneuropathy, with long-term current use of insulin  Management per hospital Medicine        Phuong Juan PA-C  General Surgery  [facility]

## 2025-04-15 NOTE — SUBJECTIVE & OBJECTIVE
Past Medical History:   Diagnosis Date    Acute blood loss anemia (ABLA) 2024    Last Assessment & Plan:    Formatting of this note might be different from the original.   History & Physical       Discharge Summary       Follow-up  Patient did receive 1 unit packed red blood cells while in the OR secondary to blood loss.  No obvious bleeding at this time.  She received 2 additional units packed red blood cells per orthopedics.  Hemoglobin running stable at this time no need fo    YOVANA (acute kidney injury) 2024    Last Assessment & Plan:    Formatting of this note might be different from the original.   History & Physical       Discharge Summary       Follow-up patient with progressive oliguric YOVANA after surgery.  Resolved. Avoid nephrotoxic agents, renally dose all medications where necessary, and protect nondominant arm as much as possible    Anxiety     Arthritis     Asthma     Back pain     Diabetes mellitus type I     Heart murmur     Hyperlipidemia     Hypertension     Obesity     Polyneuropathy     Trouble in sleeping     Type 2 diabetes mellitus     Urinary incontinence        Past Surgical History:   Procedure Laterality Date    ADENOIDECTOMY      BREAST BIOPSY      2018, benign    BREAST SURGERY       SECTION      hip reconstruction Left 2024    HYSTERECTOMY      JOINT REPLACEMENT         Review of patient's allergies indicates:   Allergen Reactions    Adhesive tape-silicones Swelling     Paper tape       Medications:  Medications Prior to Admission   Medication Sig    aspirin 81 MG Chew Take 81 mg by mouth once daily.    buPROPion (WELLBUTRIN XL) 150 MG TB24 tablet TAKE 1 TABLET(150 MG) BY MOUTH DAILY    diltiaZEM HCl 120 mg Cp12 TAKE 1 CAPSULE TWICE DAILY    esomeprazole (NEXIUM) 40 MG capsule Take 1 capsule (40 mg total) by mouth before breakfast.    metoprolol succinate (TOPROL-XL) 50 MG 24 hr tablet TAKE 1 TABLET TWICE DAILY    olmesartan (BENICAR) 40 MG tablet Take 40 mg by  "mouth once daily.    vibegron 75 mg Tab Take 1 tablet by mouth once daily.    albuterol (PROVENTIL/VENTOLIN HFA) 90 mcg/actuation inhaler Inhale 2 puffs into the lungs every 4 (four) hours as needed for Wheezing.    blood-glucose meter (TRUE METRIX AIR GLUCOSE METER) [facility] check BG 3 times daily, to use with insurance preferred meter    DROPLET PEN NEEDLE 31 gauge x 5/16" Ndle USE ONCE DAILY AS DIRECTED    fluticasone-salmeterol diskus inhaler 250-50 mcg Inhale 1 puff into the lungs once daily.    furosemide (LASIX) 80 MG tablet Take 80 mg by mouth.    gabapentin (NEURONTIN) 300 MG capsule Take 300 mg by mouth every evening. As needed (Patient not taking: Reported on 4/13/2025)    meloxicam (MOBIC) 7.5 MG tablet TAKE 1 TABLET(7.5 MG) BY MOUTH DAILY    OZEMPIC 2 mg/dose (8 mg/3 mL) PnIj INJECT 2MG UNDER THE SKIN EVERY 7 DAYS    potassium chloride (K-TAB) 20 mEq Take 40 mEq by mouth 2 (two) times a day.    TRUE METRIX GLUCOSE TEST STRIP Strp TEST BLOOD SUGAR THREE TIMES DAILY    TRUEPLUS LANCETS 33 gauge Misc TEST BLOOD SUGAR THREE TIMES DAILY    vitamin D (VITAMIN D3) 1000 units Tab Take 1 tablet by mouth every morning.     Antibiotics (From admission, onward)      Start     Stop Route Frequency Ordered    04/13/25 2100  linezolid tablet 600 mg         -- Oral Every 12 hours 04/13/25 1638    04/13/25 1930  piperacillin-tazobactam (ZOSYN) 4.5 g in D5W 100 mL IVPB (MB+)         -- IV Every 8 hours (non-standard times) 04/13/25 1638    04/13/25 1800  clindamycin in D5W 900 mg/50 mL IVPB 900 mg         04/16/25 1759 IV Every 8 hours (non-standard times) 04/13/25 1711          Antifungals (From admission, onward)      None          Antivirals (From admission, onward)      None             Immunization History   Administered Date(s) Administered    COVID-19, MRNA, LN-S, PF (Pfizer) (Purple Cap) 02/28/2021, 03/23/2021, 11/04/2021    COVID-19, mRNA, LNP-S, PF, ernestina-sucrose, 30 mcg/0.3 mL (Pfizer Ages 12+) 10/31/2023, " 10/16/2024    COVID-19, mRNA, LNP-S, bivalent booster, PF (PFIZER OMICRON) 04/27/2023       Family History       Problem Relation (Age of Onset)    Breast cancer Mother, Maternal Aunt, Maternal Aunt, Paternal Aunt, Paternal Aunt    Cancer Father    Diabetes Sister    Hypertension Mother    Lung cancer Mother    Stroke Father          Social History     Socioeconomic History    Marital status:    Tobacco Use    Smoking status: Never     Passive exposure: Never    Smokeless tobacco: Never   Substance and Sexual Activity    Alcohol use: Yes     Alcohol/week: 3.0 standard drinks of alcohol     Types: 1 Glasses of wine, 2 Shots of liquor per week    Drug use: Never    Sexual activity: Not Currently     Partners: Male     Social Drivers of Health     Financial Resource Strain: Medium Risk (4/14/2025)    Overall Financial Resource Strain (CARDIA)     Difficulty of Paying Living Expenses: Somewhat hard   Food Insecurity: Food Insecurity Present (4/14/2025)    Hunger Vital Sign     Worried About Running Out of Food in the Last Year: Sometimes true     Ran Out of Food in the Last Year: Sometimes true   Transportation Needs: Unmet Transportation Needs (4/14/2025)    PRAPARE - Transportation     Lack of Transportation (Medical): Yes     Lack of Transportation (Non-Medical): Yes   Physical Activity: Sufficiently Active (9/27/2024)    Exercise Vital Sign     Days of Exercise per Week: 4 days     Minutes of Exercise per Session: 60 min   Stress: Stress Concern Present (4/14/2025)    [employer] - Occupational Stress Questionnaire     Feeling of Stress : Very much   Housing Stability: Low Risk  (4/14/2025)    Housing Stability Vital Sign     Unable to Pay for Housing in the Last Year: No     Homeless in the Last Year: No     Review of Systems   Constitutional:  Negative for activity change, appetite change, chills, diaphoresis, fatigue and fever.   Respiratory:  Negative for apnea.   "  Cardiovascular:  Negative for chest pain and leg swelling.     Objective:     Vital Signs (Most Recent):  Temp: 97.9 °F (36.6 °C) (04/15/25 0600)  Pulse: 85 (04/15/25 0600)  Resp: 20 (04/15/25 0600)  BP: (!) 106/53 (04/15/25 0600)  SpO2: 95 % (04/15/25 0600) Vital Signs (24h Range):  Temp:  [97.9 °F (36.6 °C)-98.8 °F (37.1 °C)] 97.9 °F (36.6 °C)  Pulse:  [] 85  Resp:  [15-20] 20  SpO2:  [94 %-98 %] 95 %  BP: (102-144)/(52-74) 106/53     Weight: 119.7 kg (263 lb 14.3 oz)  Body mass index is 48.27 kg/m².    Estimated Creatinine Clearance: 74.6 mL/min (based on SCr of 0.9 mg/dL).     Physical Exam  Vitals and nursing note reviewed.   HENT:      Head: Normocephalic.   Musculoskeletal:      Cervical back: Normal range of motion.   Skin:     Findings: Bruising, erythema and lesion present.   Neurological:      General: No focal deficit present.      Mental Status: She is alert.                Significant Labs: Blood Culture: No results for input(s): "LABBLOO" in the last 4320 hours.  BMP:   Recent Labs   Lab 04/13/25  1227 04/14/25  0555    137   K 4.7 4.0    101   CO2 25 27   BUN 19 16   CREATININE 0.9 0.9   CALCIUM 8.5* 8.2*   MG 2.2  --      Wound Culture: No results for input(s): "LABAERO" in the last 4320 hours.  All pertinent labs within the past 24 hours have been reviewed.    Significant Imaging: I have reviewed all pertinent imaging results/findings within the past 24 hours.  "

## 2025-04-15 NOTE — SUBJECTIVE & OBJECTIVE
Interval History: See hospital course for today      Review of Systems   Constitutional:  Positive for activity change. Negative for appetite change.   Respiratory:  Negative for shortness of breath.    Cardiovascular:  Positive for leg swelling.   Gastrointestinal:  Negative for diarrhea.   Musculoskeletal:  Positive for myalgias.   Skin:  Positive for color change and wound.   Neurological:  Positive for weakness.   Psychiatric/Behavioral:  Negative for agitation, behavioral problems, confusion, decreased concentration and dysphoric mood. The patient is not nervous/anxious.      Objective:     Vital Signs (Most Recent):  Temp: 98.1 °F (36.7 °C) (04/15/25 1203)  Pulse: 92 (04/15/25 1203)  Resp: 15 (04/15/25 1407)  BP: (!) 109/50 (04/15/25 1203)  SpO2: (!) 92 % (04/15/25 1203) Vital Signs (24h Range):  Temp:  [97.9 °F (36.6 °C)-98.8 °F (37.1 °C)] 98.1 °F (36.7 °C)  Pulse:  [80-97] 92  Resp:  [15-20] 15  SpO2:  [92 %-98 %] 92 %  BP: (102-137)/(50-74) 109/50     Weight: 119.7 kg (263 lb 14.3 oz)  Body mass index is 48.27 kg/m².    Intake/Output Summary (Last 24 hours) at 4/15/2025 1453  Last data filed at 4/15/2025 0605  Gross per 24 hour   Intake 120 ml   Output 215 ml   Net -95 ml         Physical Exam  Vitals and nursing note reviewed.   Constitutional:       General: She is not in acute distress.     Appearance: She is morbidly obese. She is ill-appearing. She is not toxic-appearing.   HENT:      Head: Normocephalic and atraumatic.   Cardiovascular:      Rate and Rhythm: Normal rate.   Pulmonary:      Effort: Pulmonary effort is normal. No respiratory distress.   Abdominal:      Palpations: Abdomen is soft.      Tenderness: There is no abdominal tenderness.   Musculoskeletal:         General: Tenderness present.      Right lower leg: Edema present.      Left lower leg: No edema.   Skin:     General: Skin is warm.      Findings: Erythema and lesion (blister posterior side, right lower leg) present.   Neurological:       Mental Status: She is alert and oriented to person, place, and time.      Motor: Weakness present.   Psychiatric:         Mood and Affect: Mood normal.         Behavior: Behavior normal. Behavior is cooperative.               Significant Labs: All pertinent labs within the past 24 hours have been reviewed.  Blood Culture: negative growth to date x 1 day  CBC:   Recent Labs   Lab 04/14/25  0555 04/15/25  0724   WBC 10.10 14.64*   HGB 10.4* 10.8*   HCT 33.9* 34.3*    317     CMP:   Recent Labs   Lab 04/14/25  0555 04/15/25  0724    133*   K 4.0 3.7    96   CO2 27 29   BUN 16 21   CREATININE 0.9 1.3   CALCIUM 8.2* 8.4*   ALBUMIN 2.0* 2.1*   BILITOT 0.4 0.6   ALKPHOS 92 104   AST 22 21   ALT 21 19   ANIONGAP 9 8       Significant Imaging: I have reviewed all pertinent imaging results/findings within the past 24 hours.  CXR: I have reviewed all pertinent results/findings within the past 24 hours and my personal findings are:  no interval change

## 2025-04-15 NOTE — ASSESSMENT & PLAN NOTE
Patient's blood pressure range in the last 24 hours was: BP  Min: 102/54  Max: 137/74.The patient's inpatient anti-hypertensive regimen is listed below:  Current Antihypertensives  , Daily, Oral  metoprolol succinate (TOPROL-XL) 24 hr tablet 50 mg, 2 times daily, Oral    Plan  - BP is controlled, no changes needed to their regimen  Holding arb due to intermittent hypotension

## 2025-04-15 NOTE — ASSESSMENT & PLAN NOTE
Body mass index is 48.27 kg/m². Morbid obesity complicates all aspects of disease management from diagnostic modalities to treatment. Weight loss encouraged and health benefits explained to patient.   Physical/occupational therapy when able

## 2025-04-15 NOTE — SUBJECTIVE & OBJECTIVE
Interval History: Erythema and edema are improving. Pain control OK. No new blistering or evidence of abscess.     Medications:  Continuous Infusions:  Scheduled Meds:   aspirin  81 mg Oral Daily    cetirizine  10 mg Oral Daily    clindamycin IV (PEDS and ADULTS)  900 mg Intravenous Q8H    enoxparin  40 mg Subcutaneous Q12H (prophylaxis, 0900/2100)    fluticasone propionate  2 spray Each Nostril Daily    linezolid  600 mg Oral Q12H    metoprolol succinate  50 mg Oral BID    piperacillin-tazobactam (Zosyn) IV (PEDS and ADULTS) (extended infusion is not appropriate)  4.5 g Intravenous Q8H    polyethylene glycol  17 g Oral Daily     PRN Meds:  Current Facility-Administered Medications:     acetaminophen, 650 mg, Oral, Q8H PRN    acetaminophen, 650 mg, Oral, Q4H PRN    dextromethorphan-guaiFENesin  mg/5 ml, 10 mL, Oral, Q4H PRN    dextrose 50%, 12.5 g, Intravenous, PRN    dextrose 50%, 12.5 g, Intravenous, PRN    dextrose 50%, 25 g, Intravenous, PRN    dextrose 50%, 25 g, Intravenous, PRN    glucagon (human recombinant), 1 mg, Intramuscular, PRN    glucagon (human recombinant), 1 mg, Intramuscular, PRN    glucose, 16 g, Oral, PRN    glucose, 16 g, Oral, PRN    glucose, 24 g, Oral, PRN    glucose, 24 g, Oral, PRN    HYDROcodone-acetaminophen, 1 tablet, Oral, Q4H PRN    HYDROcodone-acetaminophen, 1 tablet, Oral, Q4H PRN    insulin aspart U-100, 0-5 Units, Subcutaneous, QID (AC + HS) PRN    melatonin, 6 mg, Oral, Nightly PRN    naloxone, 0.02 mg, Intravenous, PRN    ondansetron, 4 mg, Intravenous, Q8H PRN    oxyCODONE, 5 mg, Oral, Q6H PRN    simethicone, 1 tablet, Oral, QID PRN    sodium chloride 0.9%, 2 mL, Intravenous, Q12H PRN     Review of patient's allergies indicates:   Allergen Reactions    Adhesive tape-silicones Swelling     Paper tape     Objective:     Vital Signs (Most Recent):  Temp: 98 °F (36.7 °C) (04/15/25 0759)  Pulse: 86 (04/15/25 0800)  Resp: 16 (04/15/25 0836)  BP: (!) 102/54 (04/15/25  0759)  SpO2: (!) 94 % (04/15/25 0759) Vital Signs (24h Range):  Temp:  [97.9 °F (36.6 °C)-98.8 °F (37.1 °C)] 98 °F (36.7 °C)  Pulse:  [] 86  Resp:  [15-20] 16  SpO2:  [94 %-98 %] 94 %  BP: (102-144)/(52-74) 102/54     Weight: 119.7 kg (263 lb 14.3 oz)  Body mass index is 48.27 kg/m².    Intake/Output - Last 3 Shifts         04/13 0700  04/14 0659 04/14 0700  04/15 0659 04/15 0700 04/16 0659    P.O.  120     IV Piggyback 500 399     Total Intake(mL/kg) 500 (4.2) 519 (4.3)     Urine (mL/kg/hr) 650 215 (0.1)     Total Output 650 215     Net -150 +304            Urine Occurrence  2 x     Stool Occurrence  0 x              Physical Exam  Vitals and nursing note reviewed.   Constitutional:       Appearance: She is well-developed. She is obese.   HENT:      Head: Normocephalic.   Eyes:      Pupils: Pupils are equal, round, and reactive to light.   Neck:      Thyroid: No thyromegaly.      Vascular: No JVD.      Trachea: No tracheal deviation.   Cardiovascular:      Rate and Rhythm: Normal rate and regular rhythm.      Heart sounds: Normal heart sounds.   Pulmonary:      Breath sounds: Normal breath sounds. No wheezing.   Abdominal:      General: Bowel sounds are normal. There is no distension.      Palpations: Abdomen is soft. Abdomen is not rigid. There is no mass.      Tenderness: There is no abdominal tenderness. There is no guarding or rebound.      Comments: Obese   Musculoskeletal:         General: Normal range of motion.      Right lower leg: Edema present.      Left lower leg: No edema.   Lymphadenopathy:      Cervical: No cervical adenopathy.   Skin:     General: Skin is warm and dry.      Findings: Erythema (right lower extremity) present. No rash.      Comments: There is erythema of the right leg.  It is slightly less intense and slightly less in area.  There is still tenderness over the area   Neurological:      Mental Status: She is oriented to person, place, and time.   Psychiatric:         Mood and  Affect: Mood normal.         Behavior: Behavior normal.         Thought Content: Thought content normal.          Significant Labs:  I have reviewed all pertinent lab results within the past 24 hours.  CBC:   Recent Labs   Lab 04/15/25  0724   WBC 14.64*   RBC 4.13   HGB 10.8*   HCT 34.3*      MCV 83   MCH 26.2*   MCHC 31.5*     BMP:   Recent Labs   Lab 04/13/25  1227 04/14/25  0555 04/15/25  0724      < > 133*   K 4.7   < > 3.7      < > 96   CO2 25   < > 29   BUN 19   < > 21   CREATININE 0.9   < > 1.3   CALCIUM 8.5*   < > 8.4*   MG 2.2  --   --     < > = values in this interval not displayed.     Microbiology Results (last 7 days)       Procedure Component Value Units Date/Time    Blood culture x two cultures. Draw prior to antibiotics. [[id number]]  (Normal) Collected: 04/13/25 1124    Order Status: Completed Specimen: Blood from Peripheral, Hand, Left Updated: 04/15/25 0502     Blood Culture No Growth After 24 Hours    Blood culture x two cultures. Draw prior to antibiotics. [[id number]]  (Normal) Collected: 04/13/25 1116    Order Status: Completed Specimen: Blood from Peripheral, Antecubital, Left Updated: 04/15/25 0502     Blood Culture No Growth After 24 Hours            Significant Diagnostics:  I have reviewed all pertinent imaging results/findings within the past 24 hours.  No new

## 2025-04-15 NOTE — CONSULTS
[facility] - Med Surg  Infectious Disease  Consult Note    Patient Name: Stephanie Raza  MRN: [id number]  Admission Date: 4/13/2025  Hospital Length of Stay: 2 days  Attending Physician: Delfino Johansen MD  Primary Care Provider: Reyna Suarez MD     Isolation Status: No active isolations    Patient information was obtained from patient, past medical records, and ER records.      Consults  Assessment/Plan:     ID  * Cellulitis of right lower extremity    She had extensive right lower extremity cellulitis.  Will continue Zyvox, clindamycin, Zosyn.  Will monitor clinical response in a.m.a.m. she needs close follow-up    Endocrine  Morbid obesity   outpatient follow-up for weight loss regimen    Type 2 diabetes mellitus with diabetic polyneuropathy, with long-term current use of insulin    Insulin regimen as per primary team        Thank you for your consult. I will follow-up with patient. Please contact us if you have any additional questions.    Phillip Mon MD, [facility]  Infectious Disease  [facility] - Med Surg    Subjective:     Principal Problem: Cellulitis of right lower extremity    HPI:  65-year-old female with a history of diabetes, hyperlipidemia, hypertension, obesity .   she was admitted with worsening right lower leg erythema and swelling.  She was initially admitted 0408- treated with Ancef and discharged on Augmentin.  She has she was now admitted with worsening infection    Labs and imaging tests reviewed sed rate greater 120, , white blood cell count of 13.4.  Lactic acid level was normal at 1.7..  CT scan of the leg revealed large left inguinal lymph nodes, fat stranding in the thigh most notably in the medial portion right hip arthroplasty in place intrapelvic contents unremarkable  CTA tib-fib soft tissue thickening and fat stranding in the anterior aspect of the lower extremity with disorganized fluid throughout the superficial compartment with no definitive involvement of the deep  intramuscular compartment, no definitive abscess      Past Medical History:   Diagnosis Date    Acute blood loss anemia (ABLA) 2024    Last Assessment & Plan:    Formatting of this note might be different from the original.   History & Physical       Discharge Summary       Follow-up  Patient did receive 1 unit packed red blood cells while in the OR secondary to blood loss.  No obvious bleeding at this time.  She received 2 additional units packed red blood cells per orthopedics.  Hemoglobin running stable at this time no need fo    YOVANA (acute kidney injury) 2024    Last Assessment & Plan:    Formatting of this note might be different from the original.   History & Physical       Discharge Summary       Follow-up patient with progressive oliguric YOVANA after surgery.  Resolved. Avoid nephrotoxic agents, renally dose all medications where necessary, and protect nondominant arm as much as possible    Anxiety     Arthritis     Asthma     Back pain     Diabetes mellitus type I     Heart murmur     Hyperlipidemia     Hypertension     Obesity     Polyneuropathy     Trouble in sleeping     Type 2 diabetes mellitus     Urinary incontinence        Past Surgical History:   Procedure Laterality Date    ADENOIDECTOMY      BREAST BIOPSY      2018, benign    BREAST SURGERY       SECTION      hip reconstruction Left 2024    HYSTERECTOMY      JOINT REPLACEMENT         Review of patient's allergies indicates:   Allergen Reactions    Adhesive tape-silicones Swelling     Paper tape       Medications:  Medications Prior to Admission   Medication Sig    aspirin 81 MG Chew Take 81 mg by mouth once daily.    buPROPion (WELLBUTRIN XL) 150 MG TB24 tablet TAKE 1 TABLET(150 MG) BY MOUTH DAILY    diltiaZEM HCl 120 mg Cp12 TAKE 1 CAPSULE TWICE DAILY    esomeprazole (NEXIUM) 40 MG capsule Take 1 capsule (40 mg total) by mouth before breakfast.    metoprolol succinate (TOPROL-XL) 50 MG 24 hr tablet TAKE 1 TABLET TWICE  "DAILY    olmesartan (BENICAR) 40 MG tablet Take 40 mg by mouth once daily.    vibegron 75 mg Tab Take 1 tablet by mouth once daily.    albuterol (PROVENTIL/VENTOLIN HFA) 90 mcg/actuation inhaler Inhale 2 puffs into the lungs every 4 (four) hours as needed for Wheezing.    blood-glucose meter (TRUE METRIX AIR GLUCOSE METER) [facility] To check BG 3 times daily, to use with insurance preferred meter    DROPLET PEN NEEDLE 31 gauge x 5/16" Ndle USE ONCE DAILY AS DIRECTED    fluticasone-salmeterol diskus inhaler 250-50 mcg Inhale 1 puff into the lungs once daily.    furosemide (LASIX) 80 MG tablet Take 80 mg by mouth.    gabapentin (NEURONTIN) 300 MG capsule Take 300 mg by mouth every evening. As needed (Patient not taking: Reported on 4/13/2025)    meloxicam (MOBIC) 7.5 MG tablet TAKE 1 TABLET(7.5 MG) BY MOUTH DAILY    OZEMPIC 2 mg/dose (8 mg/3 mL) PnIj INJECT 2MG UNDER THE SKIN EVERY 7 DAYS    potassium chloride (K-TAB) 20 mEq Take 40 mEq by mouth 2 (two) times a day.    TRUE METRIX GLUCOSE TEST STRIP Strp TEST BLOOD SUGAR THREE TIMES DAILY    TRUEPLUS LANCETS 33 gauge Misc TEST BLOOD SUGAR THREE TIMES DAILY    vitamin D (VITAMIN D3) 1000 units Tab Take 1 tablet by mouth every morning.     Antibiotics (From admission, onward)      Start     Stop Route Frequency Ordered    04/13/25 2100  linezolid tablet 600 mg         -- Oral Every 12 hours 04/13/25 1638    04/13/25 1930  piperacillin-tazobactam (ZOSYN) 4.5 g in D5W 100 mL IVPB (MB+)         -- IV Every 8 hours (non-standard times) 04/13/25 1638    04/13/25 1800  clindamycin in D5W 900 mg/50 mL IVPB 900 mg         04/16/25 1759 IV Every 8 hours (non-standard times) 04/13/25 1711          Antifungals (From admission, onward)      None          Antivirals (From admission, onward)      None             Immunization History   Administered Date(s) Administered    COVID-19, MRNA, LN-S, PF (Pfizer) (Purple Cap) 02/28/2021, 03/23/2021, 11/04/2021    COVID-19, mRNA, PETERP-S, PF, " ernestina-sucrose, 30 mcg/0.3 mL (Pfizer Ages 12+) 10/31/2023, 10/16/2024    COVID-19, mRNA, LNP-S, bivalent booster, PF (PFIZER OMICRON) 04/27/2023       Family History       Problem Relation (Age of Onset)    Breast cancer Mother, Maternal Aunt, Maternal Aunt, Paternal Aunt, Paternal Aunt    Cancer Father    Diabetes Sister    Hypertension Mother    Lung cancer Mother    Stroke Father          Social History     Socioeconomic History    Marital status:    Tobacco Use    Smoking status: Never     Passive exposure: Never    Smokeless tobacco: Never   Substance and Sexual Activity    Alcohol use: Yes     Alcohol/week: 3.0 standard drinks of alcohol     Types: 1 Glasses of wine, 2 Shots of liquor per week    Drug use: Never    Sexual activity: Not Currently     Partners: Male     Social Drivers of Health     Financial Resource Strain: Medium Risk (4/14/2025)    Overall Financial Resource Strain (CARDIA)     Difficulty of Paying Living Expenses: Somewhat hard   Food Insecurity: Food Insecurity Present (4/14/2025)    Hunger Vital Sign     Worried About Running Out of Food in the Last Year: Sometimes true     Ran Out of Food in the Last Year: Sometimes true   Transportation Needs: Unmet Transportation Needs (4/14/2025)    PRAPARE - Transportation     Lack of Transportation (Medical): Yes     Lack of Transportation (Non-Medical): Yes   Physical Activity: Sufficiently Active (9/27/2024)    Exercise Vital Sign     Days of Exercise per Week: 4 days     Minutes of Exercise per Session: 60 min   Stress: Stress Concern Present (4/14/2025)    [employer] [place] [employer] - Occupational Stress Questionnaire     Feeling of Stress : Very much   Housing Stability: Low Risk  (4/14/2025)    Housing Stability Vital Sign     Unable to Pay for Housing in the Last Year: No     Homeless in the Last Year: No     Review of Systems   Constitutional:  Negative for activity change, appetite change, chills, diaphoresis, fatigue and  "fever.   Respiratory:  Negative for apnea.    Cardiovascular:  Negative for chest pain and leg swelling.     Objective:     Vital Signs (Most Recent):  Temp: 97.9 °F (36.6 °C) (04/15/25 0600)  Pulse: 85 (04/15/25 0600)  Resp: 20 (04/15/25 0600)  BP: (!) 106/53 (04/15/25 0600)  SpO2: 95 % (04/15/25 0600) Vital Signs (24h Range):  Temp:  [97.9 °F (36.6 °C)-98.8 °F (37.1 °C)] 97.9 °F (36.6 °C)  Pulse:  [] 85  Resp:  [15-20] 20  SpO2:  [94 %-98 %] 95 %  BP: (102-144)/(52-74) 106/53     Weight: 119.7 kg (263 lb 14.3 oz)  Body mass index is 48.27 kg/m².    Estimated Creatinine Clearance: 74.6 mL/min (based on SCr of 0.9 mg/dL).     Physical Exam  Vitals and nursing note reviewed.   HENT:      Head: Normocephalic.   Musculoskeletal:      Cervical back: Normal range of motion.   Skin:     Findings: Bruising, erythema and lesion present.   Neurological:      General: No focal deficit present.      Mental Status: She is alert.                Significant Labs: Blood Culture: No results for input(s): "LABBLOO" in the last 4320 hours.  BMP:   Recent Labs   Lab 04/13/25  1227 04/14/25  0555    137   K 4.7 4.0    101   CO2 25 27   BUN 19 16   CREATININE 0.9 0.9   CALCIUM 8.5* 8.2*   MG 2.2  --      Wound Culture: No results for input(s): "LABAERO" in the last 4320 hours.  All pertinent labs within the past 24 hours have been reviewed.    Significant Imaging: I have reviewed all pertinent imaging results/findings within the past 24 hours.              "

## 2025-04-15 NOTE — PROGRESS NOTES
Pre Op Diagnosis: right calf blister     Post Op Diagnosis: same     Procedure:  aspiration     Procedure performed by: Baltazar KYLE, Leonie PEREZ     Informed Consent Obtained: Yes     Specimen Removed:  yes     Estimated Blood Loss:  minimal     Findings: Local anesthesia     Sedation:  no     The patient tolerated the procedure well and there were no complications.      Disposition:  F/U in clinic or with ordering physician    Discharge instructions:  Light activity for 24 hours.  Remove band aid in 24 hours.  No baths (showers are appropriate).      Sterile technique was performed in the right calf.  Approx 1 cc of clear yellow fluid removed and sent to lab.  Pt tolerated the procedure well without immediate complications. F/u with PCP and/or ordering physician.

## 2025-04-15 NOTE — NURSING
During rounds pt verbalized that she was dry and did not need assistance at the moment. Will cont to observe.

## 2025-04-15 NOTE — PROGRESS NOTES
Pharmacist Renal Dose Adjustment Note    Stephanie Raza is a 69 y.o. female being treated with the medication cetirizine    Patient Data:    Vital Signs (Most Recent):  Temp: 98.8 °F (37.1 °C) (04/14/25 2119)  Pulse: 92 (04/14/25 2119)  Resp: 20 (04/14/25 2119)  BP: (!) 108/53 (04/14/25 2119)  SpO2: (!) 94 % (04/14/25 2119) Vital Signs (72h Range):  Temp:  [97.8 °F (36.6 °C)-99.4 °F (37.4 °C)]   Pulse:  []   Resp:  [15-22]   BP: (100-144)/(47-74)   SpO2:  [94 %-98 %]      Recent Labs   Lab 04/13/25  1227 04/14/25  0555   CREATININE 0.9 0.9     Serum creatinine: 0.9 mg/dL 04/14/25 0555  Estimated creatinine clearance: 74.7 mL/min    Cetirizine 5 mg QD will be changed to cetirizine 10 mg QD for CrCl >30 mL/min.     Pharmacist's Name: Jacklyn Harris  Pharmacist's Extension: [phone]     cause not entirely knownpossible eosinophilic esophagitis versus angioedema issues. She did get an EGD. She has been treated with PPI and famotidine.

## 2025-04-15 NOTE — PLAN OF CARE
Discussed poc with pt, pt verbalized understanding  Purposeful rounding every 2hours  VS wnl  Cardiac monitoring in use, pt is NSR, tele monitor #[id number]  Blood glucose monitoring   Fall precautions in place, remains injury free  Pain and nausea under control with PRN meds  IVFs  Accurate I&Os  Abx given as prescribed  Bed locked at lowest position  Call light within reach  Chart check complete  Will cont with POC

## 2025-04-16 LAB
ABSOLUTE EOSINOPHIL (OHS): 0.4 K/UL
ABSOLUTE MONOCYTE (OHS): 1.18 K/UL (ref 0.3–1)
ABSOLUTE NEUTROPHIL COUNT (OHS): 14.55 K/UL (ref 1.8–7.7)
ALBUMIN SERPL BCP-MCNC: 1.9 G/DL (ref 3.5–5.2)
ALP SERPL-CCNC: 127 UNIT/L (ref 40–150)
ALT SERPL W/O P-5'-P-CCNC: 16 UNIT/L (ref 10–44)
ANION GAP (OHS): 11 MMOL/L (ref 8–16)
AST SERPL-CCNC: 18 UNIT/L (ref 11–45)
BASOPHILS # BLD AUTO: 0.05 K/UL
BASOPHILS NFR BLD AUTO: 0.3 %
BILIRUB SERPL-MCNC: 0.7 MG/DL (ref 0.1–1)
BUN SERPL-MCNC: 28 MG/DL (ref 8–23)
CALCIUM SERPL-MCNC: 8.3 MG/DL (ref 8.7–10.5)
CHLORIDE SERPL-SCNC: 97 MMOL/L (ref 95–110)
CO2 SERPL-SCNC: 24 MMOL/L (ref 23–29)
CREAT SERPL-MCNC: 1.8 MG/DL (ref 0.5–1.4)
ERYTHROCYTE [DISTWIDTH] IN BLOOD BY AUTOMATED COUNT: 15 % (ref 11.5–14.5)
GFR SERPLBLD CREATININE-BSD FMLA CKD-EPI: 31 ML/MIN/1.73/M2
GLUCOSE SERPL-MCNC: 130 MG/DL (ref 70–110)
HCT VFR BLD AUTO: 33.5 % (ref 37–48.5)
HGB BLD-MCNC: 10.4 GM/DL (ref 12–16)
IMM GRANULOCYTES # BLD AUTO: 0.21 K/UL (ref 0–0.04)
IMM GRANULOCYTES NFR BLD AUTO: 1.2 % (ref 0–0.5)
LYMPHOCYTES # BLD AUTO: 1.64 K/UL (ref 1–4.8)
MCH RBC QN AUTO: 25.8 PG (ref 27–31)
MCHC RBC AUTO-ENTMCNC: 31 G/DL (ref 32–36)
MCV RBC AUTO: 83 FL (ref 82–98)
NUCLEATED RBC (/100WBC) (OHS): 0 /100 WBC
PLATELET # BLD AUTO: 354 K/UL (ref 150–450)
PMV BLD AUTO: 11.1 FL (ref 9.2–12.9)
POCT GLUCOSE: 143 MG/DL (ref 70–110)
POCT GLUCOSE: 150 MG/DL (ref 70–110)
POCT GLUCOSE: 157 MG/DL (ref 70–110)
POTASSIUM SERPL-SCNC: 4 MMOL/L (ref 3.5–5.1)
PROT SERPL-MCNC: 7.2 GM/DL (ref 6–8.4)
RBC # BLD AUTO: 4.03 M/UL (ref 4–5.4)
RELATIVE EOSINOPHIL (OHS): 2.2 %
RELATIVE LYMPHOCYTE (OHS): 9.1 % (ref 18–48)
RELATIVE MONOCYTE (OHS): 6.5 % (ref 4–15)
RELATIVE NEUTROPHIL (OHS): 80.7 % (ref 38–73)
SODIUM SERPL-SCNC: 132 MMOL/L (ref 136–145)
WBC # BLD AUTO: 18.03 K/UL (ref 3.9–12.7)

## 2025-04-16 PROCEDURE — 63600175 PHARM REV CODE 636 W HCPCS: Mod: HCNC | Performed by: INTERNAL MEDICINE

## 2025-04-16 PROCEDURE — 86060 ANTISTREPTOLYSIN O TITER: CPT | Mod: HCNC | Performed by: FAMILY MEDICINE

## 2025-04-16 PROCEDURE — 97162 PT EVAL MOD COMPLEX 30 MIN: CPT | Mod: HCNC

## 2025-04-16 PROCEDURE — 36415 COLL VENOUS BLD VENIPUNCTURE: CPT | Mod: HCNC | Performed by: INTERNAL MEDICINE

## 2025-04-16 PROCEDURE — 85025 COMPLETE CBC W/AUTO DIFF WBC: CPT | Mod: HCNC | Performed by: INTERNAL MEDICINE

## 2025-04-16 PROCEDURE — 99233 SBSQ HOSP IP/OBS HIGH 50: CPT | Mod: HCNC,,, | Performed by: SURGERY

## 2025-04-16 PROCEDURE — 97530 THERAPEUTIC ACTIVITIES: CPT | Mod: HCNC

## 2025-04-16 PROCEDURE — 25000003 PHARM REV CODE 250: Mod: HCNC | Performed by: HOSPITALIST

## 2025-04-16 PROCEDURE — 25000003 PHARM REV CODE 250: Mod: HCNC | Performed by: INTERNAL MEDICINE

## 2025-04-16 PROCEDURE — 36415 COLL VENOUS BLD VENIPUNCTURE: CPT | Mod: HCNC | Performed by: FAMILY MEDICINE

## 2025-04-16 PROCEDURE — 99233 SBSQ HOSP IP/OBS HIGH 50: CPT | Mod: NSCH,HCNC,, | Performed by: INTERNAL MEDICINE

## 2025-04-16 PROCEDURE — 21400001 HC TELEMETRY ROOM: Mod: HCNC

## 2025-04-16 PROCEDURE — 97165 OT EVAL LOW COMPLEX 30 MIN: CPT | Mod: HCNC

## 2025-04-16 PROCEDURE — 25000003 PHARM REV CODE 250: Mod: HCNC | Performed by: FAMILY MEDICINE

## 2025-04-16 PROCEDURE — 80053 COMPREHEN METABOLIC PANEL: CPT | Mod: HCNC | Performed by: INTERNAL MEDICINE

## 2025-04-16 PROCEDURE — 25000242 PHARM REV CODE 250 ALT 637 W/ HCPCS: Mod: HCNC | Performed by: FAMILY MEDICINE

## 2025-04-16 RX ORDER — SODIUM CHLORIDE 9 MG/ML
INJECTION, SOLUTION INTRAVENOUS CONTINUOUS
Status: ACTIVE | OUTPATIENT
Start: 2025-04-16 | End: 2025-04-17

## 2025-04-16 RX ADMIN — LINEZOLID 600 MG: 600 TABLET, FILM COATED ORAL at 10:04

## 2025-04-16 RX ADMIN — LINEZOLID 600 MG: 600 TABLET, FILM COATED ORAL at 08:04

## 2025-04-16 RX ADMIN — SODIUM CHLORIDE: 9 INJECTION, SOLUTION INTRAVENOUS at 02:04

## 2025-04-16 RX ADMIN — FLUTICASONE PROPIONATE 100 MCG: 50 SPRAY, METERED NASAL at 10:04

## 2025-04-16 RX ADMIN — METOPROLOL SUCCINATE 50 MG: 50 TABLET, EXTENDED RELEASE ORAL at 10:04

## 2025-04-16 RX ADMIN — OXYCODONE HYDROCHLORIDE 5 MG: 5 TABLET ORAL at 04:04

## 2025-04-16 RX ADMIN — CETIRIZINE HYDROCHLORIDE 10 MG: 10 TABLET, FILM COATED ORAL at 10:04

## 2025-04-16 RX ADMIN — PIPERACILLIN SODIUM AND TAZOBACTAM SODIUM 4.5 G: 4; .5 INJECTION, POWDER, FOR SOLUTION INTRAVENOUS at 07:04

## 2025-04-16 RX ADMIN — TACROLIMUS 900 MG: 0.5 CAPSULE ORAL at 09:04

## 2025-04-16 RX ADMIN — POLYETHYLENE GLYCOL 3350 17 G: 17 POWDER, FOR SOLUTION ORAL at 10:04

## 2025-04-16 RX ADMIN — ENOXAPARIN SODIUM 40 MG: 40 INJECTION SUBCUTANEOUS at 09:04

## 2025-04-16 RX ADMIN — OXYCODONE HYDROCHLORIDE 5 MG: 5 TABLET ORAL at 12:04

## 2025-04-16 RX ADMIN — PIPERACILLIN SODIUM AND TAZOBACTAM SODIUM 4.5 G: 4; .5 INJECTION, POWDER, FOR SOLUTION INTRAVENOUS at 03:04

## 2025-04-16 RX ADMIN — ENOXAPARIN SODIUM 40 MG: 40 INJECTION SUBCUTANEOUS at 08:04

## 2025-04-16 RX ADMIN — HYDROCODONE BITARTRATE AND ACETAMINOPHEN 1 TABLET: 10; 325 TABLET ORAL at 02:04

## 2025-04-16 RX ADMIN — HYDROCODONE BITARTRATE AND ACETAMINOPHEN 1 TABLET: 10; 325 TABLET ORAL at 06:04

## 2025-04-16 RX ADMIN — OXYCODONE HYDROCHLORIDE 5 MG: 5 TABLET ORAL at 08:04

## 2025-04-16 RX ADMIN — HYDROCODONE BITARTRATE AND ACETAMINOPHEN 1 TABLET: 10; 325 TABLET ORAL at 11:04

## 2025-04-16 RX ADMIN — GUAIFENESIN AND DEXTROMETHORPHAN 10 ML: 100; 10 SYRUP ORAL at 02:04

## 2025-04-16 RX ADMIN — GUAIFENESIN AND DEXTROMETHORPHAN 10 ML: 100; 10 SYRUP ORAL at 07:04

## 2025-04-16 RX ADMIN — HYDROCODONE BITARTRATE AND ACETAMINOPHEN 1 TABLET: 10; 325 TABLET ORAL at 04:04

## 2025-04-16 RX ADMIN — ASPIRIN 81 MG CHEWABLE TABLET 81 MG: 81 TABLET CHEWABLE at 10:04

## 2025-04-16 RX ADMIN — PIPERACILLIN SODIUM AND TAZOBACTAM SODIUM 4.5 G: 4; .5 INJECTION, POWDER, FOR SOLUTION INTRAVENOUS at 12:04

## 2025-04-16 RX ADMIN — TACROLIMUS 900 MG: 0.5 CAPSULE ORAL at 02:04

## 2025-04-16 RX ADMIN — METOPROLOL SUCCINATE 50 MG: 50 TABLET, EXTENDED RELEASE ORAL at 08:04

## 2025-04-16 NOTE — SUBJECTIVE & OBJECTIVE
"Interval History:   She reports less pain and erythema.  She feels better.  CBC - wbc 14.64.      Review of Systems   Constitutional:  Negative for activity change, appetite change, chills, diaphoresis, fatigue and fever.   Neurological:  Negative for dizziness and facial asymmetry.     Objective:     Vital Signs (Most Recent):  Temp: 98.9 °F (37.2 °C) (04/16/25 0410)  Pulse: 82 (04/16/25 0410)  Resp: 18 (04/16/25 0419)  BP: (!) 116/53 (04/16/25 0410)  SpO2: 95 % (04/16/25 0410) Vital Signs (24h Range):  Temp:  [97.9 °F (36.6 °C)-98.9 °F (37.2 °C)] 98.9 °F (37.2 °C)  Pulse:  [82-98] 82  Resp:  [15-20] 18  SpO2:  [92 %-95 %] 95 %  BP: (102-116)/(50-60) 116/53     Weight: 119.7 kg (263 lb 14.3 oz)  Body mass index is 48.27 kg/m².    Estimated Creatinine Clearance: 51.6 mL/min (based on SCr of 1.3 mg/dL).     Physical Exam  Vitals and nursing note reviewed.   HENT:      Head: Normocephalic.   Eyes:      Pupils: Pupils are equal, round, and reactive to light.   Pulmonary:      Effort: Pulmonary effort is normal.   Abdominal:      General: Abdomen is flat.   Skin:     Findings: Bruising, erythema and lesion present.   Neurological:      General: No focal deficit present.      Mental Status: She is alert.          Significant Labs: Blood Culture: No results for input(s): "LABBLOO" in the last 4320 hours.  CBC:   Recent Labs   Lab 04/14/25  0555 04/15/25  0724   WBC 10.10 14.64*   HGB 10.4* 10.8*   HCT 33.9* 34.3*    317     CMP:   Recent Labs   Lab 04/14/25  0555 04/15/25  0724    133*   K 4.0 3.7    96   CO2 27 29   BUN 16 21   CREATININE 0.9 1.3   CALCIUM 8.2* 8.4*   ALBUMIN 2.0* 2.1*   BILITOT 0.4 0.6   ALKPHOS 92 104   AST 22 21   ALT 21 19   ANIONGAP 9 8     All pertinent labs within the past 24 hours have been reviewed.    Significant Imaging: I have reviewed all pertinent imaging results/findings within the past 24 hours.  "

## 2025-04-16 NOTE — PT/OT/SLP PROGRESS
"Occupational Therapy   Treatment    Name: Stephanie Raza  MRN: [id number]  Admitting Diagnosis:  Cellulitis of right lower extremity       Recommendations:     Discharge Recommendations: Moderate Intensity Therapy  Discharge Equipment Recommendations:  to be determined by next level of care  Barriers to discharge:  Inaccessible home environment    Assessment:     Stephanie Raza is a 69 y.o. female with a medical diagnosis of Cellulitis of right lower extremity.  She presents with the following performance deficits affecting function are weakness, impaired endurance, impaired functional mobility, impaired self care skills, pain, decreased safety awareness, edema, impaired skin.     Rehab Prognosis:  Good; patient would benefit from acute skilled OT services to address these deficits and reach maximum level of function.       Plan:     Patient to be seen 2 x/week to address the above listed problems via self-care/home management, therapeutic activities, therapeutic exercises, wheelchair management/training  Plan of Care Expires: 04/30/25  Plan of Care Reviewed with: patient    Subjective     Chief Complaint: "don't touch my leg."  Patient/Family Comments/goals: Pain relief.  Pain/Comfort:  Pain Rating 1: 10/10  Location - Side 1: Right  Location 1: leg  Pain Addressed 1:  (activity pacing)    Objective:     Communicated with: nurse prior to session.  Patient found HOB elevated with peripheral IV, telemetry, bed alarm upon OT entry to room.    General Precautions: Standard, fall    Orthopedic Precautions:N/A  Braces: N/A  Respiratory Status: Room air     Occupational Performance: significantly increase time required throughout d/t pain and refusal for physical assistance. Patient unable to remove R LE from EOB d/t fear of falling and weakness. **    Bed Mobility:    Patient completed Scooting/Bridging with stand by assistance  Patient completed Supine to Sit with stand by assistance  Patient completed Sit to " Supine with Minimal assist. Patient allowed her grandson to lift her R great toe to assist with bed mobility.     Functional Mobility/Transfers:  Not tested. See above.     Activities of Daily Living:  Feeding:  independence    Grooming: independence    Lower Body Dressing: maximal assistance    Toileting: maximal assistance        [facility] 6 Click ADL: 18    Treatment & Education:  Several attempts to educate pt on role of OT and demonstrate assistance with minimal pain stimulation; however, pt persistent in refusal for physical assist. Significantly increase time required throughout d/t pain.    Patient left HOB elevated with all lines intact, call button in reach, bed alarm on, and family present    GOALS:   Multidisciplinary Problems       Occupational Therapy Goals          Problem: Occupational Therapy    Goal Priority Disciplines Outcome Interventions   Occupational Therapy Goal     OT, PT/OT     Description: Goals to be met by: 4/30/25     Patient will increase functional independence with ADLs by performing:    LE Dressing with Minimal Assistance.  Sitting at edge of bed x15 minutes with Stand-by Assistance.  Toilet transfer to bedside commode with Minimal Assistance.                         DME Justifications:  No DME recommended requiring DME justifications    Time Tracking:     OT Date of Treatment: 04/16/25  OT Start Time: 1514  OT Stop Time: 1530  OT Total Time (min): 16 min    Billable Minutes:Therapeutic Activity 16    OT/YURIDIA: OT     Number of YURIDIA visits since last OT visit: 0    4/16/2025

## 2025-04-16 NOTE — SUBJECTIVE & OBJECTIVE
Interval History:  Erythema and pain are improving.  White count however is elevating.  No yoni abscesses.  Blister fluids sent for culture.      ID is following for antibiotics    Medications:  Continuous Infusions:  Scheduled Meds:   aspirin  81 mg Oral Daily    cetirizine  10 mg Oral Daily    clindamycin IV (PEDS and ADULTS)  900 mg Intravenous Q8H    enoxparin  40 mg Subcutaneous Q12H (prophylaxis, 0900/2100)    fluticasone propionate  2 spray Each Nostril Daily    linezolid  600 mg Oral Q12H    metoprolol succinate  50 mg Oral BID    piperacillin-tazobactam (Zosyn) IV (PEDS and ADULTS) (extended infusion is not appropriate)  4.5 g Intravenous Q8H    polyethylene glycol  17 g Oral Daily     PRN Meds:  Current Facility-Administered Medications:     acetaminophen, 650 mg, Oral, Q8H PRN    acetaminophen, 650 mg, Oral, Q4H PRN    dextromethorphan-guaiFENesin  mg/5 ml, 10 mL, Oral, Q4H PRN    dextrose 50%, 12.5 g, Intravenous, PRN    dextrose 50%, 12.5 g, Intravenous, PRN    dextrose 50%, 25 g, Intravenous, PRN    dextrose 50%, 25 g, Intravenous, PRN    glucagon (human recombinant), 1 mg, Intramuscular, PRN    glucagon (human recombinant), 1 mg, Intramuscular, PRN    glucose, 16 g, Oral, PRN    glucose, 16 g, Oral, PRN    glucose, 24 g, Oral, PRN    glucose, 24 g, Oral, PRN    HYDROcodone-acetaminophen, 1 tablet, Oral, Q4H PRN    HYDROcodone-acetaminophen, 1 tablet, Oral, Q4H PRN    insulin aspart U-100, 0-5 Units, Subcutaneous, QID (AC + HS) PRN    melatonin, 6 mg, Oral, Nightly PRN    naloxone, 0.02 mg, Intravenous, PRN    ondansetron, 4 mg, Intravenous, Q8H PRN    oxyCODONE, 5 mg, Oral, Q6H PRN    simethicone, 1 tablet, Oral, QID PRN    sodium chloride 0.9%, 2 mL, Intravenous, Q12H PRN     Review of patient's allergies indicates:   Allergen Reactions    Adhesive tape-silicones Swelling     Paper tape     Objective:     Vital Signs (Most Recent):  Temp: 98.3 °F (36.8 °C) (04/16/25 0745)  Pulse: 86 (04/16/25  0800)  Resp: 16 (04/16/25 0745)  BP: 123/71 (04/16/25 0745)  SpO2: (!) 93 % (04/16/25 0745) Vital Signs (24h Range):  Temp:  [98.1 °F (36.7 °C)-98.9 °F (37.2 °C)] 98.3 °F (36.8 °C)  Pulse:  [80-98] 86  Resp:  [15-18] 16  SpO2:  [92 %-95 %] 93 %  BP: (104-123)/(50-71) 123/71     Weight: 119.7 kg (263 lb 14.3 oz)  Body mass index is 48.27 kg/m².    Intake/Output - Last 3 Shifts         04/14 0700  04/15 0659 04/15 0700  04/16 0659 04/16 0700 04/17 0659    P.O. 120      IV Piggyback 399      Total Intake(mL/kg) 519 (4.3)      Urine (mL/kg/hr) 215 (0.1)      Total Output 215      Net +304             Urine Occurrence 2 x      Stool Occurrence 0 x  0 x             Physical Exam  Vitals and nursing note reviewed.   Constitutional:       Appearance: She is well-developed. She is ill-appearing.   HENT:      Head: Normocephalic.   Eyes:      Pupils: Pupils are equal, round, and reactive to light.   Neck:      Thyroid: No thyromegaly.      Vascular: No JVD.      Trachea: No tracheal deviation.   Cardiovascular:      Rate and Rhythm: Normal rate and regular rhythm.      Heart sounds: Normal heart sounds.   Pulmonary:      Breath sounds: Normal breath sounds. No wheezing.   Abdominal:      General: Bowel sounds are normal. There is no distension.      Palpations: Abdomen is soft. Abdomen is not rigid. There is no mass.      Tenderness: There is no abdominal tenderness. There is no guarding or rebound.      Comments: Obese   Musculoskeletal:         General: Normal range of motion.   Lymphadenopathy:      Cervical: No cervical adenopathy.   Skin:     General: Skin is warm and dry.      Findings: No erythema or rash.      Comments: Erythema and edema now mainly of the lower extremities.  There are blisters with leaking fluid.  The patient has motor and sensory.  There are no yoni abscesses.   Neurological:      Mental Status: She is oriented to person, place, and time.      Comments: Motor and sensory to the lower extremities           Significant Labs:  I have reviewed all pertinent lab results within the past 24 hours.  CBC:   Recent Labs   Lab 04/16/25  0605   WBC 18.03*   RBC 4.03   HGB 10.4*   HCT 33.5*      MCV 83   MCH 25.8*   MCHC 31.0*     BMP:   Recent Labs   Lab 04/13/25  1227 04/14/25  0555 04/16/25  0605      < > 132*   K 4.7   < > 4.0      < > 97   CO2 25   < > 24   BUN 19   < > 28*   CREATININE 0.9   < > 1.8*   CALCIUM 8.5*   < > 8.3*   MG 2.2  --   --     < > = values in this interval not displayed.     Microbiology Results (last 7 days)       Procedure Component Value Units Date/Time    Blood culture x two cultures. Draw prior to antibiotics. [[id number]]  (Normal) Collected: 04/13/25 1124    Order Status: Completed Specimen: Blood from Peripheral, Hand, Left Updated: 04/16/25 0503     Blood Culture No Growth After 48 Hours    Blood culture x two cultures. Draw prior to antibiotics. [[id number]]  (Normal) Collected: 04/13/25 1116    Order Status: Completed Specimen: Blood from Peripheral, Antecubital, Left Updated: 04/16/25 0503     Blood Culture No Growth After 48 Hours    Gram stain [[id number]] Collected: 04/15/25 1602    Order Status: Sent Specimen: Body Fluid from Leg, Right Updated: 04/15/25 1608    Culture, Body Fluid (Aerobic) w/ GS [[id number]] Collected: 04/15/25 1602    Order Status: Sent Specimen: Body Fluid from Calf, Right Updated: 04/15/25 1608            Significant Diagnostics:  I have reviewed all pertinent imaging results/findings within the past 24 hours.  No new

## 2025-04-16 NOTE — ASSESSMENT & PLAN NOTE
Patient's blood pressure range in the last 24 hours was: BP  Min: 104/52  Max: 123/71.The patient's inpatient anti-hypertensive regimen is listed below:  Current Antihypertensives  , Daily, Oral  metoprolol succinate (TOPROL-XL) 24 hr tablet 50 mg, 2 times daily, Oral    Plan  - BP is controlled, no changes needed to their regimen  Holding arb due to intermittent hypotension

## 2025-04-16 NOTE — SUBJECTIVE & OBJECTIVE
Interval History: See hospital course for today      Review of Systems   Constitutional:  Positive for activity change and fatigue. Negative for appetite change and fever.   Respiratory:  Negative for shortness of breath.    Cardiovascular:  Positive for leg swelling. Negative for chest pain.   Gastrointestinal:  Negative for abdominal pain, nausea and vomiting.   Musculoskeletal:  Positive for myalgias.   Skin:  Positive for color change.   Neurological:  Positive for weakness.   Psychiatric/Behavioral:  Negative for agitation, behavioral problems, confusion, decreased concentration and dysphoric mood. The patient is not nervous/anxious.      Objective:     Vital Signs (Most Recent):  Temp: 98.3 °F (36.8 °C) (04/16/25 0745)  Pulse: 81 (04/16/25 0745)  Resp: 16 (04/16/25 0745)  BP: 123/71 (04/16/25 0745)  SpO2: (!) 93 % (04/16/25 0745) Vital Signs (24h Range):  Temp:  [98.1 °F (36.7 °C)-98.9 °F (37.2 °C)] 98.3 °F (36.8 °C)  Pulse:  [80-98] 81  Resp:  [15-18] 16  SpO2:  [92 %-95 %] 93 %  BP: (104-123)/(50-71) 123/71     Weight: 119.7 kg (263 lb 14.3 oz)  Body mass index is 48.27 kg/m².  No intake or output data in the 24 hours ending 04/16/25 0906      Physical Exam  Vitals and nursing note reviewed.   Constitutional:       General: She is not in acute distress.     Appearance: She is morbidly obese. She is ill-appearing. She is not toxic-appearing.   HENT:      Head: Normocephalic and atraumatic.   Cardiovascular:      Rate and Rhythm: Normal rate.   Pulmonary:      Effort: Pulmonary effort is normal. No respiratory distress.      Breath sounds: Examination of the right-lower field reveals decreased breath sounds. Examination of the left-lower field reveals decreased breath sounds. Decreased breath sounds present. No wheezing or rales.   Abdominal:      Palpations: Abdomen is soft.      Tenderness: There is no abdominal tenderness.   Musculoskeletal:      Right lower leg: Edema present.      Left lower leg: No  edema.   Skin:     General: Skin is warm.      Findings: Erythema and lesion present.   Neurological:      Mental Status: She is alert and oriented to person, place, and time.      Motor: Weakness present.   Psychiatric:         Mood and Affect: Mood normal.         Behavior: Behavior normal. Behavior is cooperative.               Significant Labs: All pertinent labs within the past 24 hours have been reviewed.  Blood Culture: negative growth to date x 2 days  CBC:   Recent Labs   Lab 04/15/25  0724 04/16/25  0605   WBC 14.64* 18.03*   HGB 10.8* 10.4*   HCT 34.3* 33.5*    354     CMP:   Recent Labs   Lab 04/15/25  0724 04/16/25  0605   * 132*   K 3.7 4.0   CL 96 97   CO2 29 24   BUN 21 28*   CREATININE 1.3 1.8*   CALCIUM 8.4* 8.3*   ALBUMIN 2.1* 1.9*   BILITOT 0.6 0.7   ALKPHOS 104 127   AST 21 18   ALT 19 16   ANIONGAP 8 11       Significant Imaging: I have reviewed all pertinent imaging results/findings within the past 24 hours.  U/S: I have reviewed all pertinent results/findings within the past 24 hours and my personal findings are:  us arteries liliana, left

## 2025-04-16 NOTE — ASSESSMENT & PLAN NOTE
She had extensive right lower extremity cellulitis.  Will continue Zyvox, clindamycin, Zosyn.  Will monitor clinical response in a.m.a.m. she needs close follow-up    04/15-  She reports interval improvement in the erythema  Will plan to stop Clindamycin , continue Zyvox/Zosyn

## 2025-04-16 NOTE — ASSESSMENT & PLAN NOTE
No drainable fluid collections.    Blister fluids sent for culture   Ultrasound ordered by hospital Medicine.    Leukocytosis is worse   Continue antibiotics per Infectious Disease.  If the ultrasound does not show any drainable fluid collections and the patient is not clinically improving over the next 24-48 hours a MRI of the leg may be needed

## 2025-04-16 NOTE — PT/OT/SLP EVAL
Occupational Therapy   Evaluation    Name: Stephanie Raza  MRN: [id number]  Admitting Diagnosis: Cellulitis of right lower extremity  Recent Surgery: * No surgery found *      Recommendations:     Discharge Recommendations: Moderate Intensity Therapy  Discharge Equipment Recommendations:  to be determined by next level of care  Barriers to discharge:  Inaccessible home environment    Assessment:     Stephanie Raza is a 69 y.o. female with a medical diagnosis of Cellulitis of right lower extremity.  She presents with the following performance deficits affecting function: weakness, impaired endurance, impaired functional mobility, impaired self care skills, pain, decreased safety awareness, edema, impaired skin.      Rehab Prognosis: Good; patient would benefit from acute skilled OT services to address these deficits and reach maximum level of function.       Plan:     Patient to be seen 2 x/week to address the above listed problems via self-care/home management, therapeutic activities, therapeutic exercises, wheelchair management/training  Plan of Care Expires: 04/30/25  Plan of Care Reviewed with: patient, daughter    Subjective     Chief Complaint: pain  Patient/Family Comments/goals: To return to [facility]    Occupational Profile:  Living Environment: Lives with spouse and grandchildren in [facility] with ramp entry.  Previous level of function: Ind with ADLs. Ambulated occasionally with either walker or cane.   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 Daughter is available     Pain/Comfort:  Pain Rating 1: 10/10  Location - Side 1: Right  Location 1: leg  Pain Addressed 1:  (activity pacing)    Patients cultural, spiritual, Hinduism conflicts given the current situation: no    Objective:     Communicated with: nurse prior to session.  Patient found HOB elevated with peripheral IV, telemetry, bed  alarm upon OT entry to room.    General Precautions: Standard, fall  Orthopedic Precautions:  N/a  Braces: N/A  Respiratory Status: Room air    Occupational Performance:    Not tested d/t ultrasound entering room. Will follow-up.    Cognitive/Visual Perceptual:  Cognitive/Psychosocial Skills:     -       Oriented to: Person, Place, Time, and Situation   -       Follows Commands/attention:Follows multistep  commands  -       Memory: No Deficits noted    AMPAC 6 Click ADL:  AMPAC Total Score: 18    Treatment & Education:  Pt educated on role of OT in acute care and POC. Will return for physical exam.     Patient left HOB elevated with all lines intact, call button in reach, bed alarm on, and ultrasound tech and family present    GOALS:   Multidisciplinary Problems       Occupational Therapy Goals          Problem: Occupational Therapy    Goal Priority Disciplines Outcome Interventions   Occupational Therapy Goal     OT, PT/OT     Description: Goals to be met by: 4/30/25     Patient will increase functional independence with ADLs by performing:    LE Dressing with Minimal Assistance.  Sitting at edge of bed x15 minutes with Stand-by Assistance.  Toilet transfer to bedside commode with Minimal Assistance.                         DME Justifications:  No DME recommended requiring DME justifications    History:     Past Medical History:   Diagnosis Date    Acute blood loss anemia (ABLA) 01/24/2024    Last Assessment & Plan:    Formatting of this note might be different from the original.   History & Physical       Discharge Summary       Follow-up  Patient did receive 1 unit packed red blood cells while in the OR secondary to blood loss.  No obvious bleeding at this time.  She received 2 additional units packed red blood cells per orthopedics.  Hemoglobin running stable at this time no need fo    YOVANA (acute kidney injury) 01/24/2024    Last Assessment & Plan:    Formatting of this note might be different from the  original.   History & Physical       Discharge Summary       Follow-up patient with progressive oliguric YOVANA after surgery.  Resolved. Avoid nephrotoxic agents, renally dose all medications where necessary, and protect nondominant arm as much as possible    Anxiety     Arthritis     Asthma     Back pain     Diabetes mellitus type I     Heart murmur     Hyperlipidemia     Hypertension     Obesity     Polyneuropathy     Trouble in sleeping     Type 2 diabetes mellitus     Urinary incontinence          Past Surgical History:   Procedure Laterality Date    ADENOIDECTOMY      BREAST BIOPSY      2018, benign    BREAST SURGERY       SECTION      hip reconstruction Left 2024    HYSTERECTOMY      JOINT REPLACEMENT         Time Tracking:     OT Date of Treatment: 25  OT Start Time: 1142  OT Stop Time: 1153  OT Total Time (min): 11 min    Billable Minutes:Evaluation 11    2025

## 2025-04-16 NOTE — PROGRESS NOTES
[facility]  Progress Note    Patient Name: Stephanie Raza  MRN: [id number]  Patient Class: IP- Inpatient   Admission Date: 4/13/2025  Length of Stay: 3 days  Attending Physician: Delfino Johansen MD  Primary Care Provider: Reyna Suarez MD        Subjective     Principal Problem:Cellulitis of right lower extremity        HPI:  Patient is a 65-year-old female with a history of diabetes, hyperlipidemia, hypertension, obesity who presented emergency department for evaluation of right leg swelling.  Patient reports on 04/06 she woke up vomiting and did not feel well.  When her daughter saw her on 04/08 she took her to the [facility].  She reports she had swelling in her right leg that was painful all the way up to her groin.  She was admitted for 3 days reportedly treated with Ancef at which time the marks of erythema had improved her fever had subsided and she was discharged home on Augmentin (blood cultures remain negative).  After being home she noted that the swelling worsened and she re-presented to the hospital.  In the emergency department she was afebrile, laboratory exam revealed protocol of 2.25, BNP of 150, sed rate greater 120, , white blood cell count of 13.4.  Lactic acid level was normal at 1.7.  Blood cultures were obtained and she was begun on vancomycin and meropenem.  CT scan of the leg revealed large left inguinal lymph nodes, fat stranding in the thigh most notably in the medial portion right hip arthroplasty in place intrapelvic contents unremarkable  CTA tib-fib soft tissue thickening and fat stranding in the anterior aspect of the lower extremity with disorganized fluid throughout the superficial compartment with no definitive involvement of the deep intramuscular compartment, no definitive abscess  Doppler exam of lower extremity negative for DVT    Consult general surgery and Infectious Disease for evaluation.        Overview/Hospital  Course:  4/14 admitted for right leg cellulitis. Denies insect bite, trauma. States improvement in symptoms of erythema, edema and pain. Intravenous antibiotic(s) per infectious disease. Surgery following.  4/15 antibiotic(s) per infectious disease. Cellulitis modestly improving. Pain regimen adjusted.  4/16 interventional radiology aspirated blister. Body fluid studies pending. Patient endorses improvement in symptoms of edema and erythema. Us liliana pending    Interval History: See hospital course for today      Review of Systems   Constitutional:  Positive for activity change and fatigue. Negative for appetite change and fever.   Respiratory:  Negative for shortness of breath.    Cardiovascular:  Positive for leg swelling. Negative for chest pain.   Gastrointestinal:  Negative for abdominal pain, nausea and vomiting.   Musculoskeletal:  Positive for myalgias.   Skin:  Positive for color change.   Neurological:  Positive for weakness.   Psychiatric/Behavioral:  Negative for agitation, behavioral problems, confusion, decreased concentration and dysphoric mood. The patient is not nervous/anxious.      Objective:     Vital Signs (Most Recent):  Temp: 98.3 °F (36.8 °C) (04/16/25 0745)  Pulse: 81 (04/16/25 0745)  Resp: 16 (04/16/25 0745)  BP: 123/71 (04/16/25 0745)  SpO2: (!) 93 % (04/16/25 0745) Vital Signs (24h Range):  Temp:  [98.1 °F (36.7 °C)-98.9 °F (37.2 °C)] 98.3 °F (36.8 °C)  Pulse:  [80-98] 81  Resp:  [15-18] 16  SpO2:  [92 %-95 %] 93 %  BP: (104-123)/(50-71) 123/71     Weight: 119.7 kg (263 lb 14.3 oz)  Body mass index is 48.27 kg/m².  No intake or output data in the 24 hours ending 04/16/25 0906      Physical Exam  Vitals and nursing note reviewed.   Constitutional:       General: She is not in acute distress.     Appearance: She is morbidly obese. She is ill-appearing. She is not toxic-appearing.   HENT:      Head: Normocephalic and atraumatic.   Cardiovascular:      Rate and Rhythm: Normal rate.   Pulmonary:       Effort: Pulmonary effort is normal. No respiratory distress.      Breath sounds: Examination of the right-lower field reveals decreased breath sounds. Examination of the left-lower field reveals decreased breath sounds. Decreased breath sounds present. No wheezing or rales.   Abdominal:      Palpations: Abdomen is soft.      Tenderness: There is no abdominal tenderness.   Musculoskeletal:      Right lower leg: Edema present.      Left lower leg: No edema.   Skin:     General: Skin is warm.      Findings: Erythema and lesion present.   Neurological:      Mental Status: She is alert and oriented to person, place, and time.      Motor: Weakness present.   Psychiatric:         Mood and Affect: Mood normal.         Behavior: Behavior normal. Behavior is cooperative.               Significant Labs: All pertinent labs within the past 24 hours have been reviewed.  Blood Culture: negative growth to date x 2 days  CBC:   Recent Labs   Lab 04/15/25  0724 04/16/25  0605   WBC 14.64* 18.03*   HGB 10.8* 10.4*   HCT 34.3* 33.5*    354     CMP:   Recent Labs   Lab 04/15/25  0724 04/16/25  0605   * 132*   K 3.7 4.0   CL 96 97   CO2 29 24   BUN 21 28*   CREATININE 1.3 1.8*   CALCIUM 8.4* 8.3*   ALBUMIN 2.1* 1.9*   BILITOT 0.6 0.7   ALKPHOS 104 127   AST 21 18   ALT 19 16   ANIONGAP 8 11       Significant Imaging: I have reviewed all pertinent imaging results/findings within the past 24 hours.  U/S: I have reviewed all pertinent results/findings within the past 24 hours and my personal findings are:  us arteries liliana, left      Assessment & Plan  Type 2 diabetes mellitus with diabetic polyneuropathy, with long-term current use of insulin  Patient with diabetes currently well-controlled is taking Ozempic outpatient  Sliding scale insulin with Accu-Cheks a.c. HS  Morbid obesity  Body mass index is 48.27 kg/m². Morbid obesity complicates all aspects of disease management from diagnostic modalities to treatment. Weight  loss encouraged and health benefits explained to patient.   Physical/occupational therapy       Cellulitis of right lower extremity  Concern for neck fascia however patient's white blood cell count, creatinine, glucose are not consistent.  However picture may be mixed due to already having been on antibiotics.  Patient reports began 4/7  She was hospitalized at [facility] 4/7 through 4/10 reportedly she was febrile.  She was treated with Ancef and by 4/10 she was afebrile.  There are surgical pen marks on her right upper thigh which shows that the redness has receded some.  Blood cultures at the [facility] were negative.  She was discharged on Augmentin.  Patient had worsening swelling and pain and she re-presented to [facility].  See results of CT scan and Doppler of lower extremity above  Was given vancomycin and cefepime in ER  We will change to Zyvox for better skin penetration for Staph, Zosyn for anaerobes and strep and add clindamycin for toxin.  General surgery following, no surgical intervention warranted  Ct imaging negative for abscess  Deferring final antibiotic(s) per infectious disease     Psh right total hip revision  Consider mri to evaluate prosthesis though erythema has receded from knee and leukocytosis resolved  Interventional radiology to aspirate blister, studies pending  Us liliana pending  Incentive spirometer   Primary hypertension  Patient's blood pressure range in the last 24 hours was: BP  Min: 104/52  Max: 123/71.The patient's inpatient anti-hypertensive regimen is listed below:  Current Antihypertensives  , Daily, Oral  metoprolol succinate (TOPROL-XL) 24 hr tablet 50 mg, 2 times daily, Oral    Plan  - BP is controlled, no changes needed to their regimen  Holding arb due to intermittent hypotension  Acute on chronic combined systolic and diastolic congestive heart failure  Patient has Combined Systolic and Diastolic heart failure that is Acute on chronic. On presentation  their CHF was decompensated. Evidence of decompensated CHF on presentation includes: edema. The etiology of their decompensation is likely increased fluid intake. Most recent BNP and echo results are listed below.  Recent Labs     04/13/25  1125   *     Latest ECHO  No results found for this or any previous visit.    Current Heart Failure Medications  , Daily, Oral  metoprolol succinate (TOPROL-XL) 24 hr tablet 50 mg, 2 times daily, Oral    Plan  - Monitor strict I&Os and daily weights.    - Place on telemetry  - Low sodium diet  - Place on fluid restriction of 1.5 L.   - Cardiology has not been consulted  - The patient's volume status is at their baseline  -patient follows with Dr. Mikie Saxena whom she saw about 3 weeks ago.  She has follow up appointment in May.    Echocardiogram reviewed         VTE Risk Mitigation (From admission, onward)           Ordered     enoxaparin injection 40 mg  Every 12 hours         04/13/25 1626     IP VTE HIGH RISK PATIENT  Once         04/13/25 1622     Place sequential compression device  Until discontinued         04/13/25 1622                    Discharge Planning   RONNIE: 4/15/2025     Code Status: Full Code   Medical Readiness for Discharge Date:   Discharge Plan A: Home Health                      Delfino Johansen MD  Department of Hospital Medicine   [facility] - Med Surg

## 2025-04-16 NOTE — PLAN OF CARE
Discussed poc with pt, pt verbalized understanding  Purposeful rounding every 2hours  VS wnl  Cardiac monitoring in use, pt is NSR,  Blood glucose monitoring   Fall precautions in place, remains injury free  Pain and nausea under control with PRN meds  IVFs  Accurate I&Os  Abx given as prescribed  Bed locked at lowest position  Call light within reach  Chart check complete  Will cont with POC

## 2025-04-16 NOTE — PT/OT/SLP EVAL
Physical Therapy Evaluation and Treatment    Patient Name: Stephanie Raza   MRN: [id number]  Recent Surgery: * No surgery found *      Recommendations:     Discharge Recommendations: Moderate Intensity Therapy   Discharge Equipment Recommendations: to be determined by next level of care   Barriers to discharge: None    Assessment:     Stephanie Raza is a 69 y.o. female admitted with a medical diagnosis of Cellulitis of right lower extremity. She presents with the following impairments/functional limitations: weakness, impaired endurance, impaired functional mobility, gait instability, impaired balance, pain, decreased safety awareness, decreased lower extremity function, decreased ROM, edema, impaired skin.    Patient currently demonstrates a need for moderate intensity therapy on a daily basis secondary to an acute decline from prior level of function.    Rehab Prognosis: Fair; patient would benefit from acute PT services to address these deficits and reach maximum level of function.    Plan:     During this hospitalization, patient to be seen 3 x/week to address the above listed problems via gait training, therapeutic activities, therapeutic exercises    Plan of Care Expires: 04/30/25    Subjective     Chief Complaint: Pt agreed to participate  Patient Comments/Goals: none stated  Pain/Comfort:  Pain Rating 1: 10/10  Location - Side 1: Right  Location - Orientation 1: generalized  Location 1: leg  Pain Addressed 1: Reposition, Distraction  Pain Rating Post-Intervention 1: 10/10    Social History:  Living Environment: Patient lives with their grandchildren  in a single story home with ramp.  Prior Level of Function: Prior to admission, patient was modified independent and ambulated household and community distances using RW vs Rollator vs SPC  Equipment Used at Home: bath bench, walker, rolling, cane, straight, rollator  DME owned (not currently used): none  Assistance Upon Discharge: family    Objective:  "    Communicated with epic chart review prior to session. Patient found supine with peripheral IV, telemetry, bed alarm, PureWick upon PT entry to room.    General Precautions: Standard, fall   Orthopedic Precautions: N/A   Braces: N/A    Respiratory Status: Room air    Exams:  Cognition: Patient is oriented to Person, Place, Time, Situation  RLE ROM:  Grossly limited due to pain  RLE Strength:  Not formally tested due to pain  LLE ROM: WFL  LLE Strength:  Grossly 4-/5  Sensation:    -       Intact  Skin Integrity/Edema:     -       Skin integrity: redness and blisters to R LE  -       Edema: B LE R>L    Functional Mobility:  Gait belt applied - Yes  Bed Mobility  Rolling Left: stand by assistance  Scooting: stand by assistance  Supine to Sit: stand by assistance  Pt refused all assistance from PT due to severe pain, pt had difficulty with movement and required increased time to complete all mobility. Pt utilizing bed rails and RW held by therapist for safety per pt request. Pt able to put L LE on the ground, R LE remained on the bed.   Sit to Supine: minimum assistance for LE management  Supine Scoot: stand by assistance  Transfers  Unable to progress due to pain  Balance  Sitting: stand by assistance  Standing:  NT    Therapeutic Activities and Exercises:   Pt educated on role of PT in acute care and POC. Educated on importance of OOB activities, activity pacing, and HEP (marching/hip flex, hip abd, heel slides/LAQ, quad sets, ankle pumps) in order to maintain/regain strength. Encouraged to sit up for all meals. Educated on "call don't fall" policy and increased risk of falling due to weakness, instructed to utilize call bell for assistance with all transfers. Pt agreeable to all requests.    AM-PAC 6 CLICK MOBILITY  Total Score:10    Patient left HOB elevated with all lines intact, call button in reach, bed alarm on, and grandson present.    GOALS:   Multidisciplinary Problems       Physical Therapy Goals       "    Problem: Physical Therapy    Goal Priority Disciplines Outcome Interventions   Physical Therapy Goal     PT, PT/OT     Description: Goals to be met by 25.  1. Pt will complete bed mobility SPV.  2. Pt will complete sit to stand MIN A.  3. Pt will ambulate 50ft MIN A using RW.  4. Pt will increase AMPAC score by 2 points to progress functional mobility.                       History:     Past Medical History:   Diagnosis Date    Acute blood loss anemia (ABLA) 2024    Last Assessment & Plan:    Formatting of this note might be different from the original.   History & Physical       Discharge Summary       Follow-up  Patient did receive 1 unit packed red blood cells while in the OR secondary to blood loss.  No obvious bleeding at this time.  She received 2 additional units packed red blood cells per orthopedics.  Hemoglobin running stable at this time no need fo    YOVANA (acute kidney injury) 2024    Last Assessment & Plan:    Formatting of this note might be different from the original.   History & Physical       Discharge Summary       Follow-up patient with progressive oliguric YOVANA after surgery.  Resolved. Avoid nephrotoxic agents, renally dose all medications where necessary, and protect nondominant arm as much as possible    Anxiety     Arthritis     Asthma     Back pain     Diabetes mellitus type I     Heart murmur     Hyperlipidemia     Hypertension     Obesity     Polyneuropathy     Trouble in sleeping     Type 2 diabetes mellitus     Urinary incontinence        Past Surgical History:   Procedure Laterality Date    ADENOIDECTOMY      BREAST BIOPSY      2018, benign    BREAST SURGERY       SECTION      hip reconstruction Left 2024    HYSTERECTOMY      JOINT REPLACEMENT         Time Tracking:     PT Received On: 25  PT Start Time: 1515  PT Stop Time: 1540  PT Total Time (min): 25 min     Billable Minutes: Evaluation 15min and Therapeutic Activity 10min    2025

## 2025-04-16 NOTE — PROGRESS NOTES
[facility] - Med [facility]  Infectious Disease  Progress Note    Patient Name: Stephanie Raza  MRN: [id number]  Admission Date: 4/13/2025  Length of Stay: 3 days  Attending Physician: Delfino Johansen MD  Primary Care Provider: Reyna Suarez MD    Isolation Status: No active isolations  Assessment/Plan:      ID  * Cellulitis of right lower extremity    She had extensive right lower extremity cellulitis.  Will continue Zyvox, clindamycin, Zosyn.  Will monitor clinical response in a.m.a.m. she needs close follow-up    04/15-  She reports interval improvement in the erythema  Will plan to stop Clindamycin , continue Zyvox/Zosyn    Endocrine  Morbid obesity   outpatient follow-up for weight loss regimen        Anticipated Disposition:     Thank you for your consult. I will follow-up with patient. Please contact us if you have any additional questions.    Phillip Mon MD, [facility]  Infectious Disease  [facility] - Med [facility]urg    Subjective:     Principal Problem:Cellulitis of right lower extremity    HPI:  65-year-old female with a history of diabetes, hyperlipidemia, hypertension, obesity .   she was admitted with worsening right lower leg erythema and swelling.  She was initially admitted 0408- treated with Ancef and discharged on Augmentin.  She has she was now admitted with worsening infection    Labs and imaging tests reviewed sed rate greater 120, , white blood cell count of 13.4.  Lactic acid level was normal at 1.7..  CT scan of the leg revealed large left inguinal lymph nodes, fat stranding in the thigh most notably in the medial portion right hip arthroplasty in place intrapelvic contents unremarkable  CTA tib-fib soft tissue thickening and fat stranding in the anterior aspect of the lower extremity with disorganized fluid throughout the superficial compartment with no definitive involvement of the deep intramuscular compartment, no definitive abscess    Interval History:   She reports less pain and  "erythema.  She feels better.  CBC - wbc 14.64.      Review of Systems   Constitutional:  Negative for activity change, appetite change, chills, diaphoresis, fatigue and fever.   Neurological:  Negative for dizziness and facial asymmetry.     Objective:     Vital Signs (Most Recent):  Temp: 98.9 °F (37.2 °C) (04/16/25 0410)  Pulse: 82 (04/16/25 0410)  Resp: 18 (04/16/25 0419)  BP: (!) 116/53 (04/16/25 0410)  SpO2: 95 % (04/16/25 0410) Vital Signs (24h Range):  Temp:  [97.9 °F (36.6 °C)-98.9 °F (37.2 °C)] 98.9 °F (37.2 °C)  Pulse:  [82-98] 82  Resp:  [15-20] 18  SpO2:  [92 %-95 %] 95 %  BP: (102-116)/(50-60) 116/53     Weight: 119.7 kg (263 lb 14.3 oz)  Body mass index is 48.27 kg/m².    Estimated Creatinine Clearance: 51.6 mL/min (based on SCr of 1.3 mg/dL).     Physical Exam  Vitals and nursing note reviewed.   HENT:      Head: Normocephalic.   Eyes:      Pupils: Pupils are equal, round, and reactive to light.   Pulmonary:      Effort: Pulmonary effort is normal.   Abdominal:      General: Abdomen is flat.   Skin:     Findings: Bruising, erythema and lesion present.   Neurological:      General: No focal deficit present.      Mental Status: She is alert.          Significant Labs: Blood Culture: No results for input(s): "LABBLOO" in the last 4320 hours.  CBC:   Recent Labs   Lab 04/14/25  0555 04/15/25  0724   WBC 10.10 14.64*   HGB 10.4* 10.8*   HCT 33.9* 34.3*    317     CMP:   Recent Labs   Lab 04/14/25  0555 04/15/25  0724    133*   K 4.0 3.7    96   CO2 27 29   BUN 16 21   CREATININE 0.9 1.3   CALCIUM 8.2* 8.4*   ALBUMIN 2.0* 2.1*   BILITOT 0.4 0.6   ALKPHOS 92 104   AST 22 21   ALT 21 19   ANIONGAP 9 8     All pertinent labs within the past 24 hours have been reviewed.    Significant Imaging: I have reviewed all pertinent imaging results/findings within the past 24 hours.  "

## 2025-04-16 NOTE — PLAN OF CARE
PT EVAL complete. Required SBA for bed mobility with increased time, pt refused assistance. Recommending moderate intensity therapy upon d/c.

## 2025-04-16 NOTE — PROGRESS NOTES
[facility]  General Surgery  Progress Note    Subjective:     History of Present Illness:  69-year-old morbidly obese female presented to the [facility] with significant erythema of the right lower extremity from the ankle to the thigh.  The patient also states that she is now not able to walk on the extremity because of the discomfort.  The patient states that she was admitted to the [facility] starting last Monday with the similar condition.  She was treated with IV antibiotics, unsure of which ones, and then discharge on Augmentin.    The patient presented to [facility].  She was evaluated.  In surgery was asked to see the patient    The patient states that the redness has gone down a little bit from the lines drawn around the upper thigh erythema at the [facility]    Post-Op Info:  * No surgery found *         Interval History:  Erythema and pain are improving.  White count however is elevating.  No yoni abscesses.  Blister fluids sent for culture.      ID is following for antibiotics    Medications:  Continuous Infusions:  Scheduled Meds:   aspirin  81 mg Oral Daily    cetirizine  10 mg Oral Daily    clindamycin IV (PEDS and ADULTS)  900 mg Intravenous Q8H    enoxparin  40 mg Subcutaneous Q12H (prophylaxis, 0900/2100)    fluticasone propionate  2 spray Each Nostril Daily    linezolid  600 mg Oral Q12H    metoprolol succinate  50 mg Oral BID    piperacillin-tazobactam (Zosyn) IV (PEDS and ADULTS) (extended infusion is not appropriate)  4.5 g Intravenous Q8H    polyethylene glycol  17 g Oral Daily     PRN Meds:  Current Facility-Administered Medications:     acetaminophen, 650 mg, Oral, Q8H PRN    acetaminophen, 650 mg, Oral, Q4H PRN    dextromethorphan-guaiFENesin  mg/5 ml, 10 mL, Oral, Q4H PRN    dextrose 50%, 12.5 g, Intravenous, PRN    dextrose 50%, 12.5 g, Intravenous, PRN    dextrose 50%, 25 g, Intravenous, PRN    dextrose 50%, 25 g, Intravenous, PRN     glucagon (human recombinant), 1 mg, Intramuscular, PRN    glucagon (human recombinant), 1 mg, Intramuscular, PRN    glucose, 16 g, Oral, PRN    glucose, 16 g, Oral, PRN    glucose, 24 g, Oral, PRN    glucose, 24 g, Oral, PRN    HYDROcodone-acetaminophen, 1 tablet, Oral, Q4H PRN    HYDROcodone-acetaminophen, 1 tablet, Oral, Q4H PRN    insulin aspart U-100, 0-5 Units, Subcutaneous, QID (AC + HS) PRN    melatonin, 6 mg, Oral, Nightly PRN    naloxone, 0.02 mg, Intravenous, PRN    ondansetron, 4 mg, Intravenous, Q8H PRN    oxyCODONE, 5 mg, Oral, Q6H PRN    simethicone, 1 tablet, Oral, QID PRN    sodium chloride 0.9%, 2 mL, Intravenous, Q12H PRN     Review of patient's allergies indicates:   Allergen Reactions    Adhesive tape-silicones Swelling     Paper tape     Objective:     Vital Signs (Most Recent):  Temp: 98.3 °F (36.8 °C) (04/16/25 0745)  Pulse: 86 (04/16/25 0800)  Resp: 16 (04/16/25 0745)  BP: 123/71 (04/16/25 0745)  SpO2: (!) 93 % (04/16/25 0745) Vital Signs (24h Range):  Temp:  [98.1 °F (36.7 °C)-98.9 °F (37.2 °C)] 98.3 °F (36.8 °C)  Pulse:  [80-98] 86  Resp:  [15-18] 16  SpO2:  [92 %-95 %] 93 %  BP: (104-123)/(50-71) 123/71     Weight: 119.7 kg (263 lb 14.3 oz)  Body mass index is 48.27 kg/m².    Intake/Output - Last 3 Shifts         04/14 0700  04/15 0659 04/15 0700  04/16 0659 04/16 0700  04/17 0659    P.O. 120      IV Piggyback 399      Total Intake(mL/kg) 519 (4.3)      Urine (mL/kg/hr) 215 (0.1)      Total Output 215      Net +304             Urine Occurrence 2 x      Stool Occurrence 0 x  0 x             Physical Exam  Vitals and nursing note reviewed.   Constitutional:       Appearance: She is well-developed. She is ill-appearing.   HENT:      Head: Normocephalic.   Eyes:      Pupils: Pupils are equal, round, and reactive to light.   Neck:      Thyroid: No thyromegaly.      Vascular: No JVD.      Trachea: No tracheal deviation.   Cardiovascular:      Rate and Rhythm: Normal rate and regular rhythm.       Heart sounds: Normal heart sounds.   Pulmonary:      Breath sounds: Normal breath sounds. No wheezing.   Abdominal:      General: Bowel sounds are normal. There is no distension.      Palpations: Abdomen is soft. Abdomen is not rigid. There is no mass.      Tenderness: There is no abdominal tenderness. There is no guarding or rebound.      Comments: Obese   Musculoskeletal:         General: Normal range of motion.   Lymphadenopathy:      Cervical: No cervical adenopathy.   Skin:     General: Skin is warm and dry.      Findings: No erythema or rash.      Comments: Erythema and edema now mainly of the lower extremities.  There are blisters with leaking fluid.  The patient has motor and sensory.  There are no yoni abscesses.   Neurological:      Mental Status: She is oriented to person, place, and time.      Comments: Motor and sensory to the lower extremities          Significant Labs:  I have reviewed all pertinent lab results within the past 24 hours.  CBC:   Recent Labs   Lab 04/16/25  0605   WBC 18.03*   RBC 4.03   HGB 10.4*   HCT 33.5*      MCV 83   MCH 25.8*   MCHC 31.0*     BMP:   Recent Labs   Lab 04/13/25  1227 04/14/25  0555 04/16/25  0605      < > 132*   K 4.7   < > 4.0      < > 97   CO2 25   < > 24   BUN 19   < > 28*   CREATININE 0.9   < > 1.8*   CALCIUM 8.5*   < > 8.3*   MG 2.2  --   --     < > = values in this interval not displayed.     Microbiology Results (last 7 days)       Procedure Component Value Units Date/Time    Blood culture x two cultures. Draw prior to antibiotics. [[id number]]  (Normal) Collected: 04/13/25 1124    Order Status: Completed Specimen: Blood from Peripheral, Hand, Left Updated: 04/16/25 0503     Blood Culture No Growth After 48 Hours    Blood culture x two cultures. Draw prior to antibiotics. [[id number]]  (Normal) Collected: 04/13/25 1116    Order Status: Completed Specimen: Blood from Peripheral, Antecubital, Left Updated: 04/16/25 0503     Blood Culture  No Growth After 48 Hours    Gram stain [[id number]] Collected: 04/15/25 1602    Order Status: Sent Specimen: Body Fluid from Leg, Right Updated: 04/15/25 1608    Culture, Body Fluid (Aerobic) w/ GS [[id number]] Collected: 04/15/25 1602    Order Status: Sent Specimen: Body Fluid from Calf, Right Updated: 04/15/25 1608            Significant Diagnostics:  I have reviewed all pertinent imaging results/findings within the past 24 hours.  No new  Assessment/Plan:     * Cellulitis of right lower extremity  No drainable fluid collections.    Blister fluids sent for culture   Ultrasound ordered by hospital Medicine.    Leukocytosis is worse   Continue antibiotics per Infectious Disease.  If the ultrasound does not show any drainable fluid collections and the patient is not clinically improving over the next 24-48 hours a MRI of the leg may be needed      Primary hypertension  Management per hospital Medicine    Morbid obesity  Patient would benefit from weight loss but this is highly unlikely    Type 2 diabetes mellitus with diabetic polyneuropathy, with long-term current use of insulin  Management per hospital Medicine        Lance Dangelo MD  General Surgery  [facility]

## 2025-04-16 NOTE — ASSESSMENT & PLAN NOTE
Body mass index is 48.27 kg/m². Morbid obesity complicates all aspects of disease management from diagnostic modalities to treatment. Weight loss encouraged and health benefits explained to patient.   Physical/occupational therapy

## 2025-04-16 NOTE — PLAN OF CARE
Problem: Adult Inpatient Plan of Care  Goal: Plan of Care Review  Outcome: Progressing  Goal: Patient-Specific Goal (Individualized)  Outcome: Progressing  Goal: Absence of Hospital-Acquired Illness or Injury  Outcome: Progressing  Goal: Optimal Comfort and Wellbeing  Outcome: Progressing  Goal: Readiness for Transition of Care  Outcome: Progressing     Problem: Bariatric Environmental Safety  Goal: Safety Maintained with Care  Outcome: Progressing     Problem: Diabetes Comorbidity  Goal: Blood Glucose Level Within Targeted Range  Outcome: Progressing     Problem: Wound  Goal: Optimal Coping  Outcome: Progressing  Goal: Optimal Functional Ability  Outcome: Progressing  Goal: Absence of Infection Signs and Symptoms  Outcome: Progressing  Goal: Improved Oral Intake  Outcome: Progressing  Goal: Optimal Pain Control and Function  Outcome: Progressing  Goal: Skin Health and Integrity  Outcome: Progressing  Goal: Optimal Wound Healing  Outcome: Progressing     Problem: Skin Injury Risk Increased  Goal: Skin Health and Integrity  Outcome: Progressing     Problem: Skin or Soft Tissue Infection  Goal: Absence of Infection Signs and Symptoms  Outcome: Progressing

## 2025-04-16 NOTE — ASSESSMENT & PLAN NOTE
Concern for neck fascia however patient's white blood cell count, creatinine, glucose are not consistent.  However picture may be mixed due to already having been on antibiotics.  Patient reports began 4/7  She was hospitalized at [facility] 4/7 through 4/10 reportedly she was febrile.  She was treated with Ancef and by 4/10 she was afebrile.  There are surgical pen marks on her right upper thigh which shows that the redness has receded some.  Blood cultures at the [facility] were negative.  She was discharged on Augmentin.  Patient had worsening swelling and pain and she re-presented to [facility].  See results of CT scan and Doppler of lower extremity above  Was given vancomycin and cefepime in ER  We will change to Zyvox for better skin penetration for Staph, Zosyn for anaerobes and strep and add clindamycin for toxin.  General surgery following, no surgical intervention warranted  Ct imaging negative for abscess  Deferring final antibiotic(s) per infectious disease     Psh right total hip revision  Consider mri to evaluate prosthesis though erythema has receded from knee and leukocytosis resolved  Interventional radiology to aspirate blister, studies pending  Us liliana pending  Incentive spirometer

## 2025-04-17 LAB
ABSOLUTE EOSINOPHIL (OHS): 0.28 K/UL
ABSOLUTE MONOCYTE (OHS): 1.11 K/UL (ref 0.3–1)
ABSOLUTE NEUTROPHIL COUNT (OHS): 15.07 K/UL (ref 1.8–7.7)
ANION GAP (OHS): 8 MMOL/L (ref 8–16)
BASOPHILS # BLD AUTO: 0.05 K/UL
BASOPHILS NFR BLD AUTO: 0.3 %
BUN SERPL-MCNC: 35 MG/DL (ref 8–23)
CALCIUM SERPL-MCNC: 8.5 MG/DL (ref 8.7–10.5)
CHLORIDE SERPL-SCNC: 97 MMOL/L (ref 95–110)
CO2 SERPL-SCNC: 27 MMOL/L (ref 23–29)
CREAT SERPL-MCNC: 1.7 MG/DL (ref 0.5–1.4)
CREAT UR-MCNC: 135.8 MG/DL (ref 15–325)
ERYTHROCYTE [DISTWIDTH] IN BLOOD BY AUTOMATED COUNT: 14.9 % (ref 11.5–14.5)
GFR SERPLBLD CREATININE-BSD FMLA CKD-EPI: 33 ML/MIN/1.73/M2
GLUCOSE SERPL-MCNC: 133 MG/DL (ref 70–110)
HCT VFR BLD AUTO: 32.5 % (ref 37–48.5)
HGB BLD-MCNC: 10.2 GM/DL (ref 12–16)
IMM GRANULOCYTES # BLD AUTO: 0.17 K/UL (ref 0–0.04)
IMM GRANULOCYTES NFR BLD AUTO: 0.9 % (ref 0–0.5)
LYMPHOCYTES # BLD AUTO: 1.83 K/UL (ref 1–4.8)
MCH RBC QN AUTO: 25.8 PG (ref 27–31)
MCHC RBC AUTO-ENTMCNC: 31.4 G/DL (ref 32–36)
MCV RBC AUTO: 82 FL (ref 82–98)
NUCLEATED RBC (/100WBC) (OHS): 0 /100 WBC
PLATELET # BLD AUTO: 366 K/UL (ref 150–450)
PMV BLD AUTO: 10.5 FL (ref 9.2–12.9)
POCT GLUCOSE: 128 MG/DL (ref 70–110)
POCT GLUCOSE: 149 MG/DL (ref 70–110)
POCT GLUCOSE: 206 MG/DL (ref 70–110)
POCT GLUCOSE: 224 MG/DL (ref 70–110)
POTASSIUM SERPL-SCNC: 3.8 MMOL/L (ref 3.5–5.1)
RBC # BLD AUTO: 3.95 M/UL (ref 4–5.4)
RELATIVE EOSINOPHIL (OHS): 1.5 %
RELATIVE LYMPHOCYTE (OHS): 9.9 % (ref 18–48)
RELATIVE MONOCYTE (OHS): 6 % (ref 4–15)
RELATIVE NEUTROPHIL (OHS): 81.4 % (ref 38–73)
SODIUM SERPL-SCNC: 132 MMOL/L (ref 136–145)
SODIUM UR-SCNC: <20 MMOL/L (ref 20–250)
WBC # BLD AUTO: 18.51 K/UL (ref 3.9–12.7)

## 2025-04-17 PROCEDURE — 36415 COLL VENOUS BLD VENIPUNCTURE: CPT | Mod: HCNC | Performed by: SURGERY

## 2025-04-17 PROCEDURE — 82570 ASSAY OF URINE CREATININE: CPT | Mod: HCNC | Performed by: FAMILY MEDICINE

## 2025-04-17 PROCEDURE — 99233 SBSQ HOSP IP/OBS HIGH 50: CPT | Mod: HCNC,,, | Performed by: SURGERY

## 2025-04-17 PROCEDURE — 84300 ASSAY OF URINE SODIUM: CPT | Mod: HCNC | Performed by: FAMILY MEDICINE

## 2025-04-17 PROCEDURE — 85025 COMPLETE CBC W/AUTO DIFF WBC: CPT | Mod: HCNC | Performed by: SURGERY

## 2025-04-17 PROCEDURE — 82310 ASSAY OF CALCIUM: CPT | Mod: HCNC | Performed by: SURGERY

## 2025-04-17 PROCEDURE — 21400001 HC TELEMETRY ROOM: Mod: HCNC

## 2025-04-17 PROCEDURE — 25000003 PHARM REV CODE 250: Mod: HCNC | Performed by: INTERNAL MEDICINE

## 2025-04-17 PROCEDURE — 25000003 PHARM REV CODE 250: Mod: HCNC | Performed by: HOSPITALIST

## 2025-04-17 PROCEDURE — 99233 SBSQ HOSP IP/OBS HIGH 50: CPT | Mod: HCNC,,, | Performed by: INTERNAL MEDICINE

## 2025-04-17 PROCEDURE — 25000003 PHARM REV CODE 250: Mod: HCNC | Performed by: FAMILY MEDICINE

## 2025-04-17 PROCEDURE — 94799 UNLISTED PULMONARY SVC/PX: CPT | Mod: HCNC,XB

## 2025-04-17 PROCEDURE — 63600175 PHARM REV CODE 636 W HCPCS: Mod: HCNC | Performed by: INTERNAL MEDICINE

## 2025-04-17 PROCEDURE — 99233 SBSQ HOSP IP/OBS HIGH 50: CPT | Mod: NSCH,HCNC,, | Performed by: INTERNAL MEDICINE

## 2025-04-17 RX ADMIN — CETIRIZINE HYDROCHLORIDE 10 MG: 10 TABLET, FILM COATED ORAL at 08:04

## 2025-04-17 RX ADMIN — ENOXAPARIN SODIUM 40 MG: 40 INJECTION SUBCUTANEOUS at 08:04

## 2025-04-17 RX ADMIN — OXYCODONE HYDROCHLORIDE 5 MG: 5 TABLET ORAL at 05:04

## 2025-04-17 RX ADMIN — FLUTICASONE PROPIONATE 100 MCG: 50 SPRAY, METERED NASAL at 08:04

## 2025-04-17 RX ADMIN — PIPERACILLIN SODIUM AND TAZOBACTAM SODIUM 4.5 G: 4; .5 INJECTION, POWDER, FOR SOLUTION INTRAVENOUS at 04:04

## 2025-04-17 RX ADMIN — PIPERACILLIN SODIUM AND TAZOBACTAM SODIUM 4.5 G: 4; .5 INJECTION, POWDER, FOR SOLUTION INTRAVENOUS at 11:04

## 2025-04-17 RX ADMIN — HYDROCODONE BITARTRATE AND ACETAMINOPHEN 1 TABLET: 10; 325 TABLET ORAL at 04:04

## 2025-04-17 RX ADMIN — HYDROCODONE BITARTRATE AND ACETAMINOPHEN 1 TABLET: 10; 325 TABLET ORAL at 10:04

## 2025-04-17 RX ADMIN — HYDROCODONE BITARTRATE AND ACETAMINOPHEN 1 TABLET: 10; 325 TABLET ORAL at 03:04

## 2025-04-17 RX ADMIN — GUAIFENESIN AND DEXTROMETHORPHAN 10 ML: 100; 10 SYRUP ORAL at 10:04

## 2025-04-17 RX ADMIN — INSULIN ASPART 1 UNITS: 100 INJECTION, SOLUTION INTRAVENOUS; SUBCUTANEOUS at 10:04

## 2025-04-17 RX ADMIN — OXYCODONE HYDROCHLORIDE 5 MG: 5 TABLET ORAL at 07:04

## 2025-04-17 RX ADMIN — METOPROLOL SUCCINATE 50 MG: 50 TABLET, EXTENDED RELEASE ORAL at 08:04

## 2025-04-17 RX ADMIN — PIPERACILLIN SODIUM AND TAZOBACTAM SODIUM 4.5 G: 4; .5 INJECTION, POWDER, FOR SOLUTION INTRAVENOUS at 07:04

## 2025-04-17 RX ADMIN — INSULIN ASPART 2 UNITS: 100 INJECTION, SOLUTION INTRAVENOUS; SUBCUTANEOUS at 11:04

## 2025-04-17 RX ADMIN — POLYETHYLENE GLYCOL 3350 17 G: 17 POWDER, FOR SOLUTION ORAL at 08:04

## 2025-04-17 RX ADMIN — LINEZOLID 600 MG: 600 TABLET, FILM COATED ORAL at 08:04

## 2025-04-17 RX ADMIN — ASPIRIN 81 MG CHEWABLE TABLET 81 MG: 81 TABLET CHEWABLE at 08:04

## 2025-04-17 RX ADMIN — HYDROCODONE BITARTRATE AND ACETAMINOPHEN 1 TABLET: 10; 325 TABLET ORAL at 08:04

## 2025-04-17 NOTE — ASSESSMENT & PLAN NOTE
No drainable fluid collections.    Blister fluids sent for culture   Ultrasound ordered by hospital Medicine.  Showed no yoni abscesses or compromise blood flow    Check CBC and BMP.      If white blood cell count that has not improving I would recommend a MRI of the right lower leg.  With and without contrast would be beneficial if renal function allows.

## 2025-04-17 NOTE — SUBJECTIVE & OBJECTIVE
"Interval History:   She reports less pain and erythema.  She feels better.  CBC - wbc 14.64.  04/16-  She feels better  CBC showed wbc 18- increased    Review of Systems   Constitutional:  Negative for activity change, appetite change, chills, diaphoresis, fatigue and fever.   Neurological:  Negative for dizziness and facial asymmetry.     Objective:     Vital Signs (Most Recent):  Temp: 98.5 °F (36.9 °C) (04/17/25 0448)  Pulse: 95 (04/17/25 0630)  Resp: 17 (04/17/25 0448)  BP: (!) 127/58 (04/17/25 0448)  SpO2: 95 % (04/17/25 0448) Vital Signs (24h Range):  Temp:  [98.3 °F (36.8 °C)-99.1 °F (37.3 °C)] 98.5 °F (36.9 °C)  Pulse:  [] 95  Resp:  [16-18] 17  SpO2:  [93 %-95 %] 95 %  BP: (123-129)/(55-71) 127/58     Weight: 119.9 kg (264 lb 6 oz)  Body mass index is 48.35 kg/m².    Estimated Creatinine Clearance: 37.3 mL/min (A) (based on SCr of 1.8 mg/dL (H)).     Physical Exam  Vitals and nursing note reviewed.   HENT:      Head: Normocephalic.   Eyes:      Pupils: Pupils are equal, round, and reactive to light.   Pulmonary:      Effort: Pulmonary effort is normal.   Abdominal:      General: Abdomen is flat.   Skin:     Findings: Bruising, erythema and lesion present.   Neurological:      General: No focal deficit present.      Mental Status: She is alert.          Significant Labs: Blood Culture: No results for input(s): "LABBLOO" in the last 4320 hours.  CBC:   Recent Labs   Lab 04/15/25  0724 04/16/25  0605   WBC 14.64* 18.03*   HGB 10.8* 10.4*   HCT 34.3* 33.5*    354     CMP:   Recent Labs   Lab 04/15/25  0724 04/16/25  0605   * 132*   K 3.7 4.0   CL 96 97   CO2 29 24   BUN 21 28*   CREATININE 1.3 1.8*   CALCIUM 8.4* 8.3*   ALBUMIN 2.1* 1.9*   BILITOT 0.6 0.7   ALKPHOS 104 127   AST 21 18   ALT 19 16   ANIONGAP 8 11     All pertinent labs within the past 24 hours have been reviewed.    Significant Imaging: I have reviewed all pertinent imaging results/findings within the past 24 hours.  "

## 2025-04-17 NOTE — PLAN OF CARE
SW spoke with pt and daughter who stated pt is refusing snf. SW explained the importance of pt going to snf. Pt and daughter stated pt's spouse and grand daughter would provide help at home. Pt wants Lewis MOHAMUD Md notified.

## 2025-04-17 NOTE — PLAN OF CARE
Problem: Adult Inpatient Plan of Care  Goal: Plan of Care Review  Outcome: Progressing  Goal: Patient-Specific Goal (Individualized)  Outcome: Progressing  Goal: Absence of Hospital-Acquired Illness or Injury  Outcome: Progressing  Goal: Optimal Comfort and Wellbeing  Outcome: Progressing  Goal: Readiness for Transition of Care  Outcome: Progressing     Problem: Bariatric Environmental Safety  Goal: Safety Maintained with Care  Outcome: Progressing     Problem: Diabetes Comorbidity  Goal: Blood Glucose Level Within Targeted Range  Outcome: Progressing     Problem: Wound  Goal: Optimal Coping  Outcome: Progressing  Goal: Optimal Functional Ability  Outcome: Progressing  Goal: Absence of Infection Signs and Symptoms  Outcome: Progressing  Goal: Improved Oral Intake  Outcome: Progressing  Goal: Optimal Pain Control and Function  Outcome: Progressing  Goal: Skin Health and Integrity  Outcome: Progressing  Goal: Optimal Wound Healing  Outcome: Progressing     Problem: Skin Injury Risk Increased  Goal: Skin Health and Integrity  Outcome: Progressing     Problem: Skin or Soft Tissue Infection  Goal: Absence of Infection Signs and Symptoms  Outcome: Progressing     Problem: Acute Kidney Injury/Impairment  Goal: Fluid and Electrolyte Balance  Outcome: Progressing  Goal: Improved Oral Intake  Outcome: Progressing  Goal: Effective Renal Function  Outcome: Progressing

## 2025-04-17 NOTE — CONSULTS
Nephrology Consultation  Consulting Physician:  Eleno  Reason for consultation:  Evaluate for acute kidney injury  Informants:  Patient, medical records     History of Present Illness   The patient is a 69 y.o. female with a hx of diabetes, hypertension who initially presented with right lower extremity cellulitis.  The patient's creatinine upon admission was 0.9 which has slowly worsened to a level of 1.8, now today at 1.7.  Because of this, Nephrology has been asked to assist with management.  Upon visiting with the patient, she states that for about 1 week prior to admission her appetite had been poor.  Since being placed on IV fluids, she states that her urine color of light orange is now beginning to clear.    Patient's home medicines included Lasix, Benicar, Mobic.  She did receive IV contrast on 04/13/2025 for CT.      PMHx:    Past Medical History:   Diagnosis Date    Acute blood loss anemia (ABLA) 01/24/2024    Last Assessment & Plan:    Formatting of this note might be different from the original.   History & Physical       Discharge Summary       Follow-up  Patient did receive 1 unit packed red blood cells while in the OR secondary to blood loss.  No obvious bleeding at this time.  She received 2 additional units packed red blood cells per orthopedics.  Hemoglobin running stable at this time no need fo    YOVANA (acute kidney injury) 01/24/2024    Last Assessment & Plan:    Formatting of this note might be different from the original.   History & Physical       Discharge Summary       Follow-up patient with progressive oliguric YOVANA after surgery.  Resolved. Avoid nephrotoxic agents, renally dose all medications where necessary, and protect nondominant arm as much as possible    Anxiety     Arthritis     Asthma     Back pain     Diabetes mellitus type I     Heart murmur     Hyperlipidemia     Hypertension     Obesity     Polyneuropathy     Trouble in sleeping     Type 2 diabetes mellitus     Urinary  incontinence          PSHx:  Past Surgical History:   Procedure Laterality Date    ADENOIDECTOMY      BREAST BIOPSY      2018, benign    BREAST SURGERY       SECTION      hip reconstruction Left 2024    HYSTERECTOMY      JOINT REPLACEMENT           SocHx:    Social History[1]      FamHx:    Family History   Problem Relation Name Age of Onset    Breast cancer Mother Naveed Blackwood     Lung cancer Mother Naveed Blackwood     Hypertension Mother Naveed Blackwood     Stroke Father Naveed Blackwood     Cancer Father Naveed Blackwood     Diabetes Sister Andrés Nowak     Breast cancer Maternal Aunt      Breast cancer Maternal Aunt      Breast cancer Paternal Aunt      Breast cancer Paternal Aunt           ROS:    ROS     Allergies:    is allergic to adhesive tape-silicones.    Current medications:   Scheduled Meds:   aspirin  81 mg Oral Daily    cetirizine  10 mg Oral Daily    enoxparin  40 mg Subcutaneous Q12H (prophylaxis, 0900/)    fluticasone propionate  2 spray Each Nostril Daily    linezolid  600 mg Oral Q12H    metoprolol succinate  50 mg Oral BID    piperacillin-tazobactam (Zosyn) IV (PEDS and ADULTS) (extended infusion is not appropriate)  4.5 g Intravenous Q8H    polyethylene glycol  17 g Oral Daily     Continuous Infusions:  PRN Meds:.  Current Facility-Administered Medications:     acetaminophen, 650 mg, Oral, Q8H PRN    acetaminophen, 650 mg, Oral, Q4H PRN    dextrose 50%, 12.5 g, Intravenous, PRN    dextrose 50%, 12.5 g, Intravenous, PRN    dextrose 50%, 25 g, Intravenous, PRN    dextrose 50%, 25 g, Intravenous, PRN    glucagon (human recombinant), 1 mg, Intramuscular, PRN    glucagon (human recombinant), 1 mg, Intramuscular, PRN    glucose, 16 g, Oral, PRN    glucose, 16 g, Oral, PRN    glucose, 24 g, Oral, PRN    glucose, 24 g, Oral, PRN    HYDROcodone-acetaminophen, 1 tablet, Oral, Q4H PRN    HYDROcodone-acetaminophen, 1 tablet, Oral, Q4H PRN    insulin aspart U-100, 0-5 Units, Subcutaneous,  "QID (AC + HS) PRN    melatonin, 6 mg, Oral, Nightly PRN    naloxone, 0.02 mg, Intravenous, PRN    ondansetron, 4 mg, Intravenous, Q8H PRN    oxyCODONE, 5 mg, Oral, Q6H PRN    simethicone, 1 tablet, Oral, QID PRN    sodium chloride 0.9%, 2 mL, Intravenous, Q12H PRN       Physical Examination    VS/Measurements   BP (!) 144/88   Pulse 100   Temp 99.4 °F (37.4 °C)   Resp 18   Ht 5' 2" (1.575 m)   Wt 121.9 kg (268 lb 11.9 oz)   SpO2 (!) 86%   BMI 49.15 kg/m²     Physical Exam         Laboratory Results   Today's Lab Results :    Recent Results (from the past 24 hours)   POCT glucose    Collection Time: 04/16/25  4:45 PM   Result Value Ref Range    POCT Glucose 150 (H) 70 - 110 mg/dL   POCT glucose    Collection Time: 04/17/25  6:37 AM   Result Value Ref Range    POCT Glucose 149 (H) 70 - 110 mg/dL   Basic Metabolic Panel    Collection Time: 04/17/25  8:39 AM   Result Value Ref Range    Sodium 132 (L) 136 - 145 mmol/L    Potassium 3.8 3.5 - 5.1 mmol/L    Chloride 97 95 - 110 mmol/L    CO2 27 23 - 29 mmol/L    Glucose 133 (H) 70 - 110 mg/dL    BUN 35 (H) 8 - 23 mg/dL    Creatinine 1.7 (H) 0.5 - 1.4 mg/dL    Calcium 8.5 (L) 8.7 - 10.5 mg/dL    Anion Gap 8 8 - 16 mmol/L    eGFR 33 (L) >60 mL/min/1.73/m2   CBC with Differential    Collection Time: 04/17/25  8:39 AM   Result Value Ref Range    WBC 18.51 (H) 3.90 - 12.70 K/uL    RBC 3.95 (L) 4.00 - 5.40 M/uL    HGB 10.2 (L) 12.0 - 16.0 gm/dL    HCT 32.5 (L) 37.0 - 48.5 %    MCV 82 82 - 98 fL    MCH 25.8 (L) 27.0 - 31.0 pg    MCHC 31.4 (L) 32.0 - 36.0 g/dL    RDW 14.9 (H) 11.5 - 14.5 %    Platelet Count 366 150 - 450 K/uL    MPV 10.5 9.2 - 12.9 fL    Nucleated RBC 0 <=0 /100 WBC    Neut % 81.4 (H) 38 - 73 %    Lymph % 9.9 (L) 18 - 48 %    Mono % 6.0 4 - 15 %    Eos % 1.5 <=8 %    Basophil % 0.3 <=1.9 %    Imm Grans % 0.9 (H) 0.0 - 0.5 %    Neut # 15.07 (H) 1.8 - 7.7 K/uL    Lymph # 1.83 1 - 4.8 K/uL    Mono # 1.11 (H) 0.3 - 1 K/uL    Eos # 0.28 <=0.5 K/uL    Baso # 0.05 " <=0.2 K/uL    Imm Grans # 0.17 (H) 0.00 - 0.04 K/uL   POCT glucose    Collection Time: 04/17/25 11:33 AM   Result Value Ref Range    POCT Glucose 224 (H) 70 - 110 mg/dL   Creatinine, Random Urine    Collection Time: 04/17/25  2:03 PM   Result Value Ref Range    Urine Creatinine 135.8 15.0 - 325.0 mg/dL   Sodium, Random Urine    Collection Time: 04/17/25  2:03 PM   Result Value Ref Range    Urine Sodium <20 (L) 20 - 250 mmol/L   POCT glucose    Collection Time: 04/17/25  3:49 PM   Result Value Ref Range    POCT Glucose 128 (H) 70 - 110 mg/dL        Assessment and Plan   Acute kidney injury-multifactorial.  Patient is definitely volume depleted but she also was on a diuretic, ARB and did receive contrast.  The fact that her creatinine improved today and her urinary output is clearing, is certainly encouraging.  Agree with IV fluids and we will re-evaluate in the morning.    Volume depletion-based on patient's history and urine sodium less than 20.    Right lower extremity cellulitis-cultures, antibiotics per primary team.    Essential hypertension-kidney ultrasound negative for hydronephrosis, echogenicity.    Chronic combined CHF-currently tolerating IV fluids and we will re-evaluate daily and adjust rate if needed.      ________________________________________________  Tobias Hodges          [1]   Social History  Socioeconomic History    Marital status:    Tobacco Use    Smoking status: Never     Passive exposure: Never    Smokeless tobacco: Never   Substance and Sexual Activity    Alcohol use: Yes     Alcohol/week: 3.0 standard drinks of alcohol     Types: 1 Glasses of wine, 2 Shots of liquor per week    Drug use: Never    Sexual activity: Not Currently     Partners: Male     Social Drivers of Health     Financial Resource Strain: Medium Risk (4/14/2025)    Overall Financial Resource Strain (CARDIA)     Difficulty of Paying Living Expenses: Somewhat hard   Food Insecurity: Food Insecurity Present  (4/14/2025)    Hunger Vital Sign     Worried About Running Out of Food in the Last Year: Sometimes true     Ran Out of Food in the Last Year: Sometimes true   Transportation Needs: Unmet Transportation Needs (4/14/2025)    PRAPARE - Transportation     Lack of Transportation (Medical): Yes     Lack of Transportation (Non-Medical): Yes   Physical Activity: Sufficiently Active (9/27/2024)    Exercise Vital Sign     Days of Exercise per Week: 4 days     Minutes of Exercise per Session: 60 min   Stress: Stress Concern Present (4/14/2025)    [employer] [place] [employer] - Occupational Stress Questionnaire     Feeling of Stress : Very much   Housing Stability: Low Risk  (4/14/2025)    Housing Stability Vital Sign     Unable to Pay for Housing in the Last Year: No     Homeless in the Last Year: No

## 2025-04-17 NOTE — ASSESSMENT & PLAN NOTE
Concern for neck fascia however patient's white blood cell count, creatinine, glucose are not consistent.  However picture may be mixed due to already having been on antibiotics.  Patient reports began 4/7  She was hospitalized at [facility] 4/7 through 4/10 reportedly she was febrile.  She was treated with Ancef and by 4/10 she was afebrile.  There are surgical pen marks on her right upper thigh which shows that the redness has receded some.  Blood cultures at the [facility] were negative.  She was discharged on Augmentin.  Patient had worsening swelling and pain and she re-presented to [facility].  See results of CT scan and Doppler of lower extremity above  Was given vancomycin and cefepime in ER  We will change to Zyvox for better skin penetration for Staph, Zosyn for anaerobes and strep and add clindamycin for toxin.  General surgery following, no surgical intervention warranted  Ct imaging negative for abscess  Deferring final antibiotic(s) per infectious disease     No significant improvement  Mild acute kidney injury   Psh right total hip revision  Consider mri to evaluate prosthesis though erythema has receded from knee and leukocytosis resolved  Interventional radiology to aspirate blister, studies pending  Us liliana unable to tolerate  Incentive spirometer   Defer to infectious disease to adjust antibiotic(s)  Nephrology and vascular surgery consulted

## 2025-04-17 NOTE — ASSESSMENT & PLAN NOTE
Patient's blood pressure range in the last 24 hours was: BP  Min: 116/56  Max: 144/88.The patient's inpatient anti-hypertensive regimen is listed below:  Current Antihypertensives  , Daily, Oral  metoprolol succinate (TOPROL-XL) 24 hr tablet 50 mg, 2 times daily, Oral    Plan  - BP is controlled, no changes needed to their regimen  Holding arb due to intermittent hypotension

## 2025-04-17 NOTE — PROGRESS NOTES
[facility] - Med Surg  Infectious Disease  Progress Note    Patient Name: Stephanie Raza  MRN: [id number]  Admission Date: 4/13/2025  Length of Stay: 4 days  Attending Physician: Delfino Johansen MD  Primary Care Provider: Reyna Suarez MD    Isolation Status: No active isolations  Assessment/Plan:      Cardiac/Vascular  Primary hypertension  BP control as per primary team    ID  * Cellulitis of right lower extremity    She had extensive right lower extremity cellulitis.  Will continue Zyvox, clindamycin, Zosyn.  Will monitor clinical response in a.m.a.m. she needs close follow-up    04/15-  She reports interval improvement in the erythema  Will plan to stop Clindamycin , continue Zyvox/Zosyn    04/16- will plan to do MRI of the lower extremity if wbc continues to increase- will monitor wbc in AM  On Zyvox/Zosyn  Follow Gen surgery    Endocrine  Type 2 diabetes mellitus with diabetic polyneuropathy, with long-term current use of insulin    Insulin regimen as per primary team        Anticipated Disposition:     Thank you for your consult. I will follow-up with patient. Please contact us if you have any additional questions.    Phillip Mon MD, [facility]  Infectious Disease  [facility] - Med [facility]urg    Subjective:     Principal Problem:Cellulitis of right lower extremity    HPI:  65-year-old female with a history of diabetes, hyperlipidemia, hypertension, obesity .   she was admitted with worsening right lower leg erythema and swelling.  She was initially admitted 0408- treated with Ancef and discharged on Augmentin.  She has she was now admitted with worsening infection    Labs and imaging tests reviewed sed rate greater 120, , white blood cell count of 13.4.  Lactic acid level was normal at 1.7..  CT scan of the leg revealed large left inguinal lymph nodes, fat stranding in the thigh most notably in the medial portion right hip arthroplasty in place intrapelvic contents unremarkable  CTA tib-fib soft tissue  "thickening and fat stranding in the anterior aspect of the lower extremity with disorganized fluid throughout the superficial compartment with no definitive involvement of the deep intramuscular compartment, no definitive abscess    Interval History:   She reports less pain and erythema.  She feels better.  CBC - wbc 14.64.  04/16-  She feels better  CBC showed wbc 18- increased    Review of Systems   Constitutional:  Negative for activity change, appetite change, chills, diaphoresis, fatigue and fever.   Neurological:  Negative for dizziness and facial asymmetry.     Objective:     Vital Signs (Most Recent):  Temp: 98.5 °F (36.9 °C) (04/17/25 0448)  Pulse: 95 (04/17/25 0630)  Resp: 17 (04/17/25 0448)  BP: (!) 127/58 (04/17/25 0448)  SpO2: 95 % (04/17/25 0448) Vital Signs (24h Range):  Temp:  [98.3 °F (36.8 °C)-99.1 °F (37.3 °C)] 98.5 °F (36.9 °C)  Pulse:  [] 95  Resp:  [16-18] 17  SpO2:  [93 %-95 %] 95 %  BP: (123-129)/(55-71) 127/58     Weight: 119.9 kg (264 lb 6 oz)  Body mass index is 48.35 kg/m².    Estimated Creatinine Clearance: 37.3 mL/min (A) (based on SCr of 1.8 mg/dL (H)).     Physical Exam  Vitals and nursing note reviewed.   HENT:      Head: Normocephalic.   Eyes:      Pupils: Pupils are equal, round, and reactive to light.   Pulmonary:      Effort: Pulmonary effort is normal.   Abdominal:      General: Abdomen is flat.   Skin:     Findings: Bruising, erythema and lesion present.   Neurological:      General: No focal deficit present.      Mental Status: She is alert.          Significant Labs: Blood Culture: No results for input(s): "LABBLOO" in the last 4320 hours.  CBC:   Recent Labs   Lab 04/15/25  0724 04/16/25  0605   WBC 14.64* 18.03*   HGB 10.8* 10.4*   HCT 34.3* 33.5*    354     CMP:   Recent Labs   Lab 04/15/25  0724 04/16/25  0605   * 132*   K 3.7 4.0   CL 96 97   CO2 29 24   BUN 21 28*   CREATININE 1.3 1.8*   CALCIUM 8.4* 8.3*   ALBUMIN 2.1* 1.9*   BILITOT 0.6 0.7 "   ALKPHOS 104 127   AST 21 18   ALT 19 16   ANIONGAP 8 11     All pertinent labs within the past 24 hours have been reviewed.    Significant Imaging: I have reviewed all pertinent imaging results/findings within the past 24 hours.

## 2025-04-17 NOTE — PLAN OF CARE
04/17/25 1303   Post-Acute Status   Post-Acute Authorization Home Health   Home Health Status Referrals Sent   Coverage [employer]   Discharge Plan   Discharge Plan A Home Health     Pt and daughter requested [facility]. SW sent an hh referral to [facility]. Pending acceptance.

## 2025-04-17 NOTE — SUBJECTIVE & OBJECTIVE
Interval History:  Continues to have pain peer erythema and edema no worse.  No yoni abscess.  No labs for this morning at this time    Medications:  Continuous Infusions:  Scheduled Meds:   aspirin  81 mg Oral Daily    cetirizine  10 mg Oral Daily    enoxparin  40 mg Subcutaneous Q12H (prophylaxis, 0900/2100)    fluticasone propionate  2 spray Each Nostril Daily    linezolid  600 mg Oral Q12H    metoprolol succinate  50 mg Oral BID    piperacillin-tazobactam (Zosyn) IV (PEDS and ADULTS) (extended infusion is not appropriate)  4.5 g Intravenous Q8H    polyethylene glycol  17 g Oral Daily     PRN Meds:  Current Facility-Administered Medications:     acetaminophen, 650 mg, Oral, Q8H PRN    acetaminophen, 650 mg, Oral, Q4H PRN    dextromethorphan-guaiFENesin  mg/5 ml, 10 mL, Oral, Q4H PRN    dextrose 50%, 12.5 g, Intravenous, PRN    dextrose 50%, 12.5 g, Intravenous, PRN    dextrose 50%, 25 g, Intravenous, PRN    dextrose 50%, 25 g, Intravenous, PRN    glucagon (human recombinant), 1 mg, Intramuscular, PRN    glucagon (human recombinant), 1 mg, Intramuscular, PRN    glucose, 16 g, Oral, PRN    glucose, 16 g, Oral, PRN    glucose, 24 g, Oral, PRN    glucose, 24 g, Oral, PRN    HYDROcodone-acetaminophen, 1 tablet, Oral, Q4H PRN    HYDROcodone-acetaminophen, 1 tablet, Oral, Q4H PRN    insulin aspart U-100, 0-5 Units, Subcutaneous, QID (AC + HS) PRN    melatonin, 6 mg, Oral, Nightly PRN    naloxone, 0.02 mg, Intravenous, PRN    ondansetron, 4 mg, Intravenous, Q8H PRN    oxyCODONE, 5 mg, Oral, Q6H PRN    simethicone, 1 tablet, Oral, QID PRN    sodium chloride 0.9%, 2 mL, Intravenous, Q12H PRN     Review of patient's allergies indicates:   Allergen Reactions    Adhesive tape-silicones Swelling     Paper tape     Objective:     Vital Signs (Most Recent):  Temp: 98.5 °F (36.9 °C) (04/17/25 0448)  Pulse: 95 (04/17/25 0817)  Resp: 18 (04/17/25 0707)  BP: (!) 127/58 (04/17/25 0448)  SpO2: 95 % (04/17/25 0448) Vital Signs  (24h Range):  Temp:  [98.4 °F (36.9 °C)-99.1 °F (37.3 °C)] 98.5 °F (36.9 °C)  Pulse:  [] 95  Resp:  [17-18] 18  SpO2:  [93 %-95 %] 95 %  BP: (123-129)/(55-70) 127/58     Weight: 121.9 kg (268 lb 11.9 oz)  Body mass index is 49.15 kg/m².    Intake/Output - Last 3 Shifts         04/15 0700  04/16 0659 04/16 0700  04/17 0659 04/17 0700  04/18 0659    P.O.  270     IV Piggyback       Total Intake(mL/kg)  270 (2.2)     Urine (mL/kg/hr)  200 (0.1)     Total Output  200     Net  +70            Urine Occurrence  3 x     Stool Occurrence  1 x 0 x             Physical Exam  Vitals and nursing note reviewed.   Constitutional:       Appearance: She is well-developed. She is obese.   HENT:      Head: Normocephalic.   Eyes:      Pupils: Pupils are equal, round, and reactive to light.   Neck:      Thyroid: No thyromegaly.      Vascular: No JVD.      Trachea: No tracheal deviation.   Cardiovascular:      Rate and Rhythm: Normal rate and regular rhythm.      Heart sounds: Normal heart sounds.   Pulmonary:      Breath sounds: Normal breath sounds. No wheezing.   Abdominal:      General: Bowel sounds are normal. There is no distension.      Palpations: Abdomen is soft. Abdomen is not rigid. There is no mass.      Tenderness: There is no abdominal tenderness. There is no guarding or rebound.      Comments: Obese   Musculoskeletal:         General: Normal range of motion.      Right lower leg: No edema (from the knee down).   Lymphadenopathy:      Cervical: No cervical adenopathy.   Skin:     General: Skin is warm and dry.      Findings: No erythema (right lower extremity from the knee down) or rash.   Neurological:      Mental Status: She is oriented to person, place, and time.      Comments: Motor and sensation of the right foot are intact          Significant Labs:  I have reviewed all pertinent lab results within the past 24 hours.  No new  Significant Diagnostics:  No new

## 2025-04-17 NOTE — PLAN OF CARE
04/17/25 1153   Post-Acute Status   Post-Acute Authorization Placement   Post-Acute Placement Status Referrals Sent   Coverage [employer]   Discharge Plan   Discharge Plan A Skilled Nursing Facility     SW spoke with pt at bedside regarding snf placement. Pt in agreement with mass referrals being sent. SW sent mass referrals. Pending snf acceptance, 142.

## 2025-04-17 NOTE — ASSESSMENT & PLAN NOTE
Body mass index is 49.15 kg/m². Morbid obesity complicates all aspects of disease management from diagnostic modalities to treatment. Weight loss encouraged and health benefits explained to patient.   Physical/occupational therapy recommending moderate intensity therapy but patient declines

## 2025-04-17 NOTE — PROGRESS NOTES
[facility] Medicine  Progress Note    Patient Name: Stephanie Raza  MRN: [id number]  Patient Class: IP- Inpatient   Admission Date: 4/13/2025  Length of Stay: 4 days  Attending Physician: Delfino Johansen MD  Primary Care Provider: Reyna Suarez MD        Subjective     Principal Problem:Cellulitis of right lower extremity        HPI:  Patient is a 65-year-old female with a history of diabetes, hyperlipidemia, hypertension, obesity who presented emergency department for evaluation of right leg swelling.  Patient reports on 04/06 she woke up vomiting and did not feel well.  When her daughter saw her on 04/08 she took her to the [facility].  She reports she had swelling in her right leg that was painful all the way up to her groin.  She was admitted for 3 days reportedly treated with Ancef at which time the marks of erythema had improved her fever had subsided and she was discharged home on Augmentin (blood cultures remain negative).  After being home she noted that the swelling worsened and she re-presented to the hospital.  In the emergency department she was afebrile, laboratory exam revealed protocol of 2.25, BNP of 150, sed rate greater 120, , white blood cell count of 13.4.  Lactic acid level was normal at 1.7.  Blood cultures were obtained and she was begun on vancomycin and meropenem.  CT scan of the leg revealed large left inguinal lymph nodes, fat stranding in the thigh most notably in the medial portion right hip arthroplasty in place intrapelvic contents unremarkable  CTA tib-fib soft tissue thickening and fat stranding in the anterior aspect of the lower extremity with disorganized fluid throughout the superficial compartment with no definitive involvement of the deep intramuscular compartment, no definitive abscess  Doppler exam of lower extremity negative for DVT    Consult general surgery and Infectious Disease for evaluation.        Overview/Hospital  Course:  4/14 admitted for right leg cellulitis. Denies insect bite, trauma. States improvement in symptoms of erythema, edema and pain. Intravenous antibiotic(s) per infectious disease. Surgery following.  4/15 antibiotic(s) per infectious disease. Cellulitis modestly improving. Pain regimen adjusted.  4/16 interventional radiology aspirated blister. Body fluid studies pending. Patient endorses improvement in symptoms of edema and erythema. Us liliana pending  4/17 us with moderate stenosis in superficial femoral artery. Patient unable to tolerate us liliana studydue to pain. Vascular surgery and nephrology consulted due to unimproved cellulitis and acute kidney injury. Us abdomen complete with medical renal disease, no hydronephrosis. Infectious disease aware. Surgery recommending mri pending renal improvement    Interval History: See hospital course for today      Review of Systems   Constitutional:  Positive for activity change and fatigue. Negative for appetite change and fever.   Respiratory:  Negative for shortness of breath.    Gastrointestinal:  Negative for diarrhea, nausea and vomiting.   Musculoskeletal:  Positive for myalgias.   Skin:  Positive for color change.   Neurological:  Positive for weakness.   Psychiatric/Behavioral:  Positive for dysphoric mood. Negative for agitation, behavioral problems, confusion and decreased concentration.      Objective:     Vital Signs (Most Recent):  Temp: 99.4 °F (37.4 °C) (04/17/25 1222)  Pulse: 102 (04/17/25 1222)  Resp: 19 (04/17/25 1222)  BP: (!) 144/88 (04/17/25 1222)  SpO2: (!) 86 % (04/17/25 1222) Vital Signs (24h Range):  Temp:  [98.4 °F (36.9 °C)-99.4 °F (37.4 °C)] 99.4 °F (37.4 °C)  Pulse:  [] 102  Resp:  [17-19] 19  SpO2:  [86 %-95 %] 86 %  BP: (116-144)/(55-88) 144/88     Weight: 121.9 kg (268 lb 11.9 oz)  Body mass index is 49.15 kg/m².    Intake/Output Summary (Last 24 hours) at 4/17/2025 1518  Last data filed at 4/16/2025 2320  Gross per 24 hour   Intake  --   Output 200 ml   Net -200 ml         Physical Exam  Vitals and nursing note reviewed.   Constitutional:       General: She is not in acute distress.     Appearance: She is morbidly obese. She is ill-appearing. She is not toxic-appearing.   HENT:      Head: Normocephalic and atraumatic.   Cardiovascular:      Rate and Rhythm: Tachycardia present.   Pulmonary:      Effort: Pulmonary effort is normal. No respiratory distress.   Abdominal:      Palpations: Abdomen is soft.      Tenderness: There is no abdominal tenderness.   Musculoskeletal:         General: Swelling and tenderness present.      Right lower leg: Edema present.   Skin:     General: Skin is warm.      Findings: Erythema present.   Neurological:      Mental Status: She is alert and oriented to person, place, and time.      Motor: Weakness present.   Psychiatric:         Mood and Affect: Mood is depressed.               Significant Labs: All pertinent labs within the past 24 hours have been reviewed.  Blood cultures negative growth to date x 72 hours  Fluid studies showing no growth or organisms  CBC:   Recent Labs   Lab 04/16/25  0605 04/17/25  0839   WBC 18.03* 18.51*   HGB 10.4* 10.2*   HCT 33.5* 32.5*    366     CMP:   Recent Labs   Lab 04/16/25  0605 04/17/25  0839   * 132*   K 4.0 3.8   CL 97 97   CO2 24 27   BUN 28* 35*   CREATININE 1.8* 1.7*   CALCIUM 8.3* 8.5*   ALBUMIN 1.9*  --    BILITOT 0.7  --    ALKPHOS 127  --    AST 18  --    ALT 16  --    ANIONGAP 11 8       Significant Imaging: I have reviewed all pertinent imaging results/findings within the past 24 hours.  U/S: I have reviewed all pertinent results/findings within the past 24 hours and my personal findings are:  us abdomen complete negative for hydronephrosis; us lower extremity arteries positive for moderate stenosis in femoral artery      Assessment & Plan  Type 2 diabetes mellitus with diabetic polyneuropathy, with long-term current use of insulin  Patient with  diabetes currently well-controlled is taking Ozempic outpatient  Sliding scale insulin with Accu-Cheks a.c. HS  Morbid obesity  Body mass index is 49.15 kg/m². Morbid obesity complicates all aspects of disease management from diagnostic modalities to treatment. Weight loss encouraged and health benefits explained to patient.   Physical/occupational therapy recommending moderate intensity therapy but patient declines      Cellulitis of right lower extremity  Concern for neck fascia however patient's white blood cell count, creatinine, glucose are not consistent.  However picture may be mixed due to already having been on antibiotics.  Patient reports began 4/7  She was hospitalized at [facility] 4/7 through 4/10 reportedly she was febrile.  She was treated with Ancef and by 4/10 she was afebrile.  There are surgical pen marks on her right upper thigh which shows that the redness has receded some.  Blood cultures at the [facility] were negative.  She was discharged on Augmentin.  Patient had worsening swelling and pain and she re-presented to [facility].  See results of CT scan and Doppler of lower extremity above  Was given vancomycin and cefepime in ER  We will change to Zyvox for better skin penetration for Staph, Zosyn for anaerobes and strep and add clindamycin for toxin.  General surgery following, no surgical intervention warranted  Ct imaging negative for abscess  Deferring final antibiotic(s) per infectious disease     No significant improvement  Mild acute kidney injury   Psh right total hip revision  Consider mri to evaluate prosthesis though erythema has receded from knee and leukocytosis resolved  Interventional radiology to aspirate blister, studies pending  Us liliana unable to tolerate  Incentive spirometer   Defer to infectious disease to adjust antibiotic(s)  Nephrology and vascular surgery consulted     Primary hypertension  Patient's blood pressure range in the last 24 hours was: BP   "Min: 116/56  Max: 144/88.The patient's inpatient anti-hypertensive regimen is listed below:  Current Antihypertensives  , Daily, Oral  metoprolol succinate (TOPROL-XL) 24 hr tablet 50 mg, 2 times daily, Oral    Plan  - BP is controlled, no changes needed to their regimen  Holding arb due to intermittent hypotension  Acute on chronic combined systolic and diastolic congestive heart failure  Patient has Combined Systolic and Diastolic heart failure that is Acute on chronic. On presentation their CHF was decompensated. Evidence of decompensated CHF on presentation includes: edema. The etiology of their decompensation is likely increased fluid intake. Most recent BNP and echo results are listed below.  No results for input(s): "BNP" in the last 72 hours.    Latest ECHO  No results found for this or any previous visit.    Current Heart Failure Medications  , Daily, Oral  metoprolol succinate (TOPROL-XL) 24 hr tablet 50 mg, 2 times daily, Oral    Plan  - Monitor strict I&Os and daily weights.    - Place on telemetry  - Low sodium diet  - Place on fluid restriction of 1.5 L.   - Cardiology has not been consulted  - The patient's volume status is at their baseline  -patient follows with Dr. Mikie Saxena whom she saw about 3 weeks ago.  She has follow up appointment in May.    Echocardiogram reviewed       YOVANA (acute kidney injury)  OYVANA is likely due to acute tubular necrosis caused by possible drug induced . Baseline creatinine is  0.9 . Most recent creatinine and eGFR are listed below.  Recent Labs     04/15/25  0724 04/16/25  0605 04/17/25  0839   CREATININE 1.3 1.8* 1.7*   EGFRNORACEVR 45* 31* 33*      Plan  - YOVANA is stable  - Avoid nephrotoxins and renally dose meds for GFR listed above  - Monitor urine output, serial BMP, and adjust therapy as needed  - no significant improvement with intravenous fluids  nephrology consulted    VTE Risk Mitigation (From admission, onward)           Ordered     enoxaparin injection 40 " mg  Every 12 hours         04/13/25 1626     IP VTE HIGH RISK PATIENT  Once         04/13/25 1622     Place sequential compression device  Until discontinued         04/13/25 1622                    Discharge Planning   RONNIE: 4/15/2025     Code Status: Full Code   Medical Readiness for Discharge Date:   Discharge Plan A: Home Health                      Delfino Johansen MD  Department of Hospital Medicine   '[facility]

## 2025-04-17 NOTE — ASSESSMENT & PLAN NOTE
She had extensive right lower extremity cellulitis.  Will continue Zyvox, clindamycin, Zosyn.  Will monitor clinical response in a.m.a.m. she needs close follow-up    04/15-  She reports interval improvement in the erythema  Will plan to stop Clindamycin , continue Zyvox/Zosyn    04/16- will plan to do MRI of the lower extremity if wbc continues to increase- will monitor wbc in AM  On Zyvox/Zosyn  Follow Gen surgery

## 2025-04-17 NOTE — PLAN OF CARE
04/17/25 0839   Rounds   Attendance Provider;Physical therapist;Occupational therapist;;Charge nurse   Discharge Plan A Home Health;Home with family   Why the patient remains in the hospital Requires continued medical care   Transition of Care Barriers None     Pending therapy recs.

## 2025-04-17 NOTE — ASSESSMENT & PLAN NOTE
YOVANA is likely due to acute tubular necrosis caused by possible drug induced. Baseline creatinine is 0.9. Most recent creatinine and eGFR are listed below.  Recent Labs     04/15/25  0724 04/16/25  0605 04/17/25  0839   CREATININE 1.3 1.8* 1.7*   EGFRNORACEVR 45* 31* 33*      Plan  - YOVANA is stable  - Avoid nephrotoxins and renally dose meds for GFR listed above  - Monitor urine output, serial BMP, and adjust therapy as needed  - no significant improvement with intravenous fluids  nephrology consulted

## 2025-04-17 NOTE — PROGRESS NOTES
[facility] Surg  General Surgery  Progress Note    Subjective:     History of Present Illness:  69-year-old morbidly obese female presented to the [facility] with significant erythema of the right lower extremity from the ankle to the thigh.  The patient also states that she is now not able to walk on the extremity because of the discomfort.  The patient states that she was admitted to the [facility] starting last Monday with the similar condition.  She was treated with IV antibiotics, unsure of which ones, and then discharge on Augmentin.    The patient presented to [facility].  She was evaluated.  In surgery was asked to see the patient    The patient states that the redness has gone down a little bit from the lines drawn around the upper thigh erythema at the [facility]    Post-Op Info:  * No surgery found *         Interval History:  Continues to have pain peer erythema and edema no worse.  No yoni abscess.  No labs for this morning at this time    Medications:  Continuous Infusions:  Scheduled Meds:   aspirin  81 mg Oral Daily    cetirizine  10 mg Oral Daily    enoxparin  40 mg Subcutaneous Q12H (prophylaxis, 0900/2100)    fluticasone propionate  2 spray Each Nostril Daily    linezolid  600 mg Oral Q12H    metoprolol succinate  50 mg Oral BID    piperacillin-tazobactam (Zosyn) IV (PEDS and ADULTS) (extended infusion is not appropriate)  4.5 g Intravenous Q8H    polyethylene glycol  17 g Oral Daily     PRN Meds:  Current Facility-Administered Medications:     acetaminophen, 650 mg, Oral, Q8H PRN    acetaminophen, 650 mg, Oral, Q4H PRN    dextromethorphan-guaiFENesin  mg/5 ml, 10 mL, Oral, Q4H PRN    dextrose 50%, 12.5 g, Intravenous, PRN    dextrose 50%, 12.5 g, Intravenous, PRN    dextrose 50%, 25 g, Intravenous, PRN    dextrose 50%, 25 g, Intravenous, PRN    glucagon (human recombinant), 1 mg, Intramuscular, PRN    glucagon (human recombinant), 1 mg,  Intramuscular, PRN    glucose, 16 g, Oral, PRN    glucose, 16 g, Oral, PRN    glucose, 24 g, Oral, PRN    glucose, 24 g, Oral, PRN    HYDROcodone-acetaminophen, 1 tablet, Oral, Q4H PRN    HYDROcodone-acetaminophen, 1 tablet, Oral, Q4H PRN    insulin aspart U-100, 0-5 Units, Subcutaneous, QID (AC + HS) PRN    melatonin, 6 mg, Oral, Nightly PRN    naloxone, 0.02 mg, Intravenous, PRN    ondansetron, 4 mg, Intravenous, Q8H PRN    oxyCODONE, 5 mg, Oral, Q6H PRN    simethicone, 1 tablet, Oral, QID PRN    sodium chloride 0.9%, 2 mL, Intravenous, Q12H PRN     Review of patient's allergies indicates:   Allergen Reactions    Adhesive tape-silicones Swelling     Paper tape     Objective:     Vital Signs (Most Recent):  Temp: 98.5 °F (36.9 °C) (04/17/25 0448)  Pulse: 95 (04/17/25 0817)  Resp: 18 (04/17/25 0707)  BP: (!) 127/58 (04/17/25 0448)  SpO2: 95 % (04/17/25 0448) Vital Signs (24h Range):  Temp:  [98.4 °F (36.9 °C)-99.1 °F (37.3 °C)] 98.5 °F (36.9 °C)  Pulse:  [] 95  Resp:  [17-18] 18  SpO2:  [93 %-95 %] 95 %  BP: (123-129)/(55-70) 127/58     Weight: 121.9 kg (268 lb 11.9 oz)  Body mass index is 49.15 kg/m².    Intake/Output - Last 3 Shifts         04/15 0700  04/16 0659 04/16 0700  04/17 0659 04/17 0700  04/18 0659    P.O.  270     IV Piggyback       Total Intake(mL/kg)  270 (2.2)     Urine (mL/kg/hr)  200 (0.1)     Total Output  200     Net  +70            Urine Occurrence  3 x     Stool Occurrence  1 x 0 x             Physical Exam  Vitals and nursing note reviewed.   Constitutional:       Appearance: She is well-developed. She is obese.   HENT:      Head: Normocephalic.   Eyes:      Pupils: Pupils are equal, round, and reactive to light.   Neck:      Thyroid: No thyromegaly.      Vascular: No JVD.      Trachea: No tracheal deviation.   Cardiovascular:      Rate and Rhythm: Normal rate and regular rhythm.      Heart sounds: Normal heart sounds.   Pulmonary:      Breath sounds: Normal breath sounds. No wheezing.    Abdominal:      General: Bowel sounds are normal. There is no distension.      Palpations: Abdomen is soft. Abdomen is not rigid. There is no mass.      Tenderness: There is no abdominal tenderness. There is no guarding or rebound.      Comments: Obese   Musculoskeletal:         General: Normal range of motion.      Right lower leg: No edema (from the knee down).   Lymphadenopathy:      Cervical: No cervical adenopathy.   Skin:     General: Skin is warm and dry.      Findings: No erythema (right lower extremity from the knee down) or rash.   Neurological:      Mental Status: She is oriented to person, place, and time.      Comments: Motor and sensation of the right foot are intact          Significant Labs:  I have reviewed all pertinent lab results within the past 24 hours.  No new  Significant Diagnostics:  No new  Assessment/Plan:     * Cellulitis of right lower extremity  No drainable fluid collections.    Blister fluids sent for culture   Ultrasound ordered by hospital Medicine.  Showed no yoni abscesses or compromise blood flow    Check CBC and BMP.      If white blood cell count that has not improving I would recommend a MRI of the right lower leg.  With and without contrast would be beneficial if renal function allows.          Primary hypertension  Management per hospital Medicine    Morbid obesity  Patient would benefit from weight loss but this is highly unlikely    Type 2 diabetes mellitus with diabetic polyneuropathy, with long-term current use of insulin  Management per hospital Medicine        Lance Dangelo MD  General Surgery  [facility]

## 2025-04-17 NOTE — PT/OT/SLP PROGRESS
Physical Therapy  Treatment    Stephanie Raza   MRN: [id number]   Admitting Diagnosis: Cellulitis of right lower extremity    PT Received On: 04/17/25  PT Start Time: 1432     PT Stop Time: 1446    PT Total Time (min): 14 min       Billable Minutes:  Therapeutic Activity 14    Treatment Type: Treatment  PT/PTA: PTA     Number of PTA visits since last PT visit: 1       General Precautions: Standard, fall  Orthopedic Precautions: N/A  Braces: N/A  Respiratory Status: Room air    Spiritual, Cultural Beliefs, Sikh Practices, Values that Affect Care: no    Subjective:  Communicated with patient's nurse Nneka and performed Epic chart review prior to session.  Patient states that the pain in her leg is too much to move, much less sit on the side of the bed or do any standing.    Pain/Comfort  Pain Rating 1: 10/10  Location - Side 1: Right  Location - Orientation 1: generalized  Location 1: leg    Objective:   Patient found with: peripheral IV, telemetry, bed alarm, PureWick    Functional Mobility:  Bed Mobility:    NT    Transfers:   NT    Gait:    NT    Treatment and Education:  Educated patient on importance of increased tolerance to upright position and direct impact on CV endurance and strength. Patient encouraged to utilize elevated HOB to simulate chair position until able to safely complete chair T/F. Patient given a minimum goal of majority of the day to be spent in upright, especially with all meals. Encouraged patient to perform AROM TE to BLE throughout the day within all available planes of motion. Re enforced importance of utilizing call light to meet needs in room and not attempt to get up without staff assistance. Patient verbalized understanding and agreed to comply.     Since no mobility could be executed today, focused on supine exercises that patient could tolerate. Repositioned the leg with a slight increase in elevation.     AM-PAC 6 CLICK MOBILITY  How much help from another person does this  patient currently need?   1 = Unable, Total/Dependent Assistance  2 = A lot, Maximum/Moderate Assistance  3 = A little, Minimum/Contact Guard/Supervision  4 = None, Modified Rabun/Independent    Turning over in bed (including adjusting bedclothes, sheets and blankets)?: 1  Sitting down on and standing up from a chair with arms (e.g., wheelchair, bedside commode, etc.): 1  Moving from lying on back to sitting on the side of the bed?: 1  Moving to and from a bed to a chair (including a wheelchair)?: 1  Need to walk in hospital room?: 1  Climbing 3-5 steps with a railing?: 1  Basic Mobility Total Score: 6    AM-PAC Raw Score CMS G-Code Modifier Level of Impairment Assistance   6 % Total / Unable   7 - 9 CM 80 - 100% Maximal Assist   10 - 14 CL 60 - 80% Moderate Assist   15 - 19 CK 40 - 60% Moderate Assist   20 - 22 CJ 20 - 40% Minimal Assist   23 CI 1-20% SBA / CGA   24 CH 0% Independent/ Mod I     Patient left HOB elevated with all lines intact, call button in reach, bed alarm on, and family present.    Assessment:  Stephanie Raza is a 69 y.o. female with a medical diagnosis of Cellulitis of right lower extremity and presents with overall decline in functional mobility. Patient would continue to benefit from skilled PT to address functional limitations listed below in order to return to PLOF/decrease caregiver burden.    Rehab identified problem list/impairments: weakness, impaired endurance, impaired self care skills, impaired functional mobility, gait instability, impaired balance, decreased lower extremity function, pain, impaired coordination, impaired skin, edema    Rehab potential is fair.    Activity tolerance: Poor - due to intolerable pain at this time    Discharge recommendations: Moderate Intensity Therapy      Barriers to discharge:      Equipment recommendations: to be determined by next level of care     GOALS:   Multidisciplinary Problems       Physical Therapy Goals          Problem:  Physical Therapy    Goal Priority Disciplines Outcome Interventions   Physical Therapy Goal     PT, PT/OT     Description: Goals to be met by 4/30/25.  1. Pt will complete bed mobility SPV.  2. Pt will complete sit to stand MIN A.  3. Pt will ambulate 50ft MIN A using RW.  4. Pt will increase AMPAC score by 2 points to progress functional mobility.                       DME Justifications:  No DME recommended requiring DME justifications    PLAN:    Patient to be seen 3 x/week to address the above listed problems via gait training, therapeutic activities, therapeutic exercises  Plan of Care expires: 04/30/25  Plan of Care reviewed with: patient         04/17/2025

## 2025-04-17 NOTE — ASSESSMENT & PLAN NOTE
"Patient has Combined Systolic and Diastolic heart failure that is Acute on chronic. On presentation their CHF was decompensated. Evidence of decompensated CHF on presentation includes: edema. The etiology of their decompensation is likely increased fluid intake. Most recent BNP and echo results are listed below.  No results for input(s): "BNP" in the last 72 hours.    Latest ECHO  No results found for this or any previous visit.    Current Heart Failure Medications  , Daily, Oral  metoprolol succinate (TOPROL-XL) 24 hr tablet 50 mg, 2 times daily, Oral    Plan  - Monitor strict I&Os and daily weights.    - Place on telemetry  - Low sodium diet  - Place on fluid restriction of 1.5 L.   - Cardiology has not been consulted  - The patient's volume status is at their baseline  -patient follows with Dr. Mikie Saxena whom she saw about 3 weeks ago.  She has follow up appointment in May.    Echocardiogram reviewed       "

## 2025-04-17 NOTE — SUBJECTIVE & OBJECTIVE
Interval History: See hospital course for today      Review of Systems   Constitutional:  Positive for activity change and fatigue. Negative for appetite change and fever.   Respiratory:  Negative for shortness of breath.    Gastrointestinal:  Negative for diarrhea, nausea and vomiting.   Musculoskeletal:  Positive for myalgias.   Skin:  Positive for color change.   Neurological:  Positive for weakness.   Psychiatric/Behavioral:  Positive for dysphoric mood. Negative for agitation, behavioral problems, confusion and decreased concentration.      Objective:     Vital Signs (Most Recent):  Temp: 99.4 °F (37.4 °C) (04/17/25 1222)  Pulse: 102 (04/17/25 1222)  Resp: 19 (04/17/25 1222)  BP: (!) 144/88 (04/17/25 1222)  SpO2: (!) 86 % (04/17/25 1222) Vital Signs (24h Range):  Temp:  [98.4 °F (36.9 °C)-99.4 °F (37.4 °C)] 99.4 °F (37.4 °C)  Pulse:  [] 102  Resp:  [17-19] 19  SpO2:  [86 %-95 %] 86 %  BP: (116-144)/(55-88) 144/88     Weight: 121.9 kg (268 lb 11.9 oz)  Body mass index is 49.15 kg/m².    Intake/Output Summary (Last 24 hours) at 4/17/2025 1518  Last data filed at 4/16/2025 2320  Gross per 24 hour   Intake --   Output 200 ml   Net -200 ml         Physical Exam  Vitals and nursing note reviewed.   Constitutional:       General: She is not in acute distress.     Appearance: She is morbidly obese. She is ill-appearing. She is not toxic-appearing.   HENT:      Head: Normocephalic and atraumatic.   Cardiovascular:      Rate and Rhythm: Tachycardia present.   Pulmonary:      Effort: Pulmonary effort is normal. No respiratory distress.   Abdominal:      Palpations: Abdomen is soft.      Tenderness: There is no abdominal tenderness.   Musculoskeletal:         General: Swelling and tenderness present.      Right lower leg: Edema present.   Skin:     General: Skin is warm.      Findings: Erythema present.   Neurological:      Mental Status: She is alert and oriented to person, place, and time.      Motor: Weakness  present.   Psychiatric:         Mood and Affect: Mood is depressed.               Significant Labs: All pertinent labs within the past 24 hours have been reviewed.  Blood cultures negative growth to date x 72 hours  Fluid studies showing no growth or organisms  CBC:   Recent Labs   Lab 04/16/25  0605 04/17/25  0839   WBC 18.03* 18.51*   HGB 10.4* 10.2*   HCT 33.5* 32.5*    366     CMP:   Recent Labs   Lab 04/16/25  0605 04/17/25  0839   * 132*   K 4.0 3.8   CL 97 97   CO2 24 27   BUN 28* 35*   CREATININE 1.8* 1.7*   CALCIUM 8.3* 8.5*   ALBUMIN 1.9*  --    BILITOT 0.7  --    ALKPHOS 127  --    AST 18  --    ALT 16  --    ANIONGAP 11 8       Significant Imaging: I have reviewed all pertinent imaging results/findings within the past 24 hours.  U/S: I have reviewed all pertinent results/findings within the past 24 hours and my personal findings are:  us abdomen complete negative for hydronephrosis; us lower extremity arteries positive for moderate stenosis in femoral artery

## 2025-04-18 LAB
ABSOLUTE EOSINOPHIL (OHS): 0.2 K/UL
ABSOLUTE MONOCYTE (OHS): 1.09 K/UL (ref 0.3–1)
ABSOLUTE NEUTROPHIL COUNT (OHS): 10.94 K/UL (ref 1.8–7.7)
ANION GAP (OHS): 8 MMOL/L (ref 8–16)
BASOPHILS # BLD AUTO: 0.05 K/UL
BASOPHILS NFR BLD AUTO: 0.4 %
BUN SERPL-MCNC: 30 MG/DL (ref 8–23)
CALCIUM SERPL-MCNC: 8.3 MG/DL (ref 8.7–10.5)
CHLORIDE SERPL-SCNC: 101 MMOL/L (ref 95–110)
CO2 SERPL-SCNC: 27 MMOL/L (ref 23–29)
CREAT SERPL-MCNC: 1.3 MG/DL (ref 0.5–1.4)
ERYTHROCYTE [DISTWIDTH] IN BLOOD BY AUTOMATED COUNT: 15 % (ref 11.5–14.5)
GFR SERPLBLD CREATININE-BSD FMLA CKD-EPI: 45 ML/MIN/1.73/M2
GLUCOSE SERPL-MCNC: 151 MG/DL (ref 70–110)
HCT VFR BLD AUTO: 31.1 % (ref 37–48.5)
HGB BLD-MCNC: 9.9 GM/DL (ref 12–16)
IMM GRANULOCYTES # BLD AUTO: 0.19 K/UL (ref 0–0.04)
IMM GRANULOCYTES NFR BLD AUTO: 1.3 % (ref 0–0.5)
LYMPHOCYTES # BLD AUTO: 1.63 K/UL (ref 1–4.8)
MCH RBC QN AUTO: 25.8 PG (ref 27–31)
MCHC RBC AUTO-ENTMCNC: 31.8 G/DL (ref 32–36)
MCV RBC AUTO: 81 FL (ref 82–98)
NUCLEATED RBC (/100WBC) (OHS): 0 /100 WBC
PLATELET # BLD AUTO: 378 K/UL (ref 150–450)
PMV BLD AUTO: 10.8 FL (ref 9.2–12.9)
POCT GLUCOSE: 155 MG/DL (ref 70–110)
POCT GLUCOSE: 159 MG/DL (ref 70–110)
POCT GLUCOSE: 175 MG/DL (ref 70–110)
POTASSIUM SERPL-SCNC: 3.9 MMOL/L (ref 3.5–5.1)
RBC # BLD AUTO: 3.84 M/UL (ref 4–5.4)
RELATIVE EOSINOPHIL (OHS): 1.4 %
RELATIVE LYMPHOCYTE (OHS): 11.6 % (ref 18–48)
RELATIVE MONOCYTE (OHS): 7.7 % (ref 4–15)
RELATIVE NEUTROPHIL (OHS): 77.6 % (ref 38–73)
SODIUM SERPL-SCNC: 136 MMOL/L (ref 136–145)
WBC # BLD AUTO: 14.1 K/UL (ref 3.9–12.7)

## 2025-04-18 PROCEDURE — 99900035 HC TECH TIME PER 15 MIN (STAT): Mod: HCNC

## 2025-04-18 PROCEDURE — 99232 SBSQ HOSP IP/OBS MODERATE 35: CPT | Mod: HCNC,,, | Performed by: SURGERY

## 2025-04-18 PROCEDURE — 97530 THERAPEUTIC ACTIVITIES: CPT | Mod: HCNC

## 2025-04-18 PROCEDURE — 99233 SBSQ HOSP IP/OBS HIGH 50: CPT | Mod: NSCH,HCNC,, | Performed by: INTERNAL MEDICINE

## 2025-04-18 PROCEDURE — 63600175 PHARM REV CODE 636 W HCPCS: Mod: HCNC | Performed by: INTERNAL MEDICINE

## 2025-04-18 PROCEDURE — 36415 COLL VENOUS BLD VENIPUNCTURE: CPT | Mod: HCNC | Performed by: SURGERY

## 2025-04-18 PROCEDURE — 85025 COMPLETE CBC W/AUTO DIFF WBC: CPT | Mod: HCNC | Performed by: SURGERY

## 2025-04-18 PROCEDURE — 25000003 PHARM REV CODE 250: Mod: HCNC | Performed by: INTERNAL MEDICINE

## 2025-04-18 PROCEDURE — 99232 SBSQ HOSP IP/OBS MODERATE 35: CPT | Mod: HCNC,,, | Performed by: INTERNAL MEDICINE

## 2025-04-18 PROCEDURE — 94799 UNLISTED PULMONARY SVC/PX: CPT | Mod: HCNC

## 2025-04-18 PROCEDURE — 21400001 HC TELEMETRY ROOM: Mod: HCNC

## 2025-04-18 PROCEDURE — 25000242 PHARM REV CODE 250 ALT 637 W/ HCPCS: Mod: HCNC | Performed by: FAMILY MEDICINE

## 2025-04-18 PROCEDURE — 25000003 PHARM REV CODE 250: Mod: HCNC | Performed by: FAMILY MEDICINE

## 2025-04-18 PROCEDURE — 80048 BASIC METABOLIC PNL TOTAL CA: CPT | Mod: HCNC | Performed by: SURGERY

## 2025-04-18 PROCEDURE — 97530 THERAPEUTIC ACTIVITIES: CPT | Mod: HCNC,CQ

## 2025-04-18 PROCEDURE — A9585 GADOBUTROL INJECTION: HCPCS | Mod: HCNC | Performed by: FAMILY MEDICINE

## 2025-04-18 PROCEDURE — 99222 1ST HOSP IP/OBS MODERATE 55: CPT | Mod: HCNC,,, | Performed by: SURGERY

## 2025-04-18 PROCEDURE — 25500020 PHARM REV CODE 255: Mod: HCNC | Performed by: FAMILY MEDICINE

## 2025-04-18 RX ORDER — GADOBUTROL 604.72 MG/ML
10 INJECTION INTRAVENOUS
Status: COMPLETED | OUTPATIENT
Start: 2025-04-18 | End: 2025-04-18

## 2025-04-18 RX ADMIN — HYDROCODONE BITARTRATE AND ACETAMINOPHEN 1 TABLET: 10; 325 TABLET ORAL at 08:04

## 2025-04-18 RX ADMIN — HYDROCODONE BITARTRATE AND ACETAMINOPHEN 1 TABLET: 10; 325 TABLET ORAL at 11:04

## 2025-04-18 RX ADMIN — METOPROLOL SUCCINATE 50 MG: 50 TABLET, EXTENDED RELEASE ORAL at 08:04

## 2025-04-18 RX ADMIN — LINEZOLID 600 MG: 600 TABLET, FILM COATED ORAL at 08:04

## 2025-04-18 RX ADMIN — ENOXAPARIN SODIUM 40 MG: 40 INJECTION SUBCUTANEOUS at 08:04

## 2025-04-18 RX ADMIN — GADOBUTROL 10 ML: 604.72 INJECTION INTRAVENOUS at 05:04

## 2025-04-18 RX ADMIN — OXYCODONE HYDROCHLORIDE 5 MG: 5 TABLET ORAL at 07:04

## 2025-04-18 RX ADMIN — OXYCODONE HYDROCHLORIDE 5 MG: 5 TABLET ORAL at 11:04

## 2025-04-18 RX ADMIN — HYDROCODONE BITARTRATE AND ACETAMINOPHEN 1 TABLET: 10; 325 TABLET ORAL at 03:04

## 2025-04-18 RX ADMIN — PIPERACILLIN SODIUM AND TAZOBACTAM SODIUM 4.5 G: 4; .5 INJECTION, POWDER, FOR SOLUTION INTRAVENOUS at 03:04

## 2025-04-18 RX ADMIN — HYDROCODONE BITARTRATE AND ACETAMINOPHEN 1 TABLET: 10; 325 TABLET ORAL at 05:04

## 2025-04-18 RX ADMIN — FLUTICASONE PROPIONATE 100 MCG: 50 SPRAY, METERED NASAL at 08:04

## 2025-04-18 RX ADMIN — CETIRIZINE HYDROCHLORIDE 10 MG: 10 TABLET, FILM COATED ORAL at 08:04

## 2025-04-18 RX ADMIN — PIPERACILLIN SODIUM AND TAZOBACTAM SODIUM 4.5 G: 4; .5 INJECTION, POWDER, FOR SOLUTION INTRAVENOUS at 08:04

## 2025-04-18 RX ADMIN — PIPERACILLIN SODIUM AND TAZOBACTAM SODIUM 4.5 G: 4; .5 INJECTION, POWDER, FOR SOLUTION INTRAVENOUS at 11:04

## 2025-04-18 RX ADMIN — ASPIRIN 81 MG CHEWABLE TABLET 81 MG: 81 TABLET CHEWABLE at 08:04

## 2025-04-18 NOTE — PROGRESS NOTES
Nephrology Progress Note     History of Present Illness     The patient is a 69 y.o. female with a hx of diabetes, hypertension who initially presented with right lower extremity cellulitis.  The patient's creatinine upon admission was 0.9 which has slowly worsened to a level of 1.8, now today at 1.7.  Because of this, Nephrology has been asked to assist with management.  Upon visiting with the patient, she states that for about 1 week prior to admission her appetite had been poor.  Since being placed on IV fluids, she states that her urine color of light orange is now beginning to clear.     Patient's home medicines included Lasix, Benicar, Mobic.  She did receive IV contrast on 04/13/2025 for CT.      Interval History   Overnight/currently:  Patient seen today she is without complaints.  No longer needing IV fluids as creatinine has improved to a level of 1.3.        Allergies:    is allergic to adhesive tape-silicones.    Current medications:   Scheduled Meds:   aspirin  81 mg Oral Daily    cetirizine  10 mg Oral Daily    enoxparin  40 mg Subcutaneous Q12H (prophylaxis, 0900/2100)    fluticasone propionate  2 spray Each Nostril Daily    linezolid  600 mg Oral Q12H    metoprolol succinate  50 mg Oral BID    piperacillin-tazobactam (Zosyn) IV (PEDS and ADULTS) (extended infusion is not appropriate)  4.5 g Intravenous Q8H    polyethylene glycol  17 g Oral Daily     Continuous Infusions:  PRN Meds:.  Current Facility-Administered Medications:     acetaminophen, 650 mg, Oral, Q8H PRN    acetaminophen, 650 mg, Oral, Q4H PRN    dextrose 50%, 12.5 g, Intravenous, PRN    dextrose 50%, 12.5 g, Intravenous, PRN    dextrose 50%, 25 g, Intravenous, PRN    dextrose 50%, 25 g, Intravenous, PRN    glucagon (human recombinant), 1 mg, Intramuscular, PRN    glucagon (human recombinant), 1 mg, Intramuscular, PRN    glucose, 16 g, Oral, PRN    glucose, 16 g, Oral, PRN    glucose, 24 g, Oral, PRN    glucose, 24 g, Oral, PRN     "HYDROcodone-acetaminophen, 1 tablet, Oral, Q4H PRN    HYDROcodone-acetaminophen, 1 tablet, Oral, Q4H PRN    insulin aspart U-100, 0-5 Units, Subcutaneous, QID (AC + HS) PRN    melatonin, 6 mg, Oral, Nightly PRN    naloxone, 0.02 mg, Intravenous, PRN    ondansetron, 4 mg, Intravenous, Q8H PRN    oxyCODONE, 5 mg, Oral, Q6H PRN    simethicone, 1 tablet, Oral, QID PRN    sodium chloride 0.9%, 2 mL, Intravenous, Q12H PRN     Physical Examination     VS/Measurements    BP (!) 110/54 (BP Location: Left arm, Patient Position: Lying)   Pulse 94   Temp 97.6 °F (36.4 °C) (Oral)   Resp 18   Ht 5' 2" (1.575 m)   Wt 126.1 kg (278 lb)   SpO2 (!) 94%   BMI 50.85 kg/m²         General:  Moderately built, not in any distress.  Neck:  Supple, no JVD  Respiratory: Non-labored,  Lungs are clear to auscultation.    Cardiovascular:  Normal rate, Regular rhythm.   Abdomen:  Soft, Non-tender, Normal bowel sounds.   Muskuloskeletal:  No pedal edema          Laboratory Results   Today's Lab Results :    Recent Results (from the past 24 hours)   POCT glucose    Collection Time: 04/17/25 11:33 AM   Result Value Ref Range    POCT Glucose 224 (H) 70 - 110 mg/dL   Creatinine, Random Urine    Collection Time: 04/17/25  2:03 PM   Result Value Ref Range    Urine Creatinine 135.8 15.0 - 325.0 mg/dL   Sodium, Random Urine    Collection Time: 04/17/25  2:03 PM   Result Value Ref Range    Urine Sodium <20 (L) 20 - 250 mmol/L   POCT glucose    Collection Time: 04/17/25  3:49 PM   Result Value Ref Range    POCT Glucose 128 (H) 70 - 110 mg/dL   POCT glucose    Collection Time: 04/17/25  9:40 PM   Result Value Ref Range    POCT Glucose 206 (H) 70 - 110 mg/dL   Basic Metabolic Panel    Collection Time: 04/18/25  5:22 AM   Result Value Ref Range    Sodium 136 136 - 145 mmol/L    Potassium 3.9 3.5 - 5.1 mmol/L    Chloride 101 95 - 110 mmol/L    CO2 27 23 - 29 mmol/L    Glucose 151 (H) 70 - 110 mg/dL    BUN 30 (H) 8 - 23 mg/dL    Creatinine 1.3 0.5 - 1.4 " mg/dL    Calcium 8.3 (L) 8.7 - 10.5 mg/dL    Anion Gap 8 8 - 16 mmol/L    eGFR 45 (L) >60 mL/min/1.73/m2   CBC with Differential    Collection Time: 04/18/25  5:22 AM   Result Value Ref Range    WBC 14.10 (H) 3.90 - 12.70 K/uL    RBC 3.84 (L) 4.00 - 5.40 M/uL    HGB 9.9 (L) 12.0 - 16.0 gm/dL    HCT 31.1 (L) 37.0 - 48.5 %    MCV 81 (L) 82 - 98 fL    MCH 25.8 (L) 27.0 - 31.0 pg    MCHC 31.8 (L) 32.0 - 36.0 g/dL    RDW 15.0 (H) 11.5 - 14.5 %    Platelet Count 378 150 - 450 K/uL    MPV 10.8 9.2 - 12.9 fL    Nucleated RBC 0 <=0 /100 WBC    Neut % 77.6 (H) 38 - 73 %    Lymph % 11.6 (L) 18 - 48 %    Mono % 7.7 4 - 15 %    Eos % 1.4 <=8 %    Basophil % 0.4 <=1.9 %    Imm Grans % 1.3 (H) 0.0 - 0.5 %    Neut # 10.94 (H) 1.8 - 7.7 K/uL    Lymph # 1.63 1 - 4.8 K/uL    Mono # 1.09 (H) 0.3 - 1 K/uL    Eos # 0.20 <=0.5 K/uL    Baso # 0.05 <=0.2 K/uL    Imm Grans # 0.19 (H) 0.00 - 0.04 K/uL   POCT glucose    Collection Time: 04/18/25  5:37 AM   Result Value Ref Range    POCT Glucose 155 (H) 70 - 110 mg/dL       LABS:  Reviewed Yes      Intake/Output Summary (Last 24 hours) at 4/18/2025 1049  Last data filed at 4/18/2025 0828  Gross per 24 hour   Intake 600 ml   Output 600 ml   Net 0 ml       Assessment and Plan     Acute kidney injury-multifactorial.  Patient is definitely volume depleted but she also was on a diuretic, ARB and did receive contrast.  The fact that her creatinine improved today and her urinary output is clearing, is certainly encouraging.  Nephrology will sign off at this time.     Volume depletion-based on patient's history and urine sodium less than 20.     Right lower extremity cellulitis-cultures, antibiotics per primary team.     Essential hypertension-kidney ultrasound negative for hydronephrosis, echogenicity.     Chronic combined CHF        ________________________________________________  Tobias Hodges

## 2025-04-18 NOTE — ASSESSMENT & PLAN NOTE
No drainable fluid collections.    Blister fluids sent for culture   Ultrasound ordered by hospital Medicine.  Showed no yoni abscesses or compromise blood flow  WBC improving  If patient fails to progress or worsens MRI of the right lower leg.  With and without contrast would be beneficial if renal function allows.

## 2025-04-18 NOTE — ASSESSMENT & PLAN NOTE
YOVANA is likely due to acute tubular necrosis caused by possible drug induced. Baseline creatinine is 0.9. Most recent creatinine and eGFR are listed below.  Recent Labs     04/16/25  0605 04/17/25  0839 04/18/25  0522   CREATININE 1.8* 1.7* 1.3   EGFRNORACEVR 31* 33* 45*      Plan  - YOVANA is improving  - Avoid nephrotoxins and renally dose meds for GFR listed above  - Monitor urine output, serial BMP, and adjust therapy as needed

## 2025-04-18 NOTE — PT/OT/SLP PROGRESS
Physical Therapy  Treatment    Stephanie Raza   MRN: [id number]   Admitting Diagnosis: Cellulitis of right lower extremity    PT Received On: 04/18/25  PT Start Time: 0930     PT Stop Time: 0945    PT Total Time (min): 15 min       Billable Minutes:  Therapeutic Activity 15    Treatment Type: Treatment  PT/PTA: PTA     Number of PTA visits since last PT visit: 2       General Precautions: Standard, fall  Orthopedic Precautions: N/A  Braces: N/A  Respiratory Status: Room air    Spiritual, Cultural Beliefs, Quaker Practices, Values that Affect Care: no    Subjective:  Communicated with patient's nurse Lacho and performed Epic chart review prior to session.  Patient agreeable to participate with therapy. Patient was hollering throughout entire therapy session d/t pain. Once seated EOB, she requested shortly after to return to supine position.    Pain/Comfort  Pain Rating 1: 10/10  Location - Side 1: Right  Location - Orientation 1: generalized  Location 1: leg  Pain Addressed 1: Reposition, Distraction, Cessation of Activity, Pre-medicate for activity  Pain Rating Post-Intervention 1: 10/10    Objective:   Patient found with: peripheral IV, telemetry, bed alarm, PureWick    Functional Mobility:  Bed Mobility:    Min A to EOB and with return to supine position with some assistance of RLE by grabbing the great toe only!    Transfers:   NT, unable to perform at this time d/t pain    Gait:    NT, unable to perform at this time d/t pain    Treatment and Education:  Educated patient on importance of increased tolerance to upright position and direct impact on CV endurance and strength. Patient encouraged to utilize elevated HOB to simulate chair position until able to safely complete chair T/F. Patient given a minimum goal of majority of the day to be spent in upright, especially with all meals. Encouraged patient to perform AROM TE to BLE throughout the day within all available planes of motion. Re enforced importance  of utilizing call light to meet needs in room and not attempt to get up without staff assistance. Patient verbalized understanding and agreed to comply.      AM-PAC 6 CLICK MOBILITY  How much help from another person does this patient currently need?   1 = Unable, Total/Dependent Assistance  2 = A lot, Maximum/Moderate Assistance  3 = A little, Minimum/Contact Guard/Supervision  4 = None, Modified [place]/Independent    Turning over in bed (including adjusting bedclothes, sheets and blankets)?: 3  Sitting down on and standing up from a chair with arms (e.g., wheelchair, bedside commode, etc.): 1  Moving from lying on back to sitting on the side of the bed?: 3  Moving to and from a bed to a chair (including a wheelchair)?: 1  Need to walk in hospital room?: 1  Climbing 3-5 steps with a railing?: 1  Basic Mobility Total Score: 10    AM-PAC Raw Score CMS G-Code Modifier Level of Impairment Assistance   6 % Total / Unable   7 - 9 CM 80 - 100% Maximal Assist   10 - 14 CL 60 - 80% Moderate Assist   15 - 19 CK 40 - 60% Moderate Assist   20 - 22 CJ 20 - 40% Minimal Assist   23 CI 1-20% SBA / CGA   24 CH 0% Independent/ Mod I     Patient left HOB elevated with all lines intact, call button in reach, and family present.    Assessment:  Stephanie Raza is a 69 y.o. female with a medical diagnosis of Cellulitis of right lower extremity and presents with overall decline in functional mobility. Patient would continue to benefit from skilled PT to address functional limitations listed below in order to return to PLOF/decrease caregiver burden.    Patient remains limited due to pain. She cannot tolerate sitting EOB very long (about 3-5 minutes) due to throbbing pain in dependent position. Assistance with movement can only be given by grabbing the right great toe. Extreme sensitivity throughout entire lower leg. Per observation today, inflammation appears to be gradually reducing.    Rehab identified problem  list/impairments: weakness, impaired endurance, impaired self care skills, impaired functional mobility, gait instability, impaired balance, decreased lower extremity function, decreased safety awareness, pain    Rehab potential is good.    Activity tolerance: Fair    Discharge recommendations: Moderate Intensity Therapy      Barriers to discharge:      Equipment recommendations: to be determined by next level of care     GOALS:   Multidisciplinary Problems       Physical Therapy Goals          Problem: Physical Therapy    Goal Priority Disciplines Outcome Interventions   Physical Therapy Goal     PT, PT/OT     Description: Goals to be met by 4/30/25.  1. Pt will complete bed mobility SPV.  2. Pt will complete sit to stand MIN A.  3. Pt will ambulate 50ft MIN A using RW.  4. Pt will increase AMPAC score by 2 points to progress functional mobility.                       DME Justifications:  No DME recommended requiring DME justifications    PLAN:    Patient to be seen 3 x/week to address the above listed problems via gait training, therapeutic activities, therapeutic exercises  Plan of Care expires: 04/30/25  Plan of Care reviewed with: patient         04/18/2025

## 2025-04-18 NOTE — PROGRESS NOTES
[facility] Surg  General Surgery  Progress Note    Subjective:     History of Present Illness:  69-year-old morbidly obese female presented to the [facility] with significant erythema of the right lower extremity from the ankle to the thigh.  The patient also states that she is now not able to walk on the extremity because of the discomfort.  The patient states that she was admitted to the [facility] starting last Monday with the similar condition.  She was treated with IV antibiotics, unsure of which ones, and then discharge on Augmentin.    The patient presented to [facility].  She was evaluated.  In surgery was asked to see the patient    The patient states that the redness has gone down a little bit from the lines drawn around the upper thigh erythema at the [facility]    Post-Op Info:  * No surgery found *         Interval History:  Cellulitis relatively unchanged, leukocytosis improving    Medications:  Continuous Infusions:  Scheduled Meds:   aspirin  81 mg Oral Daily    cetirizine  10 mg Oral Daily    enoxparin  40 mg Subcutaneous Q12H (prophylaxis, 0900/2100)    fluticasone propionate  2 spray Each Nostril Daily    linezolid  600 mg Oral Q12H    metoprolol succinate  50 mg Oral BID    piperacillin-tazobactam (Zosyn) IV (PEDS and ADULTS) (extended infusion is not appropriate)  4.5 g Intravenous Q8H    polyethylene glycol  17 g Oral Daily     PRN Meds:  Current Facility-Administered Medications:     acetaminophen, 650 mg, Oral, Q8H PRN    acetaminophen, 650 mg, Oral, Q4H PRN    dextrose 50%, 12.5 g, Intravenous, PRN    dextrose 50%, 12.5 g, Intravenous, PRN    dextrose 50%, 25 g, Intravenous, PRN    dextrose 50%, 25 g, Intravenous, PRN    glucagon (human recombinant), 1 mg, Intramuscular, PRN    glucagon (human recombinant), 1 mg, Intramuscular, PRN    glucose, 16 g, Oral, PRN    glucose, 16 g, Oral, PRN    glucose, 24 g, Oral, PRN    glucose, 24 g, Oral, PRN     HYDROcodone-acetaminophen, 1 tablet, Oral, Q4H PRN    HYDROcodone-acetaminophen, 1 tablet, Oral, Q4H PRN    insulin aspart U-100, 0-5 Units, Subcutaneous, QID (AC + HS) PRN    melatonin, 6 mg, Oral, Nightly PRN    naloxone, 0.02 mg, Intravenous, PRN    ondansetron, 4 mg, Intravenous, Q8H PRN    oxyCODONE, 5 mg, Oral, Q6H PRN    simethicone, 1 tablet, Oral, QID PRN    sodium chloride 0.9%, 2 mL, Intravenous, Q12H PRN     Review of patient's allergies indicates:   Allergen Reactions    Adhesive tape-silicones Swelling     Paper tape     Objective:     Vital Signs (Most Recent):  Temp: 98.3 °F (36.8 °C) (04/18/25 1153)  Pulse: 95 (04/18/25 1153)  Resp: 18 (04/18/25 1153)  BP: (!) 118/58 (04/18/25 1153)  SpO2: (!) 92 % (04/18/25 1153) Vital Signs (24h Range):  Temp:  [97.6 °F (36.4 °C)-100.1 °F (37.8 °C)] 98.3 °F (36.8 °C)  Pulse:  [] 95  Resp:  [17-19] 18  SpO2:  [92 %-95 %] 92 %  BP: (110-139)/(54-64) 118/58     Weight: 126.1 kg (278 lb)  Body mass index is 50.85 kg/m².    Intake/Output - Last 3 Shifts         04/16 0700  04/17 0659 04/17 0700  04/18 0659 04/18 0700  04/19 0659    P.O. 270 360 240    Total Intake(mL/kg) 270 (2.2) 360 (2.9) 240 (1.9)    Urine (mL/kg/hr) 200 (0.1) 600 (0.2)     Stool  0     Total Output 200 600     Net +70 -240 +240           Urine Occurrence 3 x 3 x     Stool Occurrence 1 x 3 x 1 x             Physical Exam  Vitals and nursing note reviewed.   Constitutional:       Appearance: She is well-developed. She is obese.   HENT:      Head: Normocephalic.   Eyes:      Pupils: Pupils are equal, round, and reactive to light.   Neck:      Thyroid: No thyromegaly.      Vascular: No JVD.      Trachea: No tracheal deviation.   Cardiovascular:      Rate and Rhythm: Normal rate and regular rhythm.      Heart sounds: Normal heart sounds.   Pulmonary:      Breath sounds: Normal breath sounds. No wheezing.   Abdominal:      General: Bowel sounds are normal. There is no distension.      Palpations:  Abdomen is soft. Abdomen is not rigid. There is no mass.      Tenderness: There is no abdominal tenderness. There is no guarding or rebound.      Comments: Obese   Musculoskeletal:         General: Normal range of motion.      Right lower leg: Edema (from the knee down) present.      Comments: Erythema/cellulitis with tried superficial blisters, slight improvement from original marks  No clear evidence of abscess   Lymphadenopathy:      Cervical: No cervical adenopathy.   Skin:     General: Skin is warm and dry.      Findings: No erythema (right lower extremity from the knee down) or rash.   Neurological:      Mental Status: She is oriented to person, place, and time.      Comments: Motor and sensation of the right foot are intact          Significant Labs:    Assessment/Plan:     * Cellulitis of right lower extremity  No drainable fluid collections.    Blister fluids sent for culture   Ultrasound ordered by hospital Medicine.  Showed no yoni abscesses or compromise blood flow  WBC improving  If patient fails to progress or worsens MRI of the right lower leg.  With and without contrast would be beneficial if renal function allows.          Primary hypertension  Management per hospital Medicine    Morbid obesity  Patient would benefit from weight loss but this is highly unlikely    Type 2 diabetes mellitus with diabetic polyneuropathy, with long-term current use of insulin  Management per hospital Medicine        Sb Avila MD  General Surgery  [facility]

## 2025-04-18 NOTE — PT/OT/SLP PROGRESS
Occupational Therapy   Treatment    Name: Stephanie Raza  MRN: [id number]  Admitting Diagnosis:  Cellulitis of right lower extremity       Recommendations:     Discharge Recommendations: Moderate Intensity Therapy  Discharge Equipment Recommendations:  to be determined by next level of care  Barriers to discharge:       Assessment:     Stephanie Raza is a 69 y.o. female with a medical diagnosis of Cellulitis of right lower extremity.  She presents with the following performance deficits affecting function are weakness, impaired endurance, impaired self care skills, impaired functional mobility, gait instability, impaired balance, decreased safety awareness, edema, pain.     Rehab Prognosis:  Fair; patient would benefit from acute skilled OT services to address these deficits and reach maximum level of function.       Plan:     Patient to be seen 2 x/week to address the above listed problems via self-care/home management, therapeutic activities, therapeutic exercises  Plan of Care Expires: 04/30/25  Plan of Care Reviewed with: patient    Subjective     Chief Complaint: RLE Pain  Patient/Family Comments/goals: Increase independence after RLE healing  Pain/Comfort:  Pain Rating 1: 10/10  Location - Side 1: Right  Location - Orientation 1: lower  Location 1: leg  Pain Addressed 1: Pre-medicate for activity, Reposition, Distraction  Pain Rating Post-Intervention 1: 10/10    Objective:     Communicated with: Nurse and EPIC chart review prior to session.  Patient found with bed in chair position with bed alarm, peripheral IV, telemetry, PureWick upon OT entry to room.    General Precautions: Standard, fall    Orthopedic Precautions:N/A  Braces: N/A  Respiratory Status: Room air     Occupational Performance:     Bed Mobility:    Patient completed Rolling/Turning to Right with minimum assistance  Patient completed Scooting/Bridging with minimum assistance  Patient completed Supine to Sit with minimum assistance  Patient  completed Sit to Supine with minimum assistance   Pt completed EOB seated balance for roughly 10 min to increase trunk control and endurance.  Pt requires extra time d/t fatigue and pain    Functional Mobility/Transfers:  Unsafe to perform at this time      [facility] 6 Click ADL: 18    Treatment & Education:  Pt tolerated session well. Noted Pt had increase pain to touch. Pt requested indepence in BM to increase independence. Pt requires extra time for BM d/t pain and fatigue. Educated Pt on importance of completing UB movement and Therex.  OT role, plan of care, progression of goals, importance of continued OOB activity, ADL/functional transfer and mobility retraining, call don't fall, safety precautions, fall prevention. Pt educated on sitting in chair for 1 hour during breakfast lunch and dinner in order to change positions, regulate BP and reduce bed sores.   Pt acknowledges and agrees with POC given by OT.      Patient left HOB elevated with all lines intact, call button in reach, and bed alarm on    GOALS:   Multidisciplinary Problems       Occupational Therapy Goals          Problem: Occupational Therapy    Goal Priority Disciplines Outcome Interventions   Occupational Therapy Goal     OT, PT/OT Progressing    Description: Goals to be met by: 4/30/25     Patient will increase functional independence with ADLs by performing:    LE Dressing with Minimal Assistance.  Sitting at edge of bed x15 minutes with Stand-by Assistance.  Toilet transfer to bedside commode with Minimal Assistance.                             Time Tracking:     OT Date of Treatment: 04/18/25  OT Start Time: 0925  OT Stop Time: 0950  OT Total Time (min): 25 min    Billable Minutes:Therapeutic Activity 25    OT/YURIDIA: YURIDIA     Number of YURIDIA visits since last OT visit: 1  A client care conference was performed between the DONNA and WINSOME, prior to treatment by WINSOEM, to discuss the patient's status, treatment plan and established goals.    Karthik  WINSOME Najera    4/18/2025

## 2025-04-18 NOTE — SUBJECTIVE & OBJECTIVE
"Interval History:   She reports less pain and erythema.  She feels better.  CBC - wbc 14.64.  04/16-  She feels better  CBC showed wbc 18- increased  04/17-  She has persistent leukocytosis.  WBC remains high at 18.  Review of Systems   Constitutional:  Negative for activity change, appetite change, chills, diaphoresis, fatigue and fever.   Neurological:  Negative for dizziness and facial asymmetry.     Objective:     Vital Signs (Most Recent):  Temp: 100.1 °F (37.8 °C) (04/18/25 0446)  Pulse: 100 (04/18/25 0446)  Resp: 18 (04/18/25 0533)  BP: (!) 112/55 (04/18/25 0446)  SpO2: (!) 93 % (04/18/25 0446) Vital Signs (24h Range):  Temp:  [98.7 °F (37.1 °C)-100.1 °F (37.8 °C)] 100.1 °F (37.8 °C)  Pulse:  [] 100  Resp:  [17-19] 18  SpO2:  [86 %-95 %] 93 %  BP: (112-144)/(55-88) 112/55     Weight: 126.1 kg (278 lb)  Body mass index is 50.85 kg/m².    Estimated Creatinine Clearance: 40.8 mL/min (A) (based on SCr of 1.7 mg/dL (H)).     Physical Exam  Vitals and nursing note reviewed.   HENT:      Head: Normocephalic.   Eyes:      Pupils: Pupils are equal, round, and reactive to light.   Pulmonary:      Effort: Pulmonary effort is normal.   Abdominal:      General: Abdomen is flat.   Skin:     Findings: Bruising, erythema and lesion present.   Neurological:      General: No focal deficit present.      Mental Status: She is alert.                Significant Labs: Blood Culture: No results for input(s): "LABBLOO" in the last 4320 hours.  CBC:   Recent Labs   Lab 04/17/25  0839   WBC 18.51*   HGB 10.2*   HCT 32.5*        CMP:   Recent Labs   Lab 04/17/25  0839   *   K 3.8   CL 97   CO2 27   BUN 35*   CREATININE 1.7*   CALCIUM 8.5*   ANIONGAP 8     All pertinent labs within the past 24 hours have been reviewed.    Significant Imaging: I have reviewed all pertinent imaging results/findings within the past 24 hours.  "

## 2025-04-18 NOTE — ASSESSMENT & PLAN NOTE
Concern for neck fascia however patient's white blood cell count, creatinine, glucose are not consistent.  However picture may be mixed due to already having been on antibiotics.  Patient reports began 4/7  She was hospitalized at [facility] 4/7 through 4/10 reportedly she was febrile.  She was treated with Ancef and by 4/10 she was afebrile.  There are surgical pen marks on her right upper thigh which shows that the redness has receded some.  Blood cultures at the [facility] were negative.  She was discharged on Augmentin.  Patient had worsening swelling and pain and she re-presented to [facility].  See results of CT scan and Doppler of lower extremity above  Was given vancomycin and cefepime in ER  We will change to Zyvox for better skin penetration for Staph, Zosyn for anaerobes and strep and add clindamycin for toxin.  General surgery following, no surgical intervention warranted  Ct imaging negative for abscess  Deferring final antibiotic(s) per infectious disease     No significant improvement  Mild acute kidney injury   Psh right total hip revision  Consider mri to evaluate prosthesis though erythema has receded from knee and leukocytosis resolved  Interventional radiology to aspirate blister, studies pending  Us liliana unable to tolerate  Incentive spirometer   Defer to infectious disease to adjust antibiotic(s)    Awaiting mri

## 2025-04-18 NOTE — CONSULTS
Consult    Consulting Physician:  Delfino Johansen MD  Reason for Consultation: cellulitis not responding to IV antibiotics with SFA stenosis    CC: right leg swelling and erythema    HPI: Stephanie Raza is a 69 y.o. female with PMHx as seen below, was admitted on 4/13/2025 with worsening right leg swelling and erythema.  Had been treated to [facility] just prior to admission here for a couple of days and this improved and she was sent home.  Symptoms worsened and subsequently presented here.  Elevated WBC, inflammatory markers, and YOVANA.  Her YOVANA has improved.  Her WBC has come down but still elevated.  ID is involved as well as nephrology and general surgery.  She is still with erythema, swelling, and pain this morning.  I can't really see much improvement comparing images in EPIC to her leg today on my exam.  She is diabetic.  She has never had cellulitis prior to this.  She has had bilateral hip replacements in the past but no other hardware in her lower extremities.    CT thigh with no abscess only soft tissue swelling    CT tib/fib with no abscess and again only soft tissue swelling    Venous duplex negative for DVT and no iliac DVT given there is normal respiratory variation and augmentation in the right common femoral vein    Arterial duplex of right leg with monophasic flow but I don't but any stock in this as she has a bounding right DP pulse on my exam    Past Medical History:   Diagnosis Date    Acute blood loss anemia (ABLA) 01/24/2024    Last Assessment & Plan:    Formatting of this note might be different from the original.   History & Physical       Discharge Summary       Follow-up  Patient did receive 1 unit packed red blood cells while in the OR secondary to blood loss.  No obvious bleeding at this time.  She received 2 additional units packed red blood cells per orthopedics.  Hemoglobin running stable at this time no need fo    YOVANA (acute kidney injury) 01/24/2024    Last Assessment & Plan:     "Formatting of this note might be different from the original.   History & Physical       Discharge Summary       Follow-up patient with progressive oliguric YOVANA after surgery.  Resolved. Avoid nephrotoxic agents, renally dose all medications where necessary, and protect nondominant arm as much as possible    Anxiety     Arthritis     Asthma     Back pain     Diabetes mellitus type I     Heart murmur     Hyperlipidemia     Hypertension     Obesity     Polyneuropathy     Trouble in sleeping     Type 2 diabetes mellitus     Urinary incontinence        Past Surgical History:   Procedure Laterality Date    ADENOIDECTOMY      BREAST BIOPSY      2018, benign    BREAST SURGERY       SECTION      hip reconstruction Left 2024    HYSTERECTOMY      JOINT REPLACEMENT         Social History[1]    Family History   Problem Relation Name Age of Onset    Breast cancer Mother Naveed Blackwood     Lung cancer Mother Naveed Blackwood     Hypertension Mother Naveed Blackwood     Stroke Father Naveed Blackwood     Cancer Father Naveed Blackwood     Diabetes Sister [employer]     Breast cancer Maternal Aunt      Breast cancer Maternal Aunt      Breast cancer Paternal Aunt      Breast cancer Paternal Aunt         Current Medications[2]    Review of patient's allergies indicates:   Allergen Reactions    Adhesive tape-silicones Swelling     Paper tape       ROS:  10 point review of systems is negative except as mentioned in HPI.    Vitals:    25 0740   BP:    Pulse:    Resp: 18   Temp:        Objective:  General Appearance:  Comfortable, well-appearing and in pain (pain in right leg).    Vital signs: (most recent): Blood pressure (!) 112/55, pulse 100, temperature 100.1 °F (37.8 °C), temperature source Oral, resp. rate 18, height 5' 2" (1.575 m), weight 126.1 kg (278 lb), SpO2 (!) 93%.  Vital signs are normal.    HEENT: Normal HEENT exam.    Lungs:  Normal effort and normal respiratory rate.    Heart: Normal rate.  Regular " rhythm.    Abdomen: Abdomen is soft.  Bowel sounds are normal.   There is no abdominal tenderness.     Extremities: (Right leg is externally rotated and flexed at knee given swelling and pain)  Pulses: Distal pulses are intact.    Neurological: Patient is alert and oriented to person, place and time.  Normal strength.    Pupils:  Pupils are equal, round, and reactive to light.    Skin:  Warm.  (Right lower leg from dorsum of foot to lower thigh with erythema, swelling, and warmth; has some edema bulla that have drained)      LABS:  I have reviewed all pertinent lab results within the past 24 hours.    IMAGING:  I have personally reviewed the imaging.    US Retroperitoneal Complete   Final Result      Elevated bilateral renal arterial resistive indices, which may be seen in the setting of medical renal disease.  No hydronephrosis.         Electronically signed by: Estephania Pappas   Date:    04/17/2025   Time:    15:03      US Lower Extremity Arteries Bilateral   Final Result      1.  At least moderate stenosis of the right superficial femoral artery.  A CT angiogram of the bilateral lower extremities could be performed for further assessment.      2.  No evidence for hemodynamically significant stenosis in the left lower extremity.      3.  Right inguinal and medial right thigh lymphadenopathy.         Electronically signed by: Foster Romo MD   Date:    04/16/2025   Time:    15:45      X-Ray Chest 1 View   Final Result      Similar radiographic appearance of the chest without significant interval change.         Electronically signed by: Estephania Pappas   Date:    04/14/2025   Time:    08:10      CT Leg (Tibia-Fibula) Wtih Contrast Right   Final Result      Extensive soft tissue swelling and skin thickening with suspected reactive right inguinal adenopathy.  No definite abscess identified at this time.         Electronically signed by: Christopher Olivier   Date:    04/13/2025   Time:    14:33      CT Thigh With  Contrast Right   Final Result      Extensive soft tissue swelling and skin thickening with suspected reactive right inguinal adenopathy.  No definite abscess identified at this time.         Electronically signed by: Christopher Olivier   Date:    04/13/2025   Time:    14:33      US Lower Extremity Veins Right   Final Result      No evidence of deep venous thrombosis in the right lower extremity.         Electronically signed by: Christopher Olivier   Date:    04/13/2025   Time:    12:13      X-Ray Chest AP Portable   Final Result    As above.      Finalized on: 4/13/2025 11:18 AM By:  Christopher Olivier MD   [facility]# [id number]      2025-04-13 11:20:[id number]     [facility]      US Guided Needle Placement    (Results Pending)   MRI Tibia Fibula W WO Contrast Right    (Results Pending)     Fluid from blister remains negative thus far    MDM  Number of Diagnoses or Management Options  Acute on chronic combined systolic and diastolic heart failure: new, needed workup  Cellulitis of right lower leg: new, needed workup  Morbid obesity: new, needed workup  Right leg swelling: new, needed workup  Screening for cardiovascular condition: new, needed workup  Severe sepsis: new, needed workup      Assessment & Plan  Stephanie Raza is a 69 y.o. female with a severe soft tissue infection.  Has resultant pain, swelling, redness, and warmth of right lower leg.  Thus far, no evidence of abscess.  She is not really responding to IV antibiotics at this time.  Venous duplex negative for DVT.  Arterial duplex showed possible SFA stenosis but has bounding DP pulse on my exam.  CT thigh and tib/fib negative for abscess.  MRI is pending today.  I don't believe as of now there is a surgical solution or anything to drain.  This is a severe soft tissue infection.  Will f/u MRI and see patient in the morning.  Continue IV antibiotics and maybe consider changing IV antibiotics to see if there is a response, defer to ID.      I discussed this plan with the patient and  she understands, agrees, and appreciates our input and would like to proceed with our above stated plan.    Basilio Hartley  4/18/2025  [facility]  Vascular Surgery  [phone] (Clinic Number)    Active Hospital Problems    Diagnosis  POA    *Cellulitis of right lower extremity [L03.115]  Yes    Morbid obesity [E66.01]  Yes    Primary hypertension [I10]  Yes    Acute on chronic combined systolic and diastolic congestive heart failure [I50.43]  Yes    YOVANA (acute kidney injury) [N17.9]  Yes     Last Assessment & Plan:    Formatting of this note might be different from the original.   History & Physical       Discharge Summary       Follow-up patient with progressive oliguric YOVANA after surgery.  Resolved. Avoid nephrotoxic agents, renally dose all medications where necessary, and protect nondominant arm as much as possible      Type 2 diabetes mellitus with diabetic polyneuropathy, with long-term current use of insulin [E11.42, Z79.4]  Not Applicable      Resolved Hospital Problems   No resolved problems to display.           [1]   Social History  Socioeconomic History    Marital status:    Tobacco Use    Smoking status: Never     Passive exposure: Never    Smokeless tobacco: Never   Substance and Sexual Activity    Alcohol use: Yes     Alcohol/week: 3.0 standard drinks of alcohol     Types: 1 Glasses of wine, 2 Shots of liquor per week    Drug use: Never    Sexual activity: Not Currently     Partners: Male     Social Drivers of Health     Financial Resource Strain: Medium Risk (4/14/2025)    Overall Financial Resource Strain (CARDIA)     Difficulty of Paying Living Expenses: Somewhat hard   Food Insecurity: Food Insecurity Present (4/14/2025)    Hunger Vital Sign     Worried About Running Out of Food in the Last Year: Sometimes true     Ran Out of Food in the Last Year: Sometimes true   Transportation Needs: Unmet Transportation Needs (4/14/2025)    PRAPARE - Transportation     Lack of Transportation  (Medical): Yes     Lack of Transportation (Non-Medical): Yes   Physical Activity: Sufficiently Active (9/27/2024)    Exercise Vital Sign     Days of Exercise per Week: 4 days     Minutes of Exercise per Session: 60 min   Stress: Stress Concern Present (4/14/2025)    [employer] [place] [employer] - Occupational Stress Questionnaire     Feeling of Stress : Very much   Housing Stability: Low Risk  (4/14/2025)    Housing Stability Vital Sign     Unable to Pay for Housing in the Last Year: No     Homeless in the Last Year: No   [2]   Current Facility-Administered Medications:     acetaminophen tablet 650 mg, 650 mg, Oral, Q8H PRN, Sarahi Franco MD    acetaminophen tablet 650 mg, 650 mg, Oral, Q4H PRN, Sarahi Franco MD    aspirin chewable tablet 81 mg, 81 mg, Oral, Daily, Sarahi Franco MD, 81 mg at 04/18/25 0801    cetirizine tablet 10 mg, 10 mg, Oral, Daily, Delfino Johansen MD, 10 mg at 04/18/25 0801    dextrose 50% injection 12.5 g, 12.5 g, Intravenous, PRN, Sarahi Franco MD    dextrose 50% injection 12.5 g, 12.5 g, Intravenous, PRN, Sarahi Franco MD    dextrose 50% injection 25 g, 25 g, Intravenous, PRN, Sarahi Franco MD    dextrose 50% injection 25 g, 25 g, Intravenous, PRN, Sarahi Franco MD    enoxaparin injection 40 mg, 40 mg, Subcutaneous, Q12H (prophylaxis, 0900/2100), Sarahi Franco MD, 40 mg at 04/18/25 0801    fluticasone propionate 50 mcg/actuation nasal spray 100 mcg, 2 spray, Each Nostril, Daily, Delfino Johansen MD, 100 mcg at 04/18/25 0800    glucagon (human recombinant) injection 1 mg, 1 mg, Intramuscular, PRN, Sarahi Franco MD    glucagon (human recombinant) injection 1 mg, 1 mg, Intramuscular, PRN, Sarahi Franco MD    glucose chewable tablet 16 g, 16 g, Oral, PRN, Sarahi Franco MD    glucose chewable tablet 16 g, 16 g, Oral, PRN, Sarahi Franco MD    glucose chewable tablet 24 g, 24 g, Oral, PRN,  Sarahi Franco MD    glucose chewable tablet 24 g, 24 g, Oral, PRN, Sarahi Franco MD    HYDROcodone-acetaminophen  mg per tablet 1 tablet, 1 tablet, Oral, Q4H PRN, Delfino Johansen MD, 1 tablet at 04/18/25 0533    HYDROcodone-acetaminophen 5-325 mg per tablet 1 tablet, 1 tablet, Oral, Q4H PRN, Delfino Johansen MD    insulin aspart U-100 pen 0-5 Units, 0-5 Units, Subcutaneous, QID (AC + HS) PRN, Sarahi Franco MD, 1 Units at 04/17/25 2218    linezolid tablet 600 mg, 600 mg, Oral, Q12H, Sarahi Franco MD, 600 mg at 04/18/25 0801    melatonin tablet 6 mg, 6 mg, Oral, Nightly PRN, Sarahi Franco MD    metoprolol succinate (TOPROL-XL) 24 hr tablet 50 mg, 50 mg, Oral, BID, Sarahi Franoc MD, 50 mg at 04/18/25 0801    naloxone 0.4 mg/mL injection 0.02 mg, 0.02 mg, Intravenous, PRN, Sarahi Franco MD    ondansetron injection 4 mg, 4 mg, Intravenous, Q8H PRN, Sarahi Franco MD    oxyCODONE immediate release tablet 5 mg, 5 mg, Oral, Q6H PRN, Delfino Johansen MD, 5 mg at 04/18/25 0740    piperacillin-tazobactam (ZOSYN) 4.5 g in D5W 100 mL IVPB (MB+), 4.5 g, Intravenous, Q8H, Sarahi Franco MD, Stopped at 04/18/25 0744    polyethylene glycol packet 17 g, 17 g, Oral, Daily, Sarahi Franco MD, 17 g at 04/17/25 0845    simethicone chewable tablet 80 mg, 1 tablet, Oral, QID PRN, Sarahi Franco MD    sodium chloride 0.9% flush 2 mL, 2 mL, Intravenous, Q12H PRN, Sarahi Franco MD

## 2025-04-18 NOTE — ASSESSMENT & PLAN NOTE
Patient's blood pressure range in the last 24 hours was: BP  Min: 110/54  Max: 139/64.The patient's inpatient anti-hypertensive regimen is listed below:  Current Antihypertensives  , Daily, Oral  metoprolol succinate (TOPROL-XL) 24 hr tablet 50 mg, 2 times daily, Oral    Plan  - BP is controlled, no changes needed to their regimen  Holding arb due to intermittent hypotension

## 2025-04-18 NOTE — ASSESSMENT & PLAN NOTE
Body mass index is 50.85 kg/m². Morbid obesity complicates all aspects of disease management from diagnostic modalities to treatment. Weight loss encouraged and health benefits explained to patient.   Physical/occupational therapy recommending moderate intensity therapy but patient declines

## 2025-04-18 NOTE — PLAN OF CARE
Pt tolerated Tx session well. Still expresses extreme pain in RLE to touch.   BM- Min A  EOB max 10 min d/t safety

## 2025-04-18 NOTE — SUBJECTIVE & OBJECTIVE
Interval History:  Cellulitis relatively unchanged, leukocytosis improving    Medications:  Continuous Infusions:  Scheduled Meds:   aspirin  81 mg Oral Daily    cetirizine  10 mg Oral Daily    enoxparin  40 mg Subcutaneous Q12H (prophylaxis, 0900/2100)    fluticasone propionate  2 spray Each Nostril Daily    linezolid  600 mg Oral Q12H    metoprolol succinate  50 mg Oral BID    piperacillin-tazobactam (Zosyn) IV (PEDS and ADULTS) (extended infusion is not appropriate)  4.5 g Intravenous Q8H    polyethylene glycol  17 g Oral Daily     PRN Meds:  Current Facility-Administered Medications:     acetaminophen, 650 mg, Oral, Q8H PRN    acetaminophen, 650 mg, Oral, Q4H PRN    dextrose 50%, 12.5 g, Intravenous, PRN    dextrose 50%, 12.5 g, Intravenous, PRN    dextrose 50%, 25 g, Intravenous, PRN    dextrose 50%, 25 g, Intravenous, PRN    glucagon (human recombinant), 1 mg, Intramuscular, PRN    glucagon (human recombinant), 1 mg, Intramuscular, PRN    glucose, 16 g, Oral, PRN    glucose, 16 g, Oral, PRN    glucose, 24 g, Oral, PRN    glucose, 24 g, Oral, PRN    HYDROcodone-acetaminophen, 1 tablet, Oral, Q4H PRN    HYDROcodone-acetaminophen, 1 tablet, Oral, Q4H PRN    insulin aspart U-100, 0-5 Units, Subcutaneous, QID (AC + HS) PRN    melatonin, 6 mg, Oral, Nightly PRN    naloxone, 0.02 mg, Intravenous, PRN    ondansetron, 4 mg, Intravenous, Q8H PRN    oxyCODONE, 5 mg, Oral, Q6H PRN    simethicone, 1 tablet, Oral, QID PRN    sodium chloride 0.9%, 2 mL, Intravenous, Q12H PRN     Review of patient's allergies indicates:   Allergen Reactions    Adhesive tape-silicones Swelling     Paper tape     Objective:     Vital Signs (Most Recent):  Temp: 98.3 °F (36.8 °C) (04/18/25 1153)  Pulse: 95 (04/18/25 1153)  Resp: 18 (04/18/25 1153)  BP: (!) 118/58 (04/18/25 1153)  SpO2: (!) 92 % (04/18/25 1153) Vital Signs (24h Range):  Temp:  [97.6 °F (36.4 °C)-100.1 °F (37.8 °C)] 98.3 °F (36.8 °C)  Pulse:  [] 95  Resp:  [17-19] 18  SpO2:   [92 %-95 %] 92 %  BP: (110-139)/(54-64) 118/58     Weight: 126.1 kg (278 lb)  Body mass index is 50.85 kg/m².    Intake/Output - Last 3 Shifts         04/16 0700  04/17 0659 04/17 0700  04/18 0659 04/18 0700  04/19 0659    P.O. 270 360 240    Total Intake(mL/kg) 270 (2.2) 360 (2.9) 240 (1.9)    Urine (mL/kg/hr) 200 (0.1) 600 (0.2)     Stool  0     Total Output 200 600     Net +70 -240 +240           Urine Occurrence 3 x 3 x     Stool Occurrence 1 x 3 x 1 x             Physical Exam  Vitals and nursing note reviewed.   Constitutional:       Appearance: She is well-developed. She is obese.   HENT:      Head: Normocephalic.   Eyes:      Pupils: Pupils are equal, round, and reactive to light.   Neck:      Thyroid: No thyromegaly.      Vascular: No JVD.      Trachea: No tracheal deviation.   Cardiovascular:      Rate and Rhythm: Normal rate and regular rhythm.      Heart sounds: Normal heart sounds.   Pulmonary:      Breath sounds: Normal breath sounds. No wheezing.   Abdominal:      General: Bowel sounds are normal. There is no distension.      Palpations: Abdomen is soft. Abdomen is not rigid. There is no mass.      Tenderness: There is no abdominal tenderness. There is no guarding or rebound.      Comments: Obese   Musculoskeletal:         General: Normal range of motion.      Right lower leg: Edema (from the knee down) present.      Comments: Erythema/cellulitis with tried superficial blisters, slight improvement from original marks  No clear evidence of abscess   Lymphadenopathy:      Cervical: No cervical adenopathy.   Skin:     General: Skin is warm and dry.      Findings: No erythema (right lower extremity from the knee down) or rash.   Neurological:      Mental Status: She is oriented to person, place, and time.      Comments: Motor and sensation of the right foot are intact          Significant Labs:

## 2025-04-18 NOTE — ASSESSMENT & PLAN NOTE
She had extensive right lower extremity cellulitis.  Will continue Zyvox, clindamycin, Zosyn.  Will monitor clinical response in a.m.a.m. she needs close follow-up    04/15-  She reports interval improvement in the erythema  Will plan to stop Clindamycin , continue Zyvox/Zosyn    04/16- will plan to do MRI of the lower extremity if wbc continues to increase- will monitor wbc in AM  On Zyvox/Zosyn  Follow Gen surgery  04/17-will order MRI of the right lower extremity.  We will discuss case with primary team.  Would continue Zosyn and Zyvox.

## 2025-04-18 NOTE — PROGRESS NOTES
[facility] Medicine  Progress Note    Patient Name: Stephanie Raza  MRN: [id number]  Patient Class: IP- Inpatient   Admission Date: 4/13/2025  Length of Stay: 5 days  Attending Physician: Delfino Johansen MD  Primary Care Provider: Reyna Suarez MD        Subjective     Principal Problem:Cellulitis of right lower extremity        HPI:  Patient is a 65-year-old female with a history of diabetes, hyperlipidemia, hypertension, obesity who presented emergency department for evaluation of right leg swelling.  Patient reports on 04/06 she woke up vomiting and did not feel well.  When her daughter saw her on 04/08 she took her to the [facility].  She reports she had swelling in her right leg that was painful all the way up to her groin.  She was admitted for 3 days reportedly treated with Ancef at which time the marks of erythema had improved her fever had subsided and she was discharged home on Augmentin (blood cultures remain negative).  After being home she noted that the swelling worsened and she re-presented to the hospital.  In the emergency department she was afebrile, laboratory exam revealed protocol of 2.25, BNP of 150, sed rate greater 120, , white blood cell count of 13.4.  Lactic acid level was normal at 1.7.  Blood cultures were obtained and she was begun on vancomycin and meropenem.  CT scan of the leg revealed large left inguinal lymph nodes, fat stranding in the thigh most notably in the medial portion right hip arthroplasty in place intrapelvic contents unremarkable  CTA tib-fib soft tissue thickening and fat stranding in the anterior aspect of the lower extremity with disorganized fluid throughout the superficial compartment with no definitive involvement of the deep intramuscular compartment, no definitive abscess  Doppler exam of lower extremity negative for DVT    Consult general surgery and Infectious Disease for evaluation.        Overview/Hospital  Course:  4/14 admitted for right leg cellulitis. Denies insect bite, trauma. States improvement in symptoms of erythema, edema and pain. Intravenous antibiotic(s) per infectious disease. Surgery following.  4/15 antibiotic(s) per infectious disease. Cellulitis modestly improving. Pain regimen adjusted.  4/16 interventional radiology aspirated blister. Body fluid studies pending. Patient endorses improvement in symptoms of edema and erythema. Us liliana pending  4/17 us with moderate stenosis in superficial femoral artery. Patient unable to tolerate us liliana studydue to pain. Vascular surgery and nephrology consulted due to unimproved cellulitis and acute kidney injury. Us abdomen complete with medical renal disease, no hydronephrosis. Infectious disease aware. Surgery recommending mri pending renal improvement  4/18 creatinine improving. Awaiting mri    Interval History: See hospital course for today      Review of Systems   Constitutional:  Positive for activity change (improving) and fatigue. Negative for appetite change and fever.   Respiratory:  Negative for shortness of breath.    Musculoskeletal:  Positive for gait problem and myalgias.   Skin:  Positive for color change and wound.   Neurological:  Positive for weakness.   Psychiatric/Behavioral:  Positive for dysphoric mood. Negative for agitation, behavioral problems, confusion and decreased concentration. The patient is not nervous/anxious.      Objective:     Vital Signs (Most Recent):  Temp: 98.3 °F (36.8 °C) (04/18/25 1153)  Pulse: 95 (04/18/25 1153)  Resp: 18 (04/18/25 1153)  BP: (!) 118/58 (04/18/25 1153)  SpO2: (!) 92 % (04/18/25 1153) Vital Signs (24h Range):  Temp:  [97.6 °F (36.4 °C)-100.1 °F (37.8 °C)] 98.3 °F (36.8 °C)  Pulse:  [] 95  Resp:  [17-19] 18  SpO2:  [92 %-95 %] 92 %  BP: (110-139)/(54-64) 118/58     Weight: 126.1 kg (278 lb)  Body mass index is 50.85 kg/m².    Intake/Output Summary (Last 24 hours) at 4/18/2025 1223  Last data filed at  4/18/2025 0828  Gross per 24 hour   Intake 600 ml   Output 600 ml   Net 0 ml         Physical Exam  Vitals and nursing note reviewed.   Constitutional:       General: She is not in acute distress.     Appearance: She is morbidly obese. She is ill-appearing. She is not toxic-appearing.   HENT:      Head: Normocephalic and atraumatic.   Cardiovascular:      Rate and Rhythm: Normal rate.   Pulmonary:      Effort: Pulmonary effort is normal. No respiratory distress.   Abdominal:      Palpations: Abdomen is soft.      Tenderness: There is no abdominal tenderness.   Musculoskeletal:         General: Swelling and tenderness present.      Right lower leg: Edema present.      Left lower leg: No edema.   Skin:     General: Skin is warm.      Findings: Erythema (blanchable) present.   Neurological:      Mental Status: She is alert and oriented to person, place, and time.      Motor: Weakness present.   Psychiatric:         Mood and Affect: Mood is depressed.         Behavior: Behavior normal.               Significant Labs: All pertinent labs within the past 24 hours have been reviewed.  Blood Culture: negative growth to date   CBC:   Recent Labs   Lab 04/17/25  0839 04/18/25  0522   WBC 18.51* 14.10*   HGB 10.2* 9.9*   HCT 32.5* 31.1*    378     CMP:   Recent Labs   Lab 04/17/25  0839 04/18/25  0522   * 136   K 3.8 3.9   CL 97 101   CO2 27 27   BUN 35* 30*   CREATININE 1.7* 1.3   CALCIUM 8.5* 8.3*   ANIONGAP 8 8     Fluid studies no organisms seen    Significant Imaging: I have reviewed all pertinent imaging results/findings within the past 24 hours.  Mri pending      Assessment & Plan  Type 2 diabetes mellitus with diabetic polyneuropathy, with long-term current use of insulin  Patient with diabetes currently well-controlled is taking Ozempic outpatient  Sliding scale insulin with Accu-Cheks a.c. HS  Morbid obesity  Body mass index is 50.85 kg/m². Morbid obesity complicates all aspects of disease management from  diagnostic modalities to treatment. Weight loss encouraged and health benefits explained to patient.   Physical/occupational therapy recommending moderate intensity therapy but patient declines      Cellulitis of right lower extremity  Concern for neck fascia however patient's white blood cell count, creatinine, glucose are not consistent.  However picture may be mixed due to already having been on antibiotics.  Patient reports began 4/7  She was hospitalized at [facility] 4/7 through 4/10 reportedly she was febrile.  She was treated with Ancef and by 4/10 she was afebrile.  There are surgical pen marks on her right upper thigh which shows that the redness has receded some.  Blood cultures at the [facility] were negative.  She was discharged on Augmentin.  Patient had worsening swelling and pain and she re-presented to [facility].  See results of CT scan and Doppler of lower extremity above  Was given vancomycin and cefepime in ER  We will change to Zyvox for better skin penetration for Staph, Zosyn for anaerobes and strep and add clindamycin for toxin.  General surgery following, no surgical intervention warranted  Ct imaging negative for abscess  Deferring final antibiotic(s) per infectious disease     No significant improvement  Mild acute kidney injury   Psh right total hip revision  Consider mri to evaluate prosthesis though erythema has receded from knee and leukocytosis resolved  Interventional radiology to aspirate blister, studies pending  Us liliana unable to tolerate  Incentive spirometer   Defer to infectious disease to adjust antibiotic(s)    Awaiting mri    Primary hypertension  Patient's blood pressure range in the last 24 hours was: BP  Min: 110/54  Max: 139/64.The patient's inpatient anti-hypertensive regimen is listed below:  Current Antihypertensives  , Daily, Oral  metoprolol succinate (TOPROL-XL) 24 hr tablet 50 mg, 2 times daily, Oral    Plan  - BP is controlled, no changes needed  "to their regimen  Holding arb due to intermittent hypotension  Acute on chronic combined systolic and diastolic congestive heart failure  Patient has Combined Systolic and Diastolic heart failure that is Acute on chronic. On presentation their CHF was decompensated. Evidence of decompensated CHF on presentation includes: edema. The etiology of their decompensation is likely increased fluid intake. Most recent BNP and echo results are listed below.  No results for input(s): "BNP" in the last 72 hours.    Latest ECHO  No results found for this or any previous visit.    Current Heart Failure Medications  , Daily, Oral  metoprolol succinate (TOPROL-XL) 24 hr tablet 50 mg, 2 times daily, Oral    Plan  - Monitor strict I&Os and daily weights.    - Place on telemetry  - Low sodium diet  - Place on fluid restriction of 1.5 L.   - Cardiology has not been consulted  - The patient's volume status is at their baseline  -patient follows with Dr. Mikie Saxena whom she saw about 3 weeks ago.  She has follow up appointment in May.    Echocardiogram reviewed       YOVANA (acute kidney injury)  YOVANA is likely due to acute tubular necrosis caused by possible drug induced . Baseline creatinine is  0.9 . Most recent creatinine and eGFR are listed below.  Recent Labs     04/16/25  0605 04/17/25  0839 04/18/25  0522   CREATININE 1.8* 1.7* 1.3   EGFRNORACEVR 31* 33* 45*      Plan  - YOVANA is improving  - Avoid nephrotoxins and renally dose meds for GFR listed above  - Monitor urine output, serial BMP, and adjust therapy as needed        VTE Risk Mitigation (From admission, onward)           Ordered     enoxaparin injection 40 mg  Every 12 hours         04/13/25 1626     IP VTE HIGH RISK PATIENT  Once         04/13/25 1622     Place sequential compression device  Until discontinued         04/13/25 1622                    Discharge Planning   RONNIE: 4/15/2025     Code Status: Full Code   Medical Readiness for Discharge Date:   Discharge Plan A: " [facility]                    Delfino Johansen MD  Department of Hospital Medicine   '[facility] S[facility]

## 2025-04-18 NOTE — PROGRESS NOTES
[facility] - Med Surg  Infectious Disease  Progress Note    Patient Name: Stephanie Raza  MRN: [id number]  Admission Date: 4/13/2025  Length of Stay: 5 days  Attending Physician: Delfino Johansen MD  Primary Care Provider: Reyna Suarez MD    Isolation Status: No active isolations  Assessment/Plan:      Renal/  YOVANA (acute kidney injury)  We will follow nephrology team    ID  * Cellulitis of right lower extremity    She had extensive right lower extremity cellulitis.  Will continue Zyvox, clindamycin, Zosyn.  Will monitor clinical response in a.m.a.m. she needs close follow-up    04/15-  She reports interval improvement in the erythema  Will plan to stop Clindamycin , continue Zyvox/Zosyn    04/16- will plan to do MRI of the lower extremity if wbc continues to increase- will monitor wbc in AM  On Zyvox/Zosyn  Follow Gen surgery  04/17-will order MRI of the right lower extremity.  We will discuss case with primary team.  Would continue Zosyn and Zyvox.    Endocrine  Type 2 diabetes mellitus with diabetic polyneuropathy, with long-term current use of insulin    Insulin regimen as per primary team        Anticipated Disposition:     Thank you for your consult. I will follow-up with patient. Please contact us if you have any additional questions.    Phillip Mon MD, [facility]  Infectious Disease  [facility] - Med Surg    Subjective:     Principal Problem:Cellulitis of right lower extremity    HPI:  65-year-old female with a history of diabetes, hyperlipidemia, hypertension, obesity .   she was admitted with worsening right lower leg erythema and swelling.  She was initially admitted 0408- treated with Ancef and discharged on Augmentin.  She has she was now admitted with worsening infection    Labs and imaging tests reviewed sed rate greater 120, , white blood cell count of 13.4.  Lactic acid level was normal at 1.7..  CT scan of the leg revealed large left inguinal lymph nodes, fat stranding in the thigh most  "notably in the medial portion right hip arthroplasty in place intrapelvic contents unremarkable  CTA tib-fib soft tissue thickening and fat stranding in the anterior aspect of the lower extremity with disorganized fluid throughout the superficial compartment with no definitive involvement of the deep intramuscular compartment, no definitive abscess    Interval History:   She reports less pain and erythema.  She feels better.  CBC - wbc 14.64.  04/16-  She feels better  CBC showed wbc 18- increased  04/17-  She has persistent leukocytosis.  WBC remains high at 18.  Review of Systems   Constitutional:  Negative for activity change, appetite change, chills, diaphoresis, fatigue and fever.   Neurological:  Negative for dizziness and facial asymmetry.     Objective:     Vital Signs (Most Recent):  Temp: 100.1 °F (37.8 °C) (04/18/25 0446)  Pulse: 100 (04/18/25 0446)  Resp: 18 (04/18/25 0533)  BP: (!) 112/55 (04/18/25 0446)  SpO2: (!) 93 % (04/18/25 0446) Vital Signs (24h Range):  Temp:  [98.7 °F (37.1 °C)-100.1 °F (37.8 °C)] 100.1 °F (37.8 °C)  Pulse:  [] 100  Resp:  [17-19] 18  SpO2:  [86 %-95 %] 93 %  BP: (112-144)/(55-88) 112/55     Weight: 126.1 kg (278 lb)  Body mass index is 50.85 kg/m².    Estimated Creatinine Clearance: 40.8 mL/min (A) (based on SCr of 1.7 mg/dL (H)).     Physical Exam  Vitals and nursing note reviewed.   HENT:      Head: Normocephalic.   Eyes:      Pupils: Pupils are equal, round, and reactive to light.   Pulmonary:      Effort: Pulmonary effort is normal.   Abdominal:      General: Abdomen is flat.   Skin:     Findings: Bruising, erythema and lesion present.   Neurological:      General: No focal deficit present.      Mental Status: She is alert.                Significant Labs: Blood Culture: No results for input(s): "LABBLOO" in the last 4320 hours.  CBC:   Recent Labs   Lab 04/17/25  0839   WBC 18.51*   HGB 10.2*   HCT 32.5*        CMP:   Recent Labs   Lab 04/17/25  0839   NA " 132*   K 3.8   CL 97   CO2 27   BUN 35*   CREATININE 1.7*   CALCIUM 8.5*   ANIONGAP 8     All pertinent labs within the past 24 hours have been reviewed.    Significant Imaging: I have reviewed all pertinent imaging results/findings within the past 24 hours.

## 2025-04-19 LAB
ABSOLUTE EOSINOPHIL (OHS): 0.2 K/UL
ABSOLUTE MONOCYTE (OHS): 1.04 K/UL (ref 0.3–1)
ABSOLUTE NEUTROPHIL COUNT (OHS): 8.18 K/UL (ref 1.8–7.7)
BACTERIA BLD CULT: NORMAL
BACTERIA BLD CULT: NORMAL
BASOPHILS # BLD AUTO: 0.04 K/UL
BASOPHILS NFR BLD AUTO: 0.4 %
ERYTHROCYTE [DISTWIDTH] IN BLOOD BY AUTOMATED COUNT: 15.2 % (ref 11.5–14.5)
HCT VFR BLD AUTO: 30 % (ref 37–48.5)
HGB BLD-MCNC: 9.5 GM/DL (ref 12–16)
HOLD SPECIMEN: NORMAL
IMM GRANULOCYTES # BLD AUTO: 0.14 K/UL (ref 0–0.04)
IMM GRANULOCYTES NFR BLD AUTO: 1.3 % (ref 0–0.5)
LYMPHOCYTES # BLD AUTO: 1.4 K/UL (ref 1–4.8)
MCH RBC QN AUTO: 26 PG (ref 27–31)
MCHC RBC AUTO-ENTMCNC: 31.7 G/DL (ref 32–36)
MCV RBC AUTO: 82 FL (ref 82–98)
NUCLEATED RBC (/100WBC) (OHS): 0 /100 WBC
PLATELET # BLD AUTO: 337 K/UL (ref 150–450)
PMV BLD AUTO: 10.2 FL (ref 9.2–12.9)
POCT GLUCOSE: 129 MG/DL (ref 70–110)
POCT GLUCOSE: 152 MG/DL (ref 70–110)
POCT GLUCOSE: 215 MG/DL (ref 70–110)
RBC # BLD AUTO: 3.66 M/UL (ref 4–5.4)
RELATIVE EOSINOPHIL (OHS): 1.8 %
RELATIVE LYMPHOCYTE (OHS): 12.7 % (ref 18–48)
RELATIVE MONOCYTE (OHS): 9.5 % (ref 4–15)
RELATIVE NEUTROPHIL (OHS): 74.3 % (ref 38–73)
WBC # BLD AUTO: 11 K/UL (ref 3.9–12.7)

## 2025-04-19 PROCEDURE — 99233 SBSQ HOSP IP/OBS HIGH 50: CPT | Mod: HCNC,,, | Performed by: SURGERY

## 2025-04-19 PROCEDURE — 25000003 PHARM REV CODE 250: Mod: HCNC | Performed by: FAMILY MEDICINE

## 2025-04-19 PROCEDURE — 85025 COMPLETE CBC W/AUTO DIFF WBC: CPT | Mod: HCNC | Performed by: SURGERY

## 2025-04-19 PROCEDURE — 94799 UNLISTED PULMONARY SVC/PX: CPT | Mod: HCNC

## 2025-04-19 PROCEDURE — 63600175 PHARM REV CODE 636 W HCPCS: Mod: HCNC | Performed by: INTERNAL MEDICINE

## 2025-04-19 PROCEDURE — 99900035 HC TECH TIME PER 15 MIN (STAT): Mod: HCNC

## 2025-04-19 PROCEDURE — 99223 1ST HOSP IP/OBS HIGH 75: CPT | Mod: HCNC,,, | Performed by: ORTHOPAEDIC SURGERY

## 2025-04-19 PROCEDURE — 36415 COLL VENOUS BLD VENIPUNCTURE: CPT | Mod: HCNC | Performed by: FAMILY MEDICINE

## 2025-04-19 PROCEDURE — 21400001 HC TELEMETRY ROOM: Mod: HCNC

## 2025-04-19 PROCEDURE — 99232 SBSQ HOSP IP/OBS MODERATE 35: CPT | Mod: HCNC,,, | Performed by: SURGERY

## 2025-04-19 PROCEDURE — 25000003 PHARM REV CODE 250: Mod: HCNC | Performed by: INTERNAL MEDICINE

## 2025-04-19 RX ORDER — METRONIDAZOLE 10 MG/G
GEL TOPICAL 2 TIMES DAILY
Status: DISCONTINUED | OUTPATIENT
Start: 2025-04-19 | End: 2025-04-22 | Stop reason: HOSPADM

## 2025-04-19 RX ADMIN — MICONAZOLE NITRATE: 20 OINTMENT TOPICAL at 08:04

## 2025-04-19 RX ADMIN — METRONIDAZOLE: 10 GEL TOPICAL at 04:04

## 2025-04-19 RX ADMIN — HYDROCODONE BITARTRATE AND ACETAMINOPHEN 1 TABLET: 10; 325 TABLET ORAL at 05:04

## 2025-04-19 RX ADMIN — OXYCODONE HYDROCHLORIDE 5 MG: 5 TABLET ORAL at 08:04

## 2025-04-19 RX ADMIN — INSULIN ASPART 2 UNITS: 100 INJECTION, SOLUTION INTRAVENOUS; SUBCUTANEOUS at 12:04

## 2025-04-19 RX ADMIN — FLUTICASONE PROPIONATE 100 MCG: 50 SPRAY, METERED NASAL at 08:04

## 2025-04-19 RX ADMIN — LINEZOLID 600 MG: 600 TABLET, FILM COATED ORAL at 08:04

## 2025-04-19 RX ADMIN — ENOXAPARIN SODIUM 40 MG: 40 INJECTION SUBCUTANEOUS at 08:04

## 2025-04-19 RX ADMIN — PIPERACILLIN SODIUM AND TAZOBACTAM SODIUM 4.5 G: 4; .5 INJECTION, POWDER, FOR SOLUTION INTRAVENOUS at 11:04

## 2025-04-19 RX ADMIN — METRONIDAZOLE: 10 GEL TOPICAL at 11:04

## 2025-04-19 RX ADMIN — HYDROCODONE BITARTRATE AND ACETAMINOPHEN 1 TABLET: 10; 325 TABLET ORAL at 11:04

## 2025-04-19 RX ADMIN — HYDROCODONE BITARTRATE AND ACETAMINOPHEN 1 TABLET: 10; 325 TABLET ORAL at 02:04

## 2025-04-19 RX ADMIN — PIPERACILLIN SODIUM AND TAZOBACTAM SODIUM 4.5 G: 4; .5 INJECTION, POWDER, FOR SOLUTION INTRAVENOUS at 06:04

## 2025-04-19 RX ADMIN — OXYCODONE HYDROCHLORIDE 5 MG: 5 TABLET ORAL at 11:04

## 2025-04-19 RX ADMIN — HYDROCODONE BITARTRATE AND ACETAMINOPHEN 1 TABLET: 10; 325 TABLET ORAL at 10:04

## 2025-04-19 RX ADMIN — HYDROCODONE BITARTRATE AND ACETAMINOPHEN 1 TABLET: 10; 325 TABLET ORAL at 01:04

## 2025-04-19 RX ADMIN — CETIRIZINE HYDROCHLORIDE 10 MG: 10 TABLET, FILM COATED ORAL at 09:04

## 2025-04-19 RX ADMIN — METOPROLOL SUCCINATE 50 MG: 50 TABLET, EXTENDED RELEASE ORAL at 08:04

## 2025-04-19 RX ADMIN — MICONAZOLE NITRATE: 20 OINTMENT TOPICAL at 12:04

## 2025-04-19 RX ADMIN — PIPERACILLIN SODIUM AND TAZOBACTAM SODIUM 4.5 G: 4; .5 INJECTION, POWDER, FOR SOLUTION INTRAVENOUS at 04:04

## 2025-04-19 RX ADMIN — ASPIRIN 81 MG CHEWABLE TABLET 81 MG: 81 TABLET CHEWABLE at 08:04

## 2025-04-19 RX ADMIN — HYDROCODONE BITARTRATE AND ACETAMINOPHEN 1 TABLET: 10; 325 TABLET ORAL at 06:04

## 2025-04-19 NOTE — SUBJECTIVE & OBJECTIVE
"Interval History:   She reports less pain and erythema.  She feels better.  CBC - wbc 14.64.  04/16-  She feels better  CBC showed wbc 18- increased  04/17-  She has persistent leukocytosis.  WBC remains high at 18.    04/18-  She feels better but has persistent leucocytosis .       Review of Systems   Constitutional:  Negative for activity change, appetite change, chills, diaphoresis, fatigue and fever.   Neurological:  Negative for dizziness and facial asymmetry.     Objective:     Vital Signs (Most Recent):  Temp: 98.4 °F (36.9 °C) (04/18/25 2042)  Pulse: 103 (Simultaneous filing. User may not have seen previous data.) (04/18/25 2042)  Resp: 18 (04/18/25 2042)  BP: (!) 132/54 (04/18/25 2042)  SpO2: 96 % (04/18/25 2042) Vital Signs (24h Range):  Temp:  [97.6 °F (36.4 °C)-100.1 °F (37.8 °C)] 98.4 °F (36.9 °C)  Pulse:  [] 103  Resp:  [17-20] 18  SpO2:  [92 %-96 %] 96 %  BP: (110-145)/(54-70) 132/54     Weight: 126.1 kg (278 lb)  Body mass index is 50.85 kg/m².    Estimated Creatinine Clearance: 53.4 mL/min (based on SCr of 1.3 mg/dL).     Physical Exam  Vitals and nursing note reviewed.   HENT:      Head: Normocephalic.   Eyes:      Pupils: Pupils are equal, round, and reactive to light.   Pulmonary:      Effort: Pulmonary effort is normal.   Abdominal:      General: Abdomen is flat.   Skin:     Findings: Bruising, erythema and lesion present.   Neurological:      General: No focal deficit present.      Mental Status: She is alert.                Significant Labs: Blood Culture: No results for input(s): "LABBLOO" in the last 4320 hours.  CBC:   Recent Labs   Lab 04/17/25 0839 04/18/25 0522   WBC 18.51* 14.10*   HGB 10.2* 9.9*   HCT 32.5* 31.1*    378     CMP:   Recent Labs   Lab 04/17/25  0839 04/18/25  0522   * 136   K 3.8 3.9   CL 97 101   CO2 27 27   BUN 35* 30*   CREATININE 1.7* 1.3   CALCIUM 8.5* 8.3*   ANIONGAP 8 8     All pertinent labs within the past 24 hours have been " reviewed.    Significant Imaging: I have reviewed all pertinent imaging results/findings within the past 24 hours.

## 2025-04-19 NOTE — PT/OT/SLP PROGRESS
Physical Therapy      Patient Name:  Stephanie Raza   MRN:  [id number]    Attempted to see pt @4220, pt reports seeing MD today and will be having surgery tomorrow. Pt states she cannot move and does not want to make her leg worse.  Will follow-up at next available opportunity.     Nimisha Pate, PTA

## 2025-04-19 NOTE — SUBJECTIVE & OBJECTIVE
Interval History: See hospital course for today      Review of Systems   Constitutional:  Positive for activity change and fatigue. Negative for appetite change and fever.   Respiratory:  Negative for shortness of breath.    Cardiovascular:  Positive for leg swelling.   Musculoskeletal:  Positive for gait problem and myalgias.   Skin:  Positive for wound.   Neurological:  Positive for weakness.   Psychiatric/Behavioral:  Positive for dysphoric mood. Negative for agitation, behavioral problems, confusion and decreased concentration. The patient is not nervous/anxious.      Objective:     Vital Signs (Most Recent):  Temp: 98.4 °F (36.9 °C) (04/19/25 0737)  Pulse: 92 (04/19/25 0759)  Resp: 18 (04/19/25 1129)  BP: (!) 109/56 (04/19/25 0737)  SpO2: (!) 94 % (04/19/25 0737) Vital Signs (24h Range):  Temp:  [98.2 °F (36.8 °C)-98.7 °F (37.1 °C)] 98.4 °F (36.9 °C)  Pulse:  [] 92  Resp:  [16-20] 18  SpO2:  [92 %-96 %] 94 %  BP: (109-145)/(54-70) 109/56     Weight: 126.1 kg (278 lb)  Body mass index is 50.85 kg/m².    Intake/Output Summary (Last 24 hours) at 4/19/2025 1150  Last data filed at 4/19/2025 0906  Gross per 24 hour   Intake 845.61 ml   Output --   Net 845.61 ml         Physical Exam  Vitals and nursing note reviewed.   Constitutional:       General: She is not in acute distress.     Appearance: She is morbidly obese. She is ill-appearing. She is not toxic-appearing.   HENT:      Head: Normocephalic and atraumatic.   Cardiovascular:      Rate and Rhythm: Normal rate.   Pulmonary:      Effort: Pulmonary effort is normal. No respiratory distress.   Abdominal:      Palpations: Abdomen is soft.      Tenderness: There is no abdominal tenderness.   Musculoskeletal:         General: Swelling and tenderness present.      Right lower leg: Edema present.      Left lower leg: No edema.   Skin:     General: Skin is warm.      Findings: Erythema and lesion present.      Comments: Skin sloughing    Neurological:      Mental  Status: She is alert and oriented to person, place, and time.      Motor: Weakness present.   Psychiatric:         Mood and Affect: Mood normal.         Behavior: Behavior normal. Behavior is cooperative.               Significant Labs: All pertinent labs within the past 24 hours have been reviewed.  Blood Culture: negative growth to date x  5 days  CBC:   Recent Labs   Lab 04/18/25  0522 04/19/25  0745   WBC 14.10* 11.00   HGB 9.9* 9.5*   HCT 31.1* 30.0*    337     CMP:   Recent Labs   Lab 04/18/25  0522      K 3.9      CO2 27   BUN 30*   CREATININE 1.3   CALCIUM 8.3*   ANIONGAP 8     POCT Glucose:   Recent Labs   Lab 04/18/25  2045 04/19/25  0623 04/19/25  1128   POCTGLUCOSE 175* 129* 215*     Body fluid culture with no organisms seen    Significant Imaging: I have reviewed all pertinent imaging results/findings within the past 24 hours.  Mri with circumferential cellulitis, no myositis

## 2025-04-19 NOTE — ASSESSMENT & PLAN NOTE
MRI No drainable fluid collections   Leukocytosis improved  Continue antibiotic therapy  No surgical indications at this time   Continue medical management, we will sign off  Please call with questions

## 2025-04-19 NOTE — SUBJECTIVE & OBJECTIVE
Interval History:  Cellulitis relatively unchanged, leukocytosis improved  MRI no obvious drainable fluid collection    Medications:  Continuous Infusions:  Scheduled Meds:   aspirin  81 mg Oral Daily    cetirizine  10 mg Oral Daily    enoxparin  40 mg Subcutaneous Q12H (prophylaxis, 0900/2100)    fluticasone propionate  2 spray Each Nostril Daily    linezolid  600 mg Oral Q12H    metoprolol succinate  50 mg Oral BID    metronidazole 1%   Topical (Top) BID    And    miconazole nitrate 2%   Topical (Top) BID    piperacillin-tazobactam (Zosyn) IV (PEDS and ADULTS) (extended infusion is not appropriate)  4.5 g Intravenous Q8H    polyethylene glycol  17 g Oral Daily     PRN Meds:  Current Facility-Administered Medications:     acetaminophen, 650 mg, Oral, Q8H PRN    acetaminophen, 650 mg, Oral, Q4H PRN    dextrose 50%, 12.5 g, Intravenous, PRN    dextrose 50%, 12.5 g, Intravenous, PRN    dextrose 50%, 25 g, Intravenous, PRN    dextrose 50%, 25 g, Intravenous, PRN    glucagon (human recombinant), 1 mg, Intramuscular, PRN    glucagon (human recombinant), 1 mg, Intramuscular, PRN    glucose, 16 g, Oral, PRN    glucose, 16 g, Oral, PRN    glucose, 24 g, Oral, PRN    glucose, 24 g, Oral, PRN    HYDROcodone-acetaminophen, 1 tablet, Oral, Q4H PRN    HYDROcodone-acetaminophen, 1 tablet, Oral, Q4H PRN    insulin aspart U-100, 0-5 Units, Subcutaneous, QID (AC + HS) PRN    melatonin, 6 mg, Oral, Nightly PRN    naloxone, 0.02 mg, Intravenous, PRN    ondansetron, 4 mg, Intravenous, Q8H PRN    oxyCODONE, 5 mg, Oral, Q6H PRN    simethicone, 1 tablet, Oral, QID PRN    sodium chloride 0.9%, 2 mL, Intravenous, Q12H PRN     Review of patient's allergies indicates:   Allergen Reactions    Adhesive tape-silicones Swelling     Paper tape     Objective:     Vital Signs (Most Recent):  Temp: 98.4 °F (36.9 °C) (04/19/25 0737)  Pulse: 92 (04/19/25 0759)  Resp: 16 (04/19/25 1005)  BP: (!) 109/56 (04/19/25 0737)  SpO2: (!) 94 % (04/19/25 0737)  Vital Signs (24h Range):  Temp:  [98.2 °F (36.8 °C)-98.7 °F (37.1 °C)] 98.4 °F (36.9 °C)  Pulse:  [] 92  Resp:  [16-20] 16  SpO2:  [92 %-96 %] 94 %  BP: (109-145)/(54-70) 109/56     Weight: 126.1 kg (278 lb)  Body mass index is 50.85 kg/m².    Intake/Output - Last 3 Shifts         04/17 0700  04/18 0659 04/18 0700 04/19 0659 04/19 0700  04/20 0659    P.O. 360 240     IV Piggyback   845.6    Total Intake(mL/kg) 360 (2.9) 240 (1.9) 845.6 (6.7)    Urine (mL/kg/hr) 600 (0.2)      Stool 0      Total Output 600      Net -240 +240 +845.6           Urine Occurrence 3 x 2 x 1 x    Stool Occurrence 3 x 4 x 1 x             Physical Exam  Vitals and nursing note reviewed.   Constitutional:       Appearance: She is well-developed. She is obese.   HENT:      Head: Normocephalic.   Eyes:      Pupils: Pupils are equal, round, and reactive to light.   Neck:      Thyroid: No thyromegaly.      Vascular: No JVD.      Trachea: No tracheal deviation.   Cardiovascular:      Rate and Rhythm: Normal rate and regular rhythm.      Heart sounds: Normal heart sounds.   Pulmonary:      Breath sounds: Normal breath sounds. No wheezing.   Abdominal:      General: Bowel sounds are normal. There is no distension.      Palpations: Abdomen is soft. Abdomen is not rigid. There is no mass.      Tenderness: There is no abdominal tenderness. There is no guarding or rebound.      Comments: Obese   Musculoskeletal:         General: Normal range of motion.      Right lower leg: Edema (from the knee down) present.      Comments: Erythema/cellulitis with tried superficial blisters, slight improvement from original marks  No clear evidence of abscess   Lymphadenopathy:      Cervical: No cervical adenopathy.   Skin:     General: Skin is warm and dry.      Findings: No erythema (right lower extremity from the knee down) or rash.   Neurological:      Mental Status: She is oriented to person, place, and time.      Comments: Motor and sensation of the right  foot are intact       MRI:   Impression:     1.  Circumferential cellulitis.  2.  No evidence for myositis or intramuscular abscess or fascial edema.  3.  No evidence for osteomyelitis of the tibia or fibula.

## 2025-04-19 NOTE — ASSESSMENT & PLAN NOTE
Concern for neck fascia however patient's white blood cell count, creatinine, glucose are not consistent.  However picture may be mixed due to already having been on antibiotics.  Patient reports began 4/7  She was hospitalized at [facility] 4/7 through 4/10 reportedly she was febrile.  She was treated with Ancef and by 4/10 she was afebrile.  There are surgical pen marks on her right upper thigh which shows that the redness has receded some.  Blood cultures at the [facility] were negative.  She was discharged on Augmentin.  Patient had worsening swelling and pain and she re-presented to [facility].  See results of CT scan and Doppler of lower extremity above  Was given vancomycin and cefepime in ER  We will change to Zyvox for better skin penetration for Staph, Zosyn for anaerobes and strep and add clindamycin for toxin.  General surgery following, no surgical intervention warranted  Ct imaging negative for abscess  Deferring final antibiotic(s) per infectious disease     No significant improvement  Mild acute kidney injury   Psh right total hip revision  Consider mri to evaluate prosthesis though erythema has receded from knee and leukocytosis resolved  Interventional radiology to aspirate blister, studies pending  Us liliana unable to tolerate  Incentive spirometer   Defer to infectious disease to adjust antibiotic(s)    Leukocytosis resolved  Mri with cellulitis  Continue intravenous antibiotic(s)  Add topical antimicrobials, robyn hose, cold compress as tolerated

## 2025-04-19 NOTE — PROGRESS NOTES
[facility] S[facility]  General Surgery  Progress Note    Subjective:     History of Present Illness:  69-year-old morbidly obese female presented to the [facility] with significant erythema of the right lower extremity from the ankle to the thigh.  The patient also states that she is now not able to walk on the extremity because of the discomfort.  The patient states that she was admitted to the [facility] starting last Monday with the similar condition.  She was treated with IV antibiotics, unsure of which ones, and then discharge on Augmentin.    The patient presented to [facility].  She was evaluated.  In surgery was asked to see the patient    The patient states that the redness has gone down a little bit from the lines drawn around the upper thigh erythema at the [facility]    Post-Op Info:  * No surgery found *         Interval History:  Cellulitis relatively unchanged, leukocytosis improved  MRI no obvious drainable fluid collection    Medications:  Continuous Infusions:  Scheduled Meds:   aspirin  81 mg Oral Daily    cetirizine  10 mg Oral Daily    enoxparin  40 mg Subcutaneous Q12H (prophylaxis, 0900/2100)    fluticasone propionate  2 spray Each Nostril Daily    linezolid  600 mg Oral Q12H    metoprolol succinate  50 mg Oral BID    metronidazole 1%   Topical (Top) BID    And    miconazole nitrate 2%   Topical (Top) BID    piperacillin-tazobactam (Zosyn) IV (PEDS and ADULTS) (extended infusion is not appropriate)  4.5 g Intravenous Q8H    polyethylene glycol  17 g Oral Daily     PRN Meds:  Current Facility-Administered Medications:     acetaminophen, 650 mg, Oral, Q8H PRN    acetaminophen, 650 mg, Oral, Q4H PRN    dextrose 50%, 12.5 g, Intravenous, PRN    dextrose 50%, 12.5 g, Intravenous, PRN    dextrose 50%, 25 g, Intravenous, PRN    dextrose 50%, 25 g, Intravenous, PRN    glucagon (human recombinant), 1 mg, Intramuscular, PRN    glucagon (human recombinant), 1 mg,  Intramuscular, PRN    glucose, 16 g, Oral, PRN    glucose, 16 g, Oral, PRN    glucose, 24 g, Oral, PRN    glucose, 24 g, Oral, PRN    HYDROcodone-acetaminophen, 1 tablet, Oral, Q4H PRN    HYDROcodone-acetaminophen, 1 tablet, Oral, Q4H PRN    insulin aspart U-100, 0-5 Units, Subcutaneous, QID (AC + HS) PRN    melatonin, 6 mg, Oral, Nightly PRN    naloxone, 0.02 mg, Intravenous, PRN    ondansetron, 4 mg, Intravenous, Q8H PRN    oxyCODONE, 5 mg, Oral, Q6H PRN    simethicone, 1 tablet, Oral, QID PRN    sodium chloride 0.9%, 2 mL, Intravenous, Q12H PRN     Review of patient's allergies indicates:   Allergen Reactions    Adhesive tape-silicones Swelling     Paper tape     Objective:     Vital Signs (Most Recent):  Temp: 98.4 °F (36.9 °C) (04/19/25 0737)  Pulse: 92 (04/19/25 0759)  Resp: 16 (04/19/25 1005)  BP: (!) 109/56 (04/19/25 0737)  SpO2: (!) 94 % (04/19/25 0737) Vital Signs (24h Range):  Temp:  [98.2 °F (36.8 °C)-98.7 °F (37.1 °C)] 98.4 °F (36.9 °C)  Pulse:  [] 92  Resp:  [16-20] 16  SpO2:  [92 %-96 %] 94 %  BP: (109-145)/(54-70) 109/56     Weight: 126.1 kg (278 lb)  Body mass index is 50.85 kg/m².    Intake/Output - Last 3 Shifts         04/17 0700  04/18 0659 04/18 0700 04/19 0659 04/19 0700  04/20 0659    P.O. 360 240     IV Piggyback   845.6    Total Intake(mL/kg) 360 (2.9) 240 (1.9) 845.6 (6.7)    Urine (mL/kg/hr) 600 (0.2)      Stool 0      Total Output 600      Net -240 +240 +845.6           Urine Occurrence 3 x 2 x 1 x    Stool Occurrence 3 x 4 x 1 x             Physical Exam  Vitals and nursing note reviewed.   Constitutional:       Appearance: She is well-developed. She is obese.   HENT:      Head: Normocephalic.   Eyes:      Pupils: Pupils are equal, round, and reactive to light.   Neck:      Thyroid: No thyromegaly.      Vascular: No JVD.      Trachea: No tracheal deviation.   Cardiovascular:      Rate and Rhythm: Normal rate and regular rhythm.      Heart sounds: Normal heart sounds.   Pulmonary:       Breath sounds: Normal breath sounds. No wheezing.   Abdominal:      General: Bowel sounds are normal. There is no distension.      Palpations: Abdomen is soft. Abdomen is not rigid. There is no mass.      Tenderness: There is no abdominal tenderness. There is no guarding or rebound.      Comments: Obese   Musculoskeletal:         General: Normal range of motion.      Right lower leg: Edema (from the knee down) present.      Comments: Erythema/cellulitis with tried superficial blisters, slight improvement from original marks  No clear evidence of abscess   Lymphadenopathy:      Cervical: No cervical adenopathy.   Skin:     General: Skin is warm and dry.      Findings: No erythema (right lower extremity from the knee down) or rash.   Neurological:      Mental Status: She is oriented to person, place, and time.      Comments: Motor and sensation of the right foot are intact       MRI:   Impression:     1.  Circumferential cellulitis.  2.  No evidence for myositis or intramuscular abscess or fascial edema.  3.  No evidence for osteomyelitis of the tibia or fibula.  Assessment/Plan:     * Cellulitis of right lower extremity  MRI No drainable fluid collections   Leukocytosis improved  Continue antibiotic therapy  No surgical indications at this time   Continue medical management, we will sign off  Please call with questions        Primary hypertension  Management per hospital Medicine    Morbid obesity  Patient would benefit from weight loss but this is highly unlikely    Type 2 diabetes mellitus with diabetic polyneuropathy, with long-term current use of insulin  Management per hospital Medicine        Sb Avila MD  General Surgery  [facility] - Med Surg

## 2025-04-19 NOTE — PROGRESS NOTES
[facility] - Med Surg  Infectious Disease  Progress Note    Patient Name: Stephanie Raza  MRN: [id number]  Admission Date: 4/13/2025  Length of Stay: 5 days  Attending Physician: Delfino Johansen MD  Primary Care Provider: Reyna Suarez MD    Isolation Status: No active isolations  Assessment/Plan:      ID  * Cellulitis of right lower extremity    She had extensive right lower extremity cellulitis.  Will continue Zyvox, clindamycin, Zosyn.  Will monitor clinical response in a.m.a.m. she needs close follow-up    04/15-  She reports interval improvement in the erythema  Will plan to stop Clindamycin , continue Zyvox/Zosyn    04/16- will plan to do MRI of the lower extremity if wbc continues to increase- will monitor wbc in AM  On Zyvox/Zosyn  Follow Gen surgery  04/17-will order MRI of the right lower extremity.  We will discuss case with primary team.  Would continue Zosyn and Zyvox.  04/18-  Will order  MRI of the right lower extremity --  Will follow cbc in AM  On Zyvox/Zosyn      Endocrine  Type 2 diabetes mellitus with diabetic polyneuropathy, with long-term current use of insulin    Insulin regimen as per primary team        Anticipated Disposition:     Thank you for your consult. I will follow-up with patient. Please contact us if you have any additional questions.    Phillip Mon MD, [facility]  Infectious Disease  [facility] Med [facility]    Subjective:     Principal Problem:Cellulitis of right lower extremity    HPI:  65-year-old female with a history of diabetes, hyperlipidemia, hypertension, obesity .   she was admitted with worsening right lower leg erythema and swelling.  She was initially admitted 0408- treated with Ancef and discharged on Augmentin.  She has she was now admitted with worsening infection    Labs and imaging tests reviewed sed rate greater 120, , white blood cell count of 13.4.  Lactic acid level was normal at 1.7..  CT scan of the leg revealed large left inguinal lymph nodes, fat  "stranding in the thigh most notably in the medial portion right hip arthroplasty in place intrapelvic contents unremarkable  CTA tib-fib soft tissue thickening and fat stranding in the anterior aspect of the lower extremity with disorganized fluid throughout the superficial compartment with no definitive involvement of the deep intramuscular compartment, no definitive abscess    Interval History:   She reports less pain and erythema.  She feels better.  CBC - wbc 14.64.  04/16-  She feels better  CBC showed wbc 18- increased  04/17-  She has persistent leukocytosis.  WBC remains high at 18.    04/18-  She feels better but has persistent leucocytosis .       Review of Systems   Constitutional:  Negative for activity change, appetite change, chills, diaphoresis, fatigue and fever.   Neurological:  Negative for dizziness and facial asymmetry.     Objective:     Vital Signs (Most Recent):  Temp: 98.4 °F (36.9 °C) (04/18/25 2042)  Pulse: 103 (Simultaneous filing. User may not have seen previous data.) (04/18/25 2042)  Resp: 18 (04/18/25 2042)  BP: (!) 132/54 (04/18/25 2042)  SpO2: 96 % (04/18/25 2042) Vital Signs (24h Range):  Temp:  [97.6 °F (36.4 °C)-100.1 °F (37.8 °C)] 98.4 °F (36.9 °C)  Pulse:  [] 103  Resp:  [17-20] 18  SpO2:  [92 %-96 %] 96 %  BP: (110-145)/(54-70) 132/54     Weight: 126.1 kg (278 lb)  Body mass index is 50.85 kg/m².    Estimated Creatinine Clearance: 53.4 mL/min (based on SCr of 1.3 mg/dL).     Physical Exam  Vitals and nursing note reviewed.   HENT:      Head: Normocephalic.   Eyes:      Pupils: Pupils are equal, round, and reactive to light.   Pulmonary:      Effort: Pulmonary effort is normal.   Abdominal:      General: Abdomen is flat.   Skin:     Findings: Bruising, erythema and lesion present.   Neurological:      General: No focal deficit present.      Mental Status: She is alert.                Significant Labs: Blood Culture: No results for input(s): "LABBLOO" in the last 4320 " hours.  CBC:   Recent Labs   Lab 04/17/25  0839 04/18/25  0522   WBC 18.51* 14.10*   HGB 10.2* 9.9*   HCT 32.5* 31.1*    378     CMP:   Recent Labs   Lab 04/17/25  0839 04/18/25  0522   * 136   K 3.8 3.9   CL 97 101   CO2 27 27   BUN 35* 30*   CREATININE 1.7* 1.3   CALCIUM 8.5* 8.3*   ANIONGAP 8 8     All pertinent labs within the past 24 hours have been reviewed.    Significant Imaging: I have reviewed all pertinent imaging results/findings within the past 24 hours.

## 2025-04-19 NOTE — ASSESSMENT & PLAN NOTE
Patient's blood pressure range in the last 24 hours was: BP  Min: 109/56  Max: 145/70.The patient's inpatient anti-hypertensive regimen is listed below:  Current Antihypertensives  , Daily, Oral  metoprolol succinate (TOPROL-XL) 24 hr tablet 50 mg, 2 times daily, Oral    Plan  - BP is controlled, no changes needed to their regimen  Holding arb due to intermittent hypotension

## 2025-04-19 NOTE — PLAN OF CARE
Assumed care over pt, report received from HILARIO Chaidez    Discussed poc with pt, pt verbalized understanding    Purposeful rounding     VS wnl  Cardiac monitoring in use, pt is NSR, tele monitor # [id number]  Blood glucose monitoring   Fall precautions in place, remains injury free  Pain and nausea under control with PRN meds    Accurate I&Os  Abx given as prescribed  Bed locked at lowest position  Call light within reach    Chart check complete  Will cont with POC

## 2025-04-19 NOTE — ASSESSMENT & PLAN NOTE
YOVANA is likely due to acute tubular necrosis caused by possible drug induced. Baseline creatinine is 0.9. Most recent creatinine and eGFR are listed below.  Recent Labs     04/17/25  0839 04/18/25  0522   CREATININE 1.7* 1.3   EGFRNORACEVR 33* 45*      Plan  - YOVANA is improving  - Avoid nephrotoxins and renally dose meds for GFR listed above  - Monitor urine output, serial BMP, and adjust therapy as needed    Resolved

## 2025-04-19 NOTE — PLAN OF CARE
Discussed poc with pt, pt verbalized understanding    Purposeful rounding every 2hours    VS monitored for need of PRN interventions   Cardiac monitoring in use, pt is NSR to , tele monitor # [id number]  Blood glucose monitoring   Fall precautions in place, remains injury free  Pt denies c/o n/v  Pain under control with PRN meds    Abx given as prescribed  Bed locked at lowest position  Call light within reach    Chart check complete  Will cont with POC

## 2025-04-19 NOTE — PROGRESS NOTES
[facility] Medicine  Progress Note    Patient Name: Stephanie Raza  MRN: [id number]  Patient Class: IP- Inpatient   Admission Date: 4/13/2025  Length of Stay: 6 days  Attending Physician: Delfino Johansen MD  Primary Care Provider: Reyna Suarez MD        Subjective     Principal Problem:Cellulitis of right lower extremity        HPI:  Patient is a 65-year-old female with a history of diabetes, hyperlipidemia, hypertension, obesity who presented emergency department for evaluation of right leg swelling.  Patient reports on 04/06 she woke up vomiting and did not feel well.  When her daughter saw her on 04/08 she took her to the [facility].  She reports she had swelling in her right leg that was painful all the way up to her groin.  She was admitted for 3 days reportedly treated with Ancef at which time the marks of erythema had improved her fever had subsided and she was discharged home on Augmentin (blood cultures remain negative).  After being home she noted that the swelling worsened and she re-presented to the hospital.  In the emergency department she was afebrile, laboratory exam revealed protocol of 2.25, BNP of 150, sed rate greater 120, , white blood cell count of 13.4.  Lactic acid level was normal at 1.7.  Blood cultures were obtained and she was begun on vancomycin and meropenem.  CT scan of the leg revealed large left inguinal lymph nodes, fat stranding in the thigh most notably in the medial portion right hip arthroplasty in place intrapelvic contents unremarkable  CTA tib-fib soft tissue thickening and fat stranding in the anterior aspect of the lower extremity with disorganized fluid throughout the superficial compartment with no definitive involvement of the deep intramuscular compartment, no definitive abscess  Doppler exam of lower extremity negative for DVT    Consult general surgery and Infectious Disease for evaluation.        Overview/Hospital  Course:  4/14 admitted for right leg cellulitis. Denies insect bite, trauma. States improvement in symptoms of erythema, edema and pain. Intravenous antibiotic(s) per infectious disease. Surgery following.  4/15 antibiotic(s) per infectious disease. Cellulitis modestly improving. Pain regimen adjusted.  4/16 interventional radiology aspirated blister. Body fluid studies pending. Patient endorses improvement in symptoms of edema and erythema. Us liliana pending  4/17 us with moderate stenosis in superficial femoral artery. Patient unable to tolerate us liliana studydue to pain. Vascular surgery and nephrology consulted due to unimproved cellulitis and acute kidney injury. Us abdomen complete with medical renal disease, no hydronephrosis. Infectious disease aware. Surgery recommending mri pending renal improvement  4/18 creatinine improving. Awaiting mri  4/19 ortho consulted while awaiting mri official read. Mri without myositis or abscess. Continue treatment for lymphedema with cellulitis with compression stockings as tolerated and topical antimicrobials, elevation and cold compress.    Interval History: See hospital course for today      Review of Systems   Constitutional:  Positive for activity change and fatigue. Negative for appetite change and fever.   Respiratory:  Negative for shortness of breath.    Cardiovascular:  Positive for leg swelling.   Musculoskeletal:  Positive for gait problem and myalgias.   Skin:  Positive for wound.   Neurological:  Positive for weakness.   Psychiatric/Behavioral:  Positive for dysphoric mood. Negative for agitation, behavioral problems, confusion and decreased concentration. The patient is not nervous/anxious.      Objective:     Vital Signs (Most Recent):  Temp: 98.4 °F (36.9 °C) (04/19/25 0737)  Pulse: 92 (04/19/25 0759)  Resp: 18 (04/19/25 1129)  BP: (!) 109/56 (04/19/25 0737)  SpO2: (!) 94 % (04/19/25 0737) Vital Signs (24h Range):  Temp:  [98.2 °F (36.8 °C)-98.7 °F (37.1 °C)]  98.4 °F (36.9 °C)  Pulse:  [] 92  Resp:  [16-20] 18  SpO2:  [92 %-96 %] 94 %  BP: (109-145)/(54-70) 109/56     Weight: 126.1 kg (278 lb)  Body mass index is 50.85 kg/m².    Intake/Output Summary (Last 24 hours) at 4/19/2025 1150  Last data filed at 4/19/2025 0906  Gross per 24 hour   Intake 845.61 ml   Output --   Net 845.61 ml         Physical Exam  Vitals and nursing note reviewed.   Constitutional:       General: She is not in acute distress.     Appearance: She is morbidly obese. She is ill-appearing. She is not toxic-appearing.   HENT:      Head: Normocephalic and atraumatic.   Cardiovascular:      Rate and Rhythm: Normal rate.   Pulmonary:      Effort: Pulmonary effort is normal. No respiratory distress.   Abdominal:      Palpations: Abdomen is soft.      Tenderness: There is no abdominal tenderness.   Musculoskeletal:         General: Swelling and tenderness present.      Right lower leg: Edema present.      Left lower leg: No edema.   Skin:     General: Skin is warm.      Findings: Erythema and lesion present.      Comments: Skin sloughing    Neurological:      Mental Status: She is alert and oriented to person, place, and time.      Motor: Weakness present.   Psychiatric:         Mood and Affect: Mood normal.         Behavior: Behavior normal. Behavior is cooperative.               Significant Labs: All pertinent labs within the past 24 hours have been reviewed.  Blood Culture: negative growth to date x  5 days  CBC:   Recent Labs   Lab 04/18/25  0522 04/19/25  0745   WBC 14.10* 11.00   HGB 9.9* 9.5*   HCT 31.1* 30.0*    337     CMP:   Recent Labs   Lab 04/18/25  0522      K 3.9      CO2 27   BUN 30*   CREATININE 1.3   CALCIUM 8.3*   ANIONGAP 8     POCT Glucose:   Recent Labs   Lab 04/18/25  2045 04/19/25  0623 04/19/25  1128   POCTGLUCOSE 175* 129* 215*     Body fluid culture with no organisms seen    Significant Imaging: I have reviewed all pertinent imaging results/findings  within the past 24 hours.  Mri with circumferential cellulitis, no myositis       Assessment & Plan  Type 2 diabetes mellitus with diabetic polyneuropathy, with long-term current use of insulin  Patient with diabetes currently well-controlled is taking Ozempic outpatient  Sliding scale insulin with Accu-Cheks a.c. HS  Morbid obesity  Body mass index is 50.85 kg/m². Morbid obesity complicates all aspects of disease management from diagnostic modalities to treatment. Weight loss encouraged and health benefits explained to patient.   Physical/occupational therapy recommending moderate intensity therapy but patient declines      Cellulitis of right lower extremity  Concern for neck fascia however patient's white blood cell count, creatinine, glucose are not consistent.  However picture may be mixed due to already having been on antibiotics.  Patient reports began 4/7  She was hospitalized at [facility] 4/7 through 4/10 reportedly she was febrile.  She was treated with Ancef and by 4/10 she was afebrile.  There are surgical pen marks on her right upper thigh which shows that the redness has receded some.  Blood cultures at the [facility] were negative.  She was discharged on Augmentin.  Patient had worsening swelling and pain and she re-presented to [facility].  See results of CT scan and Doppler of lower extremity above  Was given vancomycin and cefepime in ER  We will change to Zyvox for better skin penetration for Staph, Zosyn for anaerobes and strep and add clindamycin for toxin.  General surgery following, no surgical intervention warranted  Ct imaging negative for abscess  Deferring final antibiotic(s) per infectious disease     No significant improvement  Mild acute kidney injury   Psh right total hip revision  Consider mri to evaluate prosthesis though erythema has receded from knee and leukocytosis resolved  Interventional radiology to aspirate blister, studies pending  Us liliana unable to  "tolerate  Incentive spirometer   Defer to infectious disease to adjust antibiotic(s)    Leukocytosis resolved  Mri with cellulitis  Continue intravenous antibiotic(s)  Add topical antimicrobials, robyn hose, cold compress as tolerated    Primary hypertension  Patient's blood pressure range in the last 24 hours was: BP  Min: 109/56  Max: 145/70.The patient's inpatient anti-hypertensive regimen is listed below:  Current Antihypertensives  , Daily, Oral  metoprolol succinate (TOPROL-XL) 24 hr tablet 50 mg, 2 times daily, Oral    Plan  - BP is controlled, no changes needed to their regimen  Holding arb due to intermittent hypotension  Acute on chronic combined systolic and diastolic congestive heart failure  Patient has Combined Systolic and Diastolic heart failure that is Acute on chronic. On presentation their CHF was decompensated. Evidence of decompensated CHF on presentation includes: edema. The etiology of their decompensation is likely increased fluid intake. Most recent BNP and echo results are listed below.  No results for input(s): "BNP" in the last 72 hours.    Latest ECHO  No results found for this or any previous visit.    Current Heart Failure Medications  , Daily, Oral  metoprolol succinate (TOPROL-XL) 24 hr tablet 50 mg, 2 times daily, Oral    Plan  - Monitor strict I&Os and daily weights.    - Place on telemetry  - Low sodium diet  - Place on fluid restriction of 1.5 L.   - Cardiology has not been consulted  - The patient's volume status is at their baseline  -patient follows with Dr. Mikie Saxena whom she saw about 3 weeks ago.  She has follow up appointment in May.    Echocardiogram reviewed       YOVANA (acute kidney injury)  YOVANA is likely due to acute tubular necrosis caused by possible drug induced . Baseline creatinine is  0.9 . Most recent creatinine and eGFR are listed below.  Recent Labs     04/17/25  0839 04/18/25  0522   CREATININE 1.7* 1.3   EGFRNORACEVR 33* 45*      Plan  - YOVANA is improving  - " Avoid nephrotoxins and renally dose meds for GFR listed above  - Monitor urine output, serial BMP, and adjust therapy as needed    Resolved       VTE Risk Mitigation (From admission, onward)           Ordered     Place MONICA hose  Until discontinued         04/19/25 1146     enoxaparin injection 40 mg  Every 12 hours         04/13/25 1626     IP VTE HIGH RISK PATIENT  Once         04/13/25 1622     Place sequential compression device  Until discontinued         04/13/25 1622                    Discharge Planning   RONNIE: 4/15/2025     Code Status: Full Code   Medical Readiness for Discharge Date:   Discharge Plan A: Home Health                        Delfino Johansen MD  Department of Hospital Medicine   [facility]

## 2025-04-19 NOTE — CONSULTS
[facility]  Orthopedics  Consult Note    Patient Name: Stephanie Raza  MRN: [id number]  Admission Date: 4/13/2025  Hospital Length of Stay: 6 days  Attending Provider: Delfino Johansen MD  Primary Care Provider: Reyna Suarez MD    Patient information was obtained from patient, relative(s), and ER records.     Inpatient consult to Orthopedic Surgery  Consult performed by: Santiago Dunne Jr., MD  Consult ordered by: Delfino Johansen MD        Subjective:     Principal Problem:Cellulitis of right lower extremity    Chief Complaint:   Chief Complaint   Patient presents with    Leg Swelling     Left leg swelling and redness, unable to ambulate         HPI: Stephanie Raza is a 69 y.o. female with past medical history significant for 2 week history of right calf swelling and pain.  He denies any injury or trauma.  She does have significant history of remote right total hip arthroplasty but she reports that this leg is normally the same size as the contralateral side.  She denies chronic lymphedema.  She is accompanied by her daughter who was acting as secondary historian.  Her pain is severe and she can not ambulate    Past Medical History:   Diagnosis Date    Acute blood loss anemia (ABLA) 01/24/2024    Last Assessment & Plan:    Formatting of this note might be different from the original.   History & Physical       Discharge Summary       Follow-up  Patient did receive 1 unit packed red blood cells while in the OR secondary to blood loss.  No obvious bleeding at this time.  She received 2 additional units packed red blood cells per orthopedics.  Hemoglobin running stable at this time no need fo    YOVANA (acute kidney injury) 01/24/2024    Last Assessment & Plan:    Formatting of this note might be different from the original.   History & Physical       Discharge Summary       Follow-up patient with progressive oliguric YOVANA after surgery.  Resolved. Avoid nephrotoxic agents, renally dose all medications  where necessary, and protect nondominant arm as much as possible    Anxiety     Arthritis     Asthma     Back pain     Diabetes mellitus type I     Heart murmur     Hyperlipidemia     Hypertension     Obesity     Polyneuropathy     Trouble in sleeping     Type 2 diabetes mellitus     Urinary incontinence        Past Surgical History:   Procedure Laterality Date    ADENOIDECTOMY      BREAST BIOPSY      , benign    BREAST SURGERY       SECTION      hip reconstruction Left 2024    HYSTERECTOMY      JOINT REPLACEMENT         Review of patient's allergies indicates:   Allergen Reactions    Adhesive tape-silicones Swelling     Paper tape       Current Facility-Administered Medications   Medication    acetaminophen tablet 650 mg    acetaminophen tablet 650 mg    aspirin chewable tablet 81 mg    cetirizine tablet 10 mg    dextrose 50% injection 12.5 g    dextrose 50% injection 12.5 g    dextrose 50% injection 25 g    dextrose 50% injection 25 g    enoxaparin injection 40 mg    fluticasone propionate 50 mcg/actuation nasal spray 100 mcg    glucagon (human recombinant) injection 1 mg    glucagon (human recombinant) injection 1 mg    glucose chewable tablet 16 g    glucose chewable tablet 16 g    glucose chewable tablet 24 g    glucose chewable tablet 24 g    HYDROcodone-acetaminophen  mg per tablet 1 tablet    HYDROcodone-acetaminophen 5-325 mg per tablet 1 tablet    insulin aspart U-100 pen 0-5 Units    linezolid tablet 600 mg    melatonin tablet 6 mg    metoprolol succinate (TOPROL-XL) 24 hr tablet 50 mg    naloxone 0.4 mg/mL injection 0.02 mg    ondansetron injection 4 mg    oxyCODONE immediate release tablet 5 mg    piperacillin-tazobactam (ZOSYN) 4.5 g in D5W 100 mL IVPB (MB+)    polyethylene glycol packet 17 g    simethicone chewable tablet 80 mg    sodium chloride 0.9% flush 2 mL     Family History       Problem Relation (Age of Onset)    Breast cancer Mother, Maternal Aunt, Maternal Aunt,  "Paternal Aunt, Paternal Aunt    Cancer Father    Diabetes Sister    Hypertension Mother    Lung cancer Mother    Stroke Father          Tobacco Use    Smoking status: Never     Passive exposure: Never    Smokeless tobacco: Never   Substance and Sexual Activity    Alcohol use: Yes     Alcohol/week: 3.0 standard drinks of alcohol     Types: 1 Glasses of wine, 2 Shots of liquor per week    Drug use: Never    Sexual activity: Not Currently     Partners: Male     Review of Systems   Constitutional: Negative for chills and decreased appetite.   HENT:  Negative for ear discharge.    Eyes:  Negative for blurred vision.   Cardiovascular:  Negative for chest pain.   Respiratory:  Negative for shortness of breath.    Endocrine: Negative for cold intolerance and heat intolerance.   Skin:  Negative for dry skin and itching.   Gastrointestinal:  Negative for abdominal pain.   Genitourinary:  Negative for dysuria.   Neurological:  Negative for focal weakness.   Psychiatric/Behavioral:  Negative for altered mental status.    Allergic/Immunologic: Negative for HIV exposure.     Objective:     Vital Signs (Most Recent):  Temp: 98.4 °F (36.9 °C) (04/19/25 0737)  Pulse: 92 (04/19/25 0759)  Resp: 16 (04/19/25 0737)  BP: (!) 109/56 (04/19/25 0737)  SpO2: (!) 94 % (04/19/25 0737) Vital Signs (24h Range):  Temp:  [98.2 °F (36.8 °C)-98.7 °F (37.1 °C)] 98.4 °F (36.9 °C)  Pulse:  [] 92  Resp:  [16-20] 16  SpO2:  [92 %-96 %] 94 %  BP: (109-145)/(54-70) 109/56     Weight: 126.1 kg (278 lb)  Height: 5' 2" (157.5 cm)  Body mass index is 50.85 kg/m².      Intake/Output Summary (Last 24 hours) at 4/19/2025 0943  Last data filed at 4/19/2025 0906  Gross per 24 hour   Intake 845.61 ml   Output --   Net 845.61 ml       Ortho/SPM Exam  Morbidly obese 69-year-old female  She has severe erythema from just below the knee to the ankle -- from the markings on her leg it seems to be receding  Her calf is quite taut in his quite tender to palpation  She " "has no signs of compartment syndrome  She can wiggle her toes pain-free but does not want to dorsiflex her ankle  She has some desquamation of skin just above the ankle posteriorly which is superficial  Sensation is normal in her foot  She has brisk capillary refill time of her toes    GEN: Well developed, well nourished female. AAOX3. No acute distress.   Head: Normocephalic, atraumatic.   Eyes: BILLY  Neck: Trachea is midline, no adenopathy  Resp: Breathing unlabored.  Neuro: Motor function normal, Cranial nerves intact  Psych: Mood and affect appropriate.      Significant Labs: Blood Culture: No results for input(s): "LABBLOO" in the last 48 hours.  CBC:   Recent Labs   Lab 04/18/25  0522 04/19/25  0745   WBC 14.10* 11.00   HGB 9.9* 9.5*   HCT 31.1* 30.0*    337     CMP:   Recent Labs   Lab 04/18/25  0522      K 3.9      CO2 27   BUN 30*   CREATININE 1.3   CALCIUM 8.3*   ANIONGAP 8     Coagulation: No results for input(s): "LABPROT", "INR", "APTT" in the last 48 hours.  CRP: No results for input(s): "CRP" in the last 48 hours.  All pertinent labs within the past 24 hours have been reviewed.    Significant Imaging: CT: I have reviewed all pertinent results/findings and my personal findings are:  No obvious fluid collections  X-Ray: I have reviewed all pertinent results/findings and my personal findings are:  No bony abnormalities    Assessment/Plan:     Active Diagnoses:    Diagnosis Date Noted POA    PRINCIPAL PROBLEM:  Cellulitis of right lower extremity [L03.115] 04/13/2025 Yes    Morbid obesity [E66.01] 04/13/2025 Yes    Primary hypertension [I10] 04/13/2025 Yes    Acute on chronic combined systolic and diastolic congestive heart failure [I50.43] 04/13/2025 Yes    YOVANA (acute kidney injury) [N17.9] 01/24/2024 Yes    Type 2 diabetes mellitus with diabetic polyneuropathy, with long-term current use of insulin [E11.42, Z79.4] 01/22/2024 Not Applicable      Problems Resolved During this " Admission:       Assessment:   Lymphedema with cellulitis right lower extremity    Plan:   I reviewed the MRI and she does have some striations in the musculature of the anterior compartment.  I spoke with the radiologist and we reviewed this together he does not believe this is significant for fasciitis or myositis.  He is going to have another radiologist review to be certain.  Continue IV antibiotics, elevation, and encourage range of motion of the ankle    Santiago Dunne MD, MD  Orthopedics  [facility]

## 2025-04-19 NOTE — PROGRESS NOTES
"      Chief Complaint   Patient presents with    Leg Swelling     Left leg swelling and redness, unable to ambulate        HISTORY OF PRESENT ILLNESS:  Severe cellulitis of right lower extremity.    INTERVAL HISTORY:  Still with swelling, erythema, and pain.  Maybe swelling a little better.  Erythema maybe a little better as well but not much.  MRI done but official read is pending.    ROS:   10 point review of systems is negative except as mentioned in Interval History.    Objective:  General Appearance:  Comfortable, well-appearing and in pain (pain in right leg).    Vital signs: (most recent): Blood pressure (!) 109/56, pulse 92, temperature 98.4 °F (36.9 °C), resp. rate 16, height 5' 2" (1.575 m), weight 126.1 kg (278 lb), SpO2 (!) 94%.  Vital signs are normal.    HEENT: Normal HEENT exam.    Lungs:  Normal effort and normal respiratory rate.    Heart: Normal rate.  Regular rhythm.    Abdomen: Abdomen is soft.  Bowel sounds are normal.   There is no abdominal tenderness.     Extremities: (Right leg externally rotated and flexed at knee )  Pulses: Distal pulses are intact.  (Palpable right DP pulse)    Neurological: Patient is alert and oriented to person, place and time.    Pupils:  Pupils are equal, round, and reactive to light.    Skin:  Warm.  (Right leg with erythema, warmth, and tenderness from foot up to the knee)      LABS:  I have reviewed all pertinent lab results within the past 24 hours.    CULTURES:  No results found for the last 90 days.      PATHOLOGY:  Specimens (From admission, onward)      None            IMAGING:  I have personally reviewed the imaging.  US Retroperitoneal Complete   Final Result      Elevated bilateral renal arterial resistive indices, which may be seen in the setting of medical renal disease.  No hydronephrosis.         Electronically signed by: Estephania Pappas   Date:    04/17/2025   Time:    15:03      US Lower Extremity Arteries Bilateral   Final Result      1.  At least " moderate stenosis of the right superficial femoral artery.  A CT angiogram of the bilateral lower extremities could be performed for further assessment.      2.  No evidence for hemodynamically significant stenosis in the left lower extremity.      3.  Right inguinal and medial right thigh lymphadenopathy.         Electronically signed by: Foster Romo MD   Date:    04/16/2025   Time:    15:45      US Guided Needle Placement   Final Result      Percutaneous aspiration of the right calf cutaneous fluid collection, yielding 1 mL of serous fluid. No drainage catheter was left in place.      Plan:      Follow-up fluid culture studies.      ______________________________________________________________________         Electronically signed by: Baron Ledesma   Date:    04/18/2025   Time:    11:57      X-Ray Chest 1 View   Final Result      Similar radiographic appearance of the chest without significant interval change.         Electronically signed by: Estephania Pappas   Date:    04/14/2025   Time:    08:10      CT Leg (Tibia-Fibula) Wtih Contrast Right   Final Result      Extensive soft tissue swelling and skin thickening with suspected reactive right inguinal adenopathy.  No definite abscess identified at this time.         Electronically signed by: Christopher Olivier   Date:    04/13/2025   Time:    14:33      CT Thigh With Contrast Right   Final Result      Extensive soft tissue swelling and skin thickening with suspected reactive right inguinal adenopathy.  No definite abscess identified at this time.         Electronically signed by: Christopher Olivier   Date:    04/13/2025   Time:    14:33      US Lower Extremity Veins Right   Final Result      No evidence of deep venous thrombosis in the right lower extremity.         Electronically signed by: Christopher Olivier   Date:    04/13/2025   Time:    12:13      X-Ray Chest AP Portable   Final Result    As above.      Finalized on: 4/13/2025 11:18 AM By:  Christopher Olivier MD   [facility]#  [id number]      2025-04-13 11:20:39.894     [facility]      MRI Tibia Fibula W WO Contrast Right    (Results Pending)       MDM  Number of Diagnoses or Management Options  Acute on chronic combined systolic and diastolic heart failure: new, needed workup  Cellulitis of right lower leg: new, needed workup  Morbid obesity: new, needed workup  Right leg swelling: new, needed workup  Screening for cardiovascular condition: new, needed workup  Severe sepsis: new, needed workup     Amount and/or Complexity of Data Reviewed  Clinical lab tests: reviewed  Tests in the radiology section of CPT®: reviewed  Tests in the medicine section of CPT®: reviewed  Discussion of test results with the performing providers: no  Decide to obtain previous medical records or to obtain history from someone other than the patient: yes  Obtain history from someone other than the patient: no  Review and summarize past medical records: yes  Discuss the patient with other providers: no  Independent visualization of images, tracings, or specimens: yes    Risk of Complications, Morbidity, and/or Mortality  Presenting problems: moderate  Diagnostic procedures: moderate  Management options: moderate        Assessment & Plan  70 y/o female with severe soft tissue infection.  All studies thus far negative for drainable fluid collection/abscess.  MRI has been done but no official read.  I have reviewed MRI but admittedly I don't look at these every day and I don't see anything that jumps out at me for large fluid collection, etc.  Her leg is taught but I think this is secondary to the soft tissue edema.  Will await official read on MRI.  Continue broad spectrum IV antibiotics.        Active Hospital Problems    Diagnosis  POA    *Cellulitis of right lower extremity [L03.115]  Yes    Morbid obesity [E66.01]  Yes    Primary hypertension [I10]  Yes    Acute on chronic combined systolic and diastolic congestive heart failure [I50.43]  Yes    YOVANA (acute kidney  injury) [N17.9]  Yes     Last Assessment & Plan:    Formatting of this note might be different from the original.   History & Physical       Discharge Summary       Follow-up patient with progressive oliguric YOVANA after surgery.  Resolved. Avoid nephrotoxic agents, renally dose all medications where necessary, and protect nondominant arm as much as possible      Type 2 diabetes mellitus with diabetic polyneuropathy, with long-term current use of insulin [E11.42, Z79.4]  Not Applicable      Resolved Hospital Problems   No resolved problems to display.       Basilio Hartley  4/19/2025  [facility]  Vascular Surgery  [phone] (Clinic Number)

## 2025-04-19 NOTE — ASSESSMENT & PLAN NOTE
She had extensive right lower extremity cellulitis.  Will continue Zyvox, clindamycin, Zosyn.  Will monitor clinical response in a.m.a.m. she needs close follow-up    04/15-  She reports interval improvement in the erythema  Will plan to stop Clindamycin , continue Zyvox/Zosyn    04/16- will plan to do MRI of the lower extremity if wbc continues to increase- will monitor wbc in AM  On Zyvox/Zosyn  Follow Gen surgery  04/17-will order MRI of the right lower extremity.  We will discuss case with primary team.  Would continue Zosyn and Zyvox.  04/18-  Will order  MRI of the right lower extremity --  Will follow cbc in AM  On Zyvox/Zosyn

## 2025-04-20 PROBLEM — N17.9 AKI (ACUTE KIDNEY INJURY): Status: RESOLVED | Noted: 2024-01-24 | Resolved: 2025-04-20

## 2025-04-20 LAB
ABSOLUTE EOSINOPHIL (OHS): 0.26 K/UL
ABSOLUTE MONOCYTE (OHS): 0.98 K/UL (ref 0.3–1)
ABSOLUTE NEUTROPHIL COUNT (OHS): 7.27 K/UL (ref 1.8–7.7)
ALBUMIN SERPL BCP-MCNC: 1.7 G/DL (ref 3.5–5.2)
ALP SERPL-CCNC: 109 UNIT/L (ref 40–150)
ALT SERPL W/O P-5'-P-CCNC: 19 UNIT/L (ref 10–44)
ANION GAP (OHS): 9 MMOL/L (ref 8–16)
AST SERPL-CCNC: 23 UNIT/L (ref 11–45)
BASOPHILS # BLD AUTO: 0.05 K/UL
BASOPHILS NFR BLD AUTO: 0.5 %
BILIRUB SERPL-MCNC: 0.4 MG/DL (ref 0.1–1)
BUN SERPL-MCNC: 16 MG/DL (ref 8–23)
CALCIUM SERPL-MCNC: 8.3 MG/DL (ref 8.7–10.5)
CHLORIDE SERPL-SCNC: 102 MMOL/L (ref 95–110)
CO2 SERPL-SCNC: 26 MMOL/L (ref 23–29)
CREAT SERPL-MCNC: 0.9 MG/DL (ref 0.5–1.4)
CRP SERPL-MCNC: 246.8 MG/L
ERYTHROCYTE [DISTWIDTH] IN BLOOD BY AUTOMATED COUNT: 15.1 % (ref 11.5–14.5)
GFR SERPLBLD CREATININE-BSD FMLA CKD-EPI: >60 ML/MIN/1.73/M2
GLUCOSE SERPL-MCNC: 144 MG/DL (ref 70–110)
HCT VFR BLD AUTO: 30 % (ref 37–48.5)
HGB BLD-MCNC: 9.3 GM/DL (ref 12–16)
IMM GRANULOCYTES # BLD AUTO: 0.13 K/UL (ref 0–0.04)
IMM GRANULOCYTES NFR BLD AUTO: 1.3 % (ref 0–0.5)
LYMPHOCYTES # BLD AUTO: 1.71 K/UL (ref 1–4.8)
MCH RBC QN AUTO: 25.4 PG (ref 27–31)
MCHC RBC AUTO-ENTMCNC: 31 G/DL (ref 32–36)
MCV RBC AUTO: 82 FL (ref 82–98)
NUCLEATED RBC (/100WBC) (OHS): 0 /100 WBC
PLATELET # BLD AUTO: 366 K/UL (ref 150–450)
PMV BLD AUTO: 10.2 FL (ref 9.2–12.9)
POCT GLUCOSE: 181 MG/DL (ref 70–110)
POCT GLUCOSE: 184 MG/DL (ref 70–110)
POCT GLUCOSE: 194 MG/DL (ref 70–110)
POTASSIUM SERPL-SCNC: 4.1 MMOL/L (ref 3.5–5.1)
PROCALCITONIN SERPL-MCNC: 0.85 NG/ML
PROT SERPL-MCNC: 7.2 GM/DL (ref 6–8.4)
RBC # BLD AUTO: 3.66 M/UL (ref 4–5.4)
RELATIVE EOSINOPHIL (OHS): 2.5 %
RELATIVE LYMPHOCYTE (OHS): 16.4 % (ref 18–48)
RELATIVE MONOCYTE (OHS): 9.4 % (ref 4–15)
RELATIVE NEUTROPHIL (OHS): 69.9 % (ref 38–73)
SODIUM SERPL-SCNC: 137 MMOL/L (ref 136–145)
WBC # BLD AUTO: 10.4 K/UL (ref 3.9–12.7)

## 2025-04-20 PROCEDURE — 99232 SBSQ HOSP IP/OBS MODERATE 35: CPT | Mod: HCNC,95,, | Performed by: ORTHOPAEDIC SURGERY

## 2025-04-20 PROCEDURE — 84145 PROCALCITONIN (PCT): CPT | Mod: HCNC | Performed by: FAMILY MEDICINE

## 2025-04-20 PROCEDURE — 36415 COLL VENOUS BLD VENIPUNCTURE: CPT | Mod: HCNC | Performed by: FAMILY MEDICINE

## 2025-04-20 PROCEDURE — 25000003 PHARM REV CODE 250: Mod: HCNC | Performed by: INTERNAL MEDICINE

## 2025-04-20 PROCEDURE — 80053 COMPREHEN METABOLIC PANEL: CPT | Mod: HCNC | Performed by: FAMILY MEDICINE

## 2025-04-20 PROCEDURE — 25000242 PHARM REV CODE 250 ALT 637 W/ HCPCS: Mod: HCNC | Performed by: FAMILY MEDICINE

## 2025-04-20 PROCEDURE — 21400001 HC TELEMETRY ROOM: Mod: HCNC

## 2025-04-20 PROCEDURE — 85025 COMPLETE CBC W/AUTO DIFF WBC: CPT | Mod: HCNC | Performed by: FAMILY MEDICINE

## 2025-04-20 PROCEDURE — 86141 C-REACTIVE PROTEIN HS: CPT | Mod: HCNC | Performed by: FAMILY MEDICINE

## 2025-04-20 PROCEDURE — 63600175 PHARM REV CODE 636 W HCPCS: Mod: HCNC | Performed by: INTERNAL MEDICINE

## 2025-04-20 PROCEDURE — 99233 SBSQ HOSP IP/OBS HIGH 50: CPT | Mod: HCNC,,, | Performed by: SURGERY

## 2025-04-20 PROCEDURE — 99233 SBSQ HOSP IP/OBS HIGH 50: CPT | Mod: NSCH,HCNC,, | Performed by: INTERNAL MEDICINE

## 2025-04-20 PROCEDURE — 25000003 PHARM REV CODE 250: Mod: HCNC | Performed by: FAMILY MEDICINE

## 2025-04-20 RX ADMIN — PIPERACILLIN SODIUM AND TAZOBACTAM SODIUM 4.5 G: 4; .5 INJECTION, POWDER, FOR SOLUTION INTRAVENOUS at 06:04

## 2025-04-20 RX ADMIN — LINEZOLID 600 MG: 600 TABLET, FILM COATED ORAL at 09:04

## 2025-04-20 RX ADMIN — ASPIRIN 81 MG CHEWABLE TABLET 81 MG: 81 TABLET CHEWABLE at 09:04

## 2025-04-20 RX ADMIN — ENOXAPARIN SODIUM 40 MG: 40 INJECTION SUBCUTANEOUS at 09:04

## 2025-04-20 RX ADMIN — METOPROLOL SUCCINATE 50 MG: 50 TABLET, EXTENDED RELEASE ORAL at 09:04

## 2025-04-20 RX ADMIN — CETIRIZINE HYDROCHLORIDE 10 MG: 10 TABLET, FILM COATED ORAL at 09:04

## 2025-04-20 RX ADMIN — PIPERACILLIN SODIUM AND TAZOBACTAM SODIUM 4.5 G: 4; .5 INJECTION, POWDER, FOR SOLUTION INTRAVENOUS at 03:04

## 2025-04-20 RX ADMIN — HYDROCODONE BITARTRATE AND ACETAMINOPHEN 1 TABLET: 10; 325 TABLET ORAL at 07:04

## 2025-04-20 RX ADMIN — METRONIDAZOLE: 10 GEL TOPICAL at 09:04

## 2025-04-20 RX ADMIN — HYDROCODONE BITARTRATE AND ACETAMINOPHEN 1 TABLET: 10; 325 TABLET ORAL at 03:04

## 2025-04-20 RX ADMIN — OXYCODONE HYDROCHLORIDE 5 MG: 5 TABLET ORAL at 04:04

## 2025-04-20 RX ADMIN — PIPERACILLIN SODIUM AND TAZOBACTAM SODIUM 4.5 G: 4; .5 INJECTION, POWDER, FOR SOLUTION INTRAVENOUS at 11:04

## 2025-04-20 RX ADMIN — MICONAZOLE NITRATE: 20 OINTMENT TOPICAL at 09:04

## 2025-04-20 RX ADMIN — METRONIDAZOLE: 10 GEL TOPICAL at 04:04

## 2025-04-20 RX ADMIN — FLUTICASONE PROPIONATE 100 MCG: 50 SPRAY, METERED NASAL at 09:04

## 2025-04-20 RX ADMIN — OXYCODONE HYDROCHLORIDE 5 MG: 5 TABLET ORAL at 11:04

## 2025-04-20 RX ADMIN — MICONAZOLE NITRATE: 20 OINTMENT TOPICAL at 04:04

## 2025-04-20 RX ADMIN — OXYCODONE HYDROCHLORIDE 5 MG: 5 TABLET ORAL at 03:04

## 2025-04-20 RX ADMIN — HYDROCODONE BITARTRATE AND ACETAMINOPHEN 1 TABLET: 10; 325 TABLET ORAL at 10:04

## 2025-04-20 RX ADMIN — OXYCODONE HYDROCHLORIDE 5 MG: 5 TABLET ORAL at 09:04

## 2025-04-20 RX ADMIN — HYDROCODONE BITARTRATE AND ACETAMINOPHEN 1 TABLET: 10; 325 TABLET ORAL at 06:04

## 2025-04-20 NOTE — NURSING
Discussed poc with pt, pt verbalized understanding    Purposeful rounding every 2hours    VS wnl  Cardiac monitoring in use, pt is NSR, tele monitor # [id number]  Blood glucose monitoring   Fall precautions in place, remains injury free  Pain and nausea under control with PRN meds    Accurate I&Os  Abx given as prescribed  Bed locked at lowest position  Call light within reach    Chart check complete  Will cont with POC

## 2025-04-20 NOTE — ASSESSMENT & PLAN NOTE
Body mass index is 50.85 kg/m². Morbid obesity complicates all aspects of disease management from diagnostic modalities to treatment. Weight loss encouraged and health benefits explained to patient.   Physical/occupational therapy recommending moderate intensity therapy but patient declines but may need to reconsider

## 2025-04-20 NOTE — SUBJECTIVE & OBJECTIVE
Interval History: See hospital course for today      Review of Systems   Constitutional:  Positive for activity change and fatigue. Negative for appetite change and fever.   Respiratory:  Negative for shortness of breath.    Gastrointestinal:  Negative for abdominal pain, diarrhea, nausea and vomiting.   Musculoskeletal:  Positive for gait problem and myalgias.   Skin:  Positive for color change and wound.   Neurological:  Positive for weakness.   Psychiatric/Behavioral:  Negative for agitation, behavioral problems, confusion, decreased concentration and dysphoric mood. The patient is not nervous/anxious.      Objective:     Vital Signs (Most Recent):  Temp: 98.6 °F (37 °C) (04/20/25 0707)  Pulse:  (pt currently getting bath) (04/20/25 0900)  Resp: 15 (04/20/25 0906)  BP: (!) 155/71 (04/20/25 0707)  SpO2: 97 % (04/20/25 0707) Vital Signs (24h Range):  Temp:  [98.1 °F (36.7 °C)-99.1 °F (37.3 °C)] 98.6 °F (37 °C)  Pulse:  [] 96  Resp:  [15-20] 15  SpO2:  [94 %-97 %] 97 %  BP: (120-155)/(56-95) 155/71     Weight: 126.1 kg (278 lb)  Body mass index is 50.85 kg/m².    Intake/Output Summary (Last 24 hours) at 4/20/2025 1017  Last data filed at 4/20/2025 0444  Gross per 24 hour   Intake --   Output 500 ml   Net -500 ml         Physical Exam  Vitals and nursing note reviewed. Exam conducted with a chaperone present (visitor).   Constitutional:       General: She is not in acute distress.     Appearance: She is morbidly obese. She is ill-appearing. She is not toxic-appearing.   HENT:      Head: Normocephalic and atraumatic.   Cardiovascular:      Rate and Rhythm: Normal rate.   Pulmonary:      Effort: Pulmonary effort is normal. No respiratory distress.   Abdominal:      Palpations: Abdomen is soft.      Tenderness: There is no abdominal tenderness.   Musculoskeletal:         General: Swelling and tenderness present.      Right lower leg: Edema present.   Skin:     General: Skin is warm.      Findings: Erythema and  lesion present.   Neurological:      Mental Status: She is alert and oriented to person, place, and time.      Motor: Weakness present.   Psychiatric:         Mood and Affect: Mood normal.         Behavior: Behavior normal. Behavior is cooperative.               Significant Labs: All pertinent labs within the past 24 hours have been reviewed.  Blood Culture: negative growth to date x 5 days  Body fluid no organisms seen  CBC:   Recent Labs   Lab 04/19/25  0745 04/20/25  0811   WBC 11.00 10.40   HGB 9.5* 9.3*   HCT 30.0* 30.0*    366     CMP:   Recent Labs   Lab 04/20/25  0811      K 4.1      CO2 26   BUN 16   CREATININE 0.9   CALCIUM 8.3*   ALBUMIN 1.7*   BILITOT 0.4   ALKPHOS 109   AST 23   ALT 19   ANIONGAP 9     POCT Glucose:   Recent Labs   Lab 04/19/25  1128 04/19/25  1640 04/20/25  0611   POCTGLUCOSE 215* 152* 181*     Procalcitonin 0.85    Significant Imaging: I have reviewed all pertinent imaging results/findings within the past 24 hours.  Mri showing circumferential cellulitis, no evidence of myositis

## 2025-04-20 NOTE — ASSESSMENT & PLAN NOTE
Concern for neck fascia however patient's white blood cell count, creatinine, glucose are not consistent.  However picture may be mixed due to already having been on antibiotics.  Patient reports began 4/7  She was hospitalized at [facility] 4/7 through 4/10 reportedly she was febrile.  She was treated with Ancef and by 4/10 she was afebrile.  There are surgical pen marks on her right upper thigh which shows that the redness has receded some.  Blood cultures at the [facility] were negative.  She was discharged on Augmentin.  Patient had worsening swelling and pain and she re-presented to [facility].  See results of CT scan and Doppler of lower extremity above  Was given vancomycin and cefepime in ER  We will change to Zyvox for better skin penetration for Staph, Zosyn for anaerobes and strep and add clindamycin for toxin.  General surgery following, no surgical intervention warranted  Ct imaging negative for abscess  Deferring final antibiotic(s) per infectious disease     No significant improvement  Mild acute kidney injury   Psh right total hip revision  Consider mri to evaluate prosthesis though erythema has receded from knee and leukocytosis resolved  Interventional radiology to aspirate blister, studies pending  Us liliana unable to tolerate  Incentive spirometer   Defer to infectious disease to adjust antibiotic(s)    Leukocytosis resolved  Mri with cellulitis  Continue intravenous antibiotic(s) per infectious disease   Add topical antimicrobials, scd vs robyn hose, cold compress as tolerated  Physical/occupational therapy recommending moderate intensity therapy but patient refuses. Patient may need to reconsider

## 2025-04-20 NOTE — PROGRESS NOTES
"      Chief Complaint   Patient presents with    Leg Swelling     Left leg swelling and redness, unable to ambulate        HISTORY OF PRESENT ILLNESS:  Patient admitted with cellulitis of right lower extremity - severe cellulitis.  MRI of tib/fib only shows cellulitic changes.  Same with CT lower extremity upon admission.  Arterial duplex showed some questionable SFA disease but has bounding DP pulse.  Venous duplex negative.  Ortho on board.  ID on board as well as general surgery.    INTERVAL HISTORY:  Really no improvement in appearance of leg at this time.   to palpation, swollen, and erythematous.  Has SCDs in place.    ROS:   10 point review of systems is negative except as mentioned in Interval History.    Objective:  General Appearance:  Comfortable, well-appearing and in no acute distress.    Vital signs: (most recent): Blood pressure (!) 146/65, pulse 94, temperature 98.7 °F (37.1 °C), temperature source Oral, resp. rate 18, height 5' 2" (1.575 m), weight 126.1 kg (278 lb), SpO2 (!) 93%.  Vital signs are normal.    HEENT: Normal HEENT exam.    Lungs:  Normal effort and normal respiratory rate.    Heart: Normal rate.  Regular rhythm.    Abdomen: Abdomen is soft.  Bowel sounds are normal.   There is no abdominal tenderness.     Extremities: (Right leg flexed at knee and externally rotated)  Pulses: Distal pulses are intact.    Neurological: Patient is alert and oriented to person, place and time.  Normal strength.    Pupils:  Pupils are equal, round, and reactive to light.    Skin:  Warm.  (Skin erythematous from knee down to ankle with swelling, warmth, and tenderness to palpation present)      LABS:  I have reviewed all pertinent lab results within the past 24 hours.    CULTURES:  No results found for the last 90 days.      PATHOLOGY:  Specimens (From admission, onward)      None            IMAGING:  I have personally reviewed the imaging.  MRI Tibia Fibula W WO Contrast Right   Final Result    "   1.  Circumferential cellulitis.   2.  No evidence for myositis or intramuscular abscess or fascial edema.   3.  No evidence for osteomyelitis of the tibia or fibula.      Finalized on: 4/19/2025 9:32 AM By:  Foster Romo MD   [facility]# [id number]      2025-04-19 09:34:47.273     [facility]      US Retroperitoneal Complete   Final Result      Elevated bilateral renal arterial resistive indices, which may be seen in the setting of medical renal disease.  No hydronephrosis.         Electronically signed by: Estephania Pappas   Date:    04/17/2025   Time:    15:03      US Lower Extremity Arteries Bilateral   Final Result      1.  At least moderate stenosis of the right superficial femoral artery.  A CT angiogram of the bilateral lower extremities could be performed for further assessment.      2.  No evidence for hemodynamically significant stenosis in the left lower extremity.      3.  Right inguinal and medial right thigh lymphadenopathy.         Electronically signed by: Foster Romo MD   Date:    04/16/2025   Time:    15:45      US Guided Needle Placement   Final Result      Percutaneous aspiration of the right calf cutaneous fluid collection, yielding 1 mL of serous fluid. No drainage catheter was left in place.      Plan:      Follow-up fluid culture studies.      ______________________________________________________________________         Electronically signed by: Baron Ledesma   Date:    04/18/2025   Time:    11:57      X-Ray Chest 1 View   Final Result      Similar radiographic appearance of the chest without significant interval change.         Electronically signed by: Estephania Pappas   Date:    04/14/2025   Time:    08:10      CT Leg (Tibia-Fibula) Wtih Contrast Right   Final Result      Extensive soft tissue swelling and skin thickening with suspected reactive right inguinal adenopathy.  No definite abscess identified at this time.         Electronically signed by: Christopher Olivier   Date:    04/13/2025    Time:    14:33      CT Thigh With Contrast Right   Final Result      Extensive soft tissue swelling and skin thickening with suspected reactive right inguinal adenopathy.  No definite abscess identified at this time.         Electronically signed by: Christopher Olivier   Date:    04/13/2025   Time:    14:33      US Lower Extremity Veins Right   Final Result      No evidence of deep venous thrombosis in the right lower extremity.         Electronically signed by: Christopher Olivier   Date:    04/13/2025   Time:    12:13      X-Ray Chest AP Portable   Final Result    As above.      Finalized on: 4/13/2025 11:18 AM By:  Christopher Olivier MD   [facility]# [id number]      2025-04-13 11:20:39.894     [facility]          MDM  Number of Diagnoses or Management Options  Acute on chronic combined systolic and diastolic heart failure: new, needed workup  Cellulitis of right lower leg: new, needed workup  Morbid obesity: new, needed workup  Right leg swelling: new, needed workup  Screening for cardiovascular condition: new, needed workup  Severe sepsis: new, needed workup     Amount and/or Complexity of Data Reviewed  Clinical lab tests: reviewed  Tests in the radiology section of CPT®: reviewed  Tests in the medicine section of CPT®: reviewed  Discussion of test results with the performing providers: no  Decide to obtain previous medical records or to obtain history from someone other than the patient: yes  Obtain history from someone other than the patient: no  Review and summarize past medical records: yes  Discuss the patient with other providers: no  Independent visualization of images, tracings, or specimens: yes    Risk of Complications, Morbidity, and/or Mortality  Presenting problems: moderate  Diagnostic procedures: moderate  Management options: moderate        Assessment & Plan  68 y/o female with severe case of cellulitis.  No definitive source or abscess at this time.  I am not sure the knee as been thoroughly imaged as a potential  source.  Her knee is quite swollen and tender - could this be a septic joint.  Ortho is involved so I will defer.  Vascular surgery will sign off at this time.  Please notify us if we can be of further assistance.    Active Hospital Problems    Diagnosis  POA    *Cellulitis of right lower extremity [L03.115]  Yes    Morbid obesity [E66.01]  Yes    Primary hypertension [I10]  Yes    Acute on chronic combined systolic and diastolic congestive heart failure [I50.43]  Yes    Type 2 diabetes mellitus with diabetic polyneuropathy, with long-term current use of insulin [E11.42, Z79.4]  Not Applicable      Resolved Hospital Problems    Diagnosis Date Resolved POA    YOVANA (acute kidney injury) [N17.9] 04/20/2025 Yes     Last Assessment & Plan:    Formatting of this note might be different from the original.   History & Physical       Discharge Summary       Follow-up patient with progressive oliguric YOVANA after surgery.  Resolved. Avoid nephrotoxic agents, renally dose all medications where necessary, and protect nondominant arm as much as possible         Basilio Odilia  4/20/2025  [facility]  Vascular Surgery  [phone] (Clinic Number)

## 2025-04-20 NOTE — PT/OT/SLP PROGRESS
"Physical Therapy      Patient Name:  Stephanie Raza   MRN:  [id number]    Pt lying prone upon entry into room. Attempted to see pt, pt states "I'm not doing no more than what you see me doing." Will follow-up at next available opportunity.    Nimisha Pate, [place]  "

## 2025-04-20 NOTE — ASSESSMENT & PLAN NOTE
YOVANA is likely due to acute tubular necrosis caused by possible drug induced. Baseline creatinine is 0.9. Most recent creatinine and eGFR are listed below.  Recent Labs     04/18/25  0522 04/20/25  0811   CREATININE 1.3 0.9   EGFRNORACEVR 45* >60      Plan  - YOVANA is improving  - Avoid nephrotoxins and renally dose meds for GFR listed above  - Monitor urine output, serial BMP, and adjust therapy as needed    Resolved

## 2025-04-20 NOTE — ASSESSMENT & PLAN NOTE
Patient's blood pressure range in the last 24 hours was: BP  Min: 120/56  Max: 155/71.The patient's inpatient anti-hypertensive regimen is listed below:  Current Antihypertensives  , Daily, Oral  metoprolol succinate (TOPROL-XL) 24 hr tablet 50 mg, 2 times daily, Oral    Plan  - BP is controlled, no changes needed to their regimen  Holding arb due to intermittent hypotension and acute kidney injury

## 2025-04-20 NOTE — PROGRESS NOTES
[facility]  Progress Note    Patient Name: Setphanie Raza  MRN: [id number]  Patient Class: IP- Inpatient   Admission Date: 4/13/2025  Length of Stay: 7 days  Attending Physician: Delfino Johansen MD  Primary Care Provider: Reyna Suarez MD        Subjective     Principal Problem:Cellulitis of right lower extremity        HPI:  Patient is a 65-year-old female with a history of diabetes, hyperlipidemia, hypertension, obesity who presented emergency department for evaluation of right leg swelling.  Patient reports on 04/06 she woke up vomiting and did not feel well.  When her daughter saw her on 04/08 she took her to the [facility].  She reports she had swelling in her right leg that was painful all the way up to her groin.  She was admitted for 3 days reportedly treated with Ancef at which time the marks of erythema had improved her fever had subsided and she was discharged home on Augmentin (blood cultures remain negative).  After being home she noted that the swelling worsened and she re-presented to the hospital.  In the emergency department she was afebrile, laboratory exam revealed protocol of 2.25, BNP of 150, sed rate greater 120, , white blood cell count of 13.4.  Lactic acid level was normal at 1.7.  Blood cultures were obtained and she was begun on vancomycin and meropenem.  CT scan of the leg revealed large left inguinal lymph nodes, fat stranding in the thigh most notably in the medial portion right hip arthroplasty in place intrapelvic contents unremarkable  CTA tib-fib soft tissue thickening and fat stranding in the anterior aspect of the lower extremity with disorganized fluid throughout the superficial compartment with no definitive involvement of the deep intramuscular compartment, no definitive abscess  Doppler exam of lower extremity negative for DVT    Consult general surgery and Infectious Disease for evaluation.        Overview/Hospital  Course:  4/14 admitted for right leg cellulitis. Denies insect bite, trauma. States improvement in symptoms of erythema, edema and pain. Intravenous antibiotic(s) per infectious disease. Surgery following.  4/15 antibiotic(s) per infectious disease. Cellulitis modestly improving. Pain regimen adjusted.  4/16 interventional radiology aspirated blister. Body fluid studies pending. Patient endorses improvement in symptoms of edema and erythema. Us liliana pending  4/17 us with moderate stenosis in superficial femoral artery. Patient unable to tolerate us liliana studydue to pain. Vascular surgery and nephrology consulted due to unimproved cellulitis and acute kidney injury. Us abdomen complete with medical renal disease, no hydronephrosis. Infectious disease aware. Surgery recommending mri pending renal improvement  4/18 creatinine improving. Awaiting mri  4/19 ortho consulted while awaiting mri official read. Mri without myositis or abscess. Continue treatment for lymphedema with cellulitis with compression stockings as tolerated and topical antimicrobials, elevation and cold compress.  4/20 labs improving. Continue antibiotic(s) per infectious disease. Encourage elevation, cold compress and scds vs robyn hose. No surgical intervention warranted.    Interval History: See hospital course for today      Review of Systems   Constitutional:  Positive for activity change and fatigue. Negative for appetite change and fever.   Respiratory:  Negative for shortness of breath.    Gastrointestinal:  Negative for abdominal pain, diarrhea, nausea and vomiting.   Musculoskeletal:  Positive for gait problem and myalgias.   Skin:  Positive for color change and wound.   Neurological:  Positive for weakness.   Psychiatric/Behavioral:  Negative for agitation, behavioral problems, confusion, decreased concentration and dysphoric mood. The patient is not nervous/anxious.      Objective:     Vital Signs (Most Recent):  Temp: 98.6 °F (37 °C)  (04/20/25 0707)  Pulse:  (pt currently getting bath) (04/20/25 0900)  Resp: 15 (04/20/25 0906)  BP: (!) 155/71 (04/20/25 0707)  SpO2: 97 % (04/20/25 0707) Vital Signs (24h Range):  Temp:  [98.1 °F (36.7 °C)-99.1 °F (37.3 °C)] 98.6 °F (37 °C)  Pulse:  [] 96  Resp:  [15-20] 15  SpO2:  [94 %-97 %] 97 %  BP: (120-155)/(56-95) 155/71     Weight: 126.1 kg (278 lb)  Body mass index is 50.85 kg/m².    Intake/Output Summary (Last 24 hours) at 4/20/2025 1017  Last data filed at 4/20/2025 0444  Gross per 24 hour   Intake --   Output 500 ml   Net -500 ml         Physical Exam  Vitals and nursing note reviewed. Exam conducted with a chaperone present (visitor).   Constitutional:       General: She is not in acute distress.     Appearance: She is morbidly obese. She is ill-appearing. She is not toxic-appearing.   HENT:      Head: Normocephalic and atraumatic.   Cardiovascular:      Rate and Rhythm: Normal rate.   Pulmonary:      Effort: Pulmonary effort is normal. No respiratory distress.   Abdominal:      Palpations: Abdomen is soft.      Tenderness: There is no abdominal tenderness.   Musculoskeletal:         General: Swelling and tenderness present.      Right lower leg: Edema present.   Skin:     General: Skin is warm.      Findings: Erythema and lesion present.   Neurological:      Mental Status: She is alert and oriented to person, place, and time.      Motor: Weakness present.   Psychiatric:         Mood and Affect: Mood normal.         Behavior: Behavior normal. Behavior is cooperative.               Significant Labs: All pertinent labs within the past 24 hours have been reviewed.  Blood Culture: negative growth to date x 5 days  Body fluid no organisms seen  CBC:   Recent Labs   Lab 04/19/25  0745 04/20/25  0811   WBC 11.00 10.40   HGB 9.5* 9.3*   HCT 30.0* 30.0*    366     CMP:   Recent Labs   Lab 04/20/25  0811      K 4.1      CO2 26   BUN 16   CREATININE 0.9   CALCIUM 8.3*   ALBUMIN 1.7*    BILITOT 0.4   ALKPHOS 109   AST 23   ALT 19   ANIONGAP 9     POCT Glucose:   Recent Labs   Lab 04/19/25  1128 04/19/25  1640 04/20/25  0611   POCTGLUCOSE 215* 152* 181*     Procalcitonin 0.85    Significant Imaging: I have reviewed all pertinent imaging results/findings within the past 24 hours.  Mri showing circumferential cellulitis, no evidence of myositis      Assessment & Plan  Type 2 diabetes mellitus with diabetic polyneuropathy, with long-term current use of insulin  Patient with diabetes currently well-controlled is taking Ozempic outpatient  Sliding scale insulin with Accu-Cheks a.c. HS  Morbid obesity  Body mass index is 50.85 kg/m². Morbid obesity complicates all aspects of disease management from diagnostic modalities to treatment. Weight loss encouraged and health benefits explained to patient.   Physical/occupational therapy recommending moderate intensity therapy but patient declines but may need to reconsider      Cellulitis of right lower extremity  Concern for neck fascia however patient's white blood cell count, creatinine, glucose are not consistent.  However picture may be mixed due to already having been on antibiotics.  Patient reports began 4/7  She was hospitalized at [facility] 4/7 through 4/10 reportedly she was febrile.  She was treated with Ancef and by 4/10 she was afebrile.  There are surgical pen marks on her right upper thigh which shows that the redness has receded some.  Blood cultures at the [facility] were negative.  She was discharged on Augmentin.  Patient had worsening swelling and pain and she re-presented to [facility].  See results of CT scan and Doppler of lower extremity above  Was given vancomycin and cefepime in ER  We will change to Zyvox for better skin penetration for Staph, Zosyn for anaerobes and strep and add clindamycin for toxin.  General surgery following, no surgical intervention warranted  Ct imaging negative for abscess  Deferring final  "antibiotic(s) per infectious disease     No significant improvement  Mild acute kidney injury   Psh right total hip revision  Consider mri to evaluate prosthesis though erythema has receded from knee and leukocytosis resolved  Interventional radiology to aspirate blister, studies pending  Us liliana unable to tolerate  Incentive spirometer   Defer to infectious disease to adjust antibiotic(s)    Leukocytosis resolved  Mri with cellulitis  Continue intravenous antibiotic(s) per infectious disease   Add topical antimicrobials, scd vs robyn hose, cold compress as tolerated  Physical/occupational therapy recommending moderate intensity therapy but patient refuses. Patient may need to reconsider    Primary hypertension  Patient's blood pressure range in the last 24 hours was: BP  Min: 120/56  Max: 155/71.The patient's inpatient anti-hypertensive regimen is listed below:  Current Antihypertensives  , Daily, Oral  metoprolol succinate (TOPROL-XL) 24 hr tablet 50 mg, 2 times daily, Oral    Plan  - BP is controlled, no changes needed to their regimen  Holding arb due to intermittent hypotension and acute kidney injury   Acute on chronic combined systolic and diastolic congestive heart failure  Patient has Combined Systolic and Diastolic heart failure that is Acute on chronic. On presentation their CHF was decompensated. Evidence of decompensated CHF on presentation includes: edema. The etiology of their decompensation is likely increased fluid intake. Most recent BNP and echo results are listed below.  No results for input(s): "BNP" in the last 72 hours.    Latest ECHO  No results found for this or any previous visit.    Current Heart Failure Medications  , Daily, Oral  metoprolol succinate (TOPROL-XL) 24 hr tablet 50 mg, 2 times daily, Oral    Plan  - Monitor strict I&Os and daily weights.    - Place on telemetry  - Low sodium diet  - Place on fluid restriction of 1.5 L.   - Cardiology has not been consulted  - The patient's " volume status is at their baseline  -patient follows with Dr. Mikie Saxena whom she saw about 3 weeks ago.  She has follow up appointment in May.    Echocardiogram reviewed       YOVANA (acute kidney injury) (Resolved: 4/20/2025)  YOVANA is likely due to acute tubular necrosis caused by possible drug induced . Baseline creatinine is  0.9 . Most recent creatinine and eGFR are listed below.  Recent Labs     04/18/25  0522 04/20/25  0811   CREATININE 1.3 0.9   EGFRNORACEVR 45* >60      Plan  - YOVANA is improving  - Avoid nephrotoxins and renally dose meds for GFR listed above  - Monitor urine output, serial BMP, and adjust therapy as needed    Resolved       VTE Risk Mitigation (From admission, onward)           Ordered     Place sequential compression device  Until discontinued         04/20/25 0824     Place MONICA hose  Until discontinued         04/19/25 1146     enoxaparin injection 40 mg  Every 12 hours         04/13/25 1626     IP VTE HIGH RISK PATIENT  Once         04/13/25 1622     Place sequential compression device  Until discontinued         04/13/25 1622                    Discharge Planning   RONNIE: 4/15/2025     Code Status: Full Code   Medical Readiness for Discharge Date:   Discharge Plan A: Home Health                    Delfino Johansen MD  Department of Hospital Medicine   [facility] - Med Surg

## 2025-04-20 NOTE — PLAN OF CARE
Discussed poc with pt and he daughter at the bedside. Both verbalized understanding    Purposeful rounding every 2hours    VS wnl  Cardiac monitoring in use  Blood glucose monitoring     Pain under control with PRN meds    Accurate I&Os  Abx given as prescribed  Bed locked at lowest position  Call light within reach    Chart check continued  Will cont with POC

## 2025-04-20 NOTE — PROGRESS NOTES
Feeling better today  Still extremely tender with palpation  Spoke with her today about attempting to tolerate the SCDs as I think this will help with the lymphedema  He is willing to try  She is unwilling to ambulate  MRI shows no fasciitis or myositis  No surgical intervention warranted at this time

## 2025-04-21 DIAGNOSIS — M54.32 SCIATICA OF LEFT SIDE: Chronic | ICD-10-CM

## 2025-04-21 LAB
ABSOLUTE EOSINOPHIL (OHS): 0.25 K/UL
ABSOLUTE MONOCYTE (OHS): 1.01 K/UL (ref 0.3–1)
ABSOLUTE NEUTROPHIL COUNT (OHS): 7.87 K/UL (ref 1.8–7.7)
ANION GAP (OHS): 6 MMOL/L (ref 8–16)
BACTERIA FLD AEROBE CULT: NO GROWTH
BASOPHILS # BLD AUTO: 0.05 K/UL
BASOPHILS NFR BLD AUTO: 0.5 %
BUN SERPL-MCNC: 15 MG/DL (ref 8–23)
CALCIUM SERPL-MCNC: 8.5 MG/DL (ref 8.7–10.5)
CHLORIDE SERPL-SCNC: 102 MMOL/L (ref 95–110)
CO2 SERPL-SCNC: 28 MMOL/L (ref 23–29)
CREAT SERPL-MCNC: 1 MG/DL (ref 0.5–1.4)
ERYTHROCYTE [DISTWIDTH] IN BLOOD BY AUTOMATED COUNT: 15.2 % (ref 11.5–14.5)
GFR SERPLBLD CREATININE-BSD FMLA CKD-EPI: >60 ML/MIN/1.73/M2
GLUCOSE SERPL-MCNC: 169 MG/DL (ref 70–110)
GRAM STN SPEC: NORMAL
GRAM STN SPEC: NORMAL
HCT VFR BLD AUTO: 31.5 % (ref 37–48.5)
HGB BLD-MCNC: 9.7 GM/DL (ref 12–16)
IMM GRANULOCYTES # BLD AUTO: 0.1 K/UL (ref 0–0.04)
IMM GRANULOCYTES NFR BLD AUTO: 0.9 % (ref 0–0.5)
LYMPHOCYTES # BLD AUTO: 1.8 K/UL (ref 1–4.8)
MCH RBC QN AUTO: 25.5 PG (ref 27–31)
MCHC RBC AUTO-ENTMCNC: 30.8 G/DL (ref 32–36)
MCV RBC AUTO: 83 FL (ref 82–98)
NUCLEATED RBC (/100WBC) (OHS): 0 /100 WBC
PLATELET # BLD AUTO: 339 K/UL (ref 150–450)
PMV BLD AUTO: 9.9 FL (ref 9.2–12.9)
POCT GLUCOSE: 145 MG/DL (ref 70–110)
POCT GLUCOSE: 158 MG/DL (ref 70–110)
POCT GLUCOSE: 200 MG/DL (ref 70–110)
POTASSIUM SERPL-SCNC: 4.2 MMOL/L (ref 3.5–5.1)
RBC # BLD AUTO: 3.81 M/UL (ref 4–5.4)
RELATIVE EOSINOPHIL (OHS): 2.3 %
RELATIVE LYMPHOCYTE (OHS): 16.2 % (ref 18–48)
RELATIVE MONOCYTE (OHS): 9.1 % (ref 4–15)
RELATIVE NEUTROPHIL (OHS): 71 % (ref 38–73)
SODIUM SERPL-SCNC: 136 MMOL/L (ref 136–145)
W STREPTOLYSIN O ANTIBODIES: 23 IU/ML
WBC # BLD AUTO: 11.08 K/UL (ref 3.9–12.7)

## 2025-04-21 PROCEDURE — 36415 COLL VENOUS BLD VENIPUNCTURE: CPT | Mod: HCNC | Performed by: FAMILY MEDICINE

## 2025-04-21 PROCEDURE — 25000242 PHARM REV CODE 250 ALT 637 W/ HCPCS: Mod: HCNC | Performed by: FAMILY MEDICINE

## 2025-04-21 PROCEDURE — 97530 THERAPEUTIC ACTIVITIES: CPT | Mod: HCNC

## 2025-04-21 PROCEDURE — 80048 BASIC METABOLIC PNL TOTAL CA: CPT | Mod: HCNC | Performed by: FAMILY MEDICINE

## 2025-04-21 PROCEDURE — 85025 COMPLETE CBC W/AUTO DIFF WBC: CPT | Mod: HCNC | Performed by: FAMILY MEDICINE

## 2025-04-21 PROCEDURE — 25000003 PHARM REV CODE 250: Mod: HCNC | Performed by: FAMILY MEDICINE

## 2025-04-21 PROCEDURE — 63600175 PHARM REV CODE 636 W HCPCS: Mod: HCNC | Performed by: INTERNAL MEDICINE

## 2025-04-21 PROCEDURE — 99232 SBSQ HOSP IP/OBS MODERATE 35: CPT | Mod: HCNC,,, | Performed by: PHYSICIAN ASSISTANT

## 2025-04-21 PROCEDURE — 25000003 PHARM REV CODE 250: Mod: HCNC | Performed by: INTERNAL MEDICINE

## 2025-04-21 PROCEDURE — 97530 THERAPEUTIC ACTIVITIES: CPT | Mod: HCNC,CQ

## 2025-04-21 PROCEDURE — 21400001 HC TELEMETRY ROOM: Mod: HCNC

## 2025-04-21 PROCEDURE — 11000001 HC ACUTE MED/SURG PRIVATE ROOM: Mod: HCNC

## 2025-04-21 RX ORDER — MELOXICAM 7.5 MG/1
TABLET ORAL
Qty: 30 TABLET | Refills: 0 | Status: SHIPPED | OUTPATIENT
Start: 2025-04-21

## 2025-04-21 RX ADMIN — LINEZOLID 600 MG: 600 TABLET, FILM COATED ORAL at 08:04

## 2025-04-21 RX ADMIN — FLUTICASONE PROPIONATE 100 MCG: 50 SPRAY, METERED NASAL at 08:04

## 2025-04-21 RX ADMIN — CETIRIZINE HYDROCHLORIDE 10 MG: 10 TABLET, FILM COATED ORAL at 08:04

## 2025-04-21 RX ADMIN — ENOXAPARIN SODIUM 40 MG: 40 INJECTION SUBCUTANEOUS at 08:04

## 2025-04-21 RX ADMIN — HYDROCODONE BITARTRATE AND ACETAMINOPHEN 1 TABLET: 10; 325 TABLET ORAL at 07:04

## 2025-04-21 RX ADMIN — HYDROCODONE BITARTRATE AND ACETAMINOPHEN 1 TABLET: 10; 325 TABLET ORAL at 08:04

## 2025-04-21 RX ADMIN — PIPERACILLIN SODIUM AND TAZOBACTAM SODIUM 4.5 G: 4; .5 INJECTION, POWDER, FOR SOLUTION INTRAVENOUS at 03:04

## 2025-04-21 RX ADMIN — OXYCODONE HYDROCHLORIDE 5 MG: 5 TABLET ORAL at 09:04

## 2025-04-21 RX ADMIN — HYDROCODONE BITARTRATE AND ACETAMINOPHEN 1 TABLET: 10; 325 TABLET ORAL at 03:04

## 2025-04-21 RX ADMIN — METOPROLOL SUCCINATE 50 MG: 50 TABLET, EXTENDED RELEASE ORAL at 08:04

## 2025-04-21 RX ADMIN — METRONIDAZOLE: 10 GEL TOPICAL at 04:04

## 2025-04-21 RX ADMIN — MICONAZOLE NITRATE: 20 OINTMENT TOPICAL at 01:04

## 2025-04-21 RX ADMIN — OXYCODONE HYDROCHLORIDE 5 MG: 5 TABLET ORAL at 08:04

## 2025-04-21 RX ADMIN — OXYCODONE HYDROCHLORIDE 5 MG: 5 TABLET ORAL at 02:04

## 2025-04-21 RX ADMIN — METRONIDAZOLE: 10 GEL TOPICAL at 08:04

## 2025-04-21 RX ADMIN — ASPIRIN 81 MG CHEWABLE TABLET 81 MG: 81 TABLET CHEWABLE at 08:04

## 2025-04-21 RX ADMIN — HYDROCODONE BITARTRATE AND ACETAMINOPHEN 1 TABLET: 10; 325 TABLET ORAL at 11:04

## 2025-04-21 RX ADMIN — MICONAZOLE NITRATE: 20 OINTMENT TOPICAL at 08:04

## 2025-04-21 NOTE — ASSESSMENT & PLAN NOTE
Patient's blood pressure range in the last 24 hours was: BP  Min: 125/49  Max: 156/70.The patient's inpatient anti-hypertensive regimen is listed below:  Current Antihypertensives  , Daily, Oral  metoprolol succinate (TOPROL-XL) 24 hr tablet 50 mg, 2 times daily, Oral    Plan  - BP is controlled, no changes needed to their regimen  Holding arb due to intermittent hypotension and acute kidney injury

## 2025-04-21 NOTE — SUBJECTIVE & OBJECTIVE
"Interval History:   She reports less pain and erythema.  She feels better.  CBC - wbc 14.64.  04/16-  She feels better  CBC showed wbc 18- increased  04/17-  She has persistent leukocytosis.  WBC remains high at 18.    04/18-  She feels better but has persistent leucocytosis .   04/20-she is tolerating medications.  She has less pain  CBC now showed normal WBC  .  MRI of the lower extremity did not show any abscess    Review of Systems   Constitutional:  Negative for activity change, appetite change, chills, diaphoresis, fatigue and fever.   Neurological:  Negative for dizziness and facial asymmetry.     Objective:     Vital Signs (Most Recent):  Temp: 99.3 °F (37.4 °C) (04/20/25 2352)  Pulse: 92 (04/21/25 0101)  Resp: 18 (04/20/25 2352)  BP: 130/65 (04/20/25 2352)  SpO2: (!) 94 % (04/20/25 2352) Vital Signs (24h Range):  Temp:  [97.5 °F (36.4 °C)-99.3 °F (37.4 °C)] 99.3 °F (37.4 °C)  Pulse:  [] 92  Resp:  [15-19] 18  SpO2:  [93 %-97 %] 94 %  BP: (125-155)/(49-95) 130/65     Weight: 126.1 kg (278 lb)  Body mass index is 50.85 kg/m².    Estimated Creatinine Clearance: 77.1 mL/min (based on SCr of 0.9 mg/dL).     Physical Exam  Vitals and nursing note reviewed.   HENT:      Head: Normocephalic.   Eyes:      Pupils: Pupils are equal, round, and reactive to light.   Pulmonary:      Effort: Pulmonary effort is normal.   Abdominal:      General: Abdomen is flat.   Skin:     Findings: Bruising, erythema and lesion present.   Neurological:      General: No focal deficit present.      Mental Status: She is alert.                Significant Labs: Blood Culture: No results for input(s): "LABBLOO" in the last 4320 hours.  CBC:   Recent Labs   Lab 04/19/25  0745 04/20/25  0811   WBC 11.00 10.40   HGB 9.5* 9.3*   HCT 30.0* 30.0*    366     CMP:   Recent Labs   Lab 04/20/25  0811      K 4.1      CO2 26   BUN 16   CREATININE 0.9   CALCIUM 8.3*   ALBUMIN 1.7*   BILITOT 0.4   ALKPHOS 109   AST 23   ALT 19 "   ANIONGAP 9     All pertinent labs within the past 24 hours have been reviewed.    Significant Imaging: I have reviewed all pertinent imaging results/findings within the past 24 hours.

## 2025-04-21 NOTE — CONSULTS
CM spoke with patient and daughter regarding therapy's recommendation for SNF placement. Patient and daughter refused and want to go home with home health. CM notified [facility]; pending acceptance.

## 2025-04-21 NOTE — ASSESSMENT & PLAN NOTE
Concern for neck fascia however patient's white blood cell count, creatinine, glucose are not consistent.  However picture may be mixed due to already having been on antibiotics.  Patient reports began 4/7  She was hospitalized at [facility] 4/7 through 4/10 reportedly she was febrile.  She was treated with Ancef and by 4/10 she was afebrile.  There are surgical pen marks on her right upper thigh which shows that the redness has receded some.  Blood cultures at the [facility] were negative.  She was discharged on Augmentin.  Patient had worsening swelling and pain and she re-presented to [facility].  See results of CT scan and Doppler of lower extremity above  Was given vancomycin and cefepime in ER  We will change to Zyvox for better skin penetration for Staph, Zosyn for anaerobes and strep and add clindamycin for toxin.  General surgery following, no surgical intervention warranted  Ct imaging negative for abscess  Deferring final antibiotic(s) per infectious disease     No significant improvement  Mild acute kidney injury   Psh right total hip revision  Consider mri to evaluate prosthesis though erythema has receded from knee and leukocytosis resolved  Interventional radiology to aspirate blister, studies pending  Us liliana unable to tolerate  Incentive spirometer   Defer to infectious disease to adjust antibiotic(s)    Leukocytosis resolved  Mri with cellulitis  Continue intravenous antibiotic(s) per infectious disease   Add topical antimicrobials, scd vs robyn hose, cold compress as tolerated  Physical/occupational therapy recommending moderate intensity therapy but patient refuses. Patient may need to reconsider    4/21/2025  Cont po zyvox  Will set up 1 time dose of dalvance outpatient   Cellulitis improving  Patient declined snf   Will set up home health  Cont to monitor  Anticipate possible dc tomorrow

## 2025-04-21 NOTE — PT/OT/SLP PROGRESS
"Occupational Therapy   Treatment    Name: Stephanie Raza  MRN: [id number]  Admitting Diagnosis:  Cellulitis of right lower extremity       Recommendations:     Discharge Recommendations: Moderate Intensity Therapy  Discharge Equipment Recommendations:  to be determined by next level of care  Barriers to discharge:  None    Assessment:     Stephanie Raza is a 69 y.o. female with a medical diagnosis of Cellulitis of right lower extremity.  She is  mobilizing more efficiently today than previous visits s/t improved pain management. She is mobilizing independently in bed and reports family support at home. Pt continues to adamantly decline out of bed activity, education, or physical assist.  OT is monitoring her acute rehab potential. Performance deficits affecting function are weakness, impaired endurance, impaired self care skills, impaired functional mobility, gait instability, impaired balance, decreased safety awareness, edema, pain.     Rehab Prognosis:   guarded ; patient would benefit from acute skilled OT services to address these deficits and reach maximum level of function.       Plan:     Patient to be seen 2 x/week to address the above listed problems via self-care/home management, therapeutic activities, therapeutic exercises  Plan of Care Expires: 04/30/25  Plan of Care Reviewed with: patient    Subjective     Chief Complaint: Declines physical assist or standing. "I'm not going to stand for 6 months."  Patient/Family Comments/goals: Comfort & pain mgmt  Pain/Comfort:  Pain Rating 1: 10/10  Location - Side 1: Right  Location - Orientation 1: lower  Location 1: leg  Pain Addressed 1: Pre-medicate for activity  Pain Rating Post-Intervention 1: 10/10    Objective:     Communicated with: nurse prior to session.  Patient found supine with SCD, peripheral IV, telemetry upon OT entry to room.    General Precautions: Standard, fall    Orthopedic Precautions:N/A  Braces: N/A  Respiratory Status: Room air   "   Occupational Performance:     Bed Mobility:    Patient completed Scooting/Bridging with independence  Patient completed Supine to Sit with independence  Patient completed Sit to Supine with independence     Functional Mobility/Transfers:  Declined.      [facility] 6 Click ADL: 21    Treatment & Education:  Patient sat EOB x 3 minutes, but declined any activities.  She returned to bed s/t pain.     Patient left supine with all lines intact and call button in reach    GOALS:   Multidisciplinary Problems       Occupational Therapy Goals          Problem: Occupational Therapy    Goal Priority Disciplines Outcome Interventions   Occupational Therapy Goal     OT, PT/OT Ongoing    Description: Goals to be met by: 4/30/25     Patient will increase functional independence with ADLs by performing:    LE Dressing with Minimal Assistance.  Sitting at edge of bed x15 minutes with Stand-by Assistance.  Toilet transfer to bedside commode with Minimal Assistance.                           Time Tracking:     OT Date of Treatment: 04/21/25  OT Start Time: 1057  OT Stop Time: 1107  OT Total Time (min): 10 min    Billable Minutes:Therapeutic Activity 10    OT/YURIDIA: OT     Number of YURIDIA visits since last OT visit: 0    4/21/2025

## 2025-04-21 NOTE — PROGRESS NOTES
[facility] - Med [facility]  Infectious Disease  Progress Note    Patient Name: Stephanie Raza  MRN: [id number]  Admission Date: 4/13/2025  Length of Stay: 8 days  Attending Physician: Delfino Johansen MD  Primary Care Provider: Reyna Suarez MD    Isolation Status: No active isolations  Assessment/Plan:      ID  * Cellulitis of right lower extremity    She had extensive right lower extremity cellulitis.  Will continue Zyvox, clindamycin, Zosyn.  Will monitor clinical response in a.m.a.m. she needs close follow-up    04/15-  She reports interval improvement in the erythema  Will plan to stop Clindamycin , continue Zyvox/Zosyn    04/16- will plan to do MRI of the lower extremity if wbc continues to increase- will monitor wbc in AM  On Zyvox/Zosyn  Follow Gen surgery  04/17-will order MRI of the right lower extremity.  We will discuss case with primary team.  Would continue Zosyn and Zyvox.  04/18-  Will order  MRI of the right lower extremity --  Will follow cbc in AM  On Zyvox/Zosyn  04/20-MRI of the lower extremity did not show an abscess.  She now has normal WBC.  She can be switched to oral regimen in am.  Will complete 8 more days of Zyvox.  Plan will be to give a 1 time dose of Dalvance on discharge .we will see if this is possible with the infusion company  Outpatient Antibiotic Therapy Plan:     Please send referral to [facility].     1) Infection:  Lower extremity cellulitis     2) Discharge Antibiotics:     Intravenous antibiotics:IV Dalvance 1500mg once     3) Therapy Duration:       Estimated end date of IV antibiotics:      4) Outpatient Weekly Labs:     Order the following labs to be drawn on Mondays:   CBC  CMP   CPK (when on Daptomycin)  ESR  CRP  -Can pull picc line at end of treatment unless instructed otherwise .   Please send all labs to [facility] .              Anticipated Disposition:     Thank you for your consult. I will follow-up with patient. Please contact us if you have any  additional questions.    Phillip Mon MD, [facility]  Infectious Disease  [facility]    Subjective:     Principal Problem:Cellulitis of right lower extremity    HPI:  65-year-old female with a history of diabetes, hyperlipidemia, hypertension, obesity .   she was admitted with worsening right lower leg erythema and swelling.  She was initially admitted 0408- treated with Ancef and discharged on Augmentin.  She has she was now admitted with worsening infection    Labs and imaging tests reviewed sed rate greater 120, , white blood cell count of 13.4.  Lactic acid level was normal at 1.7..  CT scan of the leg revealed large left inguinal lymph nodes, fat stranding in the thigh most notably in the medial portion right hip arthroplasty in place intrapelvic contents unremarkable  CTA tib-fib soft tissue thickening and fat stranding in the anterior aspect of the lower extremity with disorganized fluid throughout the superficial compartment with no definitive involvement of the deep intramuscular compartment, no definitive abscess    Interval History:   She reports less pain and erythema.  She feels better.  CBC - wbc 14.64.  04/16-  She feels better  CBC showed wbc 18- increased  04/17-  She has persistent leukocytosis.  WBC remains high at 18.    04/18-  She feels better but has persistent leucocytosis .   04/20-she is tolerating medications.  She has less pain  CBC now showed normal WBC  .  MRI of the lower extremity did not show any abscess    Review of Systems   Constitutional:  Negative for activity change, appetite change, chills, diaphoresis, fatigue and fever.   Neurological:  Negative for dizziness and facial asymmetry.     Objective:     Vital Signs (Most Recent):  Temp: 99.3 °F (37.4 °C) (04/20/25 2352)  Pulse: 92 (04/21/25 0101)  Resp: 18 (04/20/25 2352)  BP: 130/65 (04/20/25 2352)  SpO2: (!) 94 % (04/20/25 2352) Vital Signs (24h Range):  Temp:  [97.5 °F (36.4 °C)-99.3 °F (37.4 °C)] 99.3 °F (37.4  "°C)  Pulse:  [] 92  Resp:  [15-19] 18  SpO2:  [93 %-97 %] 94 %  BP: (125-155)/(49-95) 130/65     Weight: 126.1 kg (278 lb)  Body mass index is 50.85 kg/m².    Estimated Creatinine Clearance: 77.1 mL/min (based on SCr of 0.9 mg/dL).     Physical Exam  Vitals and nursing note reviewed.   HENT:      Head: Normocephalic.   Eyes:      Pupils: Pupils are equal, round, and reactive to light.   Pulmonary:      Effort: Pulmonary effort is normal.   Abdominal:      General: Abdomen is flat.   Skin:     Findings: Bruising, erythema and lesion present.   Neurological:      General: No focal deficit present.      Mental Status: She is alert.                Significant Labs: Blood Culture: No results for input(s): "LABBLOO" in the last 4320 hours.  CBC:   Recent Labs   Lab 04/19/25  0745 04/20/25  0811   WBC 11.00 10.40   HGB 9.5* 9.3*   HCT 30.0* 30.0*    366     CMP:   Recent Labs   Lab 04/20/25  0811      K 4.1      CO2 26   BUN 16   CREATININE 0.9   CALCIUM 8.3*   ALBUMIN 1.7*   BILITOT 0.4   ALKPHOS 109   AST 23   ALT 19   ANIONGAP 9     All pertinent labs within the past 24 hours have been reviewed.    Significant Imaging: I have reviewed all pertinent imaging results/findings within the past 24 hours.  "

## 2025-04-21 NOTE — TELEPHONE ENCOUNTER
No care due was identified.  [facility] Embedded Care Due Messages. Reference number: [id number].   4/21/2025 5:35:34 AM CDT

## 2025-04-21 NOTE — PLAN OF CARE
Discussed poc with pt, pt verbalized understanding    Purposeful rounding every 2hours    VS wnl  Cardiac monitoring in use, pt is NSR, tele monitor #[id number]  Blood glucose monitoring   Fall precautions in place, remains injury free  Pt c/o pain  Pain under control with PRN meds    IVFs saline locked  Accurate I&Os  Abx given as prescribed  Bed locked at lowest position  Call light within reach    Chart check complete  Will cont with POC

## 2025-04-21 NOTE — PROGRESS NOTES
[facility] Medicine  Progress Note    Patient Name: Stephanie Raza  MRN: [id number]  Patient Class: IP- Inpatient   Admission Date: 4/13/2025  Length of Stay: 8 days  Attending Physician: Terry Tolentino MD  Primary Care Provider: Reyna Suarez MD        Subjective     Principal Problem:Cellulitis of right lower extremity        HPI:  Patient is a 65-year-old female with a history of diabetes, hyperlipidemia, hypertension, obesity who presented emergency department for evaluation of right leg swelling.  Patient reports on 04/06 she woke up vomiting and did not feel well.  When her daughter saw her on 04/08 she took her to the [facility].  She reports she had swelling in her right leg that was painful all the way up to her groin.  She was admitted for 3 days reportedly treated with Ancef at which time the marks of erythema had improved her fever had subsided and she was discharged home on Augmentin (blood cultures remain negative).  After being home she noted that the swelling worsened and she re-presented to the hospital.  In the emergency department she was afebrile, laboratory exam revealed protocol of 2.25, BNP of 150, sed rate greater 120, , white blood cell count of 13.4.  Lactic acid level was normal at 1.7.  Blood cultures were obtained and she was begun on vancomycin and meropenem.  CT scan of the leg revealed large left inguinal lymph nodes, fat stranding in the thigh most notably in the medial portion right hip arthroplasty in place intrapelvic contents unremarkable  CTA tib-fib soft tissue thickening and fat stranding in the anterior aspect of the lower extremity with disorganized fluid throughout the superficial compartment with no definitive involvement of the deep intramuscular compartment, no definitive abscess  Doppler exam of lower extremity negative for DVT    Consult general surgery and Infectious Disease for evaluation.        Overview/Hospital  Course:  4/14 admitted for right leg cellulitis. Denies insect bite, trauma. States improvement in symptoms of erythema, edema and pain. Intravenous antibiotic(s) per infectious disease. Surgery following.  4/15 antibiotic(s) per infectious disease. Cellulitis modestly improving. Pain regimen adjusted.  4/16 interventional radiology aspirated blister. Body fluid studies pending. Patient endorses improvement in symptoms of edema and erythema. Us liliana pending  4/17 us with moderate stenosis in superficial femoral artery. Patient unable to tolerate us liliana studydue to pain. Vascular surgery and nephrology consulted due to unimproved cellulitis and acute kidney injury. Us abdomen complete with medical renal disease, no hydronephrosis. Infectious disease aware. Surgery recommending mri pending renal improvement  4/18 creatinine improving. Awaiting mri  4/19 ortho consulted while awaiting mri official read. Mri without myositis or abscess. Continue treatment for lymphedema with cellulitis with compression stockings as tolerated and topical antimicrobials, elevation and cold compress.  4/20 labs improving. Continue antibiotic(s) per infectious disease. Encourage elevation, cold compress and scds vs robyn hose. No surgical intervention warranted.  04/21/2025  Patient reports swelling improving. Cont abx. Will establish 1 time dose of dalvance outpatient. Patient refused snf, will set up home health.     Interval History: patient states pain and swelling improving. Denies any current issues.     Review of Systems   Constitutional:  Positive for activity change and fatigue. Negative for appetite change and fever.   Respiratory:  Negative for shortness of breath.    Gastrointestinal:  Negative for abdominal pain, diarrhea, nausea and vomiting.   Musculoskeletal:  Positive for gait problem and myalgias.   Skin:  Positive for color change and wound.   Neurological:  Positive for weakness.   Psychiatric/Behavioral:  Negative for  agitation, behavioral problems, confusion, decreased concentration and dysphoric mood. The patient is not nervous/anxious.      Objective:     Vital Signs (Most Recent):  Temp: 97.9 °F (36.6 °C) (04/21/25 1157)  Pulse: 89 (04/21/25 1157)  Resp: 18 (04/21/25 1540)  BP: 128/61 (04/21/25 1157)  SpO2: 96 % (04/21/25 1157) Vital Signs (24h Range):  Temp:  [97.5 °F (36.4 °C)-99.3 °F (37.4 °C)] 97.9 °F (36.6 °C)  Pulse:  [89-99] 89  Resp:  [16-19] 18  SpO2:  [94 %-96 %] 96 %  BP: (125-156)/(49-71) 128/61     Weight: 126.1 kg (278 lb)  Body mass index is 50.85 kg/m².    Intake/Output Summary (Last 24 hours) at 4/21/2025 1552  Last data filed at 4/21/2025 0433  Gross per 24 hour   Intake 601.63 ml   Output --   Net 601.63 ml         Physical Exam  Vitals and nursing note reviewed. Exam conducted with a chaperone present (visitor).   Constitutional:       General: She is not in acute distress.     Appearance: She is morbidly obese. She is ill-appearing. She is not toxic-appearing.   HENT:      Head: Normocephalic and atraumatic.   Cardiovascular:      Rate and Rhythm: Normal rate.   Pulmonary:      Effort: Pulmonary effort is normal. No respiratory distress.   Abdominal:      Palpations: Abdomen is soft.      Tenderness: There is no abdominal tenderness.   Musculoskeletal:         General: Swelling and tenderness present.      Right lower leg: Edema present.   Skin:     General: Skin is warm.      Findings: Erythema and lesion present.   Neurological:      Mental Status: She is alert and oriented to person, place, and time.      Motor: Weakness present.   Psychiatric:         Mood and Affect: Mood normal.         Behavior: Behavior normal. Behavior is cooperative.               Significant Labs: All pertinent labs within the past 24 hours have been reviewed.    Significant Imaging: I have reviewed all pertinent imaging results/findings within the past 24 hours.      Assessment & Plan  Type 2 diabetes mellitus with diabetic  polyneuropathy, with long-term current use of insulin  Patient with diabetes currently well-controlled is taking Ozempic outpatient  Sliding scale insulin with Accu-Cheks a.c. HS  Morbid obesity  Body mass index is 50.85 kg/m². Morbid obesity complicates all aspects of disease management from diagnostic modalities to treatment. Weight loss encouraged and health benefits explained to patient.   Physical/occupational therapy recommending moderate intensity therapy but patient declines but may need to reconsider      Cellulitis of right lower extremity  Concern for neck fascia however patient's white blood cell count, creatinine, glucose are not consistent.  However picture may be mixed due to already having been on antibiotics.  Patient reports began 4/7  She was hospitalized at [facility] 4/7 through 4/10 reportedly she was febrile.  She was treated with Ancef and by 4/10 she was afebrile.  There are surgical pen marks on her right upper thigh which shows that the redness has receded some.  Blood cultures at the [facility] were negative.  She was discharged on Augmentin.  Patient had worsening swelling and pain and she re-presented to [facility].  See results of CT scan and Doppler of lower extremity above  Was given vancomycin and cefepime in ER  We will change to Zyvox for better skin penetration for Staph, Zosyn for anaerobes and strep and add clindamycin for toxin.  General surgery following, no surgical intervention warranted  Ct imaging negative for abscess  Deferring final antibiotic(s) per infectious disease     No significant improvement  Mild acute kidney injury   Psh right total hip revision  Consider mri to evaluate prosthesis though erythema has receded from knee and leukocytosis resolved  Interventional radiology to aspirate blister, studies pending  Us liliana unable to tolerate  Incentive spirometer   Defer to infectious disease to adjust antibiotic(s)    Leukocytosis resolved  Mri with  "cellulitis  Continue intravenous antibiotic(s) per infectious disease   Add topical antimicrobials, scd vs robyn hose, cold compress as tolerated  Physical/occupational therapy recommending moderate intensity therapy but patient refuses. Patient may need to reconsider    4/21/2025  Cont po zyvox  Will set up 1 time dose of dalvance outpatient   Cellulitis improving  Patient declined snf   Will set up home health  Cont to monitor  Anticipate possible dc tomorrow   Primary hypertension  Patient's blood pressure range in the last 24 hours was: BP  Min: 125/49  Max: 156/70.The patient's inpatient anti-hypertensive regimen is listed below:  Current Antihypertensives  , Daily, Oral  metoprolol succinate (TOPROL-XL) 24 hr tablet 50 mg, 2 times daily, Oral    Plan  - BP is controlled, no changes needed to their regimen  Holding arb due to intermittent hypotension and acute kidney injury   Acute on chronic combined systolic and diastolic congestive heart failure  Patient has Combined Systolic and Diastolic heart failure that is Acute on chronic. On presentation their CHF was decompensated. Evidence of decompensated CHF on presentation includes: edema. The etiology of their decompensation is likely increased fluid intake. Most recent BNP and echo results are listed below.  No results for input(s): "BNP" in the last 72 hours.    Latest ECHO  No results found for this or any previous visit.    Current Heart Failure Medications  , Daily, Oral  metoprolol succinate (TOPROL-XL) 24 hr tablet 50 mg, 2 times daily, Oral    Plan  - Monitor strict I&Os and daily weights.    - Place on telemetry  - Low sodium diet  - Place on fluid restriction of 1.5 L.   - Cardiology has not been consulted  - The patient's volume status is at their baseline  -patient follows with Dr. Mikie Saxena whom she saw about 3 weeks ago.  She has follow up appointment in May.    Echocardiogram reviewed       VTE Risk Mitigation (From admission, onward)           " Ordered     Place sequential compression device  Until discontinued         04/20/25 0824     Place MONICA hose  Until discontinued         04/19/25 1146     enoxaparin injection 40 mg  Every 12 hours         04/13/25 1626     IP VTE HIGH RISK PATIENT  Once         04/13/25 1622     Place sequential compression device  Until discontinued         04/13/25 1622                    Discharge Planning   RONNIE: 4/15/2025     Code Status: Full Code   Medical Readiness for Discharge Date:   Discharge Plan A: Home Health                    Terry Tolentino MD  Department of Hospital Medicine   '[facility] Surg

## 2025-04-21 NOTE — PT/OT/SLP PROGRESS
Physical Therapy  Treatment    Stephanie Raza   MRN: [id number]   Admitting Diagnosis: Cellulitis of right lower extremity    PT Received On: 04/21/25  PT Start Time: 1054     PT Stop Time: 1110    PT Total Time (min): 16 min       Billable Minutes:  Therapeutic Activity 16    Treatment Type: Treatment  PT/PTA: PTA     Number of PTA visits since last PT visit: 3       General Precautions: Standard, fall  Orthopedic Precautions: N/A  Braces: N/A  Respiratory Status: Room air    Spiritual, Cultural Beliefs, Orthodoxy Practices, Values that Affect Care: no    Subjective:  Communicated with patient's nurse Flakita and performed Epic chart review prior to session.  Patient willing to participate with therapy, but refuses any assistance from therapy. Once seated on the side of the bed, she told us that we talked too slow and that she was going to lay back down. Told the OT that she asks too many questions.     Pain/Comfort  Pain Rating 1: 10/10  Location - Side 1: Right  Location - Orientation 1: lower  Location 1: leg  Pain Addressed 1: Cessation of Activity, Distraction, Reposition, Pre-medicate for activity  Pain Rating Post-Intervention 1: 10/10    Objective:   Patient found with: SCD, peripheral IV, telemetry    Functional Mobility:  Bed Mobility:    Independent (patient able to get to the side of the bed without assistance, but did so very slowly and struggled because of the pain)    Transfers:   NT at this time, too much pain     Gait:    NT at this time, too much pain    Treatment and Education:  Educated patient on importance of increased tolerance to upright position and direct impact on CV endurance and strength. Patient encouraged to utilize elevated HOB to simulate chair position until able to safely complete chair T/F. Patient given a minimum goal of majority of the day to be spent in upright, especially with all meals. Encouraged patient to perform AROM TE to BLE throughout the day within all available  planes of motion. Re enforced importance of utilizing call light to meet needs in room and not attempt to get up without staff assistance. Patient verbalized understanding and agreed to comply.      AM-PAC 6 CLICK MOBILITY  How much help from another person does this patient currently need?   1 = Unable, Total/Dependent Assistance  2 = A lot, Maximum/Moderate Assistance  3 = A little, Minimum/Contact Guard/Supervision  4 = None, Modified [place]/Independent    Turning over in bed (including adjusting bedclothes, sheets and blankets)?: 4  Sitting down on and standing up from a chair with arms (e.g., wheelchair, bedside commode, etc.): 1  Moving from lying on back to sitting on the side of the bed?: 4  Moving to and from a bed to a chair (including a wheelchair)?: 1  Need to walk in hospital room?: 1  Climbing 3-5 steps with a railing?: 1  Basic Mobility Total Score: 12    AM-PAC Raw Score CMS G-Code Modifier Level of Impairment Assistance   6 % Total / Unable   7 - 9 CM 80 - 100% Maximal Assist   10 - 14 CL 60 - 80% Moderate Assist   15 - 19 CK 40 - 60% Moderate Assist   20 - 22 CJ 20 - 40% Minimal Assist   23 CI 1-20% SBA / CGA   24 CH 0% Independent/ Mod I     Patient left HOB elevated with all lines intact and call button in reach.    Assessment:  Stephanie Raza is a 69 y.o. female with a medical diagnosis of Cellulitis of right lower extremity and presents with overall decline in functional mobility. Patient would continue to benefit from skilled PT to address functional limitations listed below in order to return to PLOF/decrease caregiver burden.  Patient remains extremely limited with any further functional mobility other than bed mobility. Per patient, her pain is just too much to do anything more than what she is doing. Refuses to stand or walk at this time. She is able to get to the side of the bed and back to supine herself without assistance, but struggles to complete the task due to the  pain elicited in the right lower leg. Only sits at EOB for a couple of minutes at most before going back into supine. Difficult to obtain much more than what has been done as patient controls the therapy session.    Rehab identified problem list/impairments: weakness, impaired endurance, pain, impaired self care skills, impaired functional mobility, gait instability, decreased lower extremity function, decreased safety awareness, impaired skin, edema    Rehab potential is good.    Activity tolerance: Poor - due to pain    Discharge recommendations: Moderate Intensity Therapy      Barriers to discharge:      Equipment recommendations: to be determined by next level of care     GOALS:   Multidisciplinary Problems       Physical Therapy Goals          Problem: Physical Therapy    Goal Priority Disciplines Outcome Interventions   Physical Therapy Goal     PT, PT/OT     Description: Goals to be met by 4/30/25.  1. Pt will complete bed mobility SPV.  2. Pt will complete sit to stand MIN A.  3. Pt will ambulate 50ft MIN A using RW.  4. Pt will increase AMPAC score by 2 points to progress functional mobility.                       DME Justifications:  No DME recommended requiring DME justifications    PLAN:    Patient to be seen 3 x/week to address the above listed problems via gait training, therapeutic activities, therapeutic exercises  Plan of Care expires: 04/30/25  Plan of Care reviewed with: patient         04/21/2025

## 2025-04-21 NOTE — ASSESSMENT & PLAN NOTE
She had extensive right lower extremity cellulitis.  Will continue Zyvox, clindamycin, Zosyn.  Will monitor clinical response in a.m.a.m. she needs close follow-up    04/15-  She reports interval improvement in the erythema  Will plan to stop Clindamycin , continue Zyvox/Zosyn    04/16- will plan to do MRI of the lower extremity if wbc continues to increase- will monitor wbc in AM  On Zyvox/Zosyn  Follow Gen surgery  04/17-will order MRI of the right lower extremity.  We will discuss case with primary team.  Would continue Zosyn and Zyvox.  04/18-  Will order  MRI of the right lower extremity --  Will follow cbc in AM  On Zyvox/Zosyn  04/20-MRI of the lower extremity did not show an abscess.  She now has normal WBC.  She can be switched to oral regimen in am.  Will complete 8 more days of Zyvox.  Plan will be to give a 1 time dose of Dalvance on discharge .we will see if this is possible with the infusion company  Outpatient Antibiotic Therapy Plan:     Please send referral to [facility].     1) Infection:  Lower extremity cellulitis     2) Discharge Antibiotics:     Intravenous antibiotics:IV Dalvance 1500mg once     3) Therapy Duration:       Estimated end date of IV antibiotics:      4) Outpatient Weekly Labs:     Order the following labs to be drawn on Mondays:   CBC  CMP   CPK (when on Daptomycin)  ESR  CRP  -Can pull picc line at end of treatment unless instructed otherwise .   Please send all labs to [facility] .

## 2025-04-21 NOTE — PLAN OF CARE
Patient mobilizing more efficiently today than previous visits s/t improved pain management. She is mobilizing independently in bed and reports family support at home. Pt continues to adamantly decline out of bed activity, education, or physical assist.  OT is monitoring her acute rehab potential.

## 2025-04-21 NOTE — PLAN OF CARE
Nutrition Recommendations/Interventions 4/21/25:   1. Recommend pt continues on a Heart healthy, Consistent carbohydrate 2000 calorie, 1500 mL fluid restriction diet   2. Recommend Catrachito BID for wound healing   3. Recommend pt continues on Banatrol BID to assist with loose stools as warranted   4. Encourage PO and supplement intake, recommend feeding assistance as warranted   5. Updated weight, weekly  6. Collaboration by nutrition professional with other providers     Goals:   1. Pt will continue to tolerate and consume >75% EEN and EPN prior to RD follow up   2. Pt will tolerate and consume Catrachito BID prior to RD follow up   3. Pt's loose stools will be resolved prior to RD follow up    SANDIP Willis, RDN, LDN

## 2025-04-21 NOTE — CONSULTS
[facility] Surg  Wound Care    Patient Name:  Stephanie Raza   MRN:  [id number]  Date: 4/21/2025  Diagnosis: Cellulitis of right lower extremity    History:     Past Medical History:   Diagnosis Date    Acute blood loss anemia (ABLA) 01/24/2024    Last Assessment & Plan:    Formatting of this note might be different from the original.   History & Physical       Discharge Summary       Follow-up  Patient did receive 1 unit packed red blood cells while in the OR secondary to blood loss.  No obvious bleeding at this time.  She received 2 additional units packed red blood cells per orthopedics.  Hemoglobin running stable at this time no need fo    YOVANA (acute kidney injury) 01/24/2024    Last Assessment & Plan:    Formatting of this note might be different from the original.   History & Physical       Discharge Summary       Follow-up patient with progressive oliguric YOVANA after surgery.  Resolved. Avoid nephrotoxic agents, renally dose all medications where necessary, and protect nondominant arm as much as possible    Anxiety     Arthritis     Asthma     Back pain     Diabetes mellitus type I     Heart murmur     Hyperlipidemia     Hypertension     Obesity     Polyneuropathy     Trouble in sleeping     Type 2 diabetes mellitus     Urinary incontinence        Social History[1]    Precautions:     Allergies as of 04/13/2025 - Reviewed 04/13/2025   Allergen Reaction Noted    Adhesive tape-silicones Swelling 01/18/2024       [facility] Assessment Details/Treatment         Consulted to evaluate wounds to RLE.  Chart reviewed.  Ms. Raza is a 70 yo female patient admitted 4/13/25 with swelling of her right leg and inability to ambulate---diagnosed with severe cellulitis of RLE.    PMH includes:  diabetes, hyperlipidemia, hypertension, & obesity.    Noted blister aspiration of RLE done by Interventional Radiologist 4/16/25.  Noted arterial u/s done 4/17/25 with moderate stenosis of R SFA noted w/ monophasic flow and R  "inguinal/medial R thigh lymphadenopathy noted.  Dr. Odilia abrams/ Vascular team was consulted; he noted that:  "I don't take any stock in this as she has a bounding right DP pulse on my exam"  He last f/u'ed with patient on 4/20/25, noted only cellulitic changes on MRI, and signed-off her case.   Noted referral to Ortho as well; patient last seen by FABBY Almanza earlier today.  He noted no surgical intervention indicated and has also signed-off of patient's case.  Noted referral to General Surgery; patient seen by Dr. Avila 4/19/25.  He noted no plans for surgical intervention and signed-off her case.      11:05 am--attempted wound consult; however, physical therapy attempting to get patient OOB presently.  Will f/u later today.    14:05 pm--patient resting quietly in bed, awake and alert.  States she has noticed a decrease in the amount of swelling and redness to her RLE but "it still hurts".  Does state that she recently received pain medication, so that now would be a good time to evaluate her leg.  Attempted to palpate pedal pulses; patient flinched and yelled-out due to pain @ present.  Therefore, unable to determine if pulse is present.    Questioned if she had ever had a wound to either leg in the past---denies.  Noted RLE is considerably more swollen and red when compared to LLE.  Cleansed both lateral and posterior areas of her leg using saline/gauze.    --Noted bullous lesion (which is now dried-out and collapsed) to posterior RLE.  Skin is intact and yellow/tan in color in circular pattern.  Periwound is edematous, warm, and red but otherwise intact. Noted no induration or fluctuance upon light palpation (as you may see with a fluid-filled bullous lesion).  No exudate noted @ all; patient denies seeing any exudate on her linens.  --Noted bullous lesion to RLE (lateral aspect) which is also dried and collapsed.  Again, skin is tan colored and circular.  Periwound intact but edematous, warm and red.  No " fluctuance noted.  Absolutely no exudate noted.    Recommend:  as patient with no exudate, do not feel she would benefit from a dressing applied.  Do not feel that compression wrap is in her best interest due to abnormal arterial u/s results.  Patient did say that she thinks she is supposed to f/u with Vascular upon discharge---agree with this plan.  For now, would continue compression w/ use of SCD pumps alone; I do not suspect that patient would be able to tolerate any further compression @ this time anyway due to c/o increased pain to RLE.    Will plan f/u with patient later in the week.         04/21/25 1405   WOCN Assessment   WOCN Total Time (mins) 30   Visit Date 04/21/25   Visit Time 1405   Consult Type New   WOCN Speciality Wound   Wound venous   Number of Wounds 2   Intervention assessed;chart review;coordination of care   Teaching on-going   Skin Interventions   Device Skin Pressure Protection absorbent pad utilized/changed   Pressure Reduction Devices pressure-redistributing mattress utilized   Pressure Reduction Techniques frequent weight shift encouraged   Skin Protection incontinence pads utilized   Positioning   Body Position neutral body alignment   Head of Bed (HOB) Positioning HOB elevated   Positioning/Transfer Devices pillows;in use        Wound 04/13/25 1545 Other (comment) Right lower;posterior Leg   Date First Assessed/Time First Assessed: 04/13/25 1545   Present on Original Admission: Yes  Primary Wound Type: (c) Other (comment)  Side: Right  Orientation: lower;posterior  Location: Leg   Wound Image    Dressing Appearance Open to air   Drainage Amount None   Appearance Intact;Tan;Yellow;Dry   Periwound Area Intact;Edematous;Redness;Warm  (noted no induration, no fluctuance)   Care Cleansed with:;Sterile normal saline   Periwound Care Moisturizer applied        Wound 04/17/25 1500 Other (comment) Right lower;lateral Leg   Date First Assessed/Time First Assessed: 04/17/25 1500   Present on  Original Admission: Yes  Primary Wound Type: Other (comment)  Side: Right  Orientation: lower;lateral  Location: Leg   Wound Image    Dressing Appearance Open to air   Drainage Amount None   Appearance Intact;Tan;Dry   Periwound Area Intact;Edematous;Redness;Warm   Care Cleansed with:;Sterile normal saline   Periwound Care Moisturizer applied       04/21/2025         [1]   Social History  Socioeconomic History    Marital status:    Tobacco Use    Smoking status: Never     Passive exposure: Never    Smokeless tobacco: Never   Substance and Sexual Activity    Alcohol use: Yes     Alcohol/week: 3.0 standard drinks of alcohol     Types: 1 Glasses of wine, 2 Shots of liquor per week    Drug use: Never    Sexual activity: Not Currently     Partners: Male     Social Drivers of Health     Financial Resource Strain: Medium Risk (4/14/2025)    Overall Financial Resource Strain (CARDIA)     Difficulty of Paying Living Expenses: Somewhat hard   Food Insecurity: Food Insecurity Present (4/14/2025)    Hunger Vital Sign     Worried About Running Out of Food in the Last Year: Sometimes true     Ran Out of Food in the Last Year: Sometimes true   Transportation Needs: Unmet Transportation Needs (4/14/2025)    PRAPARE - Transportation     Lack of Transportation (Medical): Yes     Lack of Transportation (Non-Medical): Yes   Physical Activity: Sufficiently Active (9/27/2024)    Exercise Vital Sign     Days of Exercise per Week: 4 days     Minutes of Exercise per Session: 60 min   Stress: Stress Concern Present (4/14/2025)    [employer] - Occupational Stress Questionnaire     Feeling of Stress : Very much   Housing Stability: Low Risk  (4/14/2025)    Housing Stability Vital Sign     Unable to Pay for Housing in the Last Year: No     Homeless in the Last Year: No

## 2025-04-21 NOTE — SUBJECTIVE & OBJECTIVE
Interval History: patient states pain and swelling improving. Denies any current issues.     Review of Systems   Constitutional:  Positive for activity change and fatigue. Negative for appetite change and fever.   Respiratory:  Negative for shortness of breath.    Gastrointestinal:  Negative for abdominal pain, diarrhea, nausea and vomiting.   Musculoskeletal:  Positive for gait problem and myalgias.   Skin:  Positive for color change and wound.   Neurological:  Positive for weakness.   Psychiatric/Behavioral:  Negative for agitation, behavioral problems, confusion, decreased concentration and dysphoric mood. The patient is not nervous/anxious.      Objective:     Vital Signs (Most Recent):  Temp: 97.9 °F (36.6 °C) (04/21/25 1157)  Pulse: 89 (04/21/25 1157)  Resp: 18 (04/21/25 1540)  BP: 128/61 (04/21/25 1157)  SpO2: 96 % (04/21/25 1157) Vital Signs (24h Range):  Temp:  [97.5 °F (36.4 °C)-99.3 °F (37.4 °C)] 97.9 °F (36.6 °C)  Pulse:  [89-99] 89  Resp:  [16-19] 18  SpO2:  [94 %-96 %] 96 %  BP: (125-156)/(49-71) 128/61     Weight: 126.1 kg (278 lb)  Body mass index is 50.85 kg/m².    Intake/Output Summary (Last 24 hours) at 4/21/2025 1552  Last data filed at 4/21/2025 0433  Gross per 24 hour   Intake 601.63 ml   Output --   Net 601.63 ml         Physical Exam  Vitals and nursing note reviewed. Exam conducted with a chaperone present (visitor).   Constitutional:       General: She is not in acute distress.     Appearance: She is morbidly obese. She is ill-appearing. She is not toxic-appearing.   HENT:      Head: Normocephalic and atraumatic.   Cardiovascular:      Rate and Rhythm: Normal rate.   Pulmonary:      Effort: Pulmonary effort is normal. No respiratory distress.   Abdominal:      Palpations: Abdomen is soft.      Tenderness: There is no abdominal tenderness.   Musculoskeletal:         General: Swelling and tenderness present.      Right lower leg: Edema present.   Skin:     General: Skin is warm.       Findings: Erythema and lesion present.   Neurological:      Mental Status: She is alert and oriented to person, place, and time.      Motor: Weakness present.   Psychiatric:         Mood and Affect: Mood normal.         Behavior: Behavior normal. Behavior is cooperative.               Significant Labs: All pertinent labs within the past 24 hours have been reviewed.    Significant Imaging: I have reviewed all pertinent imaging results/findings within the past 24 hours.

## 2025-04-21 NOTE — PLAN OF CARE
04/21/25 1250   Rounds   Attendance Provider;Nurse ;Charge nurse;Physical therapist;Occupational therapist   Discharge Plan A Home Health   Why the patient remains in the hospital Requires continued medical care   Transition of Care Barriers None

## 2025-04-21 NOTE — PROGRESS NOTES
[facility] - Med Surg  Adult Nutrition  Progress Note    SUMMARY       Recommendations    Recommendation/Intervention:   1. Recommend pt continues on a Heart healthy, Consistent carbohydrate 2000 calorie, 1500 mL fluid restriction diet   2. Recommend Catrachito BID for wound healing   3. Recommend pt continues on Banatrol BID to assist with loose stools as warranted   4. Encourage PO and supplement intake, recommend feeding assistance as warranted   5. Updated weight, weekly    Goals:   1. Pt will continue to tolerate and consume >75% EEN and EPN prior to RD follow up   2. Pt will tolerate and consume Catrachito BID prior to RD follow up   3. Pt's loose stools will be resolved prior to RD follow up  Nutrition Goal Status: new  Communication of RD Recs: other (comment) (Progress note, POC, sticky note)    Nutrition Discharge Planning    Nutrition Discharge Planning: Therapeutic diet (comments), Oral supplement regimen (comments)  Therapeutic diet (comments): Heart healthy, Consistent carbohydrate 2000 calorie, 1500 mL fluid restriction diet  Oral supplement regimen (comments): a daily multivitamin, Catrachito BID, Boost glucose control as warranted    Assessment and Plan    Nutrition Problem  Increased nutrient needs: (protein, vitamins and minerals)  Altered GI function     Related to (etiology):   Increased demand for nutrition   Alteration in GI tract structure and/or function     Signs and Symptoms (as evidenced by):   Wound healing needs   Loose stools     Interventions/Recommendations (treatment strategy):  1. Amino acids, vitamins and minerals medical food supplement therapy  2. Commercial food medical food supplement therapy  3. Collaboration by nutrition professional with other providers    Nutrition Diagnosis Status:   New       Malnutrition Assessment     Skin (Micronutrient): red, wounds unhealed, edema (Kyler score = 15 (mild risk)       Fluid Accumulation (Malnutrition): severe                         Reason for  "Assessment    Reason For Assessment: length of stay  Diagnosis:  (Cellulitis of right lower extremity)  General Information Comments:   4/21/25: 69 y.o. Female admitted for Cellulitis of R LE. PMH: T2DM w/ diabetic polyneuropathy, Morbid obesity, HTN, Acute on chronic combined CHF. RD assessing pt d/t LOS status. EMR noted no surgical intervention warranted at this time. RD visited pt at bedside, pt resting in bed, pt family member present. Pt reported good appetite of 2 meals/day PTA, confirmed preferred amount of meals, denied ONS intake. Pt confirmed 100% PO intake of meals currently, denied N/V/D, abd pain or difficulty chewing/swallowing, pt reported was constipated now having loose stools, pt requested supplement, RD discussed benefits of Banatrol to help assist w/ loose stools, pt noted to want applesauce to consume supplement in, RD added to pt's orders and trays. Pt stated her UBW is 250 lbs, visual NFPE performed, no signs of malnutrition noted. Per chart: 4+ severe R foot/ankle/leg edema; R lower leg bilstered/red, fissure R foot. Labs, meds, weights reviewed. Weight charted 3/7/25 254 lbs, 4/18/25 278 lbs (BMI 50.85, Morbid obese), +24 lb wt gain x 1+ month, updated weight for accuracy warranted. RD will continue to follow and monitor pt's nutritional status during admit.    Nutrition/Diet History    Spiritual, Cultural Beliefs, Tenriism Practices, Values that Affect Care: no  Food Allergies: NKFA  Factors Affecting Nutritional Intake: None identified at this time    Nutrition Related Social Determinants of Health: SDOH: Adequate food in home environment, Unable to afford healthy foods, and Other:  pt receives a "food card" to assist buying groceries from her insurance company     Anthropometrics    Height: 5' 2" (157.5 cm)  Height (inches): 62 in  Weight: 126.1 kg (278 lb)  Weight (lb): 278 lb  Weight Method: Bed Scale  Ideal Body Weight (IBW), Female: 110 lb  % Ideal Body Weight, Female (lb): 252.73 " %  BMI (Calculated): 50.8  BMI Grade: greater than 40 - morbid obesity       Wt Readings from Last 15 Encounters:   04/21/25 126.1 kg (278 lb)   03/07/25 115.3 kg (254 lb 3.1 oz)   12/27/24 116 kg (255 lb 11.7 oz)   09/06/24 114.4 kg (252 lb 3.3 oz)   02/19/24 115.2 kg (253 lb 15.5 oz)   01/18/24 119 kg (262 lb 5.6 oz)   11/02/23 119 kg (262 lb 5.6 oz)   04/27/23 121.6 kg (268 lb)     Lab/Procedures/Meds    Pertinent Labs Reviewed: reviewed  Pertinent Medications Reviewed: reviewed  Pertinent Medications Comments: polyethylene glycol    BMP  Lab Results   Component Value Date     04/21/2025    K 4.2 04/21/2025     04/21/2025    CO2 28 04/21/2025    BUN 15 04/21/2025    CREATININE 1.0 04/21/2025    CALCIUM 8.5 (L) 04/21/2025    ANIONGAP 6 (L) 04/21/2025    EGFRNORACEVR >60 04/21/2025     Lab Results   Component Value Date    CALCIUM 8.5 (L) 04/21/2025     Lab Results   Component Value Date    ALBUMIN 1.7 (L) 04/20/2025     Lab Results   Component Value Date    ALT 19 04/20/2025    AST 23 04/20/2025    ALKPHOS 109 04/20/2025    BILITOT 0.4 04/20/2025     Recent Labs   Lab 04/21/25  1129   POCTGLUCOSE 200*     Lab Results   Component Value Date    HGBA1C 6.3 (H) 04/13/2025     Lab Results   Component Value Date    WBC 11.08 04/21/2025    HGB 9.7 (L) 04/21/2025    HCT 31.5 (L) 04/21/2025    MCV 83 04/21/2025     04/21/2025       Scheduled Meds:   aspirin  81 mg Oral Daily    cetirizine  10 mg Oral Daily    enoxparin  40 mg Subcutaneous Q12H (prophylaxis, 0900/2100)    fluticasone propionate  2 spray Each Nostril Daily    linezolid  600 mg Oral Q12H    metoprolol succinate  50 mg Oral BID    metronidazole 1%   Topical (Top) BID    And    miconazole nitrate 2%   Topical (Top) BID    polyethylene glycol  17 g Oral Daily     Continuous Infusions:  PRN Meds:.  Current Facility-Administered Medications:     acetaminophen, 650 mg, Oral, Q8H PRN    acetaminophen, 650 mg, Oral, Q4H PRN    dextrose 50%, 12.5 g,  Intravenous, PRN    dextrose 50%, 12.5 g, Intravenous, PRN    dextrose 50%, 25 g, Intravenous, PRN    dextrose 50%, 25 g, Intravenous, PRN    glucagon (human recombinant), 1 mg, Intramuscular, PRN    glucagon (human recombinant), 1 mg, Intramuscular, PRN    glucose, 16 g, Oral, PRN    glucose, 16 g, Oral, PRN    glucose, 24 g, Oral, PRN    glucose, 24 g, Oral, PRN    HYDROcodone-acetaminophen, 1 tablet, Oral, Q4H PRN    HYDROcodone-acetaminophen, 1 tablet, Oral, Q4H PRN    insulin aspart U-100, 0-5 Units, Subcutaneous, QID (AC + HS) PRN    melatonin, 6 mg, Oral, Nightly PRN    naloxone, 0.02 mg, Intravenous, PRN    ondansetron, 4 mg, Intravenous, Q8H PRN    oxyCODONE, 5 mg, Oral, Q6H PRN    simethicone, 1 tablet, Oral, QID PRN    sodium chloride 0.9%, 2 mL, Intravenous, Q12H PRN      Estimated/Assessed Needs    Weight Used For Calorie Calculations: 126.1 kg (278 lb)  Energy Calorie Requirements (kcal): 1749 kcals (MSJ x no AF (Obese, morbid BMI >40, diabetic)  Energy Need Method: [place]  Protein Requirements: 101-126 g (0.8-1.0 g/kg ABW (Obese, diabetic w/ wounds)  Weight Used For Protein Calculations: 126.1 kg (278 lb)  Fluid Requirements (mL): 1500 mL fluid restriction (CHF, edema; per MD/NP)  Estimated Fluid Requirement Method: other (see comments)  RDA Method (mL): 1749  CHO Requirement: 219 g (1749 kcals/8)      Nutrition Prescription Ordered    Current Diet Order: Heart healthy, Consistent carbohydrate 2000 calorie, 1500 mL fluid restriction diet  Oral Nutrition Supplement: Banatrol BID    Evaluation of Received Nutrient/Fluid Intake  I/O: (Net since admit):  4/21/25: +2047.4 mL    Energy Calories Required: meeting needs  Protein Required: meeting needs  Fluid Required: meeting needs  Total Fluid Intake (mL): 1477.7  Comments: LBM 4/19  Tolerance: tolerating  % Intake of Estimated Energy Needs: 75 - 100 %  % Meal Intake: 75 - 100 %    Nutrition Risk    Level of Risk/Frequency of Follow-up: low (F/u x  1 weekly)     Monitor and Evaluation    Monitor and Evaluation: Energy intake, Food and beverage intake, Protein intake, Carbohydrate intake, Diet order, Weight, Electrolyte and renal panel, Gastrointestinal profile, Glucose/endocrine profile, Inflammatory profile, Skin     Nutrition Follow-Up    RD Follow-up?: Yes  SANDIP Willis, RDN, LDN

## 2025-04-21 NOTE — PROGRESS NOTES
[facility]  Orthopedics  Progress Note    Patient Name: Stephanie Raza  MRN: [id number]  Admission Date: 4/13/2025  Hospital Length of Stay: 8 days  Attending Provider: Terry Tolentino MD  Primary Care Provider: Reyna Suarez MD    Subjective:     Principal Problem:Cellulitis of right lower extremity    Principal Orthopedic Problem:  Right lower extremity cellulitis    Interval History: Stephanie Raza is a 69 y.o. female admitted for right lower extremity cellulitis and Orthopedics has been consulted.  She is resting comfortably in bed.  She states redness is improved, but pain is persistent.  Denies numbness or tingling in the extremity.  No systemic signs of infection.    Review of patient's allergies indicates:   Allergen Reactions    Adhesive tape-silicones Swelling     Paper tape       Current Facility-Administered Medications   Medication    acetaminophen tablet 650 mg    acetaminophen tablet 650 mg    aspirin chewable tablet 81 mg    cetirizine tablet 10 mg    dextrose 50% injection 12.5 g    dextrose 50% injection 12.5 g    dextrose 50% injection 25 g    dextrose 50% injection 25 g    enoxaparin injection 40 mg    fluticasone propionate 50 mcg/actuation nasal spray 100 mcg    glucagon (human recombinant) injection 1 mg    glucagon (human recombinant) injection 1 mg    glucose chewable tablet 16 g    glucose chewable tablet 16 g    glucose chewable tablet 24 g    glucose chewable tablet 24 g    HYDROcodone-acetaminophen  mg per tablet 1 tablet    HYDROcodone-acetaminophen 5-325 mg per tablet 1 tablet    insulin aspart U-100 pen 0-5 Units    linezolid tablet 600 mg    melatonin tablet 6 mg    metoprolol succinate (TOPROL-XL) 24 hr tablet 50 mg    metronidazole 1% gel    And    miconazole nitrate 2% ointment    naloxone 0.4 mg/mL injection 0.02 mg    ondansetron injection 4 mg    oxyCODONE immediate release tablet 5 mg    polyethylene glycol packet 17 g    simethicone chewable tablet 80 mg     "sodium chloride 0.9% flush 2 mL     Objective:     Vital Signs (Most Recent):  Temp: 97.8 °F (36.6 °C) (04/21/25 0751)  Pulse: 91 (04/21/25 0751)  Resp: 16 (04/21/25 0833)  BP: (!) 156/70 (04/21/25 0751)  SpO2: 96 % (04/21/25 0751) Vital Signs (24h Range):  Temp:  [97.5 °F (36.4 °C)-99.3 °F (37.4 °C)] 97.8 °F (36.6 °C)  Pulse:  [] 91  Resp:  [15-19] 16  SpO2:  [93 %-96 %] 96 %  BP: (125-156)/(49-71) 156/70     Weight: 126.1 kg (278 lb)  Height: 5' 2" (157.5 cm)  Body mass index is 50.85 kg/m².      Intake/Output Summary (Last 24 hours) at 4/21/2025 0838  Last data filed at 4/21/2025 0433  Gross per 24 hour   Intake 1477.73 ml   Output --   Net 1477.73 ml       Ortho/SPM Exam  Right lower extremity:   Skin is intact   Moderate edema throughout the lower leg   Erythema and calor noted   TTP diffusely   No fluctuance   ROM of the foot/ankle is full   Calf and compartments are soft and compressible   Motor exam normal   Sensation and pulses intact   Cap refill brisk    GEN: Well developed, well nourished female. AAOX3. No acute distress.   Head: Normocephalic, atraumatic.   Eyes: BILLY  Neck: Trachea is midline, no adenopathy  Resp: Breathing unlabored.  Neuro: Motor function normal, Cranial nerves intact  Psych: Mood and affect appropriate.      Significant Labs:   Recent Lab Results         04/21/25  0502   04/20/25  1625   04/20/25  1124        POCT Glucose 145   184   194             All pertinent labs within the past 24 hours have been reviewed.    Significant Imaging: I have reviewed and interpreted all pertinent imaging results/findings.    Assessment/Plan:     Active Diagnoses:    Diagnosis Date Noted POA    PRINCIPAL PROBLEM:  Cellulitis of right lower extremity [L03.115] 04/13/2025 Yes    Morbid obesity [E66.01] 04/13/2025 Yes    Primary hypertension [I10] 04/13/2025 Yes    Acute on chronic combined systolic and diastolic congestive heart failure [I50.43] 04/13/2025 Yes    Type 2 diabetes mellitus with " diabetic polyneuropathy, with long-term current use of insulin [E11.42, Z79.4] 01/22/2024 Not Applicable      Problems Resolved During this Admission:    Diagnosis Date Noted Date Resolved POA    YOVANA (acute kidney injury) [N17.9] 01/24/2024 04/20/2025 Yes       Assessment:  69 y.o. female admitted for right lower extremity cellulitis    Plan:  No surgical intervention is indicated at this time   Continue IV antibiotics per primary team, appreciate Infectious Disease input if needed   Patient may be weight-bearing as tolerated to the right lower extremity   Keep the extremity elevated to help with swelling   Continue trying SCDs to help with swelling   Orthopedics will sign off at this time, please feel free to reach out for further questions or concerns    Ranjan Lamar PA-C  Orthopedics  [facility]

## 2025-04-22 VITALS
TEMPERATURE: 98 F | HEIGHT: 62 IN | HEART RATE: 93 BPM | BODY MASS INDEX: 51.16 KG/M2 | WEIGHT: 278 LBS | RESPIRATION RATE: 20 BRPM | SYSTOLIC BLOOD PRESSURE: 167 MMHG | DIASTOLIC BLOOD PRESSURE: 67 MMHG | OXYGEN SATURATION: 95 %

## 2025-04-22 LAB
ABSOLUTE EOSINOPHIL (OHS): 0.26 K/UL
ABSOLUTE MONOCYTE (OHS): 0.73 K/UL (ref 0.3–1)
ABSOLUTE NEUTROPHIL COUNT (OHS): 5.91 K/UL (ref 1.8–7.7)
ANION GAP (OHS): 8 MMOL/L (ref 8–16)
BASOPHILS # BLD AUTO: 0.04 K/UL
BASOPHILS NFR BLD AUTO: 0.5 %
BUN SERPL-MCNC: 16 MG/DL (ref 8–23)
CALCIUM SERPL-MCNC: 8.5 MG/DL (ref 8.7–10.5)
CHLORIDE SERPL-SCNC: 104 MMOL/L (ref 95–110)
CO2 SERPL-SCNC: 27 MMOL/L (ref 23–29)
CREAT SERPL-MCNC: 0.9 MG/DL (ref 0.5–1.4)
ERYTHROCYTE [DISTWIDTH] IN BLOOD BY AUTOMATED COUNT: 15.3 % (ref 11.5–14.5)
GFR SERPLBLD CREATININE-BSD FMLA CKD-EPI: >60 ML/MIN/1.73/M2
GLUCOSE SERPL-MCNC: 140 MG/DL (ref 70–110)
HCT VFR BLD AUTO: 30 % (ref 37–48.5)
HGB BLD-MCNC: 9.3 GM/DL (ref 12–16)
IMM GRANULOCYTES # BLD AUTO: 0.07 K/UL (ref 0–0.04)
IMM GRANULOCYTES NFR BLD AUTO: 0.8 % (ref 0–0.5)
LYMPHOCYTES # BLD AUTO: 1.63 K/UL (ref 1–4.8)
MCH RBC QN AUTO: 25.8 PG (ref 27–31)
MCHC RBC AUTO-ENTMCNC: 31 G/DL (ref 32–36)
MCV RBC AUTO: 83 FL (ref 82–98)
NUCLEATED RBC (/100WBC) (OHS): 0 /100 WBC
PLATELET # BLD AUTO: 337 K/UL (ref 150–450)
PMV BLD AUTO: 9.9 FL (ref 9.2–12.9)
POCT GLUCOSE: 146 MG/DL (ref 70–110)
POTASSIUM SERPL-SCNC: 4.6 MMOL/L (ref 3.5–5.1)
RBC # BLD AUTO: 3.61 M/UL (ref 4–5.4)
RELATIVE EOSINOPHIL (OHS): 3 %
RELATIVE LYMPHOCYTE (OHS): 18.9 % (ref 18–48)
RELATIVE MONOCYTE (OHS): 8.4 % (ref 4–15)
RELATIVE NEUTROPHIL (OHS): 68.4 % (ref 38–73)
SODIUM SERPL-SCNC: 139 MMOL/L (ref 136–145)
WBC # BLD AUTO: 8.64 K/UL (ref 3.9–12.7)

## 2025-04-22 PROCEDURE — 85025 COMPLETE CBC W/AUTO DIFF WBC: CPT | Mod: HCNC | Performed by: FAMILY MEDICINE

## 2025-04-22 PROCEDURE — 63600175 PHARM REV CODE 636 W HCPCS: Mod: HCNC | Performed by: INTERNAL MEDICINE

## 2025-04-22 PROCEDURE — 36415 COLL VENOUS BLD VENIPUNCTURE: CPT | Mod: HCNC | Performed by: FAMILY MEDICINE

## 2025-04-22 PROCEDURE — 25000003 PHARM REV CODE 250: Mod: HCNC | Performed by: FAMILY MEDICINE

## 2025-04-22 PROCEDURE — 25000242 PHARM REV CODE 250 ALT 637 W/ HCPCS: Mod: HCNC | Performed by: FAMILY MEDICINE

## 2025-04-22 PROCEDURE — 80048 BASIC METABOLIC PNL TOTAL CA: CPT | Mod: HCNC | Performed by: FAMILY MEDICINE

## 2025-04-22 PROCEDURE — 25000003 PHARM REV CODE 250: Mod: HCNC | Performed by: INTERNAL MEDICINE

## 2025-04-22 RX ORDER — HYDROCODONE BITARTRATE AND ACETAMINOPHEN 7.5; 325 MG/1; MG/1
1 TABLET ORAL EVERY 6 HOURS PRN
Qty: 20 TABLET | Refills: 0 | Status: SHIPPED | OUTPATIENT
Start: 2025-04-22

## 2025-04-22 RX ORDER — LINEZOLID 600 MG/1
600 TABLET, FILM COATED ORAL EVERY 12 HOURS
Qty: 14 TABLET | Refills: 0 | Status: SHIPPED | OUTPATIENT
Start: 2025-04-22 | End: 2025-04-29

## 2025-04-22 RX ADMIN — HYDROCODONE BITARTRATE AND ACETAMINOPHEN 1 TABLET: 10; 325 TABLET ORAL at 07:04

## 2025-04-22 RX ADMIN — OXYCODONE HYDROCHLORIDE 5 MG: 5 TABLET ORAL at 06:04

## 2025-04-22 RX ADMIN — ENOXAPARIN SODIUM 40 MG: 40 INJECTION SUBCUTANEOUS at 08:04

## 2025-04-22 RX ADMIN — HYDROCODONE BITARTRATE AND ACETAMINOPHEN 1 TABLET: 10; 325 TABLET ORAL at 11:04

## 2025-04-22 RX ADMIN — HYDROCODONE BITARTRATE AND ACETAMINOPHEN 1 TABLET: 10; 325 TABLET ORAL at 03:04

## 2025-04-22 RX ADMIN — METRONIDAZOLE: 10 GEL TOPICAL at 08:04

## 2025-04-22 RX ADMIN — LINEZOLID 600 MG: 600 TABLET, FILM COATED ORAL at 08:04

## 2025-04-22 RX ADMIN — FLUTICASONE PROPIONATE 100 MCG: 50 SPRAY, METERED NASAL at 08:04

## 2025-04-22 RX ADMIN — ASPIRIN 81 MG CHEWABLE TABLET 81 MG: 81 TABLET CHEWABLE at 08:04

## 2025-04-22 RX ADMIN — METOPROLOL SUCCINATE 50 MG: 50 TABLET, EXTENDED RELEASE ORAL at 08:04

## 2025-04-22 RX ADMIN — CETIRIZINE HYDROCHLORIDE 10 MG: 10 TABLET, FILM COATED ORAL at 08:04

## 2025-04-22 NOTE — PT/OT/SLP PROGRESS
Physical Therapy      Patient Name:  Stephanie Raza   MRN:  [id number]    Chart review complete. Patient not seen today secondary to Patient discharged prior to initiation of treatment. No need to follow up.    Terri Maxwell, PT, DPT  4/22/2025   1214

## 2025-04-22 NOTE — DISCHARGE SUMMARY
[facility] [place] [facility]  Discharge Summary      Patient Name: Stephanie Raza  MRN: [id number]  [facility]: [id number]  Patient Class: IP- Inpatient  Admission Date: 4/13/2025  Hospital Length of Stay: 9 days  Discharge Date and Time: 04/22/2025 9:44 AM  Attending Physician: Terry Tolentino MD   Discharging Provider: Terry Tolentino MD  Primary Care Provider: Reyna Suarez MD    Primary Care Team: Networked reference to record PCT     HPI:   Patient is a 65-year-old female with a history of diabetes, hyperlipidemia, hypertension, obesity who presented emergency department for evaluation of right leg swelling.  Patient reports on 04/06 she woke up vomiting and did not feel well.  When her daughter saw her on 04/08 she took her to the [facility].  She reports she had swelling in her right leg that was painful all the way up to her groin.  She was admitted for 3 days reportedly treated with Ancef at which time the marks of erythema had improved her fever had subsided and she was discharged home on Augmentin (blood cultures remain negative).  After being home she noted that the swelling worsened and she re-presented to the hospital.  In the emergency department she was afebrile, laboratory exam revealed protocol of 2.25, BNP of 150, sed rate greater 120, , white blood cell count of 13.4.  Lactic acid level was normal at 1.7.  Blood cultures were obtained and she was begun on vancomycin and meropenem.  CT scan of the leg revealed large left inguinal lymph nodes, fat stranding in the thigh most notably in the medial portion right hip arthroplasty in place intrapelvic contents unremarkable  CTA tib-fib soft tissue thickening and fat stranding in the anterior aspect of the lower extremity with disorganized fluid throughout the superficial compartment with no definitive involvement of the deep intramuscular compartment, no definitive abscess  Doppler exam of lower extremity negative for DVT    Consult  general surgery and Infectious Disease for evaluation.        * No surgery found *      Hospital Course:   4/14 admitted for right leg cellulitis. Denies insect bite, trauma. States improvement in symptoms of erythema, edema and pain. Intravenous antibiotic(s) per infectious disease. Surgery following.  4/15 antibiotic(s) per infectious disease. Cellulitis modestly improving. Pain regimen adjusted.  4/16 interventional radiology aspirated blister. Body fluid studies pending. Patient endorses improvement in symptoms of edema and erythema. Us liliana pending  4/17 us with moderate stenosis in superficial femoral artery. Patient unable to tolerate us liliana studydue to pain. Vascular surgery and nephrology consulted due to unimproved cellulitis and acute kidney injury. Us abdomen complete with medical renal disease, no hydronephrosis. Infectious disease aware. Surgery recommending mri pending renal improvement  4/18 creatinine improving. Awaiting mri  4/19 ortho consulted while awaiting mri official read. Mri without myositis or abscess. Continue treatment for lymphedema with cellulitis with compression stockings as tolerated and topical antimicrobials, elevation and cold compress.  4/20 labs improving. Continue antibiotic(s) per infectious disease. Encourage elevation, cold compress and scds vs robyn hose. No surgical intervention warranted.  04/21/2025  Patient reports swelling improving. Cont abx. Will establish 1 time dose of dalvance outpatient. Patient refused snf, will set up home health.   04/22/2025  Patients stable for dc. Will cont on po zyvox. 1 time dose of dalvance to be set up outpatient.      Goals of Care Treatment Preferences:  Code Status: Full Code      SDOH Screening:  The patient was screened for food insecurity, housing instability, transportation needs, utility difficulties, and interpersonal safety. The social determinant(s) of health identified as a concern this admission are:  Food  insecurity  Transportation difficulties    Will discuss with case management and/or community health workers.    Social Drivers of Health with Concerns     Food Insecurity: Food Insecurity Present (4/14/2025)   Transportation Needs: Unmet Transportation Needs (4/14/2025)        Consults:   Consults (From admission, onward)          Status Ordering Provider     Inpatient consult to Social Work  Once        Provider:  (Not yet assigned)    Completed DANIEL OMALLEY     Inpatient consult to Orthopedic Surgery  Once        Provider:  Santiago Dunne Jr., MD    Completed EMANUEL LIEBERMAN     Inpatient consult to Vascular Surgery  Once        Provider:  (Not yet assigned)    Completed EMANUEL LIEBERMAN     Inpatient consult to Nephrology  Once        Provider:  Tobias Hodges MD    Completed EMANUEL LIEBERMAN     Inpatient consult to Social Work/Case Management  Once        Provider:  (Not yet assigned)    Completed EMANUEL LIEBERMAN     Inpatient consult to Social Work  Once        Provider:  (Not yet assigned)    Completed EMANUEL LIEBERMAN     Inpatient consult to General Surgery  Once        Provider:  Lance Dangelo MD    Completed GUSTAVO MORENO            Assessment & Plan  Type 2 diabetes mellitus with diabetic polyneuropathy, with long-term current use of insulin  Patient with diabetes currently well-controlled is taking Ozempic outpatient  Sliding scale insulin with Accu-Cheks a.c. HS  Morbid obesity  Body mass index is 50.85 kg/m². Morbid obesity complicates all aspects of disease management from diagnostic modalities to treatment. Weight loss encouraged and health benefits explained to patient.   Physical/occupational therapy recommending moderate intensity therapy but patient declines but may need to reconsider      Cellulitis of right lower extremity  Concern for neck fascia however patient's white blood cell count, creatinine, glucose are not consistent.  However picture may be mixed due to already having been on  antibiotics.  Patient reports began 4/7  She was hospitalized at [facility] 4/7 through 4/10 reportedly she was febrile.  She was treated with Ancef and by 4/10 she was afebrile.  There are surgical pen marks on her right upper thigh which shows that the redness has receded some.  Blood cultures at the [facility] were negative.  She was discharged on Augmentin.  Patient had worsening swelling and pain and she re-presented to [facility].  See results of CT scan and Doppler of lower extremity above  Was given vancomycin and cefepime in ER  We will change to Zyvox for better skin penetration for Staph, Zosyn for anaerobes and strep and add clindamycin for toxin.  General surgery following, no surgical intervention warranted  Ct imaging negative for abscess  Deferring final antibiotic(s) per infectious disease     No significant improvement  Mild acute kidney injury   Psh right total hip revision  Consider mri to evaluate prosthesis though erythema has receded from knee and leukocytosis resolved  Interventional radiology to aspirate blister, studies pending  Us liliana unable to tolerate  Incentive spirometer   Defer to infectious disease to adjust antibiotic(s)    Leukocytosis resolved  Mri with cellulitis  Continue intravenous antibiotic(s) per infectious disease   Add topical antimicrobials, scd vs robyn hose, cold compress as tolerated  Physical/occupational therapy recommending moderate intensity therapy but patient refuses. Patient may need to reconsider    4/22/2025  Cont po zyvox  Will set up 1 time dose of dalvance outpatient   Cellulitis improving  Patient declined snf   Will set up home health  Cont to monitor  Anticipate possible dc tomorrow   Primary hypertension  Patient's blood pressure range in the last 24 hours was: BP  Min: 128/61  Max: 167/67.The patient's inpatient anti-hypertensive regimen is listed below:  Current Antihypertensives  , Daily, Oral  metoprolol succinate (TOPROL-XL) 24 hr  "tablet 50 mg, 2 times daily, Oral    Plan  - BP is controlled, no changes needed to their regimen  Holding arb due to intermittent hypotension and acute kidney injury   Acute on chronic combined systolic and diastolic congestive heart failure  Patient has Combined Systolic and Diastolic heart failure that is Acute on chronic. On presentation their CHF was decompensated. Evidence of decompensated CHF on presentation includes: edema. The etiology of their decompensation is likely increased fluid intake. Most recent BNP and echo results are listed below.  No results for input(s): "BNP" in the last 72 hours.    Latest ECHO  No results found for this or any previous visit.    Current Heart Failure Medications  , Daily, Oral  metoprolol succinate (TOPROL-XL) 24 hr tablet 50 mg, 2 times daily, Oral    Plan  - Monitor strict I&Os and daily weights.    - Place on telemetry  - Low sodium diet  - Place on fluid restriction of 1.5 L.   - Cardiology has not been consulted  - The patient's volume status is at their baseline  -patient follows with Dr. Mikie Saxena whom she saw about 3 weeks ago.  She has follow up appointment in May.    Echocardiogram reviewed       Final Active Diagnoses:    Diagnosis Date Noted POA    PRINCIPAL PROBLEM:  Cellulitis of right lower extremity [L03.115] 04/13/2025 Yes    Morbid obesity [E66.01] 04/13/2025 Yes    Primary hypertension [I10] 04/13/2025 Yes    Acute on chronic combined systolic and diastolic congestive heart failure [I50.43] 04/13/2025 Yes    Type 2 diabetes mellitus with diabetic polyneuropathy, with long-term current use of insulin [E11.42, Z79.4] 01/22/2024 Not Applicable      Problems Resolved During this Admission:    Diagnosis Date Noted Date Resolved POA    YOVANA (acute kidney injury) [N17.9] 01/24/2024 04/20/2025 Yes       Discharged Condition: good    Disposition:     Follow Up:   Contact information for follow-up providers       [facility]" "[facility] Follow up.    Why: For 1 time dose of Dalvance  Contact information:  [address]  [address]  [facility] [address]                     Contact information for after-discharge care       Home Medical Care       [facility] .    Service: Home Health Services  Contact information:  [address]  [facility] [address] [phone]                                 Patient Instructions:   No discharge procedures on file.    Significant Diagnostic Studies: N/A    Pending Diagnostic Studies:       Procedure Component Value Units Date/Time    GREY TOP URINE HOLD [[id number]] Collected: 04/13/25 1021    Order Status: Sent Lab Status: In process Updated: 04/13/25 1234    Specimen: Urine     HCV Virus Hold Specimen [[id number]] Collected: 04/13/25 1125    Order Status: Sent Lab Status: In process Updated: 04/13/25 1135    Specimen: Blood            Medications:  Reconciled Home Medications:      Medication List        START taking these medications      HYDROcodone-acetaminophen 7.5-325 mg per tablet  Commonly known as: NORCO  Take 1 tablet by mouth every 6 (six) hours as needed for Pain.     linezolid 600 mg Tab  Commonly known as: ZYVOX  Take 1 tablet (600 mg total) by mouth every 12 (twelve) hours. for 7 days            CONTINUE taking these medications      albuterol 90 mcg/actuation inhaler  Commonly known as: PROVENTIL/VENTOLIN HFA  Inhale 2 puffs into the lungs every 4 (four) hours as needed for Wheezing.     aspirin 81 MG Chew  Take 81 mg by mouth once daily.     blood-glucose meter Misc  Commonly known as: TRUE METRIX AIR GLUCOSE METER  To check BG 3 times daily, to use with insurance preferred meter     buPROPion 150 MG TB24 tablet  Commonly known as: WELLBUTRIN XL  TAKE 1 TABLET(150 MG) BY MOUTH DAILY     diltiaZEM HCl 120 mg Cp12  TAKE 1 CAPSULE TWICE DAILY     DROPLET PEN NEEDLE 31 gauge x 5/16" Ndle  Generic drug: pen needle, diabetic  USE ONCE DAILY AS " DIRECTED     esomeprazole 40 MG capsule  Commonly known as: NEXIUM  Take 1 capsule (40 mg total) by mouth before breakfast.     fluticasone-salmeterol 250-50 mcg/dose 250-50 mcg/dose diskus inhaler  Commonly known as: ADVAIR  Inhale 1 puff into the lungs once daily.     furosemide 80 MG tablet  Commonly known as: LASIX  Take 80 mg by mouth.     meloxicam 7.5 MG tablet  Commonly known as: MOBIC  TAKE 1 TABLET(7.5 MG) BY MOUTH DAILY     metoprolol succinate 50 MG 24 hr tablet  Commonly known as: TOPROL-XL  TAKE 1 TABLET TWICE DAILY     olmesartan 40 MG tablet  Commonly known as: BENICAR  Take 40 mg by mouth once daily.     OZEMPIC 2 mg/dose (8 mg/3 mL) Pnij  Generic drug: semaglutide  INJECT 2MG UNDER THE SKIN EVERY 7 DAYS     potassium chloride 20 mEq  Commonly known as: K-TAB  Take 40 mEq by mouth 2 (two) times a day.     TRUE METRIX GLUCOSE TEST STRIP Strp  Generic drug: blood sugar diagnostic  TEST BLOOD SUGAR THREE TIMES DAILY     TRUEPLUS LANCETS 33 gauge Misc  Generic drug: lancets  TEST BLOOD SUGAR THREE TIMES DAILY     vibegron 75 mg Tab  Take 1 tablet by mouth once daily.     vitamin D 1000 units Tab  Commonly known as: VITAMIN D3  Take 1 tablet by mouth every morning.            ASK your doctor about these medications      gabapentin 300 MG capsule  Commonly known as: NEURONTIN  Take 300 mg by mouth every evening. As needed              Indwelling Lines/Drains at time of discharge:   Lines/Drains/Airways       Drain  Duration             Female External Urinary Catheter w/ Suction 04/13/25 1016 8 days                    Time spent on the discharge of patient: 36 minutes         Terry Tolentino MD  Department of Hospital Medicine  '[facility] S[facility]

## 2025-04-22 NOTE — PLAN OF CARE
[facility]  Discharge Final Note    Primary Care Provider: Reyna Suarez MD    Expected Discharge Date: 4/22/2025    Final Discharge Note (most recent)       Final Note - 04/22/25 0924          Final Note    Assessment Type Final Discharge Note     Anticipated Discharge Disposition [place] [facility]     Hospital Resources/Appts/Education Provided Post-Acute resouces added to AVS        Post-Acute Status    Post-Acute Authorization Home Health     Post-Acute Placement Status Set-up Complete/Auth obtained     Patient choice form signed by patient/caregiver List from CMS Compare     Discharge Delays None known at this time                     Important Message from Medicare              Follow-up providers       [facility] [place] [id number]  [address]  [facility] [address]       Next Steps: Follow up    Instructions: For 1 time dose of Dalvance              After-discharge care                Home Medical Care       [facility]   Service: Home Health Services    [address]  [facility] [address]   Phone: [phone]                             DC Dispo: home with home health    PCP: no appt suggestion available    DME: bedside commode ordered and delivered by [facility]    [facility] arranged.    [facility] consulted for 1 time dose of Dalvance; to be scheduled by [facility] staff. Instructions to follow up added to AVS.     CM reviewed chart; no other d/c needs noted.

## 2025-04-22 NOTE — ASSESSMENT & PLAN NOTE
Concern for neck fascia however patient's white blood cell count, creatinine, glucose are not consistent.  However picture may be mixed due to already having been on antibiotics.  Patient reports began 4/7  She was hospitalized at [facility] 4/7 through 4/10 reportedly she was febrile.  She was treated with Ancef and by 4/10 she was afebrile.  There are surgical pen marks on her right upper thigh which shows that the redness has receded some.  Blood cultures at the [facility] were negative.  She was discharged on Augmentin.  Patient had worsening swelling and pain and she re-presented to [facility].  See results of CT scan and Doppler of lower extremity above  Was given vancomycin and cefepime in ER  We will change to Zyvox for better skin penetration for Staph, Zosyn for anaerobes and strep and add clindamycin for toxin.  General surgery following, no surgical intervention warranted  Ct imaging negative for abscess  Deferring final antibiotic(s) per infectious disease     No significant improvement  Mild acute kidney injury   Psh right total hip revision  Consider mri to evaluate prosthesis though erythema has receded from knee and leukocytosis resolved  Interventional radiology to aspirate blister, studies pending  Us liliana unable to tolerate  Incentive spirometer   Defer to infectious disease to adjust antibiotic(s)    Leukocytosis resolved  Mri with cellulitis  Continue intravenous antibiotic(s) per infectious disease   Add topical antimicrobials, scd vs robyn hose, cold compress as tolerated  Physical/occupational therapy recommending moderate intensity therapy but patient refuses. Patient may need to reconsider    4/22/2025  Cont po zyvox  Will set up 1 time dose of dalvance outpatient   Cellulitis improving  Patient declined snf   Will set up home health  Cont to monitor  Anticipate possible dc tomorrow

## 2025-04-22 NOTE — ASSESSMENT & PLAN NOTE
Patient's blood pressure range in the last 24 hours was: BP  Min: 128/61  Max: 167/67.The patient's inpatient anti-hypertensive regimen is listed below:  Current Antihypertensives  , Daily, Oral  metoprolol succinate (TOPROL-XL) 24 hr tablet 50 mg, 2 times daily, Oral    Plan  - BP is controlled, no changes needed to their regimen  Holding arb due to intermittent hypotension and acute kidney injury    Magda Rojo  Phone: [phone]  Fax: (   )    -  Follow Up Time: Routine

## 2025-04-22 NOTE — PLAN OF CARE
Pt being discharged Home in stable condition w/ home health. IV removed, catheter intact, pt tolerated well. Tele monitor removed, given to US. Discharge instructions given to pt, pt verbalized understanding.

## 2025-04-23 ENCOUNTER — TELEPHONE (OUTPATIENT)
Dept: INTERNAL MEDICINE | Facility: CLINIC | Age: 70
End: 2025-04-23
Payer: MEDICARE

## 2025-04-23 NOTE — TELEPHONE ENCOUNTER
Pt was d/c from hospital yesterday. She was admitted for Cellulitis on 04/13/25 and d/c 04/22/25. Pt was d/c on IV antibiotics. Pt called to schedule a follow up with Dr Pate and refused to come in to see her NP and or one of the other MD's. As of now the first available is on 05/02/2025, pt wants to take this appointment.

## 2025-04-23 NOTE — TELEPHONE ENCOUNTER
----- Message from Yojana sent at 4/23/2025  1:05 PM CDT -----  Type:  Sooner Appointment Request Patient is requesting a sooner appointment.  Patient declined first available appointment listed as well as another facility and provider .  Patient will not accept being placed on the waitlist and is requesting a message be sent to doctor. Name of Caller: Pt daughterWhen is the first available appointment? noneSymptoms: Hospital follow upWould the patient rather a call back or a response via [facility]? Call backBest Call Back Number:Telephone Information:Backplane          [phone] Information: Pt daughter would like a call back.

## 2025-04-23 NOTE — PHYSICIAN QUERY
Please clarify  the diabetes type associated with the above clinical findings:  Diabetes mellitus type 2

## 2025-04-24 PROCEDURE — 85652 RBC SED RATE AUTOMATED: CPT | Mod: HCNC | Performed by: INTERNAL MEDICINE

## 2025-04-24 PROCEDURE — 85025 COMPLETE CBC W/AUTO DIFF WBC: CPT | Mod: HCNC | Performed by: INTERNAL MEDICINE

## 2025-04-24 PROCEDURE — 86140 C-REACTIVE PROTEIN: CPT | Mod: HCNC | Performed by: INTERNAL MEDICINE

## 2025-04-28 ENCOUNTER — OFFICE VISIT (OUTPATIENT)
Dept: INFECTIOUS DISEASES | Facility: CLINIC | Age: 70
End: 2025-04-28
Payer: MEDICARE

## 2025-04-28 ENCOUNTER — HOSPITAL ENCOUNTER (OUTPATIENT)
Facility: HOSPITAL | Age: 70
Discharge: HOME HEALTH | End: 2025-04-29
Attending: EMERGENCY MEDICINE | Admitting: HOSPITALIST
Payer: MEDICARE

## 2025-04-28 VITALS
WEIGHT: 278 LBS | HEIGHT: 62 IN | SYSTOLIC BLOOD PRESSURE: 138 MMHG | BODY MASS INDEX: 51.16 KG/M2 | DIASTOLIC BLOOD PRESSURE: 76 MMHG | HEART RATE: 96 BPM | OXYGEN SATURATION: 100 %

## 2025-04-28 DIAGNOSIS — E11.42 TYPE 2 DIABETES MELLITUS WITH DIABETIC POLYNEUROPATHY, WITH LONG-TERM CURRENT USE OF INSULIN: ICD-10-CM

## 2025-04-28 DIAGNOSIS — E11.59 HYPERTENSION ASSOCIATED WITH DIABETES: ICD-10-CM

## 2025-04-28 DIAGNOSIS — L03.115 CELLULITIS OF RIGHT ANTERIOR LOWER LEG: Primary | ICD-10-CM

## 2025-04-28 DIAGNOSIS — Z79.4 TYPE 2 DIABETES MELLITUS WITH DIABETIC POLYNEUROPATHY, WITH LONG-TERM CURRENT USE OF INSULIN: ICD-10-CM

## 2025-04-28 DIAGNOSIS — R07.9 CHEST PAIN: ICD-10-CM

## 2025-04-28 DIAGNOSIS — I15.2 HYPERTENSION ASSOCIATED WITH DIABETES: ICD-10-CM

## 2025-04-28 DIAGNOSIS — L03.115 CELLULITIS OF RIGHT LOWER EXTREMITY: Primary | ICD-10-CM

## 2025-04-28 DIAGNOSIS — E66.01 MORBID OBESITY: ICD-10-CM

## 2025-04-28 PROBLEM — I50.42 CHRONIC COMBINED SYSTOLIC AND DIASTOLIC CONGESTIVE HEART FAILURE: Chronic | Status: ACTIVE | Noted: 2025-04-28

## 2025-04-28 PROBLEM — E78.5 HLD (HYPERLIPIDEMIA): Chronic | Status: ACTIVE | Noted: 2025-04-28

## 2025-04-28 PROBLEM — F32.A ANXIETY AND DEPRESSION: Status: ACTIVE | Noted: 2025-04-28

## 2025-04-28 PROBLEM — I50.42 CHRONIC COMBINED SYSTOLIC AND DIASTOLIC CONGESTIVE HEART FAILURE: Status: ACTIVE | Noted: 2025-04-28

## 2025-04-28 PROBLEM — E78.5 HLD (HYPERLIPIDEMIA): Status: ACTIVE | Noted: 2025-04-28

## 2025-04-28 PROBLEM — F32.A ANXIETY AND DEPRESSION: Chronic | Status: ACTIVE | Noted: 2025-04-28

## 2025-04-28 PROBLEM — E66.813 CLASS 3 SEVERE OBESITY DUE TO EXCESS CALORIES WITH SERIOUS COMORBIDITY AND BODY MASS INDEX (BMI) OF 45.0 TO 49.9 IN ADULT: Chronic | Status: ACTIVE | Noted: 2024-01-22

## 2025-04-28 PROBLEM — F41.9 ANXIETY AND DEPRESSION: Chronic | Status: ACTIVE | Noted: 2025-04-28

## 2025-04-28 PROBLEM — F41.9 ANXIETY AND DEPRESSION: Status: ACTIVE | Noted: 2025-04-28

## 2025-04-28 LAB
ABSOLUTE EOSINOPHIL (OHS): 0.29 K/UL
ABSOLUTE MONOCYTE (OHS): 0.73 K/UL (ref 0.3–1)
ABSOLUTE NEUTROPHIL COUNT (OHS): 5.07 K/UL (ref 1.8–7.7)
ALBUMIN SERPL BCP-MCNC: 2.6 G/DL (ref 3.5–5.2)
ALP SERPL-CCNC: 92 UNIT/L (ref 40–150)
ALT SERPL W/O P-5'-P-CCNC: 21 UNIT/L (ref 10–44)
ANION GAP (OHS): 12 MMOL/L (ref 8–16)
AST SERPL-CCNC: 18 UNIT/L (ref 11–45)
BASOPHILS # BLD AUTO: 0.05 K/UL
BASOPHILS NFR BLD AUTO: 0.6 %
BILIRUB SERPL-MCNC: 0.3 MG/DL (ref 0.1–1)
BUN SERPL-MCNC: 16 MG/DL (ref 8–23)
CALCIUM SERPL-MCNC: 8.7 MG/DL (ref 8.7–10.5)
CHLORIDE SERPL-SCNC: 102 MMOL/L (ref 95–110)
CK SERPL-CCNC: 70 U/L (ref 20–180)
CO2 SERPL-SCNC: 26 MMOL/L (ref 23–29)
CREAT SERPL-MCNC: 1 MG/DL (ref 0.5–1.4)
CRP SERPL-MCNC: 83.9 MG/L
ERYTHROCYTE [DISTWIDTH] IN BLOOD BY AUTOMATED COUNT: 14.8 % (ref 11.5–14.5)
ERYTHROCYTE [SEDIMENTATION RATE] IN BLOOD: >120 MM/HR
GFR SERPLBLD CREATININE-BSD FMLA CKD-EPI: >60 ML/MIN/1.73/M2
GLUCOSE SERPL-MCNC: 102 MG/DL (ref 70–110)
HCT VFR BLD AUTO: 34.9 % (ref 37–48.5)
HGB BLD-MCNC: 10.9 GM/DL (ref 12–16)
IMM GRANULOCYTES # BLD AUTO: 0.05 K/UL (ref 0–0.04)
IMM GRANULOCYTES NFR BLD AUTO: 0.6 % (ref 0–0.5)
LACTATE SERPL-SCNC: 1.2 MMOL/L (ref 0.5–2.2)
LYMPHOCYTES # BLD AUTO: 2.04 K/UL (ref 1–4.8)
MCH RBC QN AUTO: 25.8 PG (ref 27–31)
MCHC RBC AUTO-ENTMCNC: 31.2 G/DL (ref 32–36)
MCV RBC AUTO: 83 FL (ref 82–98)
NUCLEATED RBC (/100WBC) (OHS): 0 /100 WBC
PLATELET # BLD AUTO: 506 K/UL (ref 150–450)
PMV BLD AUTO: 8.8 FL (ref 9.2–12.9)
POCT GLUCOSE: 135 MG/DL (ref 70–110)
POTASSIUM SERPL-SCNC: 4 MMOL/L (ref 3.5–5.1)
PROCALCITONIN SERPL-MCNC: 0.16 NG/ML
PROT SERPL-MCNC: 8.6 GM/DL (ref 6–8.4)
RBC # BLD AUTO: 4.22 M/UL (ref 4–5.4)
RELATIVE EOSINOPHIL (OHS): 3.5 %
RELATIVE LYMPHOCYTE (OHS): 24.8 % (ref 18–48)
RELATIVE MONOCYTE (OHS): 8.9 % (ref 4–15)
RELATIVE NEUTROPHIL (OHS): 61.6 % (ref 38–73)
SODIUM SERPL-SCNC: 140 MMOL/L (ref 136–145)
WBC # BLD AUTO: 8.23 K/UL (ref 3.9–12.7)

## 2025-04-28 PROCEDURE — 87040 BLOOD CULTURE FOR BACTERIA: CPT | Mod: HCNC | Performed by: NURSE PRACTITIONER

## 2025-04-28 PROCEDURE — 63600175 PHARM REV CODE 636 W HCPCS: Mod: HCNC | Performed by: EMERGENCY MEDICINE

## 2025-04-28 PROCEDURE — 63600175 PHARM REV CODE 636 W HCPCS: Mod: HCNC | Performed by: HOSPITALIST

## 2025-04-28 PROCEDURE — 85025 COMPLETE CBC W/AUTO DIFF WBC: CPT | Mod: HCNC | Performed by: NURSE PRACTITIONER

## 2025-04-28 PROCEDURE — 25500020 PHARM REV CODE 255: Mod: HCNC | Performed by: EMERGENCY MEDICINE

## 2025-04-28 PROCEDURE — 82550 ASSAY OF CK (CPK): CPT | Mod: HCNC | Performed by: EMERGENCY MEDICINE

## 2025-04-28 PROCEDURE — 80053 COMPREHEN METABOLIC PANEL: CPT | Mod: HCNC | Performed by: NURSE PRACTITIONER

## 2025-04-28 PROCEDURE — 25000003 PHARM REV CODE 250: Mod: HCNC | Performed by: HOSPITALIST

## 2025-04-28 PROCEDURE — 85652 RBC SED RATE AUTOMATED: CPT | Mod: HCNC | Performed by: EMERGENCY MEDICINE

## 2025-04-28 PROCEDURE — 99999 PR PBB SHADOW E&M-EST. PATIENT-LVL III: CPT | Mod: PBBFAC,HCNC,, | Performed by: STUDENT IN AN ORGANIZED HEALTH CARE EDUCATION/TRAINING PROGRAM

## 2025-04-28 PROCEDURE — G0378 HOSPITAL OBSERVATION PER HR: HCPCS | Mod: HCNC

## 2025-04-28 PROCEDURE — 83605 ASSAY OF LACTIC ACID: CPT | Mod: HCNC | Performed by: NURSE PRACTITIONER

## 2025-04-28 PROCEDURE — 84145 PROCALCITONIN (PCT): CPT | Mod: HCNC | Performed by: NURSE PRACTITIONER

## 2025-04-28 PROCEDURE — 99285 EMERGENCY DEPT VISIT HI MDM: CPT | Mod: 25,HCNC

## 2025-04-28 PROCEDURE — 96372 THER/PROPH/DIAG INJ SC/IM: CPT | Performed by: HOSPITALIST

## 2025-04-28 PROCEDURE — 96365 THER/PROPH/DIAG IV INF INIT: CPT | Mod: 59,HCNC

## 2025-04-28 PROCEDURE — 86140 C-REACTIVE PROTEIN: CPT | Mod: HCNC | Performed by: EMERGENCY MEDICINE

## 2025-04-28 RX ORDER — IBUPROFEN 200 MG
16 TABLET ORAL
Status: DISCONTINUED | OUTPATIENT
Start: 2025-04-28 | End: 2025-04-29 | Stop reason: HOSPADM

## 2025-04-28 RX ORDER — MORPHINE SULFATE 2 MG/ML
2 INJECTION, SOLUTION INTRAMUSCULAR; INTRAVENOUS EVERY 4 HOURS PRN
Refills: 0 | Status: DISCONTINUED | OUTPATIENT
Start: 2025-04-28 | End: 2025-04-29 | Stop reason: HOSPADM

## 2025-04-28 RX ORDER — INSULIN ASPART 100 [IU]/ML
0-5 INJECTION, SOLUTION INTRAVENOUS; SUBCUTANEOUS
Status: DISCONTINUED | OUTPATIENT
Start: 2025-04-28 | End: 2025-04-29 | Stop reason: HOSPADM

## 2025-04-28 RX ORDER — TALC
6 POWDER (GRAM) TOPICAL NIGHTLY PRN
Status: DISCONTINUED | OUTPATIENT
Start: 2025-04-28 | End: 2025-04-29 | Stop reason: HOSPADM

## 2025-04-28 RX ORDER — IPRATROPIUM BROMIDE AND ALBUTEROL SULFATE 2.5; .5 MG/3ML; MG/3ML
3 SOLUTION RESPIRATORY (INHALATION) EVERY 6 HOURS PRN
Status: DISCONTINUED | OUTPATIENT
Start: 2025-04-28 | End: 2025-04-29 | Stop reason: HOSPADM

## 2025-04-28 RX ORDER — GLUCAGON 1 MG
1 KIT INJECTION
Status: DISCONTINUED | OUTPATIENT
Start: 2025-04-28 | End: 2025-04-29 | Stop reason: HOSPADM

## 2025-04-28 RX ORDER — ACETAMINOPHEN 325 MG/1
650 TABLET ORAL EVERY 8 HOURS PRN
Status: DISCONTINUED | OUTPATIENT
Start: 2025-04-28 | End: 2025-04-29 | Stop reason: HOSPADM

## 2025-04-28 RX ORDER — ENOXAPARIN SODIUM 100 MG/ML
40 INJECTION SUBCUTANEOUS EVERY 12 HOURS
Status: DISCONTINUED | OUTPATIENT
Start: 2025-04-28 | End: 2025-04-29 | Stop reason: HOSPADM

## 2025-04-28 RX ORDER — LINEZOLID 2 MG/ML
600 INJECTION, SOLUTION INTRAVENOUS
Status: DISCONTINUED | OUTPATIENT
Start: 2025-04-28 | End: 2025-04-29 | Stop reason: HOSPADM

## 2025-04-28 RX ORDER — AMOXICILLIN 250 MG
1 CAPSULE ORAL 2 TIMES DAILY PRN
Status: DISCONTINUED | OUTPATIENT
Start: 2025-04-28 | End: 2025-04-29 | Stop reason: HOSPADM

## 2025-04-28 RX ORDER — ALUMINUM HYDROXIDE, MAGNESIUM HYDROXIDE, AND SIMETHICONE 1200; 120; 1200 MG/30ML; MG/30ML; MG/30ML
30 SUSPENSION ORAL 4 TIMES DAILY PRN
Status: DISCONTINUED | OUTPATIENT
Start: 2025-04-28 | End: 2025-04-29 | Stop reason: HOSPADM

## 2025-04-28 RX ORDER — ACETAMINOPHEN 650 MG/1
650 SUPPOSITORY RECTAL EVERY 6 HOURS PRN
Status: DISCONTINUED | OUTPATIENT
Start: 2025-04-28 | End: 2025-04-29 | Stop reason: HOSPADM

## 2025-04-28 RX ORDER — ONDANSETRON HYDROCHLORIDE 2 MG/ML
4 INJECTION, SOLUTION INTRAVENOUS EVERY 8 HOURS PRN
Status: DISCONTINUED | OUTPATIENT
Start: 2025-04-28 | End: 2025-04-29 | Stop reason: HOSPADM

## 2025-04-28 RX ORDER — SODIUM CHLORIDE 0.9 % (FLUSH) 0.9 %
10 SYRINGE (ML) INJECTION EVERY 12 HOURS PRN
Status: DISCONTINUED | OUTPATIENT
Start: 2025-04-28 | End: 2025-04-29 | Stop reason: HOSPADM

## 2025-04-28 RX ORDER — NALOXONE HCL 0.4 MG/ML
0.02 VIAL (ML) INJECTION
Status: DISCONTINUED | OUTPATIENT
Start: 2025-04-28 | End: 2025-04-29 | Stop reason: HOSPADM

## 2025-04-28 RX ORDER — HYDROCODONE BITARTRATE AND ACETAMINOPHEN 5; 325 MG/1; MG/1
1 TABLET ORAL EVERY 6 HOURS PRN
Refills: 0 | Status: DISCONTINUED | OUTPATIENT
Start: 2025-04-28 | End: 2025-04-29 | Stop reason: HOSPADM

## 2025-04-28 RX ORDER — PROMETHAZINE HYDROCHLORIDE 25 MG/1
25 TABLET ORAL EVERY 6 HOURS PRN
Status: DISCONTINUED | OUTPATIENT
Start: 2025-04-28 | End: 2025-04-29 | Stop reason: HOSPADM

## 2025-04-28 RX ORDER — IBUPROFEN 200 MG
24 TABLET ORAL
Status: DISCONTINUED | OUTPATIENT
Start: 2025-04-28 | End: 2025-04-29 | Stop reason: HOSPADM

## 2025-04-28 RX ADMIN — ENOXAPARIN SODIUM 40 MG: 40 INJECTION SUBCUTANEOUS at 10:04

## 2025-04-28 RX ADMIN — IOHEXOL 100 ML: 350 INJECTION, SOLUTION INTRAVENOUS at 07:04

## 2025-04-28 RX ADMIN — HYDROCODONE BITARTRATE AND ACETAMINOPHEN 1 TABLET: 5; 325 TABLET ORAL at 10:04

## 2025-04-28 RX ADMIN — LINEZOLID 600 MG: 600 INJECTION, SOLUTION INTRAVENOUS at 07:04

## 2025-04-28 NOTE — FIRST PROVIDER EVALUATION
"Medical screening examination initiated.  I have conducted a focused provider triage encounter, findings are as follows:    Brief history of present illness:  69-year-old female presents to ER for worsening cellulitis to right lower extremity.  Was seen by infectious disease today and advised to go to ER.  Currently on antibiotics.  Recent admission for cellulitis to right lower extremity.    Vitals:    04/28/25 1459   BP: 138/82   BP Location: Left arm   Pulse: 96   Resp: 16   Temp: 97.9 °F (36.6 °C)   TempSrc: Oral   SpO2: 99%   Height: 5' 2" (1.575 m)       Pertinent physical exam:  Vital signs stable    Brief workup plan:  Septic workup, further evaluation    Preliminary workup initiated; this workup will be continued and followed by the physician or advanced practice provider that is assigned to the patient when roomed.  "

## 2025-04-28 NOTE — ED PROVIDER NOTES
SCRIBE #1 NOTE: I, Jammie Richardson, am scribing for, and in the presence of, Yesenia Watts MD. I have scribed the entire note.       History     Chief Complaint   Patient presents with    Wound Infection     Patient presents to ED with RLE wound and skin sloughing. Patient was sent to ED by Dr. Otto, Infectious Disease for treatment.     Review of patient's allergies indicates:   Allergen Reactions    Adhesive tape-silicones Swelling     Paper tape         History of Present Illness     HPI    4/28/2025, 6:03 PM  History obtained from the patient and medical records      History of Present Illness: Stephanie Raza is a 69 y.o. female patient with a PMHx of HTN, DM2, YOVANA, heart murmur, asthma, anxiety, and HLD who presents to the Emergency Department for evaluation of RLE wound which began at the beginning of this month, but worsened after treatment with Dalvance on 4/24. Pt was sent here by Dr. Otto (infectious diseases) for treatment. Pt reports that she has wound care tomorrow. She denies any numbness, itching, fever, n/v, or any increased pain since her wound worsening. No associated sxs. No further complaints or concerns at this time.       Arrival mode: Personal Transportation    PCP: Reyna Suarez MD        Past Medical History:  Past Medical History:   Diagnosis Date    Acute blood loss anemia (ABLA) 01/24/2024    Last Assessment & Plan:    Formatting of this note might be different from the original.   History & Physical       Discharge Summary       Follow-up  Patient did receive 1 unit packed red blood cells while in the OR secondary to blood loss.  No obvious bleeding at this time.  She received 2 additional units packed red blood cells per orthopedics.  Hemoglobin running stable at this time no need fo    YOVANA (acute kidney injury) 01/24/2024    Last Assessment & Plan:    Formatting of this note might be different from the original.   History & Physical       Discharge Summary        Follow-up patient with progressive oliguric YOVANA after surgery.  Resolved. Avoid nephrotoxic agents, renally dose all medications where necessary, and protect nondominant arm as much as possible    Anxiety     Arthritis     Asthma     Back pain     Diabetes mellitus type I     Heart murmur     Hyperlipidemia     Hypertension     Obesity     Polyneuropathy     Trouble in sleeping     Type 2 diabetes mellitus     Urinary incontinence        Past Surgical History:  Past Surgical History:   Procedure Laterality Date    ADENOIDECTOMY      BREAST BIOPSY      2018, benign    BREAST SURGERY       SECTION      hip reconstruction Left 2024    HYSTERECTOMY      JOINT REPLACEMENT           Family History:  Family History   Problem Relation Name Age of Onset    Breast cancer Mother Naveed Blackwood     Lung cancer Mother Naveed Blackwood     Hypertension Mother Naveed Blackwood     Stroke Father Naveed Blackwood     Cancer Father Naveed Blackwood     Diabetes Sister Andrés Nowak     Breast cancer Maternal Aunt      Breast cancer Maternal Aunt      Breast cancer Paternal Aunt      Breast cancer Paternal Aunt         Social History:  Social History     Tobacco Use    Smoking status: Never     Passive exposure: Never    Smokeless tobacco: Never   Substance and Sexual Activity    Alcohol use: Yes     Alcohol/week: 3.0 standard drinks of alcohol     Types: 1 Glasses of wine, 2 Shots of liquor per week    Drug use: Never    Sexual activity: Not Currently     Partners: Male        Review of Systems     Review of Systems   Constitutional:  Negative for fever.   HENT:  Negative for sore throat.    Respiratory:  Negative for shortness of breath.    Cardiovascular:  Negative for chest pain.   Gastrointestinal:  Negative for nausea and vomiting.   Genitourinary:  Negative for dysuria.   Musculoskeletal:  Negative for back pain.   Skin:  Positive for wound (RLE; appears worse, but has no increased pain). Negative for rash.        (-)  "Itching   Neurological:  Negative for weakness and numbness.   Hematological:  Does not bruise/bleed easily.   All other systems reviewed and are negative.     Physical Exam     Initial Vitals [04/28/25 1459]   BP Pulse Resp Temp SpO2   138/82 96 16 97.9 °F (36.6 °C) 99 %      MAP       --          Physical Exam  Nursing Notes and Vital Signs Reviewed.  Constitutional: Patient is in no acute distress. Well-developed and well-nourished.  Head: Atraumatic. Normocephalic.  Eyes: PERRL. EOM intact. Conjunctivae are not pale. No scleral icterus.  ENT: Mucous membranes are moist. Oropharynx is clear and symmetric.    Neck: Supple. Full ROM. No lymphadenopathy.  Cardiovascular: Regular rate. Regular rhythm. No murmurs, rubs, or gallops. Distal pulses are 2+ and symmetric.  Pulmonary/Chest: No respiratory distress. Clear to auscultation bilaterally. No wheezing or rales.  Abdominal: Soft and non-distended. There is no tenderness.  No rebound, guarding, or rigidity.   Musculoskeletal: Moves all extremities. No obvious deformities. No edema. Extensive soft tissue swelling of RLE, compartments are soft.  Skin: Warm and dry. Erythema from R foot throughout R leg extending past R knee. Sloughing of skin and exudative lesions along her R distal leg.  Neurological:  Alert, awake, and appropriate.  Normal speech.  No acute focal neurological deficits are appreciated.  Psychiatric: Normal affect. Good eye contact. Appropriate in content.     ED Course   Procedures  ED Vital Signs:  Vitals:    04/28/25 1459 04/28/25 1800 04/28/25 1905   BP: 138/82 (!) 143/70    Pulse: 96 100 96   Resp: 16 18 18   Temp: 97.9 °F (36.6 °C) 98 °F (36.7 °C)    TempSrc: Oral Oral    SpO2: 99% 99% 100%   Height: 5' 2" (1.575 m)         Abnormal Lab Results:  Labs Reviewed   COMPREHENSIVE METABOLIC PANEL - Abnormal       Result Value    Sodium 140      Potassium 4.0      Chloride 102      CO2 26      Glucose 102      BUN 16      Creatinine 1.0      Calcium " 8.7      Protein Total 8.6 (*)     Albumin 2.6 (*)     Bilirubin Total 0.3      ALP 92      AST 18      ALT 21      Anion Gap 12      eGFR >60     CBC WITH DIFFERENTIAL - Abnormal    WBC 8.23      RBC 4.22      HGB 10.9 (*)     HCT 34.9 (*)     MCV 83      MCH 25.8 (*)     MCHC 31.2 (*)     RDW 14.8 (*)     Platelet Count 506 (*)     MPV 8.8 (*)     Nucleated RBC 0      Neut % 61.6      Lymph % 24.8      Mono % 8.9      Eos % 3.5      Basophil % 0.6      Imm Grans % 0.6 (*)     Neut # 5.07      Lymph # 2.04      Mono # 0.73      Eos # 0.29      Baso # 0.05      Imm Grans # 0.05 (*)    SEDIMENTATION RATE - Abnormal    Sed Rate >120 (*)    C-REACTIVE PROTEIN - Abnormal    CRP 83.9 (*)    LACTIC ACID, PLASMA - Normal    Lactic Acid Level 1.2      Narrative:     Falsely low lactic acid results can be found in samples containing >=13.0 mg/dL total bilirubin and/or >=3.5 mg/dL direct bilirubin.    PROCALCITONIN - Normal    Procalcitonin 0.16     CK - Normal    CPK 70     CULTURE, BLOOD   CULTURE, BLOOD   CBC W/ AUTO DIFFERENTIAL    Narrative:     The following orders were created for panel order CBC auto differential.  Procedure                               Abnormality         Status                     ---------                               -----------         ------                     CBC with Differential[[id number]]       Abnormal            Final result                 Please view results for these tests on the individual orders.        All Lab Results:  Results for orders placed or performed during the hospital encounter of 04/28/25   Comprehensive metabolic panel    Collection Time: 04/28/25  5:06 PM   Result Value Ref Range    Sodium 140 136 - 145 mmol/L    Potassium 4.0 3.5 - 5.1 mmol/L    Chloride 102 95 - 110 mmol/L    CO2 26 23 - 29 mmol/L    Glucose 102 70 - 110 mg/dL    BUN 16 8 - 23 mg/dL    Creatinine 1.0 0.5 - 1.4 mg/dL    Calcium 8.7 8.7 - 10.5 mg/dL    Protein Total 8.6 (H) 6.0 - 8.4 gm/dL    Albumin  2.6 (L) 3.5 - 5.2 g/dL    Bilirubin Total 0.3 0.1 - 1.0 mg/dL    ALP 92 40 - 150 unit/L    AST 18 11 - 45 unit/L    ALT 21 10 - 44 unit/L    Anion Gap 12 8 - 16 mmol/L    eGFR >60 >60 mL/min/1.73/m2   Lactic acid, plasma #1    Collection Time: 04/28/25  5:06 PM   Result Value Ref Range    Lactic Acid Level 1.2 0.5 - 2.2 mmol/L   Procalcitonin    Collection Time: 04/28/25  5:06 PM   Result Value Ref Range    Procalcitonin 0.16 <0.25 ng/mL   CBC with Differential    Collection Time: 04/28/25  5:06 PM   Result Value Ref Range    WBC 8.23 3.90 - 12.70 K/uL    RBC 4.22 4.00 - 5.40 M/uL    HGB 10.9 (L) 12.0 - 16.0 gm/dL    HCT 34.9 (L) 37.0 - 48.5 %    MCV 83 82 - 98 fL    MCH 25.8 (L) 27.0 - 31.0 pg    MCHC 31.2 (L) 32.0 - 36.0 g/dL    RDW 14.8 (H) 11.5 - 14.5 %    Platelet Count 506 (H) 150 - 450 K/uL    MPV 8.8 (L) 9.2 - 12.9 fL    Nucleated RBC 0 <=0 /100 WBC    Neut % 61.6 38 - 73 %    Lymph % 24.8 18 - 48 %    Mono % 8.9 4 - 15 %    Eos % 3.5 <=8 %    Basophil % 0.6 <=1.9 %    Imm Grans % 0.6 (H) 0.0 - 0.5 %    Neut # 5.07 1.8 - 7.7 K/uL    Lymph # 2.04 1 - 4.8 K/uL    Mono # 0.73 0.3 - 1 K/uL    Eos # 0.29 <=0.5 K/uL    Baso # 0.05 <=0.2 K/uL    Imm Grans # 0.05 (H) 0.00 - 0.04 K/uL   Sedimentation rate    Collection Time: 04/28/25  5:06 PM   Result Value Ref Range    Sed Rate >120 (H) <=36 mm/hr   C-reactive protein    Collection Time: 04/28/25  5:06 PM   Result Value Ref Range    CRP 83.9 (H) <=8.2 mg/L   CPK    Collection Time: 04/28/25  5:06 PM   Result Value Ref Range    CPK 70 20 - 180 U/L       Imaging Results:  Imaging Results              CT Leg (Tibia-Fibula) Wtih Contrast Right (In process)                             The Emergency Provider reviewed the vital signs and test results, which are outlined above.     ED Discussion               Medical Decision Making  Amount and/or Complexity of Data Reviewed  Labs: ordered. Decision-making details documented in ED Course.  Radiology: ordered.  Decision-making details documented in ED Course.  Discussion of management or test interpretation with external provider(s): 1:41 PM: Discussed pt's case with Dr. Otto (Infectious Diseases); Dr. Otto was seeing the pt PTA for a hospital follow-up with cellulitis in her R leg. Pt had been set up for dose of Dalvance 4/24 and after receiving it developed a lot of oozing from leg with now blistering.    7:21 PM: Discussed pt's case with Dr. Mon (Infectious diseases) who recommends admitting to OBS and treating with Zyvox for now.    7:38 PM: Discussed case with Dr. Troncoso (Hospital Medicine). Dr. Troncoso agrees with current care and management of pt and accepts admission.   Admitting Service: Hospital Medicine  Admitting Physician: Dr. Troncoso  Admit to:  OBS med/surg    7:38 PM: Re-evaluated pt.  I have discussed test results, shared treatment plan, and the need for admission with patient/family/caretaker at bedside. Pt and/or family/caretaker express understanding at this time and agree with all information. All questions answered. Pt/caretaker/family member(s) have no further questions or concerns at this time. Pt is ready for admit.        Risk  Prescription drug management.  Decision regarding hospitalization.                ED Medication(s):  Medications   linezolid 600 mg/300 mL IVPB 600 mg (has no administration in time range)   iohexoL (OMNIPAQUE 350) injection 100 mL (100 mLs Intravenous Given 4/28/25 1933)       New Prescriptions    No medications on file               Scribe Attestation:   Scribe #1: I performed the above scribed service and the documentation accurately describes the services I performed. I attest to the accuracy of the note.     Attending:   Physician Attestation Statement for Scribe #1: I, Yesenia Watts MD, personally performed the services described in this documentation, as scribed by Jammie Richardson, in my presence, and it is both accurate and complete.           Clinical  Impression     No diagnosis found.    Disposition:   Disposition: Placed in Observation  Condition: Fair     indication / reason for exam; i.e. extremities or head); use of iterative reconstruction technique.        Finalized on: 4/28/2025 9:04 PM By:  Angel Gordon MD JEANNINE PhD  [facility]# [id number]      2025-04-28 21:07:[id number]     [facility]               Narrative:    EXAM: CT LEG (TIBIA-FIBULA) WITH CONTRAST RIGHT    CLINICAL HISTORY: Soft tissue infection suspected, lower leg, xray done; Pain    COMPARISON: None available.    TECHNIQUE: Axial CT imaging was performed through the      without intravenous contrast.  Multiplanar reformats were created and interpreted.    FINDINGS:    No acute fractures or dislocation.    Moderate superficial subcutaneous fat stranding.  Superficial perifascial fluid may relate to superficial abscess.  No deep space  abscess.  Mild atherosclerotic plaque of the arteriovascular the popliteal.  Mild degenerative joint disease of the knee.                                                The Emergency Provider reviewed the vital signs and test results, which are outlined above.     ED Discussion               Medical Decision Making  DDX: 1. Cellulitis of leg 2. Abscess of leg 3. Antibiotic side effect 4. Necrotizing fascitis    Lab work reviewed and WBC normal, cmp stable, lactate and procal normal, CRP and sed rate markedly elevated, CT scan of ext consistent with cellulitis, ID consulted, IV zyvox initiated, hosp med to admit.     Amount and/or Complexity of Data Reviewed  External Data Reviewed: labs and notes.  Labs: ordered. Decision-making details documented in ED Course.  Radiology: ordered. Decision-making details documented in ED Course.  Discussion of management or test interpretation with external provider(s): 1:41 PM: Discussed pt's case with Dr. Otto (Infectious Diseases); Dr. Otto was seeing the pt PTA for a hospital follow-up with cellulitis in her R leg. Pt had been set up for dose of Dalvance 4/24 and after receiving it developed a lot of oozing from leg with now blistering.    7:21  PM: Discussed pt's case with Dr. Mon (Infectious diseases) who recommends admitting to OBS and treating with Zyvox for now.    7:38 PM: Discussed case with Dr. Troncoso (Hospital Medicine). Dr. Troncoso agrees with current care and management of pt and accepts admission.   Admitting Service: Hospital Medicine  Admitting Physician: Dr. Troncoso  Admit to:  OBS med/surg    7:38 PM: Re-evaluated pt.  I have discussed test results, shared treatment plan, and the need for admission with patient/family/caretaker at bedside. Pt and/or family/caretaker express understanding at this time and agree with all information. All questions answered. Pt/caretaker/family member(s) have no further questions or concerns at this time. Pt is ready for admit.        Risk  Prescription drug management.  Decision regarding hospitalization.  Diagnosis or treatment significantly limited by social determinants of health.                ED Medication(s):  Medications   iohexoL (OMNIPAQUE 350) injection 100 mL (100 mLs Intravenous Given 4/28/25 1933)       Discharge Medication List as of 4/29/2025 11:44 AM           Follow-up Information       Reyna Suarez MD Follow up in 1 week(s).    Specialty: Family Medicine  Contact information:  [address] [facility] [address] [phone]               Petros Otto DO. Schedule an appointment as soon as possible for a visit.    Specialty: Infectious Diseases  Why: As needed  Contact information:  [id number] [facility] [id number] [phone]                                 Scribe Attestation:   Scribe #1: I performed the above scribed service and the documentation accurately describes the services I performed. I attest to the accuracy of the note.     Attending:   Physician Attestation Statement for Scribe #1: I, Yesenia Watts MD, personally performed the services described in this documentation, as scribed by Jammie Richardson, in my presence, and it is both  accurate and complete.           Clinical Impression       ICD-10-CM ICD-9-CM   1. Cellulitis of right anterior lower leg  L03.115 682.6   2. Morbid obesity  E66.01 278.01   3. Chest pain  R07.9 786.50       Disposition:   Disposition: Placed in Observation  Condition: Yesenia Lyons MD  05/04/25 8087

## 2025-04-28 NOTE — ED TRIAGE NOTES
Stephanie Raza, a 69 y.o. female presents to the ED w/ complaint of wound infection to RLE.     Triage note:  Chief Complaint   Patient presents with    Wound Infection     Patient presents to ED with RLE wound and skin sloughing. Patient was sent to ED by Dr. Otto, Infectious Disease for treatment.     Review of patient's allergies indicates:   Allergen Reactions    Adhesive tape-silicones Swelling     Paper tape     Past Medical History:   Diagnosis Date    Acute blood loss anemia (ABLA) 01/24/2024    Last Assessment & Plan:    Formatting of this note might be different from the original.   History & Physical       Discharge Summary       Follow-up  Patient did receive 1 unit packed red blood cells while in the OR secondary to blood loss.  No obvious bleeding at this time.  She received 2 additional units packed red blood cells per orthopedics.  Hemoglobin running stable at this time no need fo    YOVANA (acute kidney injury) 01/24/2024    Last Assessment & Plan:    Formatting of this note might be different from the original.   History & Physical       Discharge Summary       Follow-up patient with progressive oliguric YOVANA after surgery.  Resolved. Avoid nephrotoxic agents, renally dose all medications where necessary, and protect nondominant arm as much as possible    Anxiety     Arthritis     Asthma     Back pain     Diabetes mellitus type I     Heart murmur     Hyperlipidemia     Hypertension     Obesity     Polyneuropathy     Trouble in sleeping     Type 2 diabetes mellitus     Urinary incontinence

## 2025-04-28 NOTE — PROGRESS NOTES
"Infectious Disease Clinic Note    Patient Name: Stephanie Raza  YOB: 1955    PRESENTING HISTORY       History of Present Illness:  Ms. Stephanie Raza is a 69 y.o. female w/ significant PMHx of diabetes, hyperlipidemia, hypertension, obesity. Per last ID note, "she was admitted with worsening right lower leg erythema and swelling.  She was initially admitted 0408- treated with Ancef and discharged on Augmentin.  She has she was now admitted with worsening infection. Labs and imaging tests reviewed sed rate greater 120, , white blood cell count of 13.4.  Lactic acid level was normal at 1.7. CT scan of the leg revealed large left inguinal lymph nodes, fat stranding in the thigh most notably in the medial portion right hip arthroplasty in place intrapelvic contents unremarkable. CTA tib-fib soft tissue thickening and fat stranding in the anterior aspect of the lower extremity with disorganized fluid throughout the superficial compartment with no definitive involvement of the deep intramuscular compartment, no definitive abscess  Interval History:   She reports less pain and erythema.  She feels better.  CBC - wbc 14.64.  04/16-  She feels better  CBC showed wbc 18- increased  04/17-  She has persistent leukocytosis.  WBC remains high at 18.     04/18-  She feels better but has persistent leucocytosis .  04/20- she is tolerating medications.  She has less pain  CBC now showed normal WBC  MRI of the lower extremity did not show any abscess"    Discharged with plan for dose of Dalvance.    4/24: "Patient to suite for first dose Dalvance. PIV flushed w/ 10ml D5W prior to start.  Dalvance 1500mg (325ml) PIV given over 30min (650ml/hr) with no ADR. PIV flushed with 10ml D5W post infusion. Patient monitored post 30min with no ADR.  Labs: CBC, CMP, ESR, CRP  Patient out suite via WC in NAD."    4/28: Hospital follow up. After receiving Dalvance the other day, patient reports her right leg began " "blistering badly. Not blanchable on exam today but notably skin peeling around foot and blisters notable. No tenderness with palpation. Active drainage noted. Have never seen Dalvance lead to this type of reaction but always possible. May be part of healing process as well. As precaution have advised patient to go to ER for evaluation. Is in agreement and will head there shortly.     Discharge summary reviewed: "Hospital Course:   4/14 admitted for right leg cellulitis. Denies insect bite, trauma. States improvement in symptoms of erythema, edema and pain. Intravenous antibiotic(s) per infectious disease. Surgery following.  4/15 antibiotic(s) per infectious disease. Cellulitis modestly improving. Pain regimen adjusted.  4/16 interventional radiology aspirated blister. Body fluid studies pending. Patient endorses improvement in symptoms of edema and erythema. Us liliana pending  4/17 us with moderate stenosis in superficial femoral artery. Patient unable to tolerate us liliana studydue to pain. Vascular surgery and nephrology consulted due to unimproved cellulitis and acute kidney injury. Us abdomen complete with medical renal disease, no hydronephrosis. Infectious disease aware. Surgery recommending mri pending renal improvement  4/18 creatinine improving. Awaiting mri  4/19 ortho consulted while awaiting mri official read. Mri without myositis or abscess. Continue treatment for lymphedema with cellulitis with compression stockings as tolerated and topical antimicrobials, elevation and cold compress.  4/20 labs improving. Continue antibiotic(s) per infectious disease. Encourage elevation, cold compress and scds vs robyn hose. No surgical intervention warranted.  04/21/2025  Patient reports swelling improving. Cont abx. Will establish 1 time dose of dalvance outpatient. Patient refused snf, will set up home health.   04/22/2025  Patients stable for dc. Will cont on po zyvox. 1 time dose of dalvance to be set up outpatient." "     Procedure Component Value Units Date/Time   Gram stain [[id number]] Collected: 04/15/25 1602   Order Status: Canceled Specimen: Body Fluid from Leg, Right Updated: 04/15/25 1608   Culture, Body Fluid (Aerobic) w/ GS [[id number]] Collected: 04/15/25 1602   Order Status: Completed Specimen: Body Fluid from Calf, Right Updated: 04/21/25 0713    CULTURE, FLUID No Growth    GRAM STAIN No WBCs     No organisms seen   Blood culture x two cultures. Draw prior to antibiotics. [[id number]] (Normal) Collected: 04/13/25 1124   Order Status: Completed Specimen: Blood from Peripheral, Hand, Left Updated: 04/19/25 0500    Blood Culture No Growth After 5 Days   Blood culture x two cultures. Draw prior to antibiotics. [[id number]] (Normal) Collected: 04/13/25 1116   Order Status: Completed Specimen: Blood from Peripheral, Antecubital, Left Updated: 04/19/25 0500    Blood Culture No Growth After 5 Days       EXAM: MRI TIBIA FIBULA W WO CONTRAST RIGHT     CLINICAL HISTORY: Infection right lower extremity.  Concern for osteomyelitis.     TECHNIQUE: Multiplanar multisequence imaging of the right leg was performed with and without the intravenous administration of Gadavist.     FINDINGS:  The exam is very limited due to the patient's inability to lie still for the exam with motion artifact on many sequences.     There is no cortical erosion or osseous destructive tibia or fibula.  No bone marrow edema or bone marrow replacing process or loss of T1 fatty marrow signal intensity.     There is no intramuscular abscess or edema or fluid within the intermuscular fascia.  No abnormal enhancement in the muscles or fascia.     There is circumferential marked subcutaneous edema consistent with cellulitis.  No drainable fluid collection in the subcutaneous fat.        Impression:     1.  Circumferential cellulitis.  2.  No evidence for myositis or intramuscular abscess or fascial edema.  3.  No evidence for osteomyelitis of the tibia or  fibula.     Finalized on: 4/19/2025 9:32 AM By:  Foster Romo MD  [facility]# [id number]      2025-04-19 09:34:[id number]     [facility]        Exam Ended: 04/18/25 18:22 CDT Last Resulted: 04/19/25 09:32 CDT         Review of Systems:  Constitutional: no fever or chills  Eyes: no visual changes  ENT: no nasal congestion or sore throat  Respiratory: no cough or shortness of breath  Cardiovascular: no chest pain  Gastrointestinal: no nausea or vomiting, no abdominal pain, no constipation, no diarrhea  Genitourinary: no hematuria or dysuria  Musculoskeletal: no arthralgias or myalgias  Skin: right leg red, blistered, draining fluid   Neurological: no headaches, numbness, or paresthesias    The following portions of the patient's history were reviewed and updated as appropriate: allergies, current medications, past family history, past medical history, past social history, past surgical history, and problem list.    PAST HISTORY:     Immunization History   Administered Date(s) Administered    COVID-19, MRNA, LN-S, PF (Pfizer) (Purple Cap) 02/28/2021, 03/23/2021, 11/04/2021    COVID-19, mRNA, LNP-S, PF, ernestina-sucrose, 30 mcg/0.3 mL (Pfizer Ages 12+) 10/31/2023, 10/16/2024    COVID-19, mRNA, LNP-S, bivalent booster, PF (PFIZER OMICRON) 04/27/2023       Past Medical History:   Diagnosis Date    Acute blood loss anemia (ABLA) 01/24/2024    Last Assessment & Plan:    Formatting of this note might be different from the original.   History & Physical       Discharge Summary       Follow-up  Patient did receive 1 unit packed red blood cells while in the OR secondary to blood loss.  No obvious bleeding at this time.  She received 2 additional units packed red blood cells per orthopedics.  Hemoglobin running stable at this time no need fo    YOVANA (acute kidney injury) 01/24/2024    Last Assessment & Plan:    Formatting of this note might be different from the original.   History & Physical       Discharge Summary       Follow-up patient with  progressive oliguric YOVANA after surgery.  Resolved. Avoid nephrotoxic agents, renally dose all medications where necessary, and protect nondominant arm as much as possible    Anxiety     Arthritis     Asthma     Back pain     Diabetes mellitus type I     Heart murmur     Hyperlipidemia     Hypertension     Obesity     Polyneuropathy     Trouble in sleeping     Type 2 diabetes mellitus     Urinary incontinence        Past Surgical History:   Procedure Laterality Date    ADENOIDECTOMY      BREAST BIOPSY      2018, benign    BREAST SURGERY       SECTION      hip reconstruction Left 2024    HYSTERECTOMY      JOINT REPLACEMENT         Family History   Problem Relation Name Age of Onset    Breast cancer Mother Naveed Blackwood     Lung cancer Mother Naveed Blackwood     Hypertension Mother Naveed Blackwood     Stroke Father Naveed Blackwood     Cancer Father Naveed Blackwood     Diabetes Sister [employer]     Breast cancer Maternal Aunt      Breast cancer Maternal Aunt      Breast cancer Paternal Aunt      Breast cancer Paternal Aunt         Social History     Socioeconomic History    Marital status:    Tobacco Use    Smoking status: Never     Passive exposure: Never    Smokeless tobacco: Never   Substance and Sexual Activity    Alcohol use: Yes     Alcohol/week: 3.0 standard drinks of alcohol     Types: 1 Glasses of wine, 2 Shots of liquor per week    Drug use: Never    Sexual activity: Not Currently     Partners: Male     Social Drivers of Health     Financial Resource Strain: Medium Risk (2025)    Overall Financial Resource Strain (CARDIA)     Difficulty of Paying Living Expenses: Somewhat hard   Food Insecurity: Food Insecurity Present (2025)    Hunger Vital Sign     Worried About Running Out of Food in the Last Year: Sometimes true     Ran Out of Food in the Last Year: Sometimes true   Transportation Needs: Unmet Transportation Needs (2025)    PRAPARE - Transportation     Lack of  "Transportation (Medical): Yes     Lack of Transportation (Non-Medical): Yes   Physical Activity: Sufficiently Active (9/27/2024)    Exercise Vital Sign     Days of Exercise per Week: 4 days     Minutes of Exercise per Session: 60 min   Stress: Stress Concern Present (4/14/2025)    [employer] - Occupational Stress Questionnaire     Feeling of Stress : Very much   Housing Stability: Low Risk  (4/14/2025)    Housing Stability Vital Sign     Unable to Pay for Housing in the Last Year: No     Homeless in the Last Year: No       MEDICATIONS & ALLERGIES:     Current Outpatient Medications on File Prior to Visit   Medication Sig    albuterol (PROVENTIL/VENTOLIN HFA) 90 mcg/actuation inhaler Inhale 2 puffs into the lungs every 4 (four) hours as needed for Wheezing.    aspirin 81 MG Chew Take 81 mg by mouth once daily.    blood-glucose meter (TRUE METRIX AIR GLUCOSE METER) Misc To check BG 3 times daily, to use with insurance preferred meter    buPROPion (WELLBUTRIN XL) 150 MG TB24 tablet TAKE 1 TABLET(150 MG) BY MOUTH DAILY    diltiaZEM HCl 120 mg Cp12 TAKE 1 CAPSULE TWICE DAILY    DROPLET PEN NEEDLE 31 gauge x 5/16" Ndle USE ONCE DAILY AS DIRECTED    esomeprazole (NEXIUM) 40 MG capsule Take 1 capsule (40 mg total) by mouth before breakfast.    fluticasone-salmeterol diskus inhaler 250-50 mcg Inhale 1 puff into the lungs once daily.    furosemide (LASIX) 80 MG tablet Take 80 mg by mouth.    gabapentin (NEURONTIN) 300 MG capsule Take 300 mg by mouth every evening. As needed (Patient not taking: Reported on 4/13/2025)    HYDROcodone-acetaminophen (NORCO) 7.5-325 mg per tablet Take 1 tablet by mouth every 6 (six) hours as needed for Pain.    linezolid (ZYVOX) 600 mg Tab Take 1 tablet (600 mg total) by mouth every 12 (twelve) hours. for 7 days    meloxicam (MOBIC) 7.5 MG tablet TAKE 1 TABLET(7.5 MG) BY MOUTH DAILY    metoprolol succinate (TOPROL-XL) 50 MG 24 hr tablet TAKE 1 TABLET TWICE DAILY    " "olmesartan (BENICAR) 40 MG tablet Take 40 mg by mouth once daily.    OZEMPIC 2 mg/dose (8 mg/3 mL) PnIj INJECT 2MG UNDER THE SKIN EVERY 7 DAYS    potassium chloride (K-TAB) 20 mEq Take 40 mEq by mouth 2 (two) times a day.    TRUE METRIX GLUCOSE TEST STRIP Strp TEST BLOOD SUGAR THREE TIMES DAILY    TRUEPLUS LANCETS 33 gauge Misc TEST BLOOD SUGAR THREE TIMES DAILY    vibegron 75 mg Tab Take 1 tablet by mouth once daily.    vitamin D (VITAMIN D3) 1000 units Tab Take 1 tablet by mouth every morning.     No current facility-administered medications on file prior to visit.       Review of patient's allergies indicates:   Allergen Reactions    Adhesive tape-silicones Swelling     Paper tape       OBJECTIVE:   Vital Signs:  Vitals:    04/28/25 1330   BP: 138/76   Pulse: 96   SpO2: 100%   Weight: 126.1 kg (278 lb)   Height: 5' 2" (1.575 m)       No results found for this or any previous visit (from the past 24 hours).      Physical Exam:   General:  Well developed, well nourished, no acute distress  HEENT:  Normocephalic, atraumatic  CVS:  RRR, S1 and S2 normal, no murmurs, rubs, gallops  Resp:  Lungs clear to auscultation, no wheezes, rales, rhonchi  GI:  Abdomen soft, non-tender, non-distended, normoactive bowel sounds, no masses  MSK:  No muscle atrophy, peripheral edema, full range of motion  Skin:  See photo of right leg from today below   Psych:  Alert and oriented to person, place, and time        ASSESSMENT:     Cellulitis of right lower extremity  --Extensive right lower extremity cellulitis  --Initially on pip/tazo, clindamycin, and linezolid during admission  --MRI of right LE with no evidence of abscess or OM   --Completed short course of PO linezolid at discharge  --Then received single dose of Dalvance on 4/24  --Subsequently developed oozing from right leg and profound blistering seen above  --As precaution advised patient be evaluated at ER; agreeable to heading over today  --Have also set up with [facility] wound " care   --As Dalvance will remain in her system up to 21 days will hold off on additional antibiotics for now   --Follow up with me in 2 weeks     Outpatient Antibiotic Therapy Plan:     Please send referral to [facility].     1) Infection:  Lower extremity cellulitis     2) Discharge Antibiotics:     Intravenous antibiotics:IV Dalvance 1500mg once     HTN  Continue current medications and follow up with PCP      DM  Continue current medications and follow up with PCP        PLAN:     Diagnoses and all orders for this visit:    Cellulitis of right lower extremity    Hypertension associated with diabetes    Type 2 diabetes mellitus with diabetic polyneuropathy, with long-term current use of insulin          The total time for evaluation and management services performed on 4/28/25 was greater than 30 minutes.        Paul Otto, DO   Infectious Diseases

## 2025-04-29 VITALS
SYSTOLIC BLOOD PRESSURE: 128 MMHG | TEMPERATURE: 98 F | DIASTOLIC BLOOD PRESSURE: 63 MMHG | OXYGEN SATURATION: 97 % | HEART RATE: 93 BPM | RESPIRATION RATE: 17 BRPM | WEIGHT: 265 LBS | BODY MASS INDEX: 48.76 KG/M2 | HEIGHT: 62 IN

## 2025-04-29 LAB
ABSOLUTE EOSINOPHIL (OHS): 0.3 K/UL
ABSOLUTE MONOCYTE (OHS): 0.68 K/UL (ref 0.3–1)
ABSOLUTE NEUTROPHIL COUNT (OHS): 2.93 K/UL (ref 1.8–7.7)
ALBUMIN SERPL BCP-MCNC: 2.2 G/DL (ref 3.5–5.2)
ALP SERPL-CCNC: 73 UNIT/L (ref 40–150)
ALT SERPL W/O P-5'-P-CCNC: 17 UNIT/L (ref 10–44)
ANION GAP (OHS): 8 MMOL/L (ref 8–16)
AST SERPL-CCNC: 16 UNIT/L (ref 11–45)
BASOPHILS # BLD AUTO: 0.04 K/UL
BASOPHILS NFR BLD AUTO: 0.7 %
BILIRUB SERPL-MCNC: 0.2 MG/DL (ref 0.1–1)
BUN SERPL-MCNC: 14 MG/DL (ref 8–23)
CALCIUM SERPL-MCNC: 8.4 MG/DL (ref 8.7–10.5)
CHLORIDE SERPL-SCNC: 104 MMOL/L (ref 95–110)
CO2 SERPL-SCNC: 27 MMOL/L (ref 23–29)
CREAT SERPL-MCNC: 1 MG/DL (ref 0.5–1.4)
ERYTHROCYTE [DISTWIDTH] IN BLOOD BY AUTOMATED COUNT: 14.7 % (ref 11.5–14.5)
GFR SERPLBLD CREATININE-BSD FMLA CKD-EPI: >60 ML/MIN/1.73/M2
GLUCOSE SERPL-MCNC: 84 MG/DL (ref 70–110)
HCT VFR BLD AUTO: 30.9 % (ref 37–48.5)
HGB BLD-MCNC: 9.4 GM/DL (ref 12–16)
IMM GRANULOCYTES # BLD AUTO: 0.02 K/UL (ref 0–0.04)
IMM GRANULOCYTES NFR BLD AUTO: 0.3 % (ref 0–0.5)
LYMPHOCYTES # BLD AUTO: 2.06 K/UL (ref 1–4.8)
MCH RBC QN AUTO: 25.8 PG (ref 27–31)
MCHC RBC AUTO-ENTMCNC: 30.4 G/DL (ref 32–36)
MCV RBC AUTO: 85 FL (ref 82–98)
NUCLEATED RBC (/100WBC) (OHS): 0 /100 WBC
PLATELET # BLD AUTO: 481 K/UL (ref 150–450)
PMV BLD AUTO: 9.2 FL (ref 9.2–12.9)
POTASSIUM SERPL-SCNC: 4.2 MMOL/L (ref 3.5–5.1)
PROT SERPL-MCNC: 7.1 GM/DL (ref 6–8.4)
RBC # BLD AUTO: 3.65 M/UL (ref 4–5.4)
RELATIVE EOSINOPHIL (OHS): 5 %
RELATIVE LYMPHOCYTE (OHS): 34.2 % (ref 18–48)
RELATIVE MONOCYTE (OHS): 11.3 % (ref 4–15)
RELATIVE NEUTROPHIL (OHS): 48.5 % (ref 38–73)
SODIUM SERPL-SCNC: 139 MMOL/L (ref 136–145)
WBC # BLD AUTO: 6.03 K/UL (ref 3.9–12.7)

## 2025-04-29 PROCEDURE — 96372 THER/PROPH/DIAG INJ SC/IM: CPT | Performed by: HOSPITALIST

## 2025-04-29 PROCEDURE — 85025 COMPLETE CBC W/AUTO DIFF WBC: CPT | Mod: HCNC | Performed by: HOSPITALIST

## 2025-04-29 PROCEDURE — 63600175 PHARM REV CODE 636 W HCPCS: Mod: HCNC | Performed by: HOSPITALIST

## 2025-04-29 PROCEDURE — 96376 TX/PRO/DX INJ SAME DRUG ADON: CPT

## 2025-04-29 PROCEDURE — 63600175 PHARM REV CODE 636 W HCPCS: Mod: HCNC | Performed by: EMERGENCY MEDICINE

## 2025-04-29 PROCEDURE — 96366 THER/PROPH/DIAG IV INF ADDON: CPT

## 2025-04-29 PROCEDURE — G0378 HOSPITAL OBSERVATION PER HR: HCPCS | Mod: HCNC

## 2025-04-29 PROCEDURE — 82040 ASSAY OF SERUM ALBUMIN: CPT | Mod: HCNC | Performed by: HOSPITALIST

## 2025-04-29 PROCEDURE — 25000003 PHARM REV CODE 250: Mod: HCNC | Performed by: HOSPITALIST

## 2025-04-29 PROCEDURE — 36415 COLL VENOUS BLD VENIPUNCTURE: CPT | Mod: HCNC | Performed by: HOSPITALIST

## 2025-04-29 PROCEDURE — 96375 TX/PRO/DX INJ NEW DRUG ADDON: CPT

## 2025-04-29 RX ADMIN — Medication 6 MG: at 02:04

## 2025-04-29 RX ADMIN — MORPHINE SULFATE 2 MG: 2 INJECTION, SOLUTION INTRAMUSCULAR; INTRAVENOUS at 04:04

## 2025-04-29 RX ADMIN — MORPHINE SULFATE 2 MG: 2 INJECTION, SOLUTION INTRAMUSCULAR; INTRAVENOUS at 09:04

## 2025-04-29 RX ADMIN — ENOXAPARIN SODIUM 40 MG: 40 INJECTION SUBCUTANEOUS at 08:04

## 2025-04-29 RX ADMIN — LINEZOLID 600 MG: 600 INJECTION, SOLUTION INTRAVENOUS at 08:04

## 2025-04-29 NOTE — HPI
Stephanie Raza is a 69 y.o. female with a PMH  has a past medical history of Acute blood loss anemia (ABLA) (01/24/2024), YOVANA (acute kidney injury) (01/24/2024), Anxiety, Arthritis, Asthma, Back pain, Diabetes mellitus type I, Heart murmur, Hyperlipidemia, Hypertension, Obesity, Polyneuropathy, Trouble in sleeping, Type 2 diabetes mellitus, and Urinary incontinence. who presented to the ED directed by Dr. Otto for further evaluation of worsening right lower extremity wound and skin sloughing to initiate IV antibiotics during post discharge following up visit.  Patient reported symptoms began earlier this month and continued to worsen despite treatment with Dalvance on 04/24 and p.o. zyvox following recent admission from 4/13 to 4/22 for similar complaints.  Patient reported no known alleviating or aggravating factors noted and reported all other review of systems negative except those noted above.  She denied endorsing any fever, chills, sweats, nausea, vomiting, numbness, tingling, or itching and reported all other review of systems negative except as noted above.  Initial workup in the ED revealed patient to be afebrile without leukocytosis, hypertensive, sed rate >120, CRP 83.9, CT right lower extremity negative for acute findings however positive for evidence of cellulitis.  ID consulted by ED staff and recommended patient be admitted to Hospital Medicine with initiation of IV Zyvox inpatient will be evaluated in the morning.  Patient admitted under observation for continued medical management.      PCP: Reyna Suarez

## 2025-04-29 NOTE — PLAN OF CARE
Discussed poc with pt, pt verbalized understanding  Attempted to do a skin assessment with DERRICK Almanzar, patient refused, stating that the only thing we need to assess is her leg. Educated patient on the risks and benefits, patient still refused.   Purposeful rounding every 2hours    VSS  Blood glucose monitoring, no coverage needed  Fall precautions in place, remains injury free  Pt denies c/o nausea and vomiting  Pain being managed with PRN pain meds    Abx given as prescribed  Bed locked at lowest position  Call light within reach    Chart check complete  Will cont with POC

## 2025-04-29 NOTE — ASSESSMENT & PLAN NOTE
Chronic, controlled.  Latest blood pressure and vitals reviewed-   Temp:  [97.5 °F (36.4 °C)-98.5 °F (36.9 °C)]   Pulse:  []   Resp:  [16-19]   BP: (128-150)/(63-82)   SpO2:  [96 %-100 %] .   Home meds for hypertension were reviewed and noted below.   Hypertension Medications              diltiaZEM HCl 120 mg Cp12 TAKE 1 CAPSULE TWICE DAILY    furosemide (LASIX) 80 MG tablet Take 80 mg by mouth.    metoprolol succinate (TOPROL-XL) 50 MG 24 hr tablet TAKE 1 TABLET TWICE DAILY    olmesartan (BENICAR) 40 MG tablet Take 40 mg by mouth once daily.     While in the hospital, will manage blood pressure as follows; Continue home antihypertensive regimen    Will utilize p.r.n. blood pressure medication only if patient's blood pressure greater than  180/110 and she develops symptoms such as worsening chest pain or shortness of breath.

## 2025-04-29 NOTE — SUBJECTIVE & OBJECTIVE
Past Medical History:   Diagnosis Date    Acute blood loss anemia (ABLA) 2024    Last Assessment & Plan:    Formatting of this note might be different from the original.   History & Physical       Discharge Summary       Follow-up  Patient did receive 1 unit packed red blood cells while in the OR secondary to blood loss.  No obvious bleeding at this time.  She received 2 additional units packed red blood cells per orthopedics.  Hemoglobin running stable at this time no need fo    YOVANA (acute kidney injury) 2024    Last Assessment & Plan:    Formatting of this note might be different from the original.   History & Physical       Discharge Summary       Follow-up patient with progressive oliguric YOVANA after surgery.  Resolved. Avoid nephrotoxic agents, renally dose all medications where necessary, and protect nondominant arm as much as possible    Anxiety     Arthritis     Asthma     Back pain     Diabetes mellitus type I     Heart murmur     Hyperlipidemia     Hypertension     Obesity     Polyneuropathy     Trouble in sleeping     Type 2 diabetes mellitus     Urinary incontinence        Past Surgical History:   Procedure Laterality Date    ADENOIDECTOMY      BREAST BIOPSY      , benign    BREAST SURGERY       SECTION      hip reconstruction Left 2024    HYSTERECTOMY      JOINT REPLACEMENT         Review of patient's allergies indicates:   Allergen Reactions    Adhesive tape-silicones Swelling     Paper tape       No current facility-administered medications on file prior to encounter.     Current Outpatient Medications on File Prior to Encounter   Medication Sig    aspirin 81 MG Chew Take 81 mg by mouth once daily.    blood-glucose meter (TRUE METRIX AIR GLUCOSE METER) [facility] [place] To check BG 3 times daily, to use with insurance preferred meter    buPROPion (WELLBUTRIN XL) 150 MG TB24 tablet TAKE 1 TABLET(150 MG) BY MOUTH DAILY    esomeprazole (NEXIUM) 40 MG capsule Take 1 capsule (40 mg total)  "by mouth before breakfast.    furosemide (LASIX) 80 MG tablet Take 80 mg by mouth.    HYDROcodone-acetaminophen (NORCO) 7.5-325 mg per tablet Take 1 tablet by mouth every 6 (six) hours as needed for Pain.    meloxicam (MOBIC) 7.5 MG tablet TAKE 1 TABLET(7.5 MG) BY MOUTH DAILY    metoprolol succinate (TOPROL-XL) 50 MG 24 hr tablet TAKE 1 TABLET TWICE DAILY    olmesartan (BENICAR) 40 MG tablet Take 40 mg by mouth once daily.    OZEMPIC 2 mg/dose (8 mg/3 mL) PnIj INJECT 2MG UNDER THE SKIN EVERY 7 DAYS    potassium chloride (K-TAB) 20 mEq Take 40 mEq by mouth 2 (two) times a day.    albuterol (PROVENTIL/VENTOLIN HFA) 90 mcg/actuation inhaler Inhale 2 puffs into the lungs every 4 (four) hours as needed for Wheezing.    diltiaZEM HCl 120 mg Cp12 TAKE 1 CAPSULE TWICE DAILY    DROPLET PEN NEEDLE 31 gauge x 5/16" Ndle USE ONCE DAILY AS DIRECTED    fluticasone-salmeterol diskus inhaler 250-50 mcg Inhale 1 puff into the lungs once daily.    gabapentin (NEURONTIN) 300 MG capsule Take 300 mg by mouth every evening. As needed (Patient not taking: Reported on 4/13/2025)    linezolid (ZYVOX) 600 mg Tab Take 1 tablet (600 mg total) by mouth every 12 (twelve) hours. for 7 days    TRUE METRIX GLUCOSE TEST STRIP Strp TEST BLOOD SUGAR THREE TIMES DAILY    TRUEPLUS LANCETS 33 gauge Misc TEST BLOOD SUGAR THREE TIMES DAILY    vibegron 75 mg Tab Take 1 tablet by mouth once daily.    vitamin D (VITAMIN D3) 1000 units Tab Take 1 tablet by mouth every morning.     Family History       Problem Relation (Age of Onset)    Breast cancer Mother, Maternal Aunt, Maternal Aunt, Paternal Aunt, Paternal Aunt    Cancer Father    Diabetes Sister    Hypertension Mother    Lung cancer Mother    Stroke Father          Tobacco Use    Smoking status: Never     Passive exposure: Never    Smokeless tobacco: Never   Substance and Sexual Activity    Alcohol use: Yes     Alcohol/week: 3.0 standard drinks of alcohol     Types: 1 Glasses of wine, 2 Shots of liquor " per week    Drug use: Never    Sexual activity: Not Currently     Partners: Male     Review of Systems   All other systems reviewed and are negative.    Objective:     Vital Signs (Most Recent):  Temp: 97.5 °F (36.4 °C) (04/28/25 2119)  Pulse: 108 (04/28/25 2119)  Resp: 16 (04/28/25 2215)  BP: (!) 143/71 (04/28/25 2119)  SpO2: 99 % (04/28/25 2119) Vital Signs (24h Range):  Temp:  [97.5 °F (36.4 °C)-98 °F (36.7 °C)] 97.5 °F (36.4 °C)  Pulse:  [] 108  Resp:  [16-18] 16  SpO2:  [97 %-100 %] 99 %  BP: (138-150)/(67-82) 143/71     Weight: 120.2 kg (265 lb)  Body mass index is 48.47 kg/m².     Physical Exam  Vitals reviewed.   Constitutional:       General: She is not in acute distress.     Appearance: Normal appearance. She is obese. She is not ill-appearing, toxic-appearing or diaphoretic.   HENT:      Head: Normocephalic and atraumatic.      Right Ear: External ear normal.      Left Ear: External ear normal.      Nose: Nose normal. No congestion or rhinorrhea.      Mouth/Throat:      Mouth: Mucous membranes are moist.      Pharynx: Oropharynx is clear. No oropharyngeal exudate or posterior oropharyngeal erythema.   Eyes:      General: No scleral icterus.     Extraocular Movements: Extraocular movements intact.      Conjunctiva/sclera: Conjunctivae normal.      Pupils: Pupils are equal, round, and reactive to light.   Neck:      Vascular: No carotid bruit.   Cardiovascular:      Rate and Rhythm: Regular rhythm. Tachycardia present.      Pulses: Normal pulses.      Heart sounds: Normal heart sounds. No murmur heard.     No friction rub. No gallop.   Pulmonary:      Effort: Pulmonary effort is normal. No respiratory distress.      Breath sounds: Normal breath sounds. No stridor. No wheezing, rhonchi or rales.   Chest:      Chest wall: No tenderness.   Abdominal:      General: Abdomen is flat. Bowel sounds are normal. There is no distension.      Palpations: Abdomen is soft. There is no mass.      Tenderness: There  is no abdominal tenderness. There is no right CVA tenderness, left CVA tenderness, guarding or rebound.      Hernia: No hernia is present.   Musculoskeletal:         General: Swelling and tenderness present. No deformity or signs of injury. Normal range of motion.      Cervical back: Normal range of motion and neck supple. No rigidity or tenderness.      Right lower leg: Edema present.      Left lower leg: No edema.      Comments: Diffuse swelling, erythema, warmth, and TTP throughout right lower extremity. (see media for visual depiction)   Lymphadenopathy:      Cervical: No cervical adenopathy.   Skin:     General: Skin is warm and dry.      Capillary Refill: Capillary refill takes less than 2 seconds.      Coloration: Skin is not jaundiced or pale.      Findings: Erythema and rash present. No bruising or lesion.      Comments: Skin findings as noted below. (see media for visual depiction)   Neurological:      General: No focal deficit present.      Mental Status: She is alert and oriented to person, place, and time. Mental status is at baseline.      Cranial Nerves: No cranial nerve deficit.      Sensory: No sensory deficit.      Motor: No weakness.      Coordination: Coordination normal.   Psychiatric:         Mood and Affect: Mood normal.         Behavior: Behavior normal.         Thought Content: Thought content normal.         Judgment: Judgment normal.                            CRANIAL NERVES     CN III, IV, VI   Pupils are equal, round, and reactive to light.       Significant Labs: All pertinent labs within the past 24 hours have been reviewed.    Significant Imaging: I have reviewed all pertinent imaging results/findings within the past 24 hours.    LABS:  Recent Results (from the past 24 hours)   Comprehensive metabolic panel    Collection Time: 04/28/25  5:06 PM   Result Value Ref Range    Sodium 140 136 - 145 mmol/L    Potassium 4.0 3.5 - 5.1 mmol/L    Chloride 102 95 - 110 mmol/L    CO2 26 23 - 29  mmol/L    Glucose 102 70 - 110 mg/dL    BUN 16 8 - 23 mg/dL    Creatinine 1.0 0.5 - 1.4 mg/dL    Calcium 8.7 8.7 - 10.5 mg/dL    Protein Total 8.6 (H) 6.0 - 8.4 gm/dL    Albumin 2.6 (L) 3.5 - 5.2 g/dL    Bilirubin Total 0.3 0.1 - 1.0 mg/dL    ALP 92 40 - 150 unit/L    AST 18 11 - 45 unit/L    ALT 21 10 - 44 unit/L    Anion Gap 12 8 - 16 mmol/L    eGFR >60 >60 mL/min/1.73/m2   Lactic acid, plasma #1    Collection Time: 04/28/25  5:06 PM   Result Value Ref Range    Lactic Acid Level 1.2 0.5 - 2.2 mmol/L   Procalcitonin    Collection Time: 04/28/25  5:06 PM   Result Value Ref Range    Procalcitonin 0.16 <0.25 ng/mL   CBC with Differential    Collection Time: 04/28/25  5:06 PM   Result Value Ref Range    WBC 8.23 3.90 - 12.70 K/uL    RBC 4.22 4.00 - 5.40 M/uL    HGB 10.9 (L) 12.0 - 16.0 gm/dL    HCT 34.9 (L) 37.0 - 48.5 %    MCV 83 82 - 98 fL    MCH 25.8 (L) 27.0 - 31.0 pg    MCHC 31.2 (L) 32.0 - 36.0 g/dL    RDW 14.8 (H) 11.5 - 14.5 %    Platelet Count 506 (H) 150 - 450 K/uL    MPV 8.8 (L) 9.2 - 12.9 fL    Nucleated RBC 0 <=0 /100 WBC    Neut % 61.6 38 - 73 %    Lymph % 24.8 18 - 48 %    Mono % 8.9 4 - 15 %    Eos % 3.5 <=8 %    Basophil % 0.6 <=1.9 %    Imm Grans % 0.6 (H) 0.0 - 0.5 %    Neut # 5.07 1.8 - 7.7 K/uL    Lymph # 2.04 1 - 4.8 K/uL    Mono # 0.73 0.3 - 1 K/uL    Eos # 0.29 <=0.5 K/uL    Baso # 0.05 <=0.2 K/uL    Imm Grans # 0.05 (H) 0.00 - 0.04 K/uL   Sedimentation rate    Collection Time: 04/28/25  5:06 PM   Result Value Ref Range    Sed Rate >120 (H) <=36 mm/hr   C-reactive protein    Collection Time: 04/28/25  5:06 PM   Result Value Ref Range    CRP 83.9 (H) <=8.2 mg/L   CPK    Collection Time: 04/28/25  5:06 PM   Result Value Ref Range    CPK 70 20 - 180 U/L       RADIOLOGY  CT Leg (Tibia-Fibula) Wtih Contrast Right  Result Date: 4/28/2025  EXAM: CT LEG (TIBIA-FIBULA) WITH CONTRAST RIGHT CLINICAL HISTORY: Soft tissue infection suspected, lower leg, xray done; Pain COMPARISON: None available. TECHNIQUE:  Axial CT imaging was performed through the      without intravenous contrast.  Multiplanar reformats were created and interpreted. FINDINGS: No acute fractures or dislocation. Moderate superficial subcutaneous fat stranding.  Superficial perifascial fluid may relate to superficial abscess. No deep space  abscess.  Mild atherosclerotic plaque of the arteriovascular the popliteal.  Mild degenerative joint disease of the knee.     Moderate Superficial subcutaneous fat stranding and prefascial fluid density consistent with cellulitis.  Superficial abscess may be suspected. No vascular hemodynamically significant stenosis.  All CT scans at [this location] are performed using dose modulation techniques as appropriate to a performed exam including the following: automated exposure control; adjustment of the mA and/or kV according to patient size (this includes techniques or standardized protocols for targeted exams where dose is matched to indication / reason for exam; i.e. extremities or head); use of iterative reconstruction technique. Finalized on: 4/28/2025 9:04 PM By:  Angel Gordon MD, JD PhD [facility]# [id number]      2025-04-28 21:07:[id number]     [facility]    MRI Tibia Fibula W WO Contrast Right  Result Date: 4/19/2025  EXAM: MRI TIBIA FIBULA W WO CONTRAST RIGHT CLINICAL HISTORY: Infection right lower extremity.  Concern for osteomyelitis. TECHNIQUE: Multiplanar multisequence imaging of the right leg was performed with and without the intravenous administration of Gadavist. FINDINGS:  The exam is very limited due to the patient's inability to lie still for the exam with motion artifact on many sequences. There is no cortical erosion or osseous destructive tibia or fibula.  No bone marrow edema or bone marrow replacing process or loss of T1 fatty marrow signal intensity. There is no intramuscular abscess or edema or fluid within the intermuscular fascia.  No abnormal enhancement in the muscles or fascia. There is circumferential  marked subcutaneous edema consistent with cellulitis.  No drainable fluid collection in the subcutaneous fat.     1.  Circumferential cellulitis. 2.  No evidence for myositis or intramuscular abscess or fascial edema. 3.  No evidence for osteomyelitis of the tibia or fibula. Finalized on: 4/19/2025 9:32 AM By:  Foster Romo MD [facility]# [id number]      2025-04-19 09:34:47.273     [facility]    US Guided Needle Placement  Result Date: 4/18/2025  EXAMINATION: Fluid collection aspiration Procedural Personnel Attending physician(s): Fellow physician(s): None Resident physician(s): None Advanced practice provider(s): Richy SEQUEIRA performed this procedure. Pre-procedure diagnosis: Lower extremity pain and swelling, cutaneous fluid collection Post-procedure diagnosis: Same Indication: Pain associated with fluid collection Additional clinical history: None Complications: No immediate complications. TECHNIQUE: - Aspiration of cutaneous fluid collection (blister) FINDINGS: Pre-procedure Consent: Informed consent for the procedure was obtained and time-out was performed prior to the procedure. Preparation: The site was prepared and draped using maximal sterile barrier technique including cutaneous antisepsis. Antibiotic administered: Scheduled dose more than 1 hour from procedure start time  or more than 2 hours for vancomycin or fluoroquinolones Anesthesia/sedation The IR procedural team has confirmed the patient ID and re-evaluated the patient and sedation plan confirming it is suitable for the patient's condition and procedure. Level of anesthesia/sedation: No sedation Anesthesia/sedation administered by: Performing provider Total intra-service sedation time (minutes): Fluid collection aspiration The patient was positioned supine. Initial evaluation was performed. The fluid collection was accessed using an access needle. Position within the fluid collection was confirmed, and fluid aspiration was performed. All instruments were  then removed. - Initial findings: Cutaneous fluid collection - Aspiration needle/catheter: 22 gauge needle - Post-aspiration imaging findings: No significant change Contrast Contrast agent: None Contrast volume (mL): 0 Radiation Dose None Additional Details Additional description of procedure: None Equipment details: None Specimens removed: Aspirated fluid was sent for analysis. Estimated blood loss (mL): Less than 10 Standardized report: [email or web address] Attestation Signer name: I attest that I was present for the entire procedure. I reviewed the stored images and agree with the report as written.     Percutaneous aspiration of the right calf cutaneous fluid collection, yielding 1 mL of serous fluid. No drainage catheter was left in place. Plan: Follow-up fluid culture studies. ______________________________________________________________________ Electronically signed by: Baron Ledesma Date:    04/18/2025 Time:    11:57    US Retroperitoneal Complete  Result Date: 4/17/2025  EXAMINATION: US RETROPERITONEAL COMPLETE CLINICAL HISTORY: nayla; TECHNIQUE: Ultrasound of the kidneys and urinary bladder was performed including color flow and Doppler evaluation of the kidneys. COMPARISON: None. FINDINGS: Examination limited by body habitus and bowel gas. Right kidney: The right kidney measures 10.4 cm. No cortical thinning. Resistive index measures 0.80.  No mass. No renal stone. No hydronephrosis. Left kidney: The left kidney measures 12.2 cm. No cortical thinning. Resistive index measures 0.82.  No mass. No renal stone. No hydronephrosis. The bladder is partially distended at the time of scanning and has an unremarkable appearance.     Elevated bilateral renal arterial resistive indices, which may be seen in the setting of medical renal disease.  No hydronephrosis. Electronically signed by: Estephania Pappas Date:    04/17/2025 Time:    15:03    US Lower Extremity Arteries Bilateral  Result Date:  4/16/2025  EXAMINATION: US LOWER EXTREMITY ARTERIES BILATERAL CLINICAL HISTORY: cellulitis; TECHNIQUE: Bilateral spectral, color and grayscale images of the large arteries of both lower extremities were performed. COMPARISON: None FINDINGS: Right inguinal lymph nodes measuring 1.9 x 1.8 x 1.0 cm.  Adjacent 2nd similar size right inguinal lymph node.  Medial right thigh lymph node measuring 5.5 x 2.2 x 3.7 cm. Monophasic waveforms are technique throughout the entirety of the right lower extremity arterial vessels.  No occlusion of any of the right lower extremity vessels.  In the mid right SFA there is a marked elevated flow velocity of 235 centimeters/second.  Normal flow velocity measurements distal to this. In the left lower extremity there are triphasic waveforms throughout with no elevated peak systolic velocity measurements.     1.  At least moderate stenosis of the right superficial femoral artery.  A CT angiogram of the bilateral lower extremities could be performed for further assessment. 2.  No evidence for hemodynamically significant stenosis in the left lower extremity. 3.  Right inguinal and medial right thigh lymphadenopathy. Electronically signed by: Foster Romo MD Date:    04/16/2025 Time:    15:45    Echo  Result Date: 4/14/2025    Left Ventricle: The left ventricle is normal in size. Mildly increased wall thickness. There is mild concentric hypertrophy. There is normal systolic function with a visually estimated ejection fraction of 55 - 60%. There is diastolic dysfunction but grade cannot be determined.   Right Ventricle: The right ventricle is normal in size. Wall thickness is normal. Systolic function is normal.   Left Atrium: Mildly dilated   Pulmonary Artery: Pulmonary artery pressure could not be estimated.   IVC/SVC: Intermediate venous pressure at 8 mmHg.     X-Ray Chest 1 View  Result Date: 4/14/2025  EXAMINATION: XR CHEST 1 VIEW CLINICAL HISTORY: Sob; TECHNIQUE: Single frontal view of the  chest was performed. COMPARISON: Chest radiograph 04/13/2025 FINDINGS: ECG monitoring leads overlie the chest.  Lung volumes are low.  There is mild interstitial opacity at the right lung base.  No definite left lung base opacity noting retrocardiac evaluation is somewhat limited.  No large consolidation.No pneumothorax or significant pleural fluid. The cardiac silhouette is enlarged. Osseous structures demonstrate degenerative change without acute abnormality.     Similar radiographic appearance of the chest without significant interval change. Electronically signed by: Estephania Pappas Date:    04/14/2025 Time:    08:10    CT Leg (Tibia-Fibula) Wtih Contrast Right  Result Date: 4/13/2025  EXAMINATION: CT LEG (TIBIA-FIBULA) WITH CONTRAST RIGHT; CT THIGH WITH CONTRAST RIGHT CLINICAL HISTORY: cellulitis; TECHNIQUE: Low-dose axial CT images of the thigh and tibia and fibula.  Multiplanar reformats.  Images obtained after the administration 100 mL Omnipaque 350 IV contrast. COMPARISON: None FINDINGS: Large left inguinal lymph nodes possibly reactive to infection.  Clinical follow-up to resolution recommended to exclude other etiologies. Skin thickening and soft tissue fat stranding over the thigh most notable along the medial portion.  This could relate to infection.  Edema or trauma less favored but not excluded on the basis of this exam. Along the thigh the muscular bulk is maintained.  No definite fracture identified.  Right hip arthroplasty with long femoral stem in place. Intrapelvic contents appear unremarkable.  Metal artifact degrades there evaluation.  Vascular structures are patent to the level of the knee. CT tibia and fibula. Soft tissue thickening fat stranding and skin thickening most notable along the anterior aspect of the lower extremity.  Disorganized fluid throughout the superficial compartment.  No definite involvement of the deep/intramuscular compartment. Vascular structures grossly patent on  this nondedicated exam. No definite abscess identified.  Age expected degenerative changes of the osseous structures.  No fracture or traumatic malalignment identified.     Extensive soft tissue swelling and skin thickening with suspected reactive right inguinal adenopathy.  No definite abscess identified at this time. Electronically signed by: Christopher Olivier Date:    04/13/2025 Time:    14:33    CT Thigh With Contrast Right  Result Date: 4/13/2025  EXAMINATION: CT LEG (TIBIA-FIBULA) WITH CONTRAST RIGHT; CT THIGH WITH CONTRAST RIGHT CLINICAL HISTORY: cellulitis; TECHNIQUE: Low-dose axial CT images of the thigh and tibia and fibula.  Multiplanar reformats.  Images obtained after the administration 100 mL Omnipaque 350 IV contrast. COMPARISON: None FINDINGS: Large left inguinal lymph nodes possibly reactive to infection.  Clinical follow-up to resolution recommended to exclude other etiologies. Skin thickening and soft tissue fat stranding over the thigh most notable along the medial portion.  This could relate to infection.  Edema or trauma less favored but not excluded on the basis of this exam. Along the thigh the muscular bulk is maintained.  No definite fracture identified.  Right hip arthroplasty with long femoral stem in place. Intrapelvic contents appear unremarkable.  Metal artifact degrades there evaluation.  Vascular structures are patent to the level of the knee. CT tibia and fibula. Soft tissue thickening fat stranding and skin thickening most notable along the anterior aspect of the lower extremity.  Disorganized fluid throughout the superficial compartment.  No definite involvement of the deep/intramuscular compartment. Vascular structures grossly patent on this nondedicated exam. No definite abscess identified.  Age expected degenerative changes of the osseous structures.  No fracture or traumatic malalignment identified.     Extensive soft tissue swelling and skin thickening with suspected reactive  right inguinal adenopathy.  No definite abscess identified at this time. Electronically signed by: Christopher Olivier Date:    04/13/2025 Time:    14:33    US Lower Extremity Veins Right  Result Date: 4/13/2025  EXAMINATION: US LOWER EXTREMITY VEINS RIGHT CLINICAL HISTORY: Other specified soft tissue disorders TECHNIQUE: Duplex and color flow Doppler evaluation and graded compression of the right lower extremity veins was performed. COMPARISON: None FINDINGS: Right thigh veins: The common femoral, femoral, popliteal, upper greater saphenous, and deep femoral veins are patent and free of thrombus. The veins are normally compressible and have normal phasic flow and augmentation response. Right calf veins: The visualized calf veins are patent. Contralateral CFV: The contralateral (left) common femoral vein is patent and free of thrombus. Miscellaneous: None     No evidence of deep venous thrombosis in the right lower extremity. Electronically signed by: Christopher Olivier Date:    04/13/2025 Time:    12:13    X-Ray Chest AP Portable  Result Date: 4/13/2025  EXAM: XR CHEST AP PORTABLE CLINICAL HISTORY: Sepsis FINDINGS:  Heart is enlarged.  Bibasilar pulmonary opacities which could reflect edema or infection.  No pleural fluid or pneumothorax.      As above. Finalized on: 4/13/2025 11:18 AM By:  Christopher Olivier MD [facility]# [id number]      2025-04-13 11:20:39.894     [facility]      EKG    MICROBIOLOGY    MDM

## 2025-04-29 NOTE — ASSESSMENT & PLAN NOTE
Patient is chronically on statin.will continue for now. Last Lipid Panel:   Lab Results   Component Value Date    CHOL 175 10/25/2024    HDL 44 10/25/2024    LDLCALC 111.8 10/25/2024    TRIG 96 10/25/2024    CHOLHDL 25.1 10/25/2024     Plan:  -Continue home medication  -low fat/low calorie diet

## 2025-04-29 NOTE — PLAN OF CARE
[facility] - Med Surg  Discharge Final Note    Primary Care Provider: Reyna Suarez MD    Expected Discharge Date: 4/29/2025    Final Discharge Note (most recent)       Final Note - 04/29/25 1115          Final Note    Assessment Type Final Discharge Note     Anticipated Discharge Disposition Home or Self Care     Hospital Resources/Appts/Education Provided Appointments scheduled and added to AVS        Post-Acute Status    Discharge Delays None known at this time                     Important Message from Medicare             Contact Info       Reyna Suarez MD   Specialty: Family Medicine   Relationship: PCP - General    [address] [place] [id number]   Phone: [phone]       Next Steps: Follow up in 1 week(s)    Petros tOto DO   Specialty: Infectious Diseases    [address] [place] [id number]   Phone: [phone]       Next Steps: Schedule an appointment as soon as possible for a visit    Instructions: As needed          DC Dispo: home    PCP: hospital follow up scheduled for 5/2    DME: none    CM reviewed chart; no d/c needs noted.

## 2025-04-29 NOTE — ASSESSMENT & PLAN NOTE
"Patient has Combined Systolic and Diastolic heart failure that is Chronic. On presentation their CHF was well compensated. Most recent BNP and echo results are listed below.  No results for input(s): "BNP" in the last 72 hours.  Latest ECHO  Results for orders placed during the hospital encounter of 04/13/25    Echo    Interpretation Summary    Left Ventricle: The left ventricle is normal in size. Mildly increased wall thickness. There is mild concentric hypertrophy. There is normal systolic function with a visually estimated ejection fraction of 55 - 60%. There is diastolic dysfunction but grade cannot be determined.    Right Ventricle: The right ventricle is normal in size. Wall thickness is normal. Systolic function is normal.    Left Atrium: Mildly dilated    Pulmonary Artery: Pulmonary artery pressure could not be estimated.    IVC/SVC: Intermediate venous pressure at 8 mmHg.    Current Heart Failure Medications       Plan  -Monitor strict I&Os and daily weights.    -Place on telemetry  -Low sodium diet  -Place on fluid restriction of 2 L.   -Cardiology has not been consulted  -The patient's volume status is at their baseline  -Continue home medications    "

## 2025-04-29 NOTE — ASSESSMENT & PLAN NOTE
Patient's FSGs are controlled on current medication regimen.  Last A1c reviewed-   Lab Results   Component Value Date    HGBA1C 6.3 (H) 04/13/2025     Most recent fingerstick glucose reviewed-   Recent Labs   Lab 04/28/25 2221   POCTGLUCOSE 135*     Current correctional scale  Low  Titrate as needed anti-hyperglycemic dose as follows-   Antihyperglycemics (From admission, onward)      Start     Stop Route Frequency Ordered    04/28/25 2241  insulin aspart U-100 pen 0-5 Units         -- SubQ Before meals & nightly PRN 04/28/25 2142          Plan:  Resume home regimen

## 2025-04-29 NOTE — PLAN OF CARE
Pt being discharged Home in stable condition. IV removed, catheter intact, pt tolerated well. Discharge instructions given to pt, pt verbalized understanding. No prescriptions. Follow up appointments made.

## 2025-04-29 NOTE — ASSESSMENT & PLAN NOTE
Body mass index is 48.47 kg/m². Morbid obesity complicates all aspects of disease management from diagnostic modalities to treatment. Weight loss encouraged and health benefits explained to patient.

## 2025-04-29 NOTE — ASSESSMENT & PLAN NOTE
Patient presented with worsening right lower extremity pain/swelling and sloughing following recent admission for lower extremity cellulitis.  Prior imaging and workup as noted below.  Patient followed by ID outpatient and is status post dull Invanz and p.o. Zyvox.  Initial workup in the ED revealed patient to be afebrile without leukocytosis, hypertensive, sed rate >120, CRP 83.9, CT right lower extremity negative for acute findings however positive for evidence of cellulitis.  ID consulted by ED staff and recommended patient be initiation of IV Zyvox and patient will be evaluated in the morning.     US Doppler Right Lower Extremity 4/13/25    Impression:    No evidence of deep venous thrombosis in the right lower extremity.    US Arterial Doppler Bilateral 4/16/25    Impression:     1.  At least moderate stenosis of the right superficial femoral artery.  A CT angiogram of the bilateral lower extremities could be performed for further assessment.     2.  No evidence for hemodynamically significant stenosis in the left lower extremity.     3.  Right inguinal and medial right thigh lymphadenopathy.    MRI Right Tib-Fib 4/18/25    Impression:     1.  Circumferential cellulitis.    2.  No evidence for myositis or intramuscular abscess or fascial edema.    3.  No evidence for osteomyelitis of the tibia or fibula.    CT Right Tib-Fib 4/28/25    Impression:     Moderate Superficial subcutaneous fat stranding and prefascial fluid density consistent with cellulitis.  Superficial abscess may be suspected.     2.    No vascular hemodynamically significant stenosis.      Plan:  Resume home medication regimen  Follow-up with Infectious Disease as recommended  Continue with wound care as outpatient

## 2025-04-29 NOTE — ASSESSMENT & PLAN NOTE
Patient presented with worsening right lower extremity pain/swelling and sloughing following recent admission for lower extremity cellulitis.  Prior imaging and workup as noted below.  Patient followed by ID outpatient and is status post dull Invanz and p.o. Zyvox.  Initial workup in the ED revealed patient to be afebrile without leukocytosis, hypertensive, sed rate >120, CRP 83.9, CT right lower extremity negative for acute findings however positive for evidence of cellulitis.  ID consulted by ED staff and recommended patient be initiation of IV Zyvox and patient will be evaluated in the morning.     US Doppler Right Lower Extremity 4/13/25    Impression:    No evidence of deep venous thrombosis in the right lower extremity.    US Arterial Doppler Bilateral 4/16/25    Impression:     1.  At least moderate stenosis of the right superficial femoral artery.  A CT angiogram of the bilateral lower extremities could be performed for further assessment.     2.  No evidence for hemodynamically significant stenosis in the left lower extremity.     3.  Right inguinal and medial right thigh lymphadenopathy.    MRI Right Tib-Fib 4/18/25    Impression:     1.  Circumferential cellulitis.    2.  No evidence for myositis or intramuscular abscess or fascial edema.    3.  No evidence for osteomyelitis of the tibia or fibula.    CT Right Tib-Fib 4/28/25    Impression:     Moderate Superficial subcutaneous fat stranding and prefascial fluid density consistent with cellulitis.  Superficial abscess may be suspected.     2.    No vascular hemodynamically significant stenosis.      Plan:  -Continue current pain regimen, titrate as needed  -Bowel regimen  -Bedrest  -Wound care   -None weightbearing  -IVFs prn  -Antiemetics prn  -Tylenol as needed for fever   -Continue antibiotics per ID  -f/u cultures  -f/u ID

## 2025-04-29 NOTE — ASSESSMENT & PLAN NOTE
Patient's FSGs are controlled on current medication regimen.  Last A1c reviewed-   Lab Results   Component Value Date    HGBA1C 6.3 (H) 04/13/2025     Most recent fingerstick glucose reviewed-   Recent Labs   Lab 04/28/25  2221   POCTGLUCOSE 135*     Current correctional scale  Low  Titrate as needed anti-hyperglycemic dose as follows-   Antihyperglycemics (From admission, onward)      Start     Stop Route Frequency Ordered    04/28/25 2241  insulin aspart U-100 pen 0-5 Units         -- SubQ Before meals & nightly PRN 04/28/25 2142          Plan:  -SSI  -A1c  -Accu-checks  -Hold oral hypoglycemics while patient is in the hospital  -Hypoglycemic protocol

## 2025-04-29 NOTE — H&P
[facility] [place] [facility]  History & Physical    Patient Name: Stephanie Raza  MRN: [id number]  Patient Class: OP- Observation  Admission Date: 4/28/2025  Attending Physician: Pop Troncoso MD   Primary Care Provider: Reyna Suarez MD         Patient information was obtained from patient, past medical records, and ER records.     Subjective:     Principal Problem:Cellulitis of right lower extremity    Chief Complaint:   Chief Complaint   Patient presents with    Wound Infection     Patient presents to ED with RLE wound and skin sloughing. Patient was sent to ED by Dr. Otto, Infectious Disease for treatment.        HPI: Stephanie Raza is a 69 y.o. female with a PMH  has a past medical history of Acute blood loss anemia (ABLA) (01/24/2024), YOVANA (acute kidney injury) (01/24/2024), Anxiety, Arthritis, Asthma, Back pain, Diabetes mellitus type I, Heart murmur, Hyperlipidemia, Hypertension, Obesity, Polyneuropathy, Trouble in sleeping, Type 2 diabetes mellitus, and Urinary incontinence. who presented to the ED directed by Dr. Otto for further evaluation of worsening right lower extremity wound and skin sloughing to initiate IV antibiotics during post discharge following up visit.  Patient reported symptoms began earlier this month and continued to worsen despite treatment with Dalvance on 04/24 and p.o. zyvox following recent admission from 4/13 to 4/22 for similar complaints.  Patient reported no known alleviating or aggravating factors noted and reported all other review of systems negative except those noted above.  She denied endorsing any fever, chills, sweats, nausea, vomiting, numbness, tingling, or itching and reported all other review of systems negative except as noted above.  Initial workup in the ED revealed patient to be afebrile without leukocytosis, hypertensive, sed rate >120, CRP 83.9, CT right lower extremity negative for acute findings however positive for  evidence of cellulitis.  ID consulted by ED staff and recommended patient be admitted to Hospital Medicine with initiation of IV Zyvox inpatient will be evaluated in the morning.  Patient admitted under observation for continued medical management.      PCP: Reyna Suarez      Past Medical History:   Diagnosis Date    Acute blood loss anemia (ABLA) 2024    Last Assessment & Plan:    Formatting of this note might be different from the original.   History & Physical       Discharge Summary       Follow-up  Patient did receive 1 unit packed red blood cells while in the OR secondary to blood loss.  No obvious bleeding at this time.  She received 2 additional units packed red blood cells per orthopedics.  Hemoglobin running stable at this time no need fo    YOVANA (acute kidney injury) 2024    Last Assessment & Plan:    Formatting of this note might be different from the original.   History & Physical       Discharge Summary       Follow-up patient with progressive oliguric YOVANA after surgery.  Resolved. Avoid nephrotoxic agents, renally dose all medications where necessary, and protect nondominant arm as much as possible    Anxiety     Arthritis     Asthma     Back pain     Diabetes mellitus type I     Heart murmur     Hyperlipidemia     Hypertension     Obesity     Polyneuropathy     Trouble in sleeping     Type 2 diabetes mellitus     Urinary incontinence        Past Surgical History:   Procedure Laterality Date    ADENOIDECTOMY      BREAST BIOPSY      , benign    BREAST SURGERY       SECTION      hip reconstruction Left 2024    HYSTERECTOMY      JOINT REPLACEMENT         Review of patient's allergies indicates:   Allergen Reactions    Adhesive tape-silicones Swelling     Paper tape       No current facility-administered medications on file prior to encounter.     Current Outpatient Medications on File Prior to Encounter   Medication Sig    aspirin 81 MG Chew Take 81 mg by mouth  "once daily.    blood-glucose meter (TRUE METRIX AIR GLUCOSE METER) [facility] To check BG 3 times daily, to use with insurance preferred meter    buPROPion (WELLBUTRIN XL) 150 MG TB24 tablet TAKE 1 TABLET(150 MG) BY MOUTH DAILY    esomeprazole (NEXIUM) 40 MG capsule Take 1 capsule (40 mg total) by mouth before breakfast.    furosemide (LASIX) 80 MG tablet Take 80 mg by mouth.    HYDROcodone-acetaminophen (NORCO) 7.5-325 mg per tablet Take 1 tablet by mouth every 6 (six) hours as needed for Pain.    meloxicam (MOBIC) 7.5 MG tablet TAKE 1 TABLET(7.5 MG) BY MOUTH DAILY    metoprolol succinate (TOPROL-XL) 50 MG 24 hr tablet TAKE 1 TABLET TWICE DAILY    olmesartan (BENICAR) 40 MG tablet Take 40 mg by mouth once daily.    OZEMPIC 2 mg/dose (8 mg/3 mL) PnIj INJECT 2MG UNDER THE SKIN EVERY 7 DAYS    potassium chloride (K-TAB) 20 mEq Take 40 mEq by mouth 2 (two) times a day.    albuterol (PROVENTIL/VENTOLIN HFA) 90 mcg/actuation inhaler Inhale 2 puffs into the lungs every 4 (four) hours as needed for Wheezing.    diltiaZEM HCl 120 mg Cp12 TAKE 1 CAPSULE TWICE DAILY    DROPLET PEN NEEDLE 31 gauge x 5/16" Ndle USE ONCE DAILY AS DIRECTED    fluticasone-salmeterol diskus inhaler 250-50 mcg Inhale 1 puff into the lungs once daily.    gabapentin (NEURONTIN) 300 MG capsule Take 300 mg by mouth every evening. As needed (Patient not taking: Reported on 4/13/2025)    linezolid (ZYVOX) 600 mg Tab Take 1 tablet (600 mg total) by mouth every 12 (twelve) hours. for 7 days    TRUE METRIX GLUCOSE TEST STRIP Strp TEST BLOOD SUGAR THREE TIMES DAILY    TRUEPLUS LANCETS 33 gauge Misc TEST BLOOD SUGAR THREE TIMES DAILY    vibegron 75 mg Tab Take 1 tablet by mouth once daily.    vitamin D (VITAMIN D3) 1000 units Tab Take 1 tablet by mouth every morning.     Family History       Problem Relation (Age of Onset)    Breast cancer Mother, Maternal Aunt, Maternal Aunt, Paternal Aunt, Paternal Aunt    Cancer Father    Diabetes Sister    Hypertension Mother    " Lung cancer Mother    Stroke Father          Tobacco Use    Smoking status: Never     Passive exposure: Never    Smokeless tobacco: Never   Substance and Sexual Activity    Alcohol use: Yes     Alcohol/week: 3.0 standard drinks of alcohol     Types: 1 Glasses of wine, 2 Shots of liquor per week    Drug use: Never    Sexual activity: Not Currently     Partners: Male     Review of Systems   All other systems reviewed and are negative.    Objective:     Vital Signs (Most Recent):  Temp: 97.5 °F (36.4 °C) (04/28/25 2119)  Pulse: 108 (04/28/25 2119)  Resp: 16 (04/28/25 2215)  BP: (!) 143/71 (04/28/25 2119)  SpO2: 99 % (04/28/25 2119) Vital Signs (24h Range):  Temp:  [97.5 °F (36.4 °C)-98 °F (36.7 °C)] 97.5 °F (36.4 °C)  Pulse:  [] 108  Resp:  [16-18] 16  SpO2:  [97 %-100 %] 99 %  BP: (138-150)/(67-82) 143/71     Weight: 120.2 kg (265 lb)  Body mass index is 48.47 kg/m².     Physical Exam  Vitals reviewed.   Constitutional:       General: She is not in acute distress.     Appearance: Normal appearance. She is obese. She is not ill-appearing, toxic-appearing or diaphoretic.   HENT:      Head: Normocephalic and atraumatic.      Right Ear: External ear normal.      Left Ear: External ear normal.      Nose: Nose normal. No congestion or rhinorrhea.      Mouth/Throat:      Mouth: Mucous membranes are moist.      Pharynx: Oropharynx is clear. No oropharyngeal exudate or posterior oropharyngeal erythema.   Eyes:      General: No scleral icterus.     Extraocular Movements: Extraocular movements intact.      Conjunctiva/sclera: Conjunctivae normal.      Pupils: Pupils are equal, round, and reactive to light.   Neck:      Vascular: No carotid bruit.   Cardiovascular:      Rate and Rhythm: Regular rhythm. Tachycardia present.      Pulses: Normal pulses.      Heart sounds: Normal heart sounds. No murmur heard.     No friction rub. No gallop.   Pulmonary:      Effort: Pulmonary effort is normal. No respiratory distress.       Breath sounds: Normal breath sounds. No stridor. No wheezing, rhonchi or rales.   Chest:      Chest wall: No tenderness.   Abdominal:      General: Abdomen is flat. Bowel sounds are normal. There is no distension.      Palpations: Abdomen is soft. There is no mass.      Tenderness: There is no abdominal tenderness. There is no right CVA tenderness, left CVA tenderness, guarding or rebound.      Hernia: No hernia is present.   Musculoskeletal:         General: Swelling and tenderness present. No deformity or signs of injury. Normal range of motion.      Cervical back: Normal range of motion and neck supple. No rigidity or tenderness.      Right lower leg: Edema present.      Left lower leg: No edema.      Comments: Diffuse swelling, erythema, warmth, and TTP throughout right lower extremity. (see media for visual depiction)   Lymphadenopathy:      Cervical: No cervical adenopathy.   Skin:     General: Skin is warm and dry.      Capillary Refill: Capillary refill takes less than 2 seconds.      Coloration: Skin is not jaundiced or pale.      Findings: Erythema and rash present. No bruising or lesion.      Comments: Skin findings as noted below. (see media for visual depiction)   Neurological:      General: No focal deficit present.      Mental Status: She is alert and oriented to person, place, and time. Mental status is at baseline.      Cranial Nerves: No cranial nerve deficit.      Sensory: No sensory deficit.      Motor: No weakness.      Coordination: Coordination normal.   Psychiatric:         Mood and Affect: Mood normal.         Behavior: Behavior normal.         Thought Content: Thought content normal.         Judgment: Judgment normal.                            CRANIAL NERVES     CN III, IV, VI   Pupils are equal, round, and reactive to light.       Significant Labs: All pertinent labs within the past 24 hours have been reviewed.    Significant Imaging: I have reviewed all pertinent imaging results/findings  within the past 24 hours.    LABS:  Recent Results (from the past 24 hours)   Comprehensive metabolic panel    Collection Time: 04/28/25  5:06 PM   Result Value Ref Range    Sodium 140 136 - 145 mmol/L    Potassium 4.0 3.5 - 5.1 mmol/L    Chloride 102 95 - 110 mmol/L    CO2 26 23 - 29 mmol/L    Glucose 102 70 - 110 mg/dL    BUN 16 8 - 23 mg/dL    Creatinine 1.0 0.5 - 1.4 mg/dL    Calcium 8.7 8.7 - 10.5 mg/dL    Protein Total 8.6 (H) 6.0 - 8.4 gm/dL    Albumin 2.6 (L) 3.5 - 5.2 g/dL    Bilirubin Total 0.3 0.1 - 1.0 mg/dL    ALP 92 40 - 150 unit/L    AST 18 11 - 45 unit/L    ALT 21 10 - 44 unit/L    Anion Gap 12 8 - 16 mmol/L    eGFR >60 >60 mL/min/1.73/m2   Lactic acid, plasma #1    Collection Time: 04/28/25  5:06 PM   Result Value Ref Range    Lactic Acid Level 1.2 0.5 - 2.2 mmol/L   Procalcitonin    Collection Time: 04/28/25  5:06 PM   Result Value Ref Range    Procalcitonin 0.16 <0.25 ng/mL   CBC with Differential    Collection Time: 04/28/25  5:06 PM   Result Value Ref Range    WBC 8.23 3.90 - 12.70 K/uL    RBC 4.22 4.00 - 5.40 M/uL    HGB 10.9 (L) 12.0 - 16.0 gm/dL    HCT 34.9 (L) 37.0 - 48.5 %    MCV 83 82 - 98 fL    MCH 25.8 (L) 27.0 - 31.0 pg    MCHC 31.2 (L) 32.0 - 36.0 g/dL    RDW 14.8 (H) 11.5 - 14.5 %    Platelet Count 506 (H) 150 - 450 K/uL    MPV 8.8 (L) 9.2 - 12.9 fL    Nucleated RBC 0 <=0 /100 WBC    Neut % 61.6 38 - 73 %    Lymph % 24.8 18 - 48 %    Mono % 8.9 4 - 15 %    Eos % 3.5 <=8 %    Basophil % 0.6 <=1.9 %    Imm Grans % 0.6 (H) 0.0 - 0.5 %    Neut # 5.07 1.8 - 7.7 K/uL    Lymph # 2.04 1 - 4.8 K/uL    Mono # 0.73 0.3 - 1 K/uL    Eos # 0.29 <=0.5 K/uL    Baso # 0.05 <=0.2 K/uL    Imm Grans # 0.05 (H) 0.00 - 0.04 K/uL   Sedimentation rate    Collection Time: 04/28/25  5:06 PM   Result Value Ref Range    Sed Rate >120 (H) <=36 mm/hr   C-reactive protein    Collection Time: 04/28/25  5:06 PM   Result Value Ref Range    CRP 83.9 (H) <=8.2 mg/L   CPK    Collection Time: 04/28/25  5:06 PM   Result  Value Ref Range    CPK 70 20 - 180 U/L       RADIOLOGY  CT Leg (Tibia-Fibula) Wtih Contrast Right  Result Date: 4/28/2025  EXAM: CT LEG (TIBIA-FIBULA) WITH CONTRAST RIGHT CLINICAL HISTORY: Soft tissue infection suspected, lower leg, xray done; Pain COMPARISON: None available. TECHNIQUE: Axial CT imaging was performed through the      without intravenous contrast.  Multiplanar reformats were created and interpreted. FINDINGS: No acute fractures or dislocation. Moderate superficial subcutaneous fat stranding.  Superficial perifascial fluid may relate to superficial abscess. No deep space  abscess.  Mild atherosclerotic plaque of the arteriovascular the popliteal.  Mild degenerative joint disease of the knee.     Moderate Superficial subcutaneous fat stranding and prefascial fluid density consistent with cellulitis.  Superficial abscess may be suspected. No vascular hemodynamically significant stenosis.  All CT scans at [this location] are performed using dose modulation techniques as appropriate to a performed exam including the following: automated exposure control; adjustment of the mA and/or kV according to patient size (this includes techniques or standardized protocols for targeted exams where dose is matched to indication / reason for exam; i.e. extremities or head); use of iterative reconstruction technique. Finalized on: 4/28/2025 9:04 PM By:  Angel Gordon MD JEANNINE PhD [facility]# [id number]      2025-04-28 21:07:[id number]     [facility]    MRI Tibia Fibula W WO Contrast Right  Result Date: 4/19/2025  EXAM: MRI TIBIA FIBULA W WO CONTRAST RIGHT CLINICAL HISTORY: Infection right lower extremity.  Concern for osteomyelitis. TECHNIQUE: Multiplanar multisequence imaging of the right leg was performed with and without the intravenous administration of Gadavist. FINDINGS:  The exam is very limited due to the patient's inability to lie still for the exam with motion artifact on many sequences. There is no cortical erosion or osseous  destructive tibia or fibula.  No bone marrow edema or bone marrow replacing process or loss of T1 fatty marrow signal intensity. There is no intramuscular abscess or edema or fluid within the intermuscular fascia.  No abnormal enhancement in the muscles or fascia. There is circumferential marked subcutaneous edema consistent with cellulitis.  No drainable fluid collection in the subcutaneous fat.     1.  Circumferential cellulitis. 2.  No evidence for myositis or intramuscular abscess or fascial edema. 3.  No evidence for osteomyelitis of the tibia or fibula. Finalized on: 4/19/2025 9:32 AM By:  Foster Romo MD [facility]# [id number]      2025-04-19 09:34:[id number]     [facility]    US Guided Needle Placement  Result Date: 4/18/2025  EXAMINATION: Fluid collection aspiration Procedural Personnel Attending physician(s): Fellow physician(s): None Resident physician(s): None Advanced practice provider(s): Richy SEQUEIRA performed this procedure. Pre-procedure diagnosis: Lower extremity pain and swelling, cutaneous fluid collection Post-procedure diagnosis: Same Indication: Pain associated with fluid collection Additional clinical history: None Complications: No immediate complications. TECHNIQUE: - Aspiration of cutaneous fluid collection (blister) FINDINGS: Pre-procedure Consent: Informed consent for the procedure was obtained and time-out was performed prior to the procedure. Preparation: The site was prepared and draped using maximal sterile barrier technique including cutaneous antisepsis. Antibiotic administered: Scheduled dose more than 1 hour from procedure start time  or more than 2 hours for vancomycin or fluoroquinolones Anesthesia/sedation The IR procedural team has confirmed the patient ID and re-evaluated the patient and sedation plan confirming it is suitable for the patient's condition and procedure. Level of anesthesia/sedation: No sedation Anesthesia/sedation administered by: Performing provider Total intra-service  sedation time (minutes): Fluid collection aspiration The patient was positioned supine. Initial evaluation was performed. The fluid collection was accessed using an access needle. Position within the fluid collection was confirmed, and fluid aspiration was performed. All instruments were then removed. - Initial findings: Cutaneous fluid collection - Aspiration needle/catheter: 22 gauge needle - Post-aspiration imaging findings: No significant change Contrast Contrast agent: None Contrast volume (mL): 0 Radiation Dose None Additional Details Additional description of procedure: None Equipment details: None Specimens removed: Aspirated fluid was sent for analysis. Estimated blood loss (mL): Less than 10 Standardized report: [email or web address] Attestation Signer name: I attest that I was present for the entire procedure. I reviewed the stored images and agree with the report as written.     Percutaneous aspiration of the right calf cutaneous fluid collection, yielding 1 mL of serous fluid. No drainage catheter was left in place. Plan: Follow-up fluid culture studies. ______________________________________________________________________ Electronically signed by: Baron Ledesma Date:    04/18/2025 Time:    11:57    US Retroperitoneal Complete  Result Date: 4/17/2025  EXAMINATION: US RETROPERITONEAL COMPLETE CLINICAL HISTORY: nayla; TECHNIQUE: Ultrasound of the kidneys and urinary bladder was performed including color flow and Doppler evaluation of the kidneys. COMPARISON: None. FINDINGS: Examination limited by body habitus and bowel gas. Right kidney: The right kidney measures 10.4 cm. No cortical thinning. Resistive index measures 0.80.  No mass. No renal stone. No hydronephrosis. Left kidney: The left kidney measures 12.2 cm. No cortical thinning. Resistive index measures 0.82.  No mass. No renal stone. No hydronephrosis. The bladder is partially distended at the time of scanning and has an unremarkable appearance.      Elevated bilateral renal arterial resistive indices, which may be seen in the setting of medical renal disease.  No hydronephrosis. Electronically signed by: Estephania Pappas Date:    04/17/2025 Time:    15:03    US Lower Extremity Arteries Bilateral  Result Date: 4/16/2025  EXAMINATION: US LOWER EXTREMITY ARTERIES BILATERAL CLINICAL HISTORY: cellulitis; TECHNIQUE: Bilateral spectral, color and grayscale images of the large arteries of both lower extremities were performed. COMPARISON: None FINDINGS: Right inguinal lymph nodes measuring 1.9 x 1.8 x 1.0 cm.  Adjacent 2nd similar size right inguinal lymph node.  Medial right thigh lymph node measuring 5.5 x 2.2 x 3.7 cm. Monophasic waveforms are technique throughout the entirety of the right lower extremity arterial vessels.  No occlusion of any of the right lower extremity vessels.  In the mid right SFA there is a marked elevated flow velocity of 235 centimeters/second.  Normal flow velocity measurements distal to this. In the left lower extremity there are triphasic waveforms throughout with no elevated peak systolic velocity measurements.     1.  At least moderate stenosis of the right superficial femoral artery.  A CT angiogram of the bilateral lower extremities could be performed for further assessment. 2.  No evidence for hemodynamically significant stenosis in the left lower extremity. 3.  Right inguinal and medial right thigh lymphadenopathy. Electronically signed by: Foster Romo MD Date:    04/16/2025 Time:    15:45    Echo  Result Date: 4/14/2025    Left Ventricle: The left ventricle is normal in size. Mildly increased wall thickness. There is mild concentric hypertrophy. There is normal systolic function with a visually estimated ejection fraction of 55 - 60%. There is diastolic dysfunction but grade cannot be determined.   Right Ventricle: The right ventricle is normal in size. Wall thickness is normal. Systolic function is normal.   Left Atrium:  Mildly dilated   Pulmonary Artery: Pulmonary artery pressure could not be estimated.   IVC/SVC: Intermediate venous pressure at 8 mmHg.     X-Ray Chest 1 View  Result Date: 4/14/2025  EXAMINATION: XR CHEST 1 VIEW CLINICAL HISTORY: Sob; TECHNIQUE: Single frontal view of the chest was performed. COMPARISON: Chest radiograph 04/13/2025 FINDINGS: ECG monitoring leads overlie the chest.  Lung volumes are low.  There is mild interstitial opacity at the right lung base.  No definite left lung base opacity noting retrocardiac evaluation is somewhat limited.  No large consolidation.No pneumothorax or significant pleural fluid. The cardiac silhouette is enlarged. Osseous structures demonstrate degenerative change without acute abnormality.     Similar radiographic appearance of the chest without significant interval change. Electronically signed by: Estephania Pappas Date:    04/14/2025 Time:    08:10    CT Leg (Tibia-Fibula) Wtih Contrast Right  Result Date: 4/13/2025  EXAMINATION: CT LEG (TIBIA-FIBULA) WITH CONTRAST RIGHT; CT THIGH WITH CONTRAST RIGHT CLINICAL HISTORY: cellulitis; TECHNIQUE: Low-dose axial CT images of the thigh and tibia and fibula.  Multiplanar reformats.  Images obtained after the administration 100 mL Omnipaque 350 IV contrast. COMPARISON: None FINDINGS: Large left inguinal lymph nodes possibly reactive to infection.  Clinical follow-up to resolution recommended to exclude other etiologies. Skin thickening and soft tissue fat stranding over the thigh most notable along the medial portion.  This could relate to infection.  Edema or trauma less favored but not excluded on the basis of this exam. Along the thigh the muscular bulk is maintained.  No definite fracture identified.  Right hip arthroplasty with long femoral stem in place. Intrapelvic contents appear unremarkable.  Metal artifact degrades there evaluation.  Vascular structures are patent to the level of the knee. CT tibia and fibula. Soft tissue  thickening fat stranding and skin thickening most notable along the anterior aspect of the lower extremity.  Disorganized fluid throughout the superficial compartment.  No definite involvement of the deep/intramuscular compartment. Vascular structures grossly patent on this nondedicated exam. No definite abscess identified.  Age expected degenerative changes of the osseous structures.  No fracture or traumatic malalignment identified.     Extensive soft tissue swelling and skin thickening with suspected reactive right inguinal adenopathy.  No definite abscess identified at this time. Electronically signed by: Christopher Olivier Date:    04/13/2025 Time:    14:33    CT Thigh With Contrast Right  Result Date: 4/13/2025  EXAMINATION: CT LEG (TIBIA-FIBULA) WITH CONTRAST RIGHT; CT THIGH WITH CONTRAST RIGHT CLINICAL HISTORY: cellulitis; TECHNIQUE: Low-dose axial CT images of the thigh and tibia and fibula.  Multiplanar reformats.  Images obtained after the administration 100 mL Omnipaque 350 IV contrast. COMPARISON: None FINDINGS: Large left inguinal lymph nodes possibly reactive to infection.  Clinical follow-up to resolution recommended to exclude other etiologies. Skin thickening and soft tissue fat stranding over the thigh most notable along the medial portion.  This could relate to infection.  Edema or trauma less favored but not excluded on the basis of this exam. Along the thigh the muscular bulk is maintained.  No definite fracture identified.  Right hip arthroplasty with long femoral stem in place. Intrapelvic contents appear unremarkable.  Metal artifact degrades there evaluation.  Vascular structures are patent to the level of the knee. CT tibia and fibula. Soft tissue thickening fat stranding and skin thickening most notable along the anterior aspect of the lower extremity.  Disorganized fluid throughout the superficial compartment.  No definite involvement of the deep/intramuscular compartment. Vascular structures  grossly patent on this nondedicated exam. No definite abscess identified.  Age expected degenerative changes of the osseous structures.  No fracture or traumatic malalignment identified.     Extensive soft tissue swelling and skin thickening with suspected reactive right inguinal adenopathy.  No definite abscess identified at this time. Electronically signed by: Christopher Olivier Date:    04/13/2025 Time:    14:33    US Lower Extremity Veins Right  Result Date: 4/13/2025  EXAMINATION: US LOWER EXTREMITY VEINS RIGHT CLINICAL HISTORY: Other specified soft tissue disorders TECHNIQUE: Duplex and color flow Doppler evaluation and graded compression of the right lower extremity veins was performed. COMPARISON: None FINDINGS: Right thigh veins: The common femoral, femoral, popliteal, upper greater saphenous, and deep femoral veins are patent and free of thrombus. The veins are normally compressible and have normal phasic flow and augmentation response. Right calf veins: The visualized calf veins are patent. Contralateral CFV: The contralateral (left) common femoral vein is patent and free of thrombus. Miscellaneous: None     No evidence of deep venous thrombosis in the right lower extremity. Electronically signed by: Christopher Olivier Date:    04/13/2025 Time:    12:13    X-Ray Chest AP Portable  Result Date: 4/13/2025  EXAM: XR CHEST AP PORTABLE CLINICAL HISTORY: Sepsis FINDINGS:  Heart is enlarged.  Bibasilar pulmonary opacities which could reflect edema or infection.  No pleural fluid or pneumothorax.      As above. Finalized on: 4/13/2025 11:18 AM By:  Christopher Olivier MD [facility]# [id number]      2025-04-13 11:20:39.894     [facility]      EKG    MICROBIOLOGY    [facility]    Assessment/Plan:     Assessment & Plan  Cellulitis of right lower extremity  Patient presented with worsening right lower extremity pain/swelling and sloughing following recent admission for lower extremity cellulitis.  Prior imaging and workup as noted below.  Patient  followed by ID outpatient and is status post dull Invanz and p.o. Zyvox.  Initial workup in the ED revealed patient to be afebrile without leukocytosis, hypertensive, sed rate >120, CRP 83.9, CT right lower extremity negative for acute findings however positive for evidence of cellulitis.  ID consulted by ED staff and recommended patient be initiation of IV Zyvox and patient will be evaluated in the morning.     US Doppler Right Lower Extremity 4/13/25    Impression:    No evidence of deep venous thrombosis in the right lower extremity.    US Arterial Doppler Bilateral 4/16/25    Impression:     1.  At least moderate stenosis of the right superficial femoral artery.  A CT angiogram of the bilateral lower extremities could be performed for further assessment.     2.  No evidence for hemodynamically significant stenosis in the left lower extremity.     3.  Right inguinal and medial right thigh lymphadenopathy.    MRI Right Tib-Fib 4/18/25    Impression:     1.  Circumferential cellulitis.    2.  No evidence for myositis or intramuscular abscess or fascial edema.    3.  No evidence for osteomyelitis of the tibia or fibula.    CT Right Tib-Fib 4/28/25    Impression:     Moderate Superficial subcutaneous fat stranding and prefascial fluid density consistent with cellulitis.  Superficial abscess may be suspected.     2.    No vascular hemodynamically significant stenosis.      Plan:  -Continue current pain regimen, titrate as needed  -Bowel regimen  -Bedrest  -Wound care   -None weightbearing  -IVFs prn  -Antiemetics prn  -Tylenol as needed for fever   -Continue antibiotics per ID  -f/u cultures  -f/u ID    Hypertension associated with diabetes  Chronic, controlled.  Latest blood pressure and vitals reviewed-   Temp:  [97.5 °F (36.4 °C)-98.5 °F (36.9 °C)]   Pulse:  []   Resp:  [16-19]   BP: (128-150)/(63-82)   SpO2:  [96 %-100 %] .   Home meds for hypertension were reviewed and noted below.   Hypertension  "Medications              diltiaZEM HCl 120 mg Cp12 TAKE 1 CAPSULE TWICE DAILY    furosemide (LASIX) 80 MG tablet Take 80 mg by mouth.    metoprolol succinate (TOPROL-XL) 50 MG 24 hr tablet TAKE 1 TABLET TWICE DAILY    olmesartan (BENICAR) 40 MG tablet Take 40 mg by mouth once daily.     While in the hospital, will manage blood pressure as follows; Continue home antihypertensive regimen    Will utilize p.r.n. blood pressure medication only if patient's blood pressure greater than  180/110 and she develops symptoms such as worsening chest pain or shortness of breath.    Type 2 diabetes mellitus with diabetic polyneuropathy, with long-term current use of insulin  Patient's FSGs are controlled on current medication regimen.  Last A1c reviewed-   Lab Results   Component Value Date    HGBA1C 6.3 (H) 04/13/2025     Most recent fingerstick glucose reviewed-   Recent Labs   Lab 04/28/25  2221   POCTGLUCOSE 135*     Current correctional scale  Low  Titrate as needed  anti-hyperglycemic dose as follows-   Antihyperglycemics (From admission, onward)      Start     Stop Route Frequency Ordered    04/28/25 2241  insulin aspart U-100 pen 0-5 Units         -- SubQ Before meals & nightly PRN 04/28/25 2142          Plan:  -SSI  -A1c  -Accu-checks  -Hold oral hypoglycemics while patient is in the hospital  -Hypoglycemic protocol      Chronic combined systolic and diastolic congestive heart failure  Patient has Combined Systolic and Diastolic heart failure that is Chronic. On presentation their CHF was well compensated. Most recent BNP and echo results are listed below.  No results for input(s): "BNP" in the last 72 hours.  Latest ECHO  Results for orders placed during the hospital encounter of 04/13/25    Echo    Interpretation Summary    Left Ventricle: The left ventricle is normal in size. Mildly increased wall thickness. There is mild concentric hypertrophy. There is normal systolic function with a visually estimated ejection " fraction of 55 - 60%. There is diastolic dysfunction but grade cannot be determined.    Right Ventricle: The right ventricle is normal in size. Wall thickness is normal. Systolic function is normal.    Left Atrium: Mildly dilated    Pulmonary Artery: Pulmonary artery pressure could not be estimated.    IVC/SVC: Intermediate venous pressure at 8 mmHg.    Current Heart Failure Medications       Plan  -Monitor strict I&Os and daily weights.    -Place on telemetry  -Low sodium diet  -Place on fluid restriction of 2 L.   -Cardiology has not been consulted  -The patient's volume status is at their baseline  -Continue home medications    Anxiety and depression  Chronic. Stable. Not in acute exacerbation and currently denies endorsing any suicidal/homicidal ideations.   Plan:  -Continue home medications     HLD (hyperlipidemia)  Patient is chronically on statin.will continue for now. Last Lipid Panel:   Lab Results   Component Value Date    CHOL 175 10/25/2024    HDL 44 10/25/2024    LDLCALC 111.8 10/25/2024    TRIG 96 10/25/2024    CHOLHDL 25.1 10/25/2024     Plan:  -Continue home medication  -low fat/low calorie diet    Class 3 severe obesity due to excess calories with serious comorbidity and body mass index (BMI) of 45.0 to 49.9 in adult  Body mass index is 48.47 kg/m². Morbid obesity complicates all aspects of disease management from diagnostic modalities to treatment. Weight loss encouraged and health benefits explained to patient.       VTE Risk Mitigation (From admission, onward)           Ordered     enoxaparin injection 40 mg  Every 12 hours         04/28/25 2142     IP VTE HIGH RISK PATIENT  Once         04/28/25 2142     Place sequential compression device  Until discontinued         04/28/25 2142                  //Core Measures   -DVT proph: SCDs, Lovenox   -Code status: Full    -Surrogate: none provided       Components of this note were documented using a voice recognition system and are subject to errors  not corrected at the time the document was proof read. Please contact the author for any clarifications.        On 04/28/2025, patient should be placed in hospital observation services under my care.       Pop Troncoso MD  Department of Hospital Medicine  [facility] Med Surg

## 2025-04-29 NOTE — HOSPITAL COURSE
69-year-old female, under the care [facility] medicine for management of cellulitis.  CT RLE: Moderate Superficial subcutaneous fat stranding and prefascial fluid density consistent with cellulitis.  Superficial abscess may be suspected.  Extensive right lower extremity skin sloughing on exam.  Wound care consulted.  Patient remains afebrile, labs stable.  Inflammatory markers trending down.  Patient is followed by a wound care service at home, advised patient to call the wound care company and request home visit tomorrow.  Extensive discussion with patient, patient in agreement with discharge today.  Resume previous home medication regimen.  Follow-up with PCP and Infectious Disease as indicated.Patient seen and examined on day of discharge and determined stable for discharge.

## 2025-04-29 NOTE — ASSESSMENT & PLAN NOTE
Chronic, controlled.  Latest blood pressure and vitals reviewed-   Temp:  [97.5 °F (36.4 °C)-98.5 °F (36.9 °C)]   Pulse:  []   Resp:  [16-19]   BP: (128-150)/(63-82)   SpO2:  [96 %-100 %] .   Home meds for hypertension were reviewed and noted below.   Hypertension Medications              diltiaZEM HCl 120 mg Cp12 TAKE 1 CAPSULE TWICE DAILY    furosemide (LASIX) 80 MG tablet Take 80 mg by mouth.    metoprolol succinate (TOPROL-XL) 50 MG 24 hr tablet TAKE 1 TABLET TWICE DAILY    olmesartan (BENICAR) 40 MG tablet Take 40 mg by mouth once daily.     Resume home regimen

## 2025-04-29 NOTE — ASSESSMENT & PLAN NOTE
"Patient has Combined Systolic and Diastolic heart failure that is Chronic. On presentation their CHF was well compensated. Most recent BNP and echo results are listed below.  No results for input(s): "BNP" in the last 72 hours.  Latest ECHO  Results for orders placed during the hospital encounter of 04/13/25    Echo    Interpretation Summary    Left Ventricle: The left ventricle is normal in size. Mildly increased wall thickness. There is mild concentric hypertrophy. There is normal systolic function with a visually estimated ejection fraction of 55 - 60%. There is diastolic dysfunction but grade cannot be determined.    Right Ventricle: The right ventricle is normal in size. Wall thickness is normal. Systolic function is normal.    Left Atrium: Mildly dilated    Pulmonary Artery: Pulmonary artery pressure could not be estimated.    IVC/SVC: Intermediate venous pressure at 8 mmHg.    Current Heart Failure Medications       Plan  Resume home medications, follow-up with primary cardiologist as recommended  "

## 2025-04-29 NOTE — CONSULTS
[facility] Surg  Wound Care    Patient Name:  Stephanie Raza   MRN:  [id number]  Date: 4/29/2025  Diagnosis: Cellulitis of right lower extremity    History:     Past Medical History:   Diagnosis Date    Acute blood loss anemia (ABLA) 01/24/2024    Last Assessment & Plan:    Formatting of this note might be different from the original.   History & Physical       Discharge Summary       Follow-up  Patient did receive 1 unit packed red blood cells while in the OR secondary to blood loss.  No obvious bleeding at this time.  She received 2 additional units packed red blood cells per orthopedics.  Hemoglobin running stable at this time no need fo    YOVANA (acute kidney injury) 01/24/2024    Last Assessment & Plan:    Formatting of this note might be different from the original.   History & Physical       Discharge Summary       Follow-up patient with progressive oliguric YOVANA after surgery.  Resolved. Avoid nephrotoxic agents, renally dose all medications where necessary, and protect nondominant arm as much as possible    Anxiety     Arthritis     Asthma     Back pain     Diabetes mellitus type I     Heart murmur     Hyperlipidemia     Hypertension     Obesity     Polyneuropathy     Trouble in sleeping     Type 2 diabetes mellitus     Urinary incontinence        Social History[1]    Precautions:     Allergies as of 04/28/2025 - Reviewed 04/28/2025   Allergen Reaction Noted    Adhesive tape-silicones Swelling 01/18/2024       [facility] Assessment Details/Treatment     Consulted on this 70 y/o female patient for wound to RLE. Patient was admitted 4/28/25 for cellulitis of the right lower extremity. PMH significant for Acute blood loss anemia (ABLA) (01/24/2024), YOVANA (acute kidney injury) (01/24/2024), Anxiety, Arthritis, Asthma, Back pain, Diabetes mellitus type I, Heart murmur, Hyperlipidemia, Hypertension, Obesity, Polyneuropathy, Trouble in sleeping, Type 2 diabetes mellitus, and Urinary incontinence.     Patient resting in  bed, awake and alert. Wound care nurse gave introduction and reason for visit. Patient explains she needs pain medication prior to beginning. Primary nurse contacted and patient was given IV medication for pain.     Gently lifted the blankets.   Noted RLE to be slightly swollen with slight erythema compared to the LLE. RLE is noted to have very dry crusted tan and brown skin that is beginning to peel away all over the lower leg. Noted intact skin under these areas that are peeling away. No open ulcerations or drainage is noted to the leg. Did note bullous lesion to the posterior calf that is circular with intact tan/yellow tissue that appears to be beginning to soften, slight fluctuance is noted. No drainage is noted to the area even when palpated/cleansed gently. Believe best treatment at this time would be moisturizer applied to the lower leg BID. Cleansed entire leg with vashe moistened gauze. Applied zinc oxide moisture barrier paste over the bullous lesion and moisturizer to the remains of the RLE, clean incontinence pad placed under the leg and wrapped over the top lightly to protect peeling hanging skin from getting pulled on the sheets.     Patient states she is set up with outpatient wound care, recommend F/U with them once discharged.      04/29/25 0944   WOCN Assessment   WOCN Total Time (mins) 45   Visit Date 04/29/25   Visit Time 0944   Consult Type New   WOCN Speciality Wound   Wound cellulitis;other  (Ulcerations)   Intervention assessed;changed;applied;chart review;orders   Teaching on-going        Wound 04/13/25 1545 Other (comment) Right lower;posterior Leg   Date First Assessed/Time First Assessed: 04/13/25 1545   Present on Original Admission: Yes  Primary Wound Type: (c) Other (comment)  Side: Right  Orientation: lower;posterior  Location: Leg   Wound Image      Dressing Appearance Open to air   Drainage Amount None   Drainage Characteristics/Odor No odor   Appearance  Tan;Yellow;Moist;Dry  (tan/brown dry crusted cracking/peeling skin)   Tissue loss description Not applicable   Periwound Area Intact;Dry   Care Cleansed with:;Antimicrobial agent;Applied:;Moisturizing agent   Dressing Applied;Other (comment)  (zinc barrier paste and moisturizer)   Periwound Care Moisture barrier applied;Moisturizer applied   Dressing Change Due 04/29/25 04/29/2025         [1]   Social History  Socioeconomic History    Marital status:    Tobacco Use    Smoking status: Never     Passive exposure: Never    Smokeless tobacco: Never   Substance and Sexual Activity    Alcohol use: Yes     Alcohol/week: 3.0 standard drinks of alcohol     Types: 1 Glasses of wine, 2 Shots of liquor per week    Drug use: Never    Sexual activity: Not Currently     Partners: Male     Social Drivers of Health     Financial Resource Strain: Medium Risk (4/14/2025)    Overall Financial Resource Strain (CARDIA)     Difficulty of Paying Living Expenses: Somewhat hard   Food Insecurity: Food Insecurity Present (4/14/2025)    Hunger Vital Sign     Worried About Running Out of Food in the Last Year: Sometimes true     Ran Out of Food in the Last Year: Sometimes true   Transportation Needs: Unmet Transportation Needs (4/14/2025)    PRAPARE - Transportation     Lack of Transportation (Medical): Yes     Lack of Transportation (Non-Medical): Yes   Physical Activity: Sufficiently Active (9/27/2024)    Exercise Vital Sign     Days of Exercise per Week: 4 days     Minutes of Exercise per Session: 60 min   Stress: Stress Concern Present (4/14/2025)    [employer] - Occupational Stress Questionnaire     Feeling of Stress : Very much   Housing Stability: Low Risk  (4/14/2025)    Housing Stability Vital Sign     Unable to Pay for Housing in the Last Year: No     Homeless in the Last Year: No

## 2025-04-29 NOTE — PLAN OF CARE
[facility] - Med Surg  Initial Discharge Assessment       Primary Care Provider: Reyna Suarez MD    Admission Diagnosis: Morbid obesity [E66.01]  Cellulitis of right anterior lower leg [L03.115]    Admission Date: 4/28/2025  Expected Discharge Date:     Transition of Care Barriers: None    Payor: HU[employer] MANAGED MEDICARE / Plan: [employer] SNP HMO PPO SPECIAL NEEDS / Product Type: Medicare Advantage /     Extended Emergency Contact Information  Primary Emergency Contact: contact,no  Relation: None  Secondary Emergency Contact: ida dahl  Mobile Phone: [phone]  Relation: Daughter    Discharge Plan A: Home Health  Discharge Plan B: Home with family      [id number] - [facility] [address] [address]  Phone: [phone] Fax: [phone]    [facility] - [facility] [address]  [address] [address]  Phone: [phone] Fax: [phone]      Initial Assessment (most recent)       Adult Discharge Assessment - 04/29/25 0842          Discharge Assessment    Assessment Type Discharge Planning Assessment     Confirmed/corrected address, phone number and insurance Yes     Confirmed Demographics Correct on Facesheet     Source of Information family     When was your last doctors appointment? 04/28/25     Communicated RONNIE with patient/caregiver Date not available/Unable to determine     Reason For Admission Cellulitis of right lower extremity     People in Home grandchild(nallely)     Facility Arrived From: Home     Do you expect to return to your current living situation? Yes     Do you have help at home or someone to help you manage your care at home? Yes     Who are your caregiver(s) and their phone number(s)? Family members     Prior to hospitilization cognitive status: Alert/Oriented     Current cognitive status: Alert/Oriented     Walking or Climbing Stairs  Difficulty yes     Walking or Climbing Stairs ambulation difficulty, requires equipment     Mobility Management Walker     Dressing/Bathing Difficulty no     Equipment Currently Used at Home walker, rolling;cane, straight;bedside commode     Readmission within 30 days? Yes     Patient currently being followed by outpatient case management? No     Do you currently have service(s) that help you manage your care at home? Yes     Name and Contact number of agency [facility]     Is the pt/caregiver preference to resume services with current agency Yes     Do you take prescription medications? Yes     Do you have prescription coverage? Yes     Coverage [employer]     Do you have any problems affording any of your prescribed medications? TBD     Is the patient taking medications as prescribed? yes     Who is going to help you get home at discharge? Daughter     How do you get to doctors appointments? family or friend will provide     Are you on dialysis? No     Do you take coumadin? No     Discharge Plan A Home Health     Discharge Plan B Home with family     DME Needed Upon Discharge  none     Discharge Plan discussed with: Adult children     Transition of Care Barriers None                      Anticipated DC disposition: home with home health    Prior level of function: modified independent     PCP: Reyna Suarez MD    Comments: CM confirmed demographics, emergency contacts, PCP and insurance. Needs will depend on hospital progress; CM following for needs.    Discharge Plan A and Plan B have been determined by review of patient's clinical status, future medical and therapeutic needs, and coverage/benefits for post-acute care in coordination with multidisciplinary team members.

## 2025-04-29 NOTE — DISCHARGE SUMMARY
[facility] Medicine  Discharge Summary      Patient Name: Stephanie Raza  MRN: [id number]  [facility]: [id number]  Patient Class: OP- Observation  Admission Date: 4/28/2025  Hospital Length of Stay: 0 days  Discharge Date and Time: No discharge date for patient encounter.  Attending Physician: Delfino Johansen MD   Discharging Provider: Gely David NP  Primary Care Provider: Reyna Suarez MD    Primary Care Team: Networked reference to record PCT     HPI:   Stephanie Raza is a 69 y.o. female with a PMH  has a past medical history of Acute blood loss anemia (ABLA) (01/24/2024), YOVANA (acute kidney injury) (01/24/2024), Anxiety, Arthritis, Asthma, Back pain, Diabetes mellitus type I, Heart murmur, Hyperlipidemia, Hypertension, Obesity, Polyneuropathy, Trouble in sleeping, Type 2 diabetes mellitus, and Urinary incontinence. who presented to the ED directed by Dr. Otto for further evaluation of worsening right lower extremity wound and skin sloughing to initiate IV antibiotics during post discharge following up visit.  Patient reported symptoms began earlier this month and continued to worsen despite treatment with Dalvance on 04/24 and p.o. zyvox following recent admission from 4/13 to 4/22 for similar complaints.  Patient reported no known alleviating or aggravating factors noted and reported all other review of systems negative except those noted above.  She denied endorsing any fever, chills, sweats, nausea, vomiting, numbness, tingling, or itching and reported all other review of systems negative except as noted above.  Initial workup in the ED revealed patient to be afebrile without leukocytosis, hypertensive, sed rate >120, CRP 83.9, CT right lower extremity negative for acute findings however positive for evidence of cellulitis.  ID consulted by ED staff and recommended patient be admitted to Hospital Medicine with initiation of IV Zyvox inpatient will be evaluated in the  morning.  Patient admitted under observation for continued medical management.      PCP: Reyna Suarez      * No surgery found *      Hospital Course:   69-year-old female, under the care [facility] medicine for management of cellulitis.  CT RLE: Moderate Superficial subcutaneous fat stranding and prefascial fluid density consistent with cellulitis.  Superficial abscess may be suspected.  Extensive right lower extremity skin sloughing on exam.  Wound care consulted.  Patient remains afebrile, labs stable.  Inflammatory markers trending down.  Patient is followed by a wound care service at home, advised patient to call the wound care company and request home visit tomorrow.  Extensive discussion with patient, patient in agreement with discharge today.  Resume previous home medication regimen.  Follow-up with PCP and Infectious Disease as indicated.Patient seen and examined on day of discharge and determined stable for discharge.      Goals of Care Treatment Preferences:  Code Status: Full Code         Consults:   Consults (From admission, onward)          Status Ordering Provider     Inpatient consult to Infectious Diseases  Once        Provider:  Phillip Mon MD, ERI Kirk            Assessment & Plan  Cellulitis of right lower extremity  Patient presented with worsening right lower extremity pain/swelling and sloughing following recent admission for lower extremity cellulitis.  Prior imaging and workup as noted below.  Patient followed by ID outpatient and is status post dull Invanz and p.o. Zyvox.  Initial workup in the ED revealed patient to be afebrile without leukocytosis, hypertensive, sed rate >120, CRP 83.9, CT right lower extremity negative for acute findings however positive for evidence of cellulitis.  ID consulted by ED staff and recommended patient be initiation of IV Zyvox and patient will be evaluated in the morning.     US Doppler Right Lower Extremity  4/13/25    Impression:    No evidence of deep venous thrombosis in the right lower extremity.    US Arterial Doppler Bilateral 4/16/25    Impression:     1.  At least moderate stenosis of the right superficial femoral artery.  A CT angiogram of the bilateral lower extremities could be performed for further assessment.     2.  No evidence for hemodynamically significant stenosis in the left lower extremity.     3.  Right inguinal and medial right thigh lymphadenopathy.    MRI Right Tib-Fib 4/18/25    Impression:     1.  Circumferential cellulitis.    2.  No evidence for myositis or intramuscular abscess or fascial edema.    3.  No evidence for osteomyelitis of the tibia or fibula.    CT Right Tib-Fib 4/28/25    Impression:     Moderate Superficial subcutaneous fat stranding and prefascial fluid density consistent with cellulitis.  Superficial abscess may be suspected.     2.    No vascular hemodynamically significant stenosis.      Plan:  Resume home medication regimen  Follow-up with Infectious Disease as recommended  Continue with wound care as outpatient    Hypertension associated with diabetes  Chronic, controlled.  Latest blood pressure and vitals reviewed-   Temp:  [97.5 °F (36.4 °C)-98.5 °F (36.9 °C)]   Pulse:  []   Resp:  [16-19]   BP: (128-150)/(63-82)   SpO2:  [96 %-100 %] .   Home meds for hypertension were reviewed and noted below.   Hypertension Medications              diltiaZEM HCl 120 mg Cp12 TAKE 1 CAPSULE TWICE DAILY    furosemide (LASIX) 80 MG tablet Take 80 mg by mouth.    metoprolol succinate (TOPROL-XL) 50 MG 24 hr tablet TAKE 1 TABLET TWICE DAILY    olmesartan (BENICAR) 40 MG tablet Take 40 mg by mouth once daily.     Resume home regimen  Type 2 diabetes mellitus with diabetic polyneuropathy, with long-term current use of insulin  Patient's FSGs are controlled on current medication regimen.  Last A1c reviewed-   Lab Results   Component Value Date    HGBA1C 6.3 (H) 04/13/2025     Most  "recent fingerstick glucose reviewed-   Recent Labs   Lab 04/28/25  2221   POCTGLUCOSE 135*     Current correctional scale  Low  Titrate as needed  anti-hyperglycemic dose as follows-   Antihyperglycemics (From admission, onward)      Start     Stop Route Frequency Ordered    04/28/25 2241  insulin aspart U-100 pen 0-5 Units         -- SubQ Before meals & nightly PRN 04/28/25 2142          Plan:  Resume home regimen  Chronic combined systolic and diastolic congestive heart failure  Patient has Combined Systolic and Diastolic heart failure that is Chronic. On presentation their CHF was well compensated. Most recent BNP and echo results are listed below.  No results for input(s): "BNP" in the last 72 hours.  Latest ECHO  Results for orders placed during the hospital encounter of 04/13/25    Echo    Interpretation Summary    Left Ventricle: The left ventricle is normal in size. Mildly increased wall thickness. There is mild concentric hypertrophy. There is normal systolic function with a visually estimated ejection fraction of 55 - 60%. There is diastolic dysfunction but grade cannot be determined.    Right Ventricle: The right ventricle is normal in size. Wall thickness is normal. Systolic function is normal.    Left Atrium: Mildly dilated    Pulmonary Artery: Pulmonary artery pressure could not be estimated.    IVC/SVC: Intermediate venous pressure at 8 mmHg.    Current Heart Failure Medications       Plan  Resume home medications, follow-up with primary cardiologist as recommended  Anxiety and depression  Chronic. Stable. Not in acute exacerbation and currently denies endorsing any suicidal/homicidal ideations.   Plan:  -Continue home medications     HLD (hyperlipidemia)  Patient is chronically on statin.will continue for now. Last Lipid Panel:   Lab Results   Component Value Date    CHOL 175 10/25/2024    HDL 44 10/25/2024    LDLCALC 111.8 10/25/2024    TRIG 96 10/25/2024    CHOLHDL 25.1 10/25/2024 "     Plan:  -Continue home medication  -low fat/low calorie diet    Class 3 severe obesity due to excess calories with serious comorbidity and body mass index (BMI) of 45.0 to 49.9 in adult  Body mass index is 48.47 kg/m². Morbid obesity complicates all aspects of disease management from diagnostic modalities to treatment. Weight loss encouraged and health benefits explained to patient.     Final Active Diagnoses:    Diagnosis Date Noted POA    PRINCIPAL PROBLEM:  Cellulitis of right lower extremity [L03.115] 04/13/2025 Yes    HLD (hyperlipidemia) [E78.5] 04/28/2025 Yes     Chronic    Anxiety and depression [F41.9, F32.A] 04/28/2025 Yes     Chronic    Chronic combined systolic and diastolic congestive heart failure [I50.42] 04/28/2025 Yes     Chronic    Type 2 diabetes mellitus with diabetic polyneuropathy, with long-term current use of insulin [E11.42, Z79.4] 01/22/2024 Not Applicable     Chronic    Class 3 severe obesity due to excess calories with serious comorbidity and body mass index (BMI) of 45.0 to 49.9 in adult [E66.813, Z68.42, E66.01] 01/22/2024 Not Applicable     Chronic    Hypertension associated with diabetes [E11.59, I15.2] 10/29/2018 Yes     Chronic      Problems Resolved During this Admission:       Discharged Condition: stable    Disposition: Home-Health Care [facility]    Follow Up:   Follow-up Information       Reyna Suarez MD Follow up in 1 week(s).    Specialty: Family Medicine  Contact information:  [address] [facility] [address] [phone]               Petros Otto DO. Schedule an appointment as soon as possible for a visit.    Specialty: Infectious Diseases  Why: As needed  Contact information:  [address] [facility] [address] [phone]                           Patient Instructions:      Diet Cardiac     SUBSEQUENT HOME HEALTH ORDERS     Order Specific Question Answer Comments   What Home Health Agency is the patient currently using?  "Other/External      Notify your health care provider if you experience any of the following:  temperature >100.4     Notify your health care provider if you experience any of the following:  persistent nausea and vomiting or diarrhea     Notify your health care provider if you experience any of the following:  severe uncontrolled pain     Notify your health care provider if you experience any of the following:  difficulty breathing or increased cough     No dressing needed   Order Comments: Follow-up with home wound care service     Activity as tolerated       Significant Diagnostic Studies: Labs: CMP   Recent Labs   Lab 04/28/25  1706 04/29/25  0712    139   K 4.0 4.2    104   CO2 26 27    84   BUN 16 14   CREATININE 1.0 1.0   CALCIUM 8.7 8.4*   PROT 8.6* 7.1   ALBUMIN 2.6* 2.2*   BILITOT 0.3 0.2   ALKPHOS 92 73   AST 18 16   ALT 21 17   ANIONGAP 12 8    and CBC   Recent Labs   Lab 04/28/25  1706 04/29/25  0712   WBC 8.23 6.03   HGB 10.9* 9.4*   HCT 34.9* 30.9*   * 481*       Pending Diagnostic Studies:       None           Medications:  Reconciled Home Medications:      Medication List        CONTINUE taking these medications      albuterol 90 mcg/actuation inhaler  Commonly known as: PROVENTIL/VENTOLIN HFA  Inhale 2 puffs into the lungs every 4 (four) hours as needed for Wheezing.     aspirin 81 MG Chew  Take 81 mg by mouth once daily.     blood-glucose meter Misc  Commonly known as: TRUE METRIX AIR GLUCOSE METER  To check BG 3 times daily, to use with insurance preferred meter     buPROPion 150 MG TB24 tablet  Commonly known as: WELLBUTRIN XL  TAKE 1 TABLET(150 MG) BY MOUTH DAILY     DROPLET PEN NEEDLE 31 gauge x 5/16" Ndle  Generic drug: pen needle, diabetic  USE ONCE DAILY AS DIRECTED     esomeprazole 40 MG capsule  Commonly known as: NEXIUM  Take 1 capsule (40 mg total) by mouth before breakfast.     fluticasone-salmeterol 250-50 mcg/dose 250-50 mcg/dose diskus inhaler  Commonly " known as: ADVAIR  Inhale 1 puff into the lungs once daily.     furosemide 80 MG tablet  Commonly known as: LASIX  Take 80 mg by mouth.     HYDROcodone-acetaminophen 7.5-325 mg per tablet  Commonly known as: NORCO  Take 1 tablet by mouth every 6 (six) hours as needed for Pain.     linezolid 600 mg Tab  Commonly known as: ZYVOX  Take 1 tablet (600 mg total) by mouth every 12 (twelve) hours. for 7 days     meloxicam 7.5 MG tablet  Commonly known as: MOBIC  TAKE 1 TABLET(7.5 MG) BY MOUTH DAILY     metoprolol succinate 50 MG 24 hr tablet  Commonly known as: TOPROL-XL  TAKE 1 TABLET TWICE DAILY     olmesartan 40 MG tablet  Commonly known as: BENICAR  Take 40 mg by mouth once daily.     OZEMPIC 2 mg/dose (8 mg/3 mL) Pnij  Generic drug: semaglutide  INJECT 2MG UNDER THE SKIN EVERY 7 DAYS     potassium chloride 20 mEq  Commonly known as: K-TAB  Take 40 mEq by mouth 2 (two) times a day.     TRUE METRIX GLUCOSE TEST STRIP Strp  Generic drug: blood sugar diagnostic  TEST BLOOD SUGAR THREE TIMES DAILY     TRUEPLUS LANCETS 33 gauge Misc  Generic drug: lancets  TEST BLOOD SUGAR THREE TIMES DAILY     vibegron 75 mg Tab  Take 1 tablet by mouth once daily.     vitamin D 1000 units Tab  Commonly known as: VITAMIN D3  Take 1 tablet by mouth every morning.            STOP taking these medications      diltiaZEM HCl 120 mg Cp12            ASK your doctor about these medications      gabapentin 300 MG capsule  Commonly known as: NEURONTIN  Take 300 mg by mouth every evening. As needed              Indwelling Lines/Drains at time of discharge:   Lines/Drains/Airways       None                   Time spent on the discharge of patient: 45 minutes         Gely David NP  Department of Hospital Medicine  [facility] - Med Surg

## 2025-05-01 ENCOUNTER — PATIENT OUTREACH (OUTPATIENT)
Dept: ADMINISTRATIVE | Facility: CLINIC | Age: 70
End: 2025-05-01
Payer: MEDICARE

## 2025-05-01 ENCOUNTER — TELEPHONE (OUTPATIENT)
Dept: INTERNAL MEDICINE | Facility: CLINIC | Age: 70
End: 2025-05-01
Payer: MEDICARE

## 2025-05-01 NOTE — TELEPHONE ENCOUNTER
----- Message from DERRICK Staples sent at 5/1/2025 12:27 PM CDT -----  Regarding: medications  Patient reported the following: Pt states she takes her 20meq potassium chloride, two tablets nightly instead of two tablets BID as is written in the MAR, she also states she doesn't take gabapentin, which is written in her MARPlease reach out directly to the patient if needed. I am unable to accept orders. Respectfully,Bel Albrecht RNTransition of Care TeamPlease do not reply to this message, as this in-box is not routinely monitored

## 2025-05-01 NOTE — PROGRESS NOTES
C3 nurse spoke with Stephanie Raza for a TCC post hospital discharge follow up call. The patient has a scheduled appt with her PCP, Reyna Suarez MD on 5/2/25 at 1300. Message routed to Reyna Suarez MD requesting visit type change to 'HOSFU.'

## 2025-05-02 ENCOUNTER — OFFICE VISIT (OUTPATIENT)
Dept: INTERNAL MEDICINE | Facility: CLINIC | Age: 70
End: 2025-05-02
Payer: MEDICARE

## 2025-05-02 VITALS
SYSTOLIC BLOOD PRESSURE: 126 MMHG | HEIGHT: 62 IN | DIASTOLIC BLOOD PRESSURE: 76 MMHG | HEART RATE: 86 BPM | TEMPERATURE: 98 F | BODY MASS INDEX: 48.47 KG/M2 | OXYGEN SATURATION: 99 %

## 2025-05-02 DIAGNOSIS — E11.42 TYPE 2 DIABETES MELLITUS WITH DIABETIC POLYNEUROPATHY, WITH LONG-TERM CURRENT USE OF INSULIN: Chronic | ICD-10-CM

## 2025-05-02 DIAGNOSIS — J45.20 MILD INTERMITTENT ASTHMA WITHOUT COMPLICATION: Chronic | ICD-10-CM

## 2025-05-02 DIAGNOSIS — Z79.4 TYPE 2 DIABETES MELLITUS WITH DIABETIC POLYNEUROPATHY, WITH LONG-TERM CURRENT USE OF INSULIN: Chronic | ICD-10-CM

## 2025-05-02 DIAGNOSIS — S80.812A ABRASION OF LEFT LOWER EXTREMITY, INITIAL ENCOUNTER: ICD-10-CM

## 2025-05-02 DIAGNOSIS — I50.42 CHRONIC COMBINED SYSTOLIC AND DIASTOLIC CONGESTIVE HEART FAILURE: Chronic | ICD-10-CM

## 2025-05-02 DIAGNOSIS — I11.0 HYPERTENSIVE HEART DISEASE WITH HEART FAILURE: Chronic | ICD-10-CM

## 2025-05-02 DIAGNOSIS — L03.115 CELLULITIS OF RIGHT LEG: Primary | ICD-10-CM

## 2025-05-02 DIAGNOSIS — Z09 HOSPITAL DISCHARGE FOLLOW-UP: ICD-10-CM

## 2025-05-02 PROCEDURE — 1111F DSCHRG MED/CURRENT MED MERGE: CPT | Mod: CPTII,HCNC,S$GLB, | Performed by: FAMILY MEDICINE

## 2025-05-02 PROCEDURE — 1101F PT FALLS ASSESS-DOCD LE1/YR: CPT | Mod: CPTII,HCNC,S$GLB, | Performed by: FAMILY MEDICINE

## 2025-05-02 PROCEDURE — 3074F SYST BP LT 130 MM HG: CPT | Mod: CPTII,HCNC,S$GLB, | Performed by: FAMILY MEDICINE

## 2025-05-02 PROCEDURE — 3072F LOW RISK FOR RETINOPATHY: CPT | Mod: CPTII,HCNC,S$GLB, | Performed by: FAMILY MEDICINE

## 2025-05-02 PROCEDURE — 3078F DIAST BP <80 MM HG: CPT | Mod: CPTII,HCNC,S$GLB, | Performed by: FAMILY MEDICINE

## 2025-05-02 PROCEDURE — 1125F AMNT PAIN NOTED PAIN PRSNT: CPT | Mod: CPTII,HCNC,S$GLB, | Performed by: FAMILY MEDICINE

## 2025-05-02 PROCEDURE — 3044F HG A1C LEVEL LT 7.0%: CPT | Mod: CPTII,HCNC,S$GLB, | Performed by: FAMILY MEDICINE

## 2025-05-02 PROCEDURE — 4010F ACE/ARB THERAPY RXD/TAKEN: CPT | Mod: CPTII,HCNC,S$GLB, | Performed by: FAMILY MEDICINE

## 2025-05-02 PROCEDURE — 90715 TDAP VACCINE 7 YRS/> IM: CPT | Mod: HCNC,S$GLB,, | Performed by: FAMILY MEDICINE

## 2025-05-02 PROCEDURE — 99999 PR PBB SHADOW E&M-EST. PATIENT-LVL IV: CPT | Mod: PBBFAC,HCNC,, | Performed by: FAMILY MEDICINE

## 2025-05-02 PROCEDURE — 90471 IMMUNIZATION ADMIN: CPT | Mod: HCNC,S$GLB,, | Performed by: FAMILY MEDICINE

## 2025-05-02 PROCEDURE — 3008F BODY MASS INDEX DOCD: CPT | Mod: CPTII,HCNC,S$GLB, | Performed by: FAMILY MEDICINE

## 2025-05-02 PROCEDURE — 3288F FALL RISK ASSESSMENT DOCD: CPT | Mod: CPTII,HCNC,S$GLB, | Performed by: FAMILY MEDICINE

## 2025-05-02 PROCEDURE — 99496 TRANSJ CARE MGMT HIGH F2F 7D: CPT | Mod: HCNC,25,S$GLB, | Performed by: FAMILY MEDICINE

## 2025-05-02 RX ORDER — EMPAGLIFLOZIN 10 MG/1
10 TABLET, FILM COATED ORAL
COMMUNITY
Start: 2025-04-03 | End: 2025-05-02

## 2025-05-02 RX ORDER — INSULIN DEGLUDEC 100 U/ML
10 INJECTION, SOLUTION SUBCUTANEOUS DAILY
Qty: 3 ML | Refills: 3 | Status: SHIPPED | OUTPATIENT
Start: 2025-05-02

## 2025-05-03 LAB
BACTERIA BLD CULT: NORMAL
BACTERIA BLD CULT: NORMAL

## 2025-05-04 LAB
BACTERIA BLD CULT: NORMAL
BACTERIA BLD CULT: NORMAL

## 2025-05-07 DIAGNOSIS — R32 URINARY INCONTINENCE, UNSPECIFIED TYPE: Primary | ICD-10-CM

## 2025-05-07 DIAGNOSIS — K21.9 GASTROESOPHAGEAL REFLUX DISEASE WITHOUT ESOPHAGITIS: Chronic | ICD-10-CM

## 2025-05-07 RX ORDER — ESOMEPRAZOLE MAGNESIUM 40 MG/1
40 CAPSULE, DELAYED RELEASE ORAL
Qty: 90 CAPSULE | Refills: 3 | Status: SHIPPED | OUTPATIENT
Start: 2025-05-07

## 2025-05-07 NOTE — TELEPHONE ENCOUNTER
No care due was identified.  [facility] Embedded Care Due Messages. Reference number: [id number].   5/07/2025 11:42:50 AM CDT

## 2025-05-07 NOTE — TELEPHONE ENCOUNTER
Refill Decision Note   Stephanie Raza  is requesting a refill authorization.  Brief Assessment and Rationale for Refill:  Approve     Medication Therapy Plan:        Comments:     Note composed:11:53 AM 05/07/2025

## 2025-05-07 NOTE — TELEPHONE ENCOUNTER
Refill Routing Note   Medication(s) are not appropriate for processing by [facility] for the following reason(s):        No active prescription written by provider    [facility] action(s):  Defer      Medication Therapy Plan: IN CHART AS HISTORICAL PROVIDER      Appointments  past 12m or future 3m with PCP    Date Provider   Last Visit   5/2/2025 Reyna Suarez MD   Next Visit   6/9/2025 Reyna Suarez MD   ED visits in past 90 days: 0        Note composed:3:35 PM 05/07/2025

## 2025-05-07 NOTE — TELEPHONE ENCOUNTER
No care due was identified.  [facility] Embedded Care Due Messages. Reference number: [id number].   5/07/2025 3:02:03 PM CDT

## 2025-05-08 RX ORDER — VIBEGRON 75 MG/1
1 TABLET, FILM COATED ORAL
Qty: 90 TABLET | Refills: 3 | Status: SHIPPED | OUTPATIENT
Start: 2025-05-08

## 2025-05-14 NOTE — PROGRESS NOTES
Subjective     Patient ID: Stephanie Raza is a 69 y.o. female.    Chief Complaint: hospital f/u    History of Present Illness    PRESENTATION:  Stephanie presents for a follow-up visit after a recent hospitalization for a leg infection and to discuss medication changes.    HPI:  Stephanie was recently hospitalized for an infection in her right leg. She was initially admitted on April 14th and discharged on April 22nd. Following discharge, she received an antibiotic infusion (Dalvance) on April 24th. After the infusion, she developed blistering on her leg, prompting her return to the hospital on April 28th. She was discharged again on April 30th.    During her hospital stay, she was treated with antibiotics without surgical intervention. She was instructed to keep the affected area moisturized and wrapped. She is currently receiving weekly wound care at home, along with weekly visits from home health and physical therapy. She has a follow-up visit with the infectious disease doctor scheduled for the 16th of the current month.    Her blood sugars have been running low, often around 80 or even 50. She recently took 20 units of insulin when her blood sugar was 120, a reduction from her previous dose of 60 units. She has reduced her intake of sweets to manage her blood sugar.    She also reports fluid retention, noting that she did not take her fluid pill today before coming to the appointment. She takes Lasix 80 mg daily and potassium at night for fluid management.    She denies chest pain, shortness of breath, or any problems with her asthma.    MEDICATIONS:  Stephanie is on Ozempic for diabetes and a combination of Amlodipine, Metoprolol, and Diltiazem for high blood pressure. She is also taking Lasix 80 mg daily as a fluid pill, and Potassium at night. Stephanie discontinued Jardiance, believing it was causing leg pain and friction. She also discontinued insulin 60 units at night. Her Tresiba dosage has been reduced from 60 units to  20-30 units as needed.    MEDICAL HISTORY:  Stephanie has a history of diabetes, hypertension, anemia, and asthma. She recently experienced a leg infection that required hospitalization and antibiotic treatment.    SURGICAL HISTORY:  Stephanie underwent a hip replacement surgery 1 year ago.    TEST RESULTS:  Stephanie's potassium levels were fine when checked 3 days ago. Her hemoglobin test from the same time indicated anemia. Kidney function tests, also conducted 3 days ago, were looking good. While in the hospital, her blood sugar were normal, ranging between 120-130. An A1C test was mentioned but results were not specified.      ROS:  General: -fever, -chills, -fatigue, -weight gain, -weight loss  Eyes: -vision changes, -redness, -discharge  ENT: -ear pain, -nasal congestion, -sore throat  Cardiovascular: -chest pain, -palpitations, -lower extremity edema  Respiratory: -cough, -shortness of breath  Gastrointestinal: -abdominal pain, -nausea, -vomiting, -diarrhea, -constipation, -blood in stool  Genitourinary: -dysuria, -hematuria, -frequency  Musculoskeletal: -joint pain, -muscle pain, +limb pain  Skin: -rash, +lesion  Neurological: -headache, -dizziness, -numbness, -tingling  Psychiatric: -anxiety, -depression, -sleep difficulty  Endocrine: +lightheaded if meals are missed, +irritable if meals are missed            Objective     Physical Exam  Vitals and nursing note reviewed.   Constitutional:       Appearance: Normal appearance. She is obese.      Comments: wheelchair   HENT:      Head: Normocephalic and atraumatic.   Eyes:      Extraocular Movements: Extraocular movements intact.      Conjunctiva/sclera: Conjunctivae normal.   Cardiovascular:      Rate and Rhythm: Normal rate and regular rhythm.      Pulses: Normal pulses.      Heart sounds: Normal heart sounds.   Pulmonary:      Effort: Pulmonary effort is normal.      Breath sounds: Normal breath sounds.   Abdominal:      General: Abdomen is flat. Bowel sounds are normal.       Palpations: Abdomen is soft.   Musculoskeletal:      Cervical back: Normal range of motion and neck supple.      Right lower leg: Edema present.      Left lower leg: Edema present.      Comments: Right greater than left   Skin:     General: Skin is warm and dry.   Neurological:      General: No focal deficit present.      Mental Status: She is alert and oriented to person, place, and time.   Psychiatric:         Mood and Affect: Mood normal.         Behavior: Behavior normal.                Assessment and Plan     1. Cellulitis of right leg    2. Hospital discharge follow-up    3. Hypertensive heart disease with heart failure  Comments:  stable well controlled cont metoprolol, olmesartan  Orders:  -     Hypertension Digital Medicine (HDMP) Enrollment Order    4. Type 2 diabetes mellitus with diabetic polyneuropathy, with long-term current use of insulin  Comments:  stable cotn tresiba, ozempic  Orders:  -     insulin degludec (TRESIBA FLEXTOUCH U-100) 100 unit/mL (3 mL) insulin pen; Inject 10 Units into the skin once daily.  Dispense: 3 mL; Refill: 3  -     Diabetes Digital Medicine (DDMP) Enrollment Order    5. Abrasion of left lower extremity, initial encounter  -     Tdap vaccine injection 0.5 mL    6. Chronic combined systolic and diastolic congestive heart failure  Comments:  stable cont lasix, patient without symptoms    7. Mild intermittent asthma without complication  Comments:  stable cont albuterol and advair        Assessment & Plan    Assessed recent hospitalization for leg infection, noting successful treatment with antibiotics and no bone involvement.  Blood sugar control improved with current regimen and dietary changes.  Recent lab results, including potassium levels and renal function, appear stable.  BP control adequate on current medications.  Discontinued Jardiance (empagliflozin) due to concerns about side effects and lack of clear benefit.  Continued Ozempic (semaglutide) at current dose with  close monitoring of glucose levels.  Continued Lasix (furosemide) 80 mg daily.  Continued potassium supplement at night.  Continued amlodipine at current dose.  Continued metoprolol at current dose, twice daily.  Started Tresiba (insulin degludec) 20 units as needed if glucose goes over 180 mg/dL.    PATIENT EDUCATION:   Educated on target glucose range, advising that levels of 120-130 mg/dL are within acceptable range.    ACTION ITEMS/LIFESTYLE:   Stephanie to continue monitoring glucose levels closely and contact the office if levels start trending higher.   Stephanie to resume walking daily to regain strength after prolonged hospital stay.    MEDICATIONS:   Discontinued Jardiance (empagliflozin) due to concerns about side effects and lack of clear benefit.   Continued Ozempic (semaglutide) at current dose with close monitoring of glucose levels.   Continued Lasix (furosemide) 80 mg daily.   Continued potassium supplement at night.   Continued amlodipine at current dose.   Continued metoprolol at current dose, twice daily.   Started Tresiba (insulin degludec) 20 units as needed if glucose goes over 180 mg/dL.    ORDERS:   Tetanus shot administered during visit.    FOLLOW UP:   Follow up on June 9th as scheduled.              Follow up in about 5 weeks (around 6/9/2025).    This note was generated with the assistance of ambient listening technology. Verbal consent was obtained by the patient and accompanying visitor(s) for the recording of patient appointment to facilitate this note. I attest to having reviewed and edited the generated note for accuracy, though some syntax or spelling errors may persist. Please contact the author of this note for any clarification.

## 2025-05-16 ENCOUNTER — OFFICE VISIT (OUTPATIENT)
Dept: INFECTIOUS DISEASES | Facility: CLINIC | Age: 70
End: 2025-05-16
Payer: MEDICARE

## 2025-05-16 VITALS
HEART RATE: 62 BPM | WEIGHT: 264.56 LBS | SYSTOLIC BLOOD PRESSURE: 130 MMHG | DIASTOLIC BLOOD PRESSURE: 80 MMHG | HEIGHT: 62 IN | BODY MASS INDEX: 48.68 KG/M2

## 2025-05-16 DIAGNOSIS — L03.115 CELLULITIS OF RIGHT LOWER EXTREMITY: Primary | ICD-10-CM

## 2025-05-16 DIAGNOSIS — E11.42 TYPE 2 DIABETES MELLITUS WITH DIABETIC POLYNEUROPATHY, WITH LONG-TERM CURRENT USE OF INSULIN: ICD-10-CM

## 2025-05-16 DIAGNOSIS — I15.2 HYPERTENSION ASSOCIATED WITH DIABETES: ICD-10-CM

## 2025-05-16 DIAGNOSIS — Z79.4 TYPE 2 DIABETES MELLITUS WITH DIABETIC POLYNEUROPATHY, WITH LONG-TERM CURRENT USE OF INSULIN: ICD-10-CM

## 2025-05-16 DIAGNOSIS — E11.59 HYPERTENSION ASSOCIATED WITH DIABETES: ICD-10-CM

## 2025-05-16 PROCEDURE — 99999 PR PBB SHADOW E&M-EST. PATIENT-LVL IV: CPT | Mod: PBBFAC,HCNC,, | Performed by: STUDENT IN AN ORGANIZED HEALTH CARE EDUCATION/TRAINING PROGRAM

## 2025-05-16 NOTE — PROGRESS NOTES
"Infectious Disease Clinic Note    Patient Name: Stephanie Raza  YOB: 1955    PRESENTING HISTORY       History of Present Illness:  Ms. Stephanie Raza is a 69 y.o. female w/ significant PMHx of diabetes, hyperlipidemia, hypertension, obesity. Per last ID note, "she was admitted with worsening right lower leg erythema and swelling.  She was initially admitted 0408- treated with Ancef and discharged on Augmentin.  She has she was now admitted with worsening infection. Labs and imaging tests reviewed sed rate greater 120, , white blood cell count of 13.4.  Lactic acid level was normal at 1.7. CT scan of the leg revealed large left inguinal lymph nodes, fat stranding in the thigh most notably in the medial portion right hip arthroplasty in place intrapelvic contents unremarkable. CTA tib-fib soft tissue thickening and fat stranding in the anterior aspect of the lower extremity with disorganized fluid throughout the superficial compartment with no definitive involvement of the deep intramuscular compartment, no definitive abscess  Interval History:   She reports less pain and erythema.  She feels better.  CBC - wbc 14.64.  04/16-  She feels better  CBC showed wbc 18- increased  04/17-  She has persistent leukocytosis.  WBC remains high at 18.     04/18-  She feels better but has persistent leucocytosis .  04/20- she is tolerating medications.  She has less pain  CBC now showed normal WBC  MRI of the lower extremity did not show any abscess"     Discharged with plan for dose of Dalvance.     4/24: "Patient to suite for first dose Dalvance. PIV flushed w/ 10ml D5W prior to start.  Dalvance 1500mg (325ml) PIV given over 30min (650ml/hr) with no ADR. PIV flushed with 10ml D5W post infusion. Patient monitored post 30min with no ADR.  Labs: CBC, CMP, ESR, CRP  Patient out suite via WC in NAD."     4/28: Hospital follow up. After receiving Dalvance the other day, patient reports her right leg " "began blistering badly. Not blanchable on exam today but notably skin peeling around foot and blisters notable. No tenderness with palpation. Active drainage noted. Have never seen Dalvance lead to this type of reaction but always possible. May be part of healing process as well. As precaution have advised patient to go to ER for evaluation. Is in agreement and will head there shortly.    5/16: Second hospital follow up. Last admission CT showed cellulitis of right leg. No abscess still. Afebrile. Inflammatory markers trended down. Has wound care service at home. Completed course of antibiotics. Leg looks great today. Skin healing. No blisters. Edema just about resolved. Will refer to lymphedema clinic.     Discharge summary 4/29 reviewed: "HPI:   Stephanie Raza is a 69 y.o. female with a PMH  has a past medical history of Acute blood loss anemia (ABLA) (01/24/2024), YOVANA (acute kidney injury) (01/24/2024), Anxiety, Arthritis, Asthma, Back pain, Diabetes mellitus type I, Heart murmur, Hyperlipidemia, Hypertension, Obesity, Polyneuropathy, Trouble in sleeping, Type 2 diabetes mellitus, and Urinary incontinence. who presented to the ED directed by Dr. Otto for further evaluation of worsening right lower extremity wound and skin sloughing to initiate IV antibiotics during post discharge following up visit.  Patient reported symptoms began earlier this month and continued to worsen despite treatment with Dalvance on 04/24 and p.o. zyvox following recent admission from 4/13 to 4/22 for similar complaints.  Patient reported no known alleviating or aggravating factors noted and reported all other review of systems negative except those noted above.  She denied endorsing any fever, chills, sweats, nausea, vomiting, numbness, tingling, or itching and reported all other review of systems negative except as noted above.  Initial workup in the ED revealed patient to be afebrile without leukocytosis, hypertensive, sed rate " ">120, CRP 83.9, CT right lower extremity negative for acute findings however positive for evidence of cellulitis.  ID consulted by ED staff and recommended patient be admitted to Hospital Medicine with initiation of IV Zyvox inpatient will be evaluated in the morning.  Patient admitted under observation for continued medical management.       Hospital Course:   69-year-old female, under the care hospital medicine for management of cellulitis.  CT RLE: Moderate Superficial subcutaneous fat stranding and prefascial fluid density consistent with cellulitis.  Superficial abscess may be suspected.  Extensive right lower extremity skin sloughing on exam.  Wound care consulted.  Patient remains afebrile, labs stable.  Inflammatory markers trending down.  Patient is followed by a wound care service at home, advised patient to call the wound care company and request home visit tomorrow.  Extensive discussion with patient, patient in agreement with discharge today.  Resume previous home medication regimen.  Follow-up with PCP and Infectious Disease as indicated.Patient seen and examined on day of discharge and determined stable for discharge. "     Discharge summary reviewed: "Hospital Course:   4/14 admitted for right leg cellulitis. Denies insect bite, trauma. States improvement in symptoms of erythema, edema and pain. Intravenous antibiotic(s) per infectious disease. Surgery following.  4/15 antibiotic(s) per infectious disease. Cellulitis modestly improving. Pain regimen adjusted.  4/16 interventional radiology aspirated blister. Body fluid studies pending. Patient endorses improvement in symptoms of edema and erythema. Us liliana pending  4/17 us with moderate stenosis in superficial femoral artery. Patient unable to tolerate us liliana studydue to pain. Vascular surgery and nephrology consulted due to unimproved cellulitis and acute kidney injury. Us abdomen complete with medical renal disease, no hydronephrosis. Infectious " "disease aware. Surgery recommending mri pending renal improvement  4/18 creatinine improving. Awaiting mri  4/19 ortho consulted while awaiting mri official read. Mri without myositis or abscess. Continue treatment for lymphedema with cellulitis with compression stockings as tolerated and topical antimicrobials, elevation and cold compress.  4/20 labs improving. Continue antibiotic(s) per infectious disease. Encourage elevation, cold compress and scds vs robyn hose. No surgical intervention warranted.  04/21/2025  Patient reports swelling improving. Cont abx. Will establish 1 time dose of dalvance outpatient. Patient refused snf, will set up home health.   04/22/2025  Patients stable for dc. Will cont on po zyvox. 1 time dose of dalvance to be set up outpatient."     EXAM: CT LEG (TIBIA-FIBULA) WITH CONTRAST RIGHT     CLINICAL HISTORY: Soft tissue infection suspected, lower leg, xray done; Pain     COMPARISON: None available.     TECHNIQUE: Axial CT imaging was performed through the      without intravenous contrast.  Multiplanar reformats were created and interpreted.     FINDINGS:     No acute fractures or dislocation.     Moderate superficial subcutaneous fat stranding.  Superficial perifascial fluid may relate to superficial abscess.  No deep space  abscess.  Mild atherosclerotic plaque of the arteriovascular the popliteal.  Mild degenerative joint disease of the knee.        Impression:     Moderate Superficial subcutaneous fat stranding and prefascial fluid density consistent with cellulitis.  Superficial abscess may be suspected.     No vascular hemodynamically significant stenosis.      All CT scans at [this location] are performed using dose modulation techniques as appropriate to a performed exam including the following: automated exposure control; adjustment of the mA and/or kV according to patient size (this includes techniques or standardized protocols for targeted exams where dose is matched to " indication / reason for exam; i.e. extremities or head); use of iterative reconstruction technique.           Finalized on: 4/28/2025 9:04 PM By:  Angel Gordon MD JEANNINE PhD  [facility]# [id number]      2025-04-28 21:07:01.779     [facility]        Exam Ended: 04/28/25 19:31 CDT Last Resulted: 04/28/25 21:04 CDT        Procedure Component Value Units Date/Time   Gram stain [[id number]] Collected: 04/15/25 1602   Order Status: Canceled Specimen: Body Fluid from Leg, Right Updated: 04/15/25 1608   Culture, Body Fluid (Aerobic) w/ GS [[id number]] Collected: 04/15/25 1602   Order Status: Completed Specimen: Body Fluid from Calf, Right Updated: 04/21/25 0713     CULTURE, FLUID No Growth     GRAM STAIN No WBCs       No organisms seen   Blood culture x two cultures. Draw prior to antibiotics. [[id number]] (Normal) Collected: 04/13/25 1124   Order Status: Completed Specimen: Blood from Peripheral, Hand, Left Updated: 04/19/25 0500     Blood Culture No Growth After 5 Days   Blood culture x two cultures. Draw prior to antibiotics. [[id number]] (Normal) Collected: 04/13/25 1116   Order Status: Completed Specimen: Blood from Peripheral, Antecubital, Left Updated: 04/19/25 0500     Blood Culture No Growth After 5 Days         EXAM: MRI TIBIA FIBULA W WO CONTRAST RIGHT     CLINICAL HISTORY: Infection right lower extremity.  Concern for osteomyelitis.     TECHNIQUE: Multiplanar multisequence imaging of the right leg was performed with and without the intravenous administration of Gadavist.     FINDINGS:  The exam is very limited due to the patient's inability to lie still for the exam with motion artifact on many sequences.     There is no cortical erosion or osseous destructive tibia or fibula.  No bone marrow edema or bone marrow replacing process or loss of T1 fatty marrow signal intensity.     There is no intramuscular abscess or edema or fluid within the intermuscular fascia.  No abnormal enhancement in the muscles or fascia.     There is  circumferential marked subcutaneous edema consistent with cellulitis.  No drainable fluid collection in the subcutaneous fat.        Impression:     1.  Circumferential cellulitis.  2.  No evidence for myositis or intramuscular abscess or fascial edema.  3.  No evidence for osteomyelitis of the tibia or fibula.     Finalized on: 4/19/2025 9:32 AM By:  Foster Romo MD  [facility]# [id number]      2025-04-19 09:34:47.273     [facility]              Exam Ended: 04/18/25 18:22 CDT Last Resulted: 04/19/25 09:32 CDT         Review of Systems:  Constitutional: no fever or chills  Eyes: no visual changes  ENT: no nasal congestion or sore throat  Respiratory: no cough or shortness of breath  Cardiovascular: no chest pain  Gastrointestinal: no nausea or vomiting, no abdominal pain, no constipation, no diarrhea  Genitourinary: no hematuria or dysuria  Musculoskeletal: no arthralgias or myalgias  Skin: no rash  Neurological: no headaches, numbness, or paresthesias    The following portions of the patient's history were reviewed and updated as appropriate: allergies, current medications, past family history, past medical history, past social history, past surgical history, and problem list.    PAST HISTORY:     Immunization History   Administered Date(s) Administered    COVID-19, MRNA, LN-S, PF (Pfizer) (Purple Cap) 02/28/2021, 03/23/2021, 11/04/2021    COVID-19, mRNA, LNP-S, PF, ernestina-sucrose, 30 mcg/0.3 mL (Pfizer Ages 12+) 10/31/2023, 10/16/2024    COVID-19, mRNA, LNP-S, bivalent booster, PF (PFIZER OMICRON) 04/27/2023    Tdap 05/02/2025       Past Medical History:   Diagnosis Date    Acute blood loss anemia (ABLA) 01/24/2024    Last Assessment & Plan:    Formatting of this note might be different from the original.   History & Physical       Discharge Summary       Follow-up  Patient did receive 1 unit packed red blood cells while in the OR secondary to blood loss.  No obvious bleeding at this time.  She received 2 additional units packed  red blood cells per orthopedics.  Hemoglobin running stable at this time no need fo    YOVANA (acute kidney injury) 2024    Last Assessment & Plan:    Formatting of this note might be different from the original.   History & Physical       Discharge Summary       Follow-up patient with progressive oliguric YOVANA after surgery.  Resolved. Avoid nephrotoxic agents, renally dose all medications where necessary, and protect nondominant arm as much as possible    Anxiety     Arthritis     Asthma     Back pain     Diabetes mellitus type I     Heart murmur     Hyperlipidemia     Hypertension     Obesity     Polyneuropathy     Trouble in sleeping     Type 2 diabetes mellitus     Urinary incontinence        Past Surgical History:   Procedure Laterality Date    ADENOIDECTOMY      BREAST BIOPSY      , benign    BREAST SURGERY       SECTION      hip reconstruction Left 2024    HYSTERECTOMY      JOINT REPLACEMENT         Family History   Problem Relation Name Age of Onset    Breast cancer Mother Naveed Blackwood     Lung cancer Mother Naveed Blackwood     Hypertension Mother Naveed Blackwood     Stroke Father Naveed Blackwood     Cancer Father Naveed Blackwood     Diabetes Sister [employer]     Breast cancer Maternal Aunt      Breast cancer Maternal Aunt      Breast cancer Paternal Aunt      Breast cancer Paternal Aunt         Social History     Socioeconomic History    Marital status:    Tobacco Use    Smoking status: Never     Passive exposure: Never    Smokeless tobacco: Never   Substance and Sexual Activity    Alcohol use: Yes     Alcohol/week: 3.0 standard drinks of alcohol     Types: 1 Glasses of wine, 2 Shots of liquor per week    Drug use: Never    Sexual activity: Not Currently     Partners: Male     Social Drivers of Health     Financial Resource Strain: Medium Risk (2025)    Overall Financial Resource Strain (CARDIA)     Difficulty of Paying Living Expenses: Somewhat hard   Food Insecurity:  "Food Insecurity Present (4/14/2025)    Hunger Vital Sign     Worried About Running Out of Food in the Last Year: Sometimes true     Ran Out of Food in the Last Year: Sometimes true   Transportation Needs: Unmet Transportation Needs (4/14/2025)    PRAPARE - Transportation     Lack of Transportation (Medical): Yes     Lack of Transportation (Non-Medical): Yes   Physical Activity: Sufficiently Active (9/27/2024)    Exercise Vital Sign     Days of Exercise per Week: 4 days     Minutes of Exercise per Session: 60 min   Stress: Stress Concern Present (4/14/2025)    [employer] [employer] - Occupational Stress Questionnaire     Feeling of Stress : Very much   Housing Stability: Low Risk  (4/14/2025)    Housing Stability Vital Sign     Unable to Pay for Housing in the Last Year: No     Homeless in the Last Year: No       MEDICATIONS & ALLERGIES:     Current Outpatient Medications on File Prior to Visit   Medication Sig    albuterol (PROVENTIL/VENTOLIN HFA) 90 mcg/actuation inhaler Inhale 2 puffs into the lungs every 4 (four) hours as needed for Wheezing.    aspirin 81 MG Chew Take 81 mg by mouth once daily.    blood-glucose meter (TRUE METRIX AIR GLUCOSE METER) [facility] [place] To check BG 3 times daily, to use with insurance preferred meter    buPROPion (WELLBUTRIN XL) 150 MG TB24 tablet TAKE 1 TABLET(150 MG) BY MOUTH DAILY    DROPLET PEN NEEDLE 31 gauge x 5/16" Ndle USE ONCE DAILY AS DIRECTED    esomeprazole (NEXIUM) 40 MG capsule TAKE 1 CAPSULE BEFORE BREAKFAST.    fluticasone-salmeterol diskus inhaler 250-50 mcg Inhale 1 puff into the lungs once daily.    furosemide (LASIX) 80 MG tablet Take 80 mg by mouth.    gabapentin (NEURONTIN) 300 MG capsule Take 300 mg by mouth every evening. As needed    GEMTESA 75 mg Tab TAKE 1 TABLET BY MOUTH EVERY DAY    HYDROcodone-acetaminophen (NORCO) 7.5-325 mg per tablet Take 1 tablet by mouth every 6 (six) hours as needed for Pain.    insulin degludec (TRESIBA FLEXTOUCH U-100) 100 " "unit/mL (3 mL) insulin pen Inject 10 Units into the skin once daily.    meloxicam (MOBIC) 7.5 MG tablet TAKE 1 TABLET(7.5 MG) BY MOUTH DAILY    metoprolol succinate (TOPROL-XL) 50 MG 24 hr tablet TAKE 1 TABLET TWICE DAILY    olmesartan (BENICAR) 40 MG tablet Take 40 mg by mouth once daily.    OZEMPIC 2 mg/dose (8 mg/3 mL) PnIj INJECT 2MG UNDER THE SKIN EVERY 7 DAYS    potassium chloride (K-TAB) 20 mEq Take 40 mEq by mouth 2 (two) times a day.    TRUE METRIX GLUCOSE TEST STRIP Strp TEST BLOOD SUGAR THREE TIMES DAILY    TRUEPLUS LANCETS 33 gauge Misc TEST BLOOD SUGAR THREE TIMES DAILY    vitamin D (VITAMIN D3) 1000 units Tab Take 1 tablet by mouth every morning.     No current facility-administered medications on file prior to visit.       Review of patient's allergies indicates:   Allergen Reactions    Adhesive tape-silicones Swelling     Paper tape       OBJECTIVE:   Vital Signs:  Vitals:    05/16/25 0941   BP: 130/80   Pulse: 62   Weight: 120 kg (264 lb 8.8 oz)   Height: 5' 2" (1.575 m)       No results found for this or any previous visit (from the past 24 hours).      Physical Exam:   General:  Well developed, well nourished, no acute distress  HEENT:  Normocephalic, atraumatic  CVS:  RRR, S1 and S2 normal, no murmurs, rubs, gallops  Resp:  Lungs clear to auscultation, no wheezes, rales, rhonchi  GI:  Abdomen soft, non-tender, non-distended, normoactive bowel sounds, no masses  MSK:  No muscle atrophy, peripheral edema, full range of motion  Skin:  photos of right leg below   Psych:  Alert and oriented to person, place, and time              ASSESSMENT:     Cellulitis of right lower extremity  --Extensive right lower extremity cellulitis  --Initially on pip/tazo, clindamycin, and linezolid during initial admission  --MRI of right LE with no evidence of abscess or OM   --Completed short course of PO linezolid at discharge  --Then received single dose of Dalvance on 4/24  --Subsequently developed oozing from right " leg and profound blistering  --As precaution advised patient be evaluated at ER  --During second admission had wound care clean the right leg; resumed home wound care at discharge   --Repeat CT with no abscess of bone involvement   --No indication for antibiotics at this time   --Will refer to lymphedema clinic   --Follow up with me as needed      Outpatient Antibiotic Therapy Plan:     Please send referral to [facility].     1) Infection:  Lower extremity cellulitis     2) Discharge Antibiotics:     Intravenous antibiotics:IV Dalvance 1500mg once     HTN  Continue current medications and follow up with PCP       DM  Continue current medications and follow up with PCP        PLAN:     Stephanie was seen today for cellulitis of right lower extremity.    Diagnoses and all orders for this visit:    Cellulitis of right lower extremity  -     Ambulatory Referral/Consult to Physical Therapy    Hypertension associated with diabetes    Type 2 diabetes mellitus with diabetic polyneuropathy, with long-term current use of insulin          The total time for evaluation and management services performed on 5/16/25 was greater than 25 minutes.        Paul Otto, DO   Infectious Diseases

## 2025-05-19 ENCOUNTER — CLINICAL SUPPORT (OUTPATIENT)
Dept: REHABILITATION | Facility: HOSPITAL | Age: 70
End: 2025-05-19
Attending: STUDENT IN AN ORGANIZED HEALTH CARE EDUCATION/TRAINING PROGRAM
Payer: MEDICARE

## 2025-05-19 DIAGNOSIS — I89.0 LYMPHEDEMA OF BOTH LOWER EXTREMITIES: Primary | ICD-10-CM

## 2025-05-19 PROCEDURE — 97535 SELF CARE MNGMENT TRAINING: CPT | Mod: HCNC,PN

## 2025-05-19 PROCEDURE — 97161 PT EVAL LOW COMPLEX 20 MIN: CPT | Mod: HCNC,PN

## 2025-05-19 NOTE — PROGRESS NOTES
Outpatient Rehab    Physical Therapy Evaluation    Patient Name: Stephanie Raza  MRN: [id number]  YOB: 1955  Encounter Date: 5/19/2025    Therapy Diagnosis:   Encounter Diagnosis   Name Primary?    Lymphedema of both lower extremities Yes     Physician: Petros Otto DO    Physician Orders: Eval and Treat  Medical Diagnosis: Cellulitis of right lower extremity    Visit # / Visits Authorized:  1 / 1  Insurance Authorization Period: 5/16/2025 to 5/16/2026  Date of Evaluation: 5/19/2025  Plan of Care Certification: 5/19/2025 to 7/28/2025    Time In:   2:35 pm  Time Out:  3:25 pm  Total Time (in minutes):   50 minutes  Total Billable Time (in minutes):  30 minutes    Intake Outcome Measure for FOTO Survey    Therapist reviewed FOTO scores for Stephanie Raza on 5/19/2025.   FOTO report - see Media section or FOTO account episode details.     Intake Score: 33%       Subjective   History of Present Illness  Stephanie is a 69 y.o. female who reports to physical therapy with a chief concern of Bilateral LE swelling, wounds of right leg.                 History of Present Condition/Illness: Reports swelling for more than 10 years. Swelling of the right leg worsened after episode of cellulitis. She developed cellulitis of the right leg 2 months ago. She has a wound care service that comes by to check on her wounds and wraps her legs with Ace bandages    Pain     Patient reports a current pain level of 0/10. Pain at best is reported as 3/10. Pain at worst is reported as 3/10.   Location: right ankle  Clinical Progression (since onset): Stable  Pain Qualities: Aching  Pain-Relieving Factors: Lying down, Elevation  Pain-Aggravating Factors: Sitting         Living Arrangements  Living Situation  Living Arrangements: Family members        Employment  Employment Status: Not employed, On disability          Past Medical History/Physical Systems Review:   Stephanie Raza  has a past medical history of Acute  blood loss anemia (ABLA), YOVANA (acute kidney injury), Anxiety, Arthritis, Asthma, Back pain, Diabetes mellitus type I, Heart murmur, Hyperlipidemia, Hypertension, Obesity, Polyneuropathy, Trouble in sleeping, Type 2 diabetes mellitus, and Urinary incontinence.    Stephanie Raza  has a past surgical history that includes  section; hip reconstruction (Left, 2024); Adenoidectomy; Breast surgery; Joint replacement; Hysterectomy; and Breast biopsy.    Stephanie has a current medication list which includes the following prescription(s): albuterol, aspirin, blood-glucose meter, bupropion, droplet pen needle, esomeprazole, fluticasone-salmeterol 250-50 mcg/dose, furosemide, gabapentin, gemtesa, hydrocodone-acetaminophen, insulin degludec, meloxicam, metoprolol succinate, olmesartan, ozempic, potassium chloride, true metrix glucose test strip, trueplus lancets, and vitamin d.    Review of patient's allergies indicates:   Allergen Reactions    Adhesive tape-silicones Swelling     Paper tape        Objective   Lower Extremity Skin Observations  Right Lower Extremity Skin Observations  Right Lower Extremity Skin Appearance: Other (Comment)  Right Lower Extremity Skin Appearance Comment: healing wounds present  Right Lower Extremity Edema Non-pitting or Pitting: Pitting  Right Lower Extremity Pitting Edema Severity: Mild pitting, indentation lasts less than 15 sec    Left Lower Extremity Skin Observations  Left Lower Extremity Skin Appearance: Firm skin  Left Lower Extremity Edema Non-pitting or Pitting: Pitting  Left Lower Extremity Pitting Edema Severity: Mild pitting, indentation lasts less than 15 sec               Lower extremity Girth Measurements (in centimeters): taken in short sitting  [place] Left lower extremity  Date: 2025 Right lower extremity   Date: 2025   Metatarsal Head 24cm 24.5cm   Medial Malleolus 30cm 32.5cm   (+) 24cm (calf) 44.5cm 46.5cm   (+) 46cm 51cm 53.5cm   Total Girth  Measurement 149.5cm 157cm   Total Girth Difference 7.5cm 7.5cm   Total Girth Reduction -cm -cm     Single Limb Involvement  Mild: <10cm - <5cm of TGD  Mild/Mod: 10cm - 20cm TGD or 5.1cm -10cm TCD  Moderate:  20-40cm TGD or 10.1cm -15cm TGD  Moderate/Severe: 40.1cm TGD or 15.1cm -20.0cm TCD    Bilateral Limb Involvement  Moderate: 10cm - 15cm TGD or <5cm TGD  Moderate/Severe: 15.1-20cm TGD  Severe: > 20cm TGD or > 10cm TGD    TGD - total girth difference      Treatment:     -discussed etiology of lymphedema and the lymphatic system  -discussed cellulitis and ways to decrease risk of cellulitis  -measured bilateral lower extremities to obtain size for B compression velcro wraps:  Right lower extremity: sigvaris compreflex complete, size large/tall, color-black  Left lower extremity: sigvaris compreflex complete, size medium/tall, color-black  -instruction provided on how to jose B velcro compression wraps using accutabs   -discussed how to apply the most effective compression for each strap and recommended frequent adjustment of each strap initially as legs are reducing in size  -discussed wear time for compression wraps: wear day and night initially, remove when bathing or during skin care; can start wearing during daytime only when swelling of BLE is controlled  -requested order from Dr. Otto via Epic for B velcro compression wraps. Once order is signed, will route order to DME company ([employer]) and have compression garments shipped to home residence.  -discussed benefits of a home compression pump to manage swelling (will complete ordering process at the next session if pump is needed to help manage swelling)          Assessment & Plan   Assessment  Stephanie presents with a condition of Low complexity.   Presentation of Symptoms: Stable       Functional Limitations: Driving, Functional mobility, Bed mobility, Increased risk of fall, Gait limitations  Impairments: Abnormal or restricted range of motion, Impaired  physical strength, Other (Comment)  Other Impairments: swelling BLE    Patient Goal for Therapy (PT): improve swelling both legs  Prognosis: Excellent  Assessment Details: Pt has stage II lymphedema secondary to hx of cellulitis and is in the active (phase 1) stage of treatment. A compression garment is required to reduce and maintain limb girth and prevent progression of lymphedema to prevent infections, wounds, pain, and orthopedic issues.   Pt is unable to independently jose traditional compression stockings/sleeve due to decreased strength, ROM, and large body habitus, therefore, a gradient compression wrap with adjustable straps is required for home management.      Plan  From a physical therapy perspective, the patient would benefit from: Skilled Rehab Services    Planned therapy interventions include: Manual therapy and Other (Comment). Self care/home management  Planned modalities to include: Vasopneumatic pump.        Visit Frequency: 1 times Per Week for 10 Weeks.       This plan was discussed with Patient.   Discussion participants: Agreed Upon Plan of Care             Patient's spiritual, cultural, and educational needs considered and patient agreeable to plan of care and goals.           Goals:   Active       LTG       Patient will show decreased girth in R LE by up to 3-4 cm  to allow for LE symmetry, shoe and clothing choice, and ability to apply needed compression daily.       Start:  05/19/25    Expected End:  07/28/25            Patient to jose/doff compression garment with daily compliance to assist in lymphedema management, skin elasticity, and tissue density.       Start:  05/19/25    Expected End:  07/28/25               STG       Patient will show a decreased girth in R LE by up to 2 cm to allow for LE symmetry, shoe and clothing choice, and ability to apply needed compression       Start:  05/19/25    Expected End:  06/23/25            Patient will demonstrate 100% knowledge of lymphedema  precautions and signs of infection to allow for reduced lymphedema risk, infection risk, and/or exacerbation of condition       Start:  05/19/25    Expected End:  06/23/25                Luann Schaefer, PT

## 2025-05-21 DIAGNOSIS — M54.32 SCIATICA OF LEFT SIDE: Chronic | ICD-10-CM

## 2025-05-21 NOTE — TELEPHONE ENCOUNTER
No care due was identified.  [facility] Embedded Care Due Messages. Reference number: [id number].   5/21/2025 5:33:49 AM CDT

## 2025-05-22 RX ORDER — MELOXICAM 7.5 MG/1
TABLET ORAL
Qty: 30 TABLET | Refills: 3 | Status: SHIPPED | OUTPATIENT
Start: 2025-05-22

## 2025-05-27 ENCOUNTER — PATIENT OUTREACH (OUTPATIENT)
Dept: ADMINISTRATIVE | Facility: HOSPITAL | Age: 70
End: 2025-05-27
Payer: MEDICARE

## 2025-05-29 ENCOUNTER — PATIENT OUTREACH (OUTPATIENT)
Dept: ADMINISTRATIVE | Facility: HOSPITAL | Age: 70
End: 2025-05-29
Payer: MEDICARE

## 2025-06-02 ENCOUNTER — TELEPHONE (OUTPATIENT)
Dept: INTERNAL MEDICINE | Facility: CLINIC | Age: 70
End: 2025-06-02
Payer: MEDICARE

## 2025-06-02 DIAGNOSIS — Z79.4 TYPE 2 DIABETES MELLITUS WITH DIABETIC POLYNEUROPATHY, WITH LONG-TERM CURRENT USE OF INSULIN: Primary | ICD-10-CM

## 2025-06-02 DIAGNOSIS — E11.42 TYPE 2 DIABETES MELLITUS WITH DIABETIC POLYNEUROPATHY, WITH LONG-TERM CURRENT USE OF INSULIN: Primary | ICD-10-CM

## 2025-06-03 ENCOUNTER — LAB VISIT (OUTPATIENT)
Dept: LAB | Facility: HOSPITAL | Age: 70
End: 2025-06-03
Payer: MEDICARE

## 2025-06-03 DIAGNOSIS — Z79.4 TYPE 2 DIABETES MELLITUS WITH DIABETIC POLYNEUROPATHY, WITH LONG-TERM CURRENT USE OF INSULIN: ICD-10-CM

## 2025-06-03 DIAGNOSIS — E11.42 TYPE 2 DIABETES MELLITUS WITH DIABETIC POLYNEUROPATHY, WITH LONG-TERM CURRENT USE OF INSULIN: ICD-10-CM

## 2025-06-03 LAB
ALBUMIN/CREAT UR: 226.6 UG/MG
CREAT UR-MCNC: 109 MG/DL (ref 15–325)
EAG (OHS): 123 MG/DL (ref 68–131)
HBA1C MFR BLD: 5.9 % (ref 4–5.6)
MICROALBUMIN UR-MCNC: 247 UG/ML (ref ?–5000)

## 2025-06-03 PROCEDURE — 36415 COLL VENOUS BLD VENIPUNCTURE: CPT | Mod: PO

## 2025-06-03 PROCEDURE — 83036 HEMOGLOBIN GLYCOSYLATED A1C: CPT

## 2025-06-03 PROCEDURE — 82570 ASSAY OF URINE CREATININE: CPT

## 2025-06-04 ENCOUNTER — RESULTS FOLLOW-UP (OUTPATIENT)
Dept: INTERNAL MEDICINE | Facility: CLINIC | Age: 70
End: 2025-06-04

## 2025-06-04 DIAGNOSIS — E11.42 TYPE 2 DIABETES MELLITUS WITH DIABETIC POLYNEUROPATHY, WITH LONG-TERM CURRENT USE OF INSULIN: Primary | Chronic | ICD-10-CM

## 2025-06-04 DIAGNOSIS — E11.59 HYPERTENSION ASSOCIATED WITH DIABETES: Chronic | ICD-10-CM

## 2025-06-04 DIAGNOSIS — Z79.4 TYPE 2 DIABETES MELLITUS WITH DIABETIC POLYNEUROPATHY, WITH LONG-TERM CURRENT USE OF INSULIN: Primary | Chronic | ICD-10-CM

## 2025-06-04 DIAGNOSIS — I15.2 HYPERTENSION ASSOCIATED WITH DIABETES: Chronic | ICD-10-CM

## 2025-06-09 ENCOUNTER — OFFICE VISIT (OUTPATIENT)
Dept: INTERNAL MEDICINE | Facility: CLINIC | Age: 70
End: 2025-06-09
Payer: MEDICARE

## 2025-06-09 VITALS
DIASTOLIC BLOOD PRESSURE: 80 MMHG | WEIGHT: 252.63 LBS | HEART RATE: 122 BPM | OXYGEN SATURATION: 99 % | BODY MASS INDEX: 46.49 KG/M2 | HEIGHT: 62 IN | TEMPERATURE: 99 F | SYSTOLIC BLOOD PRESSURE: 132 MMHG

## 2025-06-09 DIAGNOSIS — E78.2 MIXED HYPERLIPIDEMIA: Chronic | ICD-10-CM

## 2025-06-09 DIAGNOSIS — Z79.4 TYPE 2 DIABETES MELLITUS WITH DIABETIC POLYNEUROPATHY, WITH LONG-TERM CURRENT USE OF INSULIN: Chronic | ICD-10-CM

## 2025-06-09 DIAGNOSIS — J45.20 MILD INTERMITTENT ASTHMA WITHOUT COMPLICATION: Chronic | ICD-10-CM

## 2025-06-09 DIAGNOSIS — R32 URINARY INCONTINENCE, UNSPECIFIED TYPE: Chronic | ICD-10-CM

## 2025-06-09 DIAGNOSIS — I50.42 CHRONIC COMBINED SYSTOLIC AND DIASTOLIC CONGESTIVE HEART FAILURE: Chronic | ICD-10-CM

## 2025-06-09 DIAGNOSIS — E11.42 TYPE 2 DIABETES MELLITUS WITH DIABETIC POLYNEUROPATHY, WITH LONG-TERM CURRENT USE OF INSULIN: Chronic | ICD-10-CM

## 2025-06-09 DIAGNOSIS — I11.0 HYPERTENSIVE HEART DISEASE WITH HEART FAILURE: Primary | Chronic | ICD-10-CM

## 2025-06-09 DIAGNOSIS — D50.9 IRON DEFICIENCY ANEMIA, UNSPECIFIED IRON DEFICIENCY ANEMIA TYPE: Chronic | ICD-10-CM

## 2025-06-09 DIAGNOSIS — I89.0 LYMPHEDEMA OF BOTH LOWER EXTREMITIES: Chronic | ICD-10-CM

## 2025-06-09 DIAGNOSIS — L97.911 ULCER OF RIGHT LOWER EXTREMITY, LIMITED TO BREAKDOWN OF SKIN: ICD-10-CM

## 2025-06-09 DIAGNOSIS — F33.0 MILD EPISODE OF RECURRENT MAJOR DEPRESSIVE DISORDER: Chronic | ICD-10-CM

## 2025-06-09 PROCEDURE — 3072F LOW RISK FOR RETINOPATHY: CPT | Mod: CPTII,HCNC,S$GLB, | Performed by: FAMILY MEDICINE

## 2025-06-09 PROCEDURE — 99214 OFFICE O/P EST MOD 30 MIN: CPT | Mod: HCNC,S$GLB,, | Performed by: FAMILY MEDICINE

## 2025-06-09 PROCEDURE — 1126F AMNT PAIN NOTED NONE PRSNT: CPT | Mod: CPTII,HCNC,S$GLB, | Performed by: FAMILY MEDICINE

## 2025-06-09 PROCEDURE — 3060F POS MICROALBUMINURIA REV: CPT | Mod: CPTII,HCNC,S$GLB, | Performed by: FAMILY MEDICINE

## 2025-06-09 PROCEDURE — 3066F NEPHROPATHY DOC TX: CPT | Mod: CPTII,HCNC,S$GLB, | Performed by: FAMILY MEDICINE

## 2025-06-09 PROCEDURE — 3008F BODY MASS INDEX DOCD: CPT | Mod: CPTII,HCNC,S$GLB, | Performed by: FAMILY MEDICINE

## 2025-06-09 PROCEDURE — 1160F RVW MEDS BY RX/DR IN RCRD: CPT | Mod: CPTII,HCNC,S$GLB, | Performed by: FAMILY MEDICINE

## 2025-06-09 PROCEDURE — 99999 PR PBB SHADOW E&M-EST. PATIENT-LVL IV: CPT | Mod: PBBFAC,HCNC,, | Performed by: FAMILY MEDICINE

## 2025-06-09 PROCEDURE — 3079F DIAST BP 80-89 MM HG: CPT | Mod: CPTII,HCNC,S$GLB, | Performed by: FAMILY MEDICINE

## 2025-06-09 PROCEDURE — 3044F HG A1C LEVEL LT 7.0%: CPT | Mod: CPTII,HCNC,S$GLB, | Performed by: FAMILY MEDICINE

## 2025-06-09 PROCEDURE — 4010F ACE/ARB THERAPY RXD/TAKEN: CPT | Mod: CPTII,HCNC,S$GLB, | Performed by: FAMILY MEDICINE

## 2025-06-09 PROCEDURE — G2211 COMPLEX E/M VISIT ADD ON: HCPCS | Mod: HCNC,S$GLB,, | Performed by: FAMILY MEDICINE

## 2025-06-09 PROCEDURE — 3288F FALL RISK ASSESSMENT DOCD: CPT | Mod: CPTII,HCNC,S$GLB, | Performed by: FAMILY MEDICINE

## 2025-06-09 PROCEDURE — 3075F SYST BP GE 130 - 139MM HG: CPT | Mod: CPTII,HCNC,S$GLB, | Performed by: FAMILY MEDICINE

## 2025-06-09 PROCEDURE — 1101F PT FALLS ASSESS-DOCD LE1/YR: CPT | Mod: CPTII,HCNC,S$GLB, | Performed by: FAMILY MEDICINE

## 2025-06-09 PROCEDURE — 1159F MED LIST DOCD IN RCRD: CPT | Mod: CPTII,HCNC,S$GLB, | Performed by: FAMILY MEDICINE

## 2025-06-09 RX ORDER — POTASSIUM CHLORIDE 1500 MG/1
40 TABLET, EXTENDED RELEASE ORAL 2 TIMES DAILY
Qty: 180 TABLET | Refills: 3 | Status: SHIPPED | OUTPATIENT
Start: 2025-06-09

## 2025-06-09 RX ORDER — FUROSEMIDE 80 MG/1
80 TABLET ORAL DAILY
Qty: 90 TABLET | Refills: 3 | Status: SHIPPED | OUTPATIENT
Start: 2025-06-09

## 2025-06-09 RX ORDER — FLUTICASONE PROPIONATE AND SALMETEROL 250; 50 UG/1; UG/1
1 POWDER RESPIRATORY (INHALATION) DAILY
Qty: 180 EACH | Refills: 3 | Status: SHIPPED | OUTPATIENT
Start: 2025-06-09

## 2025-06-09 RX ORDER — OLMESARTAN MEDOXOMIL 40 MG/1
40 TABLET ORAL DAILY
Qty: 90 TABLET | Refills: 3 | Status: SHIPPED | OUTPATIENT
Start: 2025-06-09

## 2025-06-09 RX ORDER — VIBEGRON 75 MG/1
1 TABLET, FILM COATED ORAL DAILY
Qty: 90 TABLET | Refills: 3 | Status: SHIPPED | OUTPATIENT
Start: 2025-06-09

## 2025-06-09 RX ORDER — BUPROPION HYDROCHLORIDE 150 MG/1
150 TABLET ORAL DAILY
Qty: 90 TABLET | Refills: 3 | Status: SHIPPED | OUTPATIENT
Start: 2025-06-09

## 2025-06-09 NOTE — PROGRESS NOTES
Subjective     Patient ID: Stephanie Raza is a 69 y.o. female.    Chief Complaint: Follow-up (3 months)    History of Present Illness    PRESENTATION:  Stephanie presents for a follow-up visit to assess leg wounds and discuss recent medical issues.    HPI:  Stephanie reports developing new leg wounds after consuming crawfish. She has two new wounds, with one showing significant improvement according to the practitioner. Stephanie had not applied any treatment as wound care specialists were scheduled to visit later that day. She also mentions a small intact blister on her leg.    Stephanie reports changes in bowel habits, noting increased frequency coinciding with meals. She describes her stools as slightly loose but not watery, attributing this to increased vegetable consumption. She eats two vegetables for supper and two for dinner, along with grits or oatmeal.    Stephanie mentions discontinuing Ozempic for 3 weeks to observe its effects and reports a recent hospitalization.    Regarding diabetes management, patient states her blood sugars have been within normal range. She has not been taking Tresiba (long-acting insulin) as instructed by the practitioner to take it only as needed.    Stephanie reports difficulty sleeping and requests a refill for her sleep medication.    MEDICATIONS:  Stephanie is on Nexium for acid reflux, Wellbutrin for depression, and an Albuterol inhaler. She is taking Meloxicam 7.5 mg as an anti-inflammatory for sciatica. Stephanie is on Ozempic, which she stopped for 3 weeks to observe its effects but has implied resuming use. She is also on Metoprolol. Stephanie is holding Tresiba 10 units, a long-acting insulin for diabetes, and taking it as needed. She is on a bladder pill, sleep medication, and Adderall. Stephanie takes Lasix 80 mg daily as a fluid pill, Losartan 40 mg daily for blood pressure, and 2 tablets of Potassium at bedtime.    MEDICAL HISTORY:  Stephanie has diabetes, depression, acid reflux, sciatica, and hypertension. Stephanie does not need  Pap smears after age 65.    TEST RESULTS:  Stephanie recently underwent a urinalysis which showed increased protein levels compared to her usual results. Her recent HbA1c was 5.9%, indicating good blood sugar control.      ROS:  General: -fever, -chills, -fatigue, -weight gain, -weight loss  Eyes: -vision changes, -redness, -discharge  ENT: -ear pain, -nasal congestion, -sore throat  Cardiovascular: -chest pain, -palpitations, -lower extremity edema  Respiratory: -cough, -shortness of breath  Gastrointestinal: -abdominal pain, -nausea, -vomiting, -diarrhea, -constipation, -blood in stool, +change in bowel habits  Genitourinary: -dysuria, -hematuria, -frequency  Musculoskeletal: -joint pain, -muscle pain  Skin: +rash, -lesion, +urticaria/ hives  Neurological: -headache, -dizziness, -numbness, -tingling  Psychiatric: -anxiety, -depression, +sleep difficulty            Objective     Physical Exam  Vitals and nursing note reviewed.   Constitutional:       Appearance: Normal appearance. She is normal weight.      Comments: wheelchair   HENT:      Head: Normocephalic and atraumatic.   Eyes:      Extraocular Movements: Extraocular movements intact.      Conjunctiva/sclera: Conjunctivae normal.   Cardiovascular:      Rate and Rhythm: Normal rate and regular rhythm.      Pulses: Normal pulses.      Heart sounds: Normal heart sounds.   Pulmonary:      Effort: Pulmonary effort is normal.      Breath sounds: Normal breath sounds.   Musculoskeletal:      Cervical back: Normal range of motion and neck supple.      Right lower leg: Edema present.      Left lower leg: Edema present.      Comments: Right greater than left   Skin:     General: Skin is warm and dry.             Comments: 2 stage 1 ulcers on right lower leg, positive granulation tissue, no discharge or drainage, no odor   Neurological:      General: No focal deficit present.      Mental Status: She is alert and oriented to person, place, and time.   Psychiatric:         Mood  and Affect: Mood normal.         Behavior: Behavior normal.                Assessment and Plan     1. Hypertensive heart disease with heart failure  Overview:  Stable cont metoprolol, olmesartan    Orders:  -     olmesartan (BENICAR) 40 MG tablet; Take 1 tablet (40 mg total) by mouth once daily.  Dispense: 90 tablet; Refill: 3  -     TSH; Future; Expected date: 09/09/2025  -     Comprehensive Metabolic Panel; Future; Expected date: 06/09/2025    2. Type 2 diabetes mellitus with diabetic polyneuropathy, with long-term current use of insulin  Overview:  Stable cont ozempic, tresiba on hold take as needed for elevated blood sugars    Orders:  -     Hemoglobin A1C; Future; Expected date: 06/09/2025    3. Mild episode of recurrent major depressive disorder  Overview:  Stable controlled cont wellbutrin    Orders:  -     buPROPion (WELLBUTRIN XL) 150 MG TB24 tablet; Take 1 tablet (150 mg total) by mouth once daily.  Dispense: 90 tablet; Refill: 3    4. Urinary incontinence, unspecified type  Overview:  Stable cont gemtesa    Orders:  -     vibegron (GEMTESA) 75 mg Tab; Take 1 tablet (75 mg total) by mouth once daily.  Dispense: 90 tablet; Refill: 3    5. Mild intermittent asthma without complication  Overview:  Stable cont advair and albuterol prn    Orders:  -     fluticasone-salmeterol diskus inhaler 250-50 mcg; Inhale 1 puff into the lungs once daily.  Dispense: 180 each; Refill: 3    6. Chronic combined systolic and diastolic congestive heart failure  Overview:  Compensated cont lasix  Results for orders placed during the hospital encounter of 04/13/25  Echo  Interpretation Summary    Left Ventricle: The left ventricle is normal in size. Mildly increased wall thickness. There is mild concentric hypertrophy. There is normal systolic function with a visually estimated ejection fraction of 55 - 60%. There is diastolic dysfunction but grade cannot be determined.    Right Ventricle: The right ventricle is normal in size.  Wall thickness is normal. Systolic function is normal.    Left Atrium: Mildly dilated    Pulmonary Artery: Pulmonary artery pressure could not be estimated.    IVC/SVC: Intermediate venous pressure at 8 mmHg.    Orders:  -     furosemide (LASIX) 80 MG tablet; Take 1 tablet (80 mg total) by mouth once daily.  Dispense: 90 tablet; Refill: 3  -     potassium chloride (K-TAB) 20 mEq; Take 2 tablets (40 mEq total) by mouth 2 (two) times a day.  Dispense: 180 tablet; Refill: 3    7. Lymphedema of both lower extremities  Overview:  Swelling has improved from last visit, discussed low na diet      8. Iron deficiency anemia, unspecified iron deficiency anemia type  Comments:  check levels to determine need for supplementation  Orders:  -     CBC Auto Differential; Future; Expected date: 06/09/2025  -     Ferritin; Future; Expected date: 06/09/2025  -     Iron and TIBC; Future; Expected date: 06/09/2025    9. Mixed hyperlipidemia  Overview:  Stable cont atorvastatin    Orders:  -     Lipid Panel; Future; Expected date: 06/09/2025    10. Ulcer of right lower extremity, limited to breakdown of skin  Comments:  improving, receiving wound care through         Assessment & Plan    Assessed leg wounds, noting improvement in both legs.  BP and glucose control satisfactory.  Reviewed recent lab results, noting increased protein in urine but attributing it to current health issues.  Increased bowel movements likely beneficial for elimination.  Evaluated current medication regimen and made adjustments as needed.  Discussed use of Ozempic and its impact on glucose control.  Considered recent hospital stay in medication management decisions.    PATIENT EDUCATION:   Explained routine pap smears generally no longer necessary at age 67 unless history of abnormal results.    ACTION ITEMS/LIFESTYLE:   Stephanie to continue consumption of vegetables.   Educated on importance of limiting salt intake and avoiding salty foods, particularly crawfish,  due to potential negative effects on health.    MEDICATIONS:   Continued Nexium for acid reflux.   Continued Wellbutrin for depression.   Continued albuterol inhaler.   Continued Meloxicam 7.5 mg for sciatica.   Continued Tresiba (long-acting insulin) at current dose of 10 units, to be taken as needed.   Continued Adderall, provided refill.   Continued Lasix 80 mg daily.   Continued losartan 40 mg daily.   Continued two potassium tablets at bedtime.   Changed pharmacy for multiple medications from [facility] to [facility].    ORDERS:   Ordered labs to be completed before next appointment.    REFERRALS:   Consider physical therapy after current leg issues are resolved.    FOLLOW UP:   Follow up in 3 months.   Contact the office to get Shingles shot at the pharmacy when feeling better.       MD counseled patient on all conditions and answered questions.         Follow up in about 3 months (around 9/9/2025).    This note was generated with the assistance of ambient listening technology. Verbal consent was obtained by the patient and accompanying visitor(s) for the recording of patient appointment to facilitate this note. I attest to having reviewed and edited the generated note for accuracy, though some syntax or spelling errors may persist. Please contact the author of this note for any clarification.

## 2025-06-17 ENCOUNTER — TELEPHONE (OUTPATIENT)
Dept: INTERNAL MEDICINE | Facility: CLINIC | Age: 70
End: 2025-06-17
Payer: MEDICARE

## 2025-06-17 ENCOUNTER — PATIENT MESSAGE (OUTPATIENT)
Dept: INTERNAL MEDICINE | Facility: CLINIC | Age: 70
End: 2025-06-17

## 2025-06-17 NOTE — TELEPHONE ENCOUNTER
Copied from CRM #[id number]. Topic: Appointments - Appointment Rescheduling  >> Jun 17, 2025  2:40 PM Adele wrote:  Pt is calling to reschedule appt due to the pt couldn't get on the portal. Please give pt a call back at [phone]  reschedule appt

## 2025-06-22 ENCOUNTER — TELEPHONE (OUTPATIENT)
Dept: PHARMACY | Facility: CLINIC | Age: 70
End: 2025-06-22
Payer: MEDICARE

## 2025-06-22 NOTE — TELEPHONE ENCOUNTER
[facility]/Population Health Chart Review & Patient Outreach Details For Medication Adherence Project    Reason for Outreach Encounter: 3rd Party payor non-compliance report ([employer], [employer], C, etc)  2.  Patient Outreach Method: Reviewed patient chart   3.   Medication in question:    Hypertension Medications              furosemide (LASIX) 80 MG tablet Take 1 tablet (80 mg total) by mouth once daily.    metoprolol succinate (TOPROL-XL) 50 MG 24 hr tablet TAKE 1 TABLET TWICE DAILY    olmesartan (BENICAR) 40 MG tablet Take 1 tablet (40 mg total) by mouth once daily.                 olmesartan  last filled  6/20 for 30 day supply      4.  Reviewed and or Updates Made To: Patient Chart  5. Outreach Outcomes and/or actions taken: Patient filled medication and is on track to be adherent  Additional Notes:

## 2025-07-09 ENCOUNTER — EXTERNAL HOME HEALTH (OUTPATIENT)
Dept: HOME HEALTH SERVICES | Facility: HOSPITAL | Age: 70
End: 2025-07-09
Payer: MEDICARE

## 2025-07-15 ENCOUNTER — CLINICAL SUPPORT (OUTPATIENT)
Dept: REHABILITATION | Facility: HOSPITAL | Age: 70
End: 2025-07-15
Attending: STUDENT IN AN ORGANIZED HEALTH CARE EDUCATION/TRAINING PROGRAM
Payer: MEDICARE

## 2025-07-15 DIAGNOSIS — L03.115 CELLULITIS OF RIGHT LOWER EXTREMITY: Primary | ICD-10-CM

## 2025-07-15 DIAGNOSIS — I89.0 LYMPHEDEMA OF BOTH LOWER EXTREMITIES: Primary | ICD-10-CM

## 2025-07-15 DIAGNOSIS — I89.0 LYMPHEDEMA OF BOTH LOWER EXTREMITIES: ICD-10-CM

## 2025-07-15 PROCEDURE — 97535 SELF CARE MNGMENT TRAINING: CPT | Mod: HCNC,PN

## 2025-07-15 NOTE — PROGRESS NOTES
Outpatient Rehab    Physical Therapy Visit    Patient Name: Stephanie Raza  MRN: [id number]  YOB: 1955  Encounter Date: 7/15/2025    Therapy Diagnosis:   Encounter Diagnoses   Name Primary?    Cellulitis of right lower extremity Yes    Lymphedema of both lower extremities      Physician: Petros Otto DO    Physician Orders: Eval and Treat  Medical Diagnosis: Cellulitis of right lower extremity  Surgical Diagnosis: Not applicable for this Episode   Surgical Date: Not applicable for this Episode  Days Since Last Surgery: Not applicable for this Episode    Visit # / Visits Authorized:  1 / 8  Insurance Authorization Period: 7/15/2025 to 9/15/2025  Date of Evaluation: 5/19/2025  Plan of Care Certification: 5/19/2025 to 7/28/2025      PT/PTA:     Number of PTA visits since last PT visit:   Time In:   1100 am  Time Out:  1205 pm  Total Time (in minutes):   65  Total Billable Time (in minutes):  55    FOTO:  Intake Score: 33%  Survey Score 2: Not applicable for this Episode%  Survey Score 3: Not applicable for this Episode%    Precautions:         Subjective             Objective        Lower Extremity Skin Observations  Right Lower Extremity Skin Observations  Right Lower Extremity Skin Appearance: Other (Comment)  Right Lower Extremity Skin Appearance Comment: healing wounds present  Right Lower Extremity Edema Non-pitting or Pitting: Pitting  Right Lower Extremity Pitting Edema Severity: Mild pitting, indentation lasts less than 15 sec     Left Lower Extremity Skin Observations  Left Lower Extremity Skin Appearance: Firm skin  Left Lower Extremity Edema Non-pitting or Pitting: Pitting  Left Lower Extremity Pitting Edema Severity: Mild pitting, indentation lasts less than 15 sec         Lower extremity Girth Measurements (in centimeters): taken in short sitting  [facility] Left lower extremity  Date: 5/19/2025 Right lower extremity   Date: 5/19/2025 Left lower extremity   Date: 7/15/2025 Right lower  extremity   Date: 7/15/2025   Metatarsal Head 24cm 24.5cm 24 cm 25 cm   Medial Malleolus 30cm 32.5cm 30 cm 32.5 cm   (+) 24cm (calf) 44.5cm 46.5cm 43.5 cm 45.5 cm   (+) 46cm 51cm 53.5cm 51 cm 54 cm   Total Girth Measurement 149.5cm 157cm 148.5 cm 157 cm   Total Girth Difference 7.5cm 7.5cm 8.5 cm 8.5 cm   Total Girth Reduction -cm -cm 1 cm 0 cm     For compression pump sleeves: (measurements)  72 cm - circumference of upper thigh/groin  76 cm - circumference of mid thigh  50 cm- circumference of knee  46 cm circumference of calf  30 cm circumference of ankle  Length of leg from groin to floor: 65 cm      7/15/2025    Single Limb Involvement  Mild: <10cm - <5cm of TGD  Mild/Mod: 10cm - 20cm TGD or 5.1cm -10cm TCD  Moderate:  20-40cm TGD or 10.1cm -15cm TGD  Moderate/Severe: 40.1cm TGD or 15.1cm -20.0cm TCD     Bilateral Limb Involvement  Moderate: 10cm - 15cm TGD or <5cm TGD  Moderate/Severe: 15.1-20cm TGD  Severe: > 20cm TGD or > 10cm TGD     TGD - total girth difference    Treatment:   -obtained recent measurements for sizing of compression pump sleeve  -discussed etiology of lymphedema and the lymphatic system  -discussed cellulitis and ways to decrease risk of cellulitis  -instruction provided on how to jose B velcro compression wraps using accutabs   -discussed how to apply the most effective compression for each strap and recommended frequent adjustment of each strap initially as legs are reducing in size  -discussed wear time for compression wraps: wear day and night initially, remove when bathing or during skin care; can start wearing during daytime only when swelling of BLE is controlled  -requested order from Dr. Otto via [employer]} for compression pump  Once order is signed, will route order to [facility] and have compression pump shipped to home residence.  -discussed benefits of a home compression pump to manage swelling     Time Entry(in minutes):       Assessment & Plan   Assessment:    Stephanie Walters  progressing well slowly towards her goals. She has tried elevation, exercise, compression garments/bandaging (coban) since 5/19/2025 and swelling still persists. She also continues to have wounds due to swelling. Home pump is recommended for bilateral lower extremities to help manage swelling and decrease risk for wounds.       The patient will continue to benefit from skilled outpatient physical therapy in order to address the deficits listed in the problem list on the initial evaluation, provide patient and family education, and maximize the patients level of independence in the home and community environments.     The patient's spiritual, cultural, and educational needs were considered, and the patient is agreeable to the plan of care and goals.           Plan:  Planned therapy interventions include: Manual therapy and Other (Comment). Self care/home management  Planned modalities to include: Vasopneumatic pump.         Visit Frequency: 1 times Per Week for 10 Weeks.    Goals:   Active       LTG       Patient will show decreased girth in R LE by up to 3-4 cm  to allow for LE symmetry, shoe and clothing choice, and ability to apply needed compression daily. (Ongoing)       Start:  05/19/25    Expected End:  07/28/25            Patient to jose/doff compression garment with daily compliance to assist in lymphedema management, skin elasticity, and tissue density. (Ongoing)       Start:  05/19/25    Expected End:  07/28/25               STG       Patient will show a decreased girth in R LE by up to 2 cm to allow for LE symmetry, shoe and clothing choice, and ability to apply needed compression (Ongoing)       Start:  05/19/25    Expected End:  06/23/25            Patient will demonstrate 100% knowledge of lymphedema precautions and signs of infection to allow for reduced lymphedema risk, infection risk, and/or exacerbation of condition (Ongoing)       Start:  05/19/25    Expected End:  06/23/25                Luann  Olive, PT

## 2025-07-21 DIAGNOSIS — E11.42 TYPE 2 DIABETES MELLITUS WITH DIABETIC POLYNEUROPATHY, WITH LONG-TERM CURRENT USE OF INSULIN: Chronic | ICD-10-CM

## 2025-07-21 DIAGNOSIS — Z79.4 TYPE 2 DIABETES MELLITUS WITH DIABETIC POLYNEUROPATHY, WITH LONG-TERM CURRENT USE OF INSULIN: Chronic | ICD-10-CM

## 2025-07-21 RX ORDER — SEMAGLUTIDE 2.68 MG/ML
INJECTION, SOLUTION SUBCUTANEOUS
Qty: 9 EACH | Refills: 1 | Status: SHIPPED | OUTPATIENT
Start: 2025-07-21

## 2025-07-21 NOTE — TELEPHONE ENCOUNTER
No care due was identified.  [facility] Embedded Care Due Messages. Reference number: [id number].   7/21/2025 5:32:01 PM CDT

## 2025-07-22 NOTE — TELEPHONE ENCOUNTER
Refill Decision Note   Stephanie Raza  is requesting a refill authorization.  Brief Assessment and Rationale for Refill:  Approve     Medication Therapy Plan:         Comments:     Note composed:8:11 PM 07/21/2025

## 2025-07-25 ENCOUNTER — OFFICE VISIT (OUTPATIENT)
Dept: HOME HEALTH SERVICES | Facility: CLINIC | Age: 70
End: 2025-07-25
Payer: MEDICARE

## 2025-07-25 VITALS
SYSTOLIC BLOOD PRESSURE: 132 MMHG | DIASTOLIC BLOOD PRESSURE: 98 MMHG | WEIGHT: 249 LBS | TEMPERATURE: 97 F | BODY MASS INDEX: 45.82 KG/M2 | RESPIRATION RATE: 18 BRPM | OXYGEN SATURATION: 99 % | HEART RATE: 70 BPM | HEIGHT: 62 IN

## 2025-07-25 DIAGNOSIS — Z96.649 S/P REVISION OF TOTAL HIP: ICD-10-CM

## 2025-07-25 DIAGNOSIS — E66.813 CLASS 3 SEVERE OBESITY DUE TO EXCESS CALORIES WITH SERIOUS COMORBIDITY AND BODY MASS INDEX (BMI) OF 45.0 TO 49.9 IN ADULT: Chronic | ICD-10-CM

## 2025-07-25 DIAGNOSIS — M25.512 PAIN IN JOINT OF LEFT SHOULDER: ICD-10-CM

## 2025-07-25 DIAGNOSIS — F41.9 ANXIETY AND DEPRESSION: Chronic | ICD-10-CM

## 2025-07-25 DIAGNOSIS — M47.816 LUMBAR SPONDYLOSIS: ICD-10-CM

## 2025-07-25 DIAGNOSIS — M48.062 SPINAL STENOSIS OF LUMBAR REGION WITH NEUROGENIC CLAUDICATION: ICD-10-CM

## 2025-07-25 DIAGNOSIS — I15.2 HYPERTENSION ASSOCIATED WITH DIABETES: Chronic | ICD-10-CM

## 2025-07-25 DIAGNOSIS — Z00.00 ENCOUNTER FOR MEDICARE ANNUAL WELLNESS EXAM: ICD-10-CM

## 2025-07-25 DIAGNOSIS — R26.9 ABNORMALITY OF GAIT AND MOBILITY: ICD-10-CM

## 2025-07-25 DIAGNOSIS — E11.59 HYPERTENSION ASSOCIATED WITH DIABETES: Chronic | ICD-10-CM

## 2025-07-25 DIAGNOSIS — R32 URINARY INCONTINENCE, UNSPECIFIED TYPE: Chronic | ICD-10-CM

## 2025-07-25 DIAGNOSIS — J45.20 MILD INTERMITTENT ASTHMA WITHOUT COMPLICATION: ICD-10-CM

## 2025-07-25 DIAGNOSIS — E11.36 DIABETIC CATARACT: ICD-10-CM

## 2025-07-25 DIAGNOSIS — M54.32 SCIATICA OF LEFT SIDE: ICD-10-CM

## 2025-07-25 DIAGNOSIS — F32.A ANXIETY AND DEPRESSION: Chronic | ICD-10-CM

## 2025-07-25 DIAGNOSIS — Z79.4 TYPE 2 DIABETES MELLITUS WITH DIABETIC POLYNEUROPATHY, WITH LONG-TERM CURRENT USE OF INSULIN: Chronic | ICD-10-CM

## 2025-07-25 DIAGNOSIS — Z99.89 DEPENDENCE ON OTHER ENABLING MACHINES AND DEVICES: ICD-10-CM

## 2025-07-25 DIAGNOSIS — E11.42 TYPE 2 DIABETES MELLITUS WITH DIABETIC POLYNEUROPATHY, WITH LONG-TERM CURRENT USE OF INSULIN: Chronic | ICD-10-CM

## 2025-07-25 DIAGNOSIS — E78.5 HYPERLIPIDEMIA, UNSPECIFIED HYPERLIPIDEMIA TYPE: Chronic | ICD-10-CM

## 2025-07-25 DIAGNOSIS — E11.21 MICROALBUMINURIC DIABETIC NEPHROPATHY: ICD-10-CM

## 2025-07-25 DIAGNOSIS — L03.115 CELLULITIS OF RIGHT LOWER EXTREMITY: ICD-10-CM

## 2025-07-25 DIAGNOSIS — M25.551 PAIN OF RIGHT HIP JOINT: Primary | ICD-10-CM

## 2025-07-25 DIAGNOSIS — D50.9 IRON DEFICIENCY ANEMIA, UNSPECIFIED IRON DEFICIENCY ANEMIA TYPE: Chronic | ICD-10-CM

## 2025-07-25 DIAGNOSIS — I50.42 CHRONIC COMBINED SYSTOLIC AND DIASTOLIC CONGESTIVE HEART FAILURE: Chronic | ICD-10-CM

## 2025-07-25 DIAGNOSIS — I89.0 LYMPHEDEMA OF BOTH LOWER EXTREMITIES: ICD-10-CM

## 2025-07-25 PROBLEM — E66.01 MORBID OBESITY: Status: RESOLVED | Noted: 2025-04-13 | Resolved: 2025-07-25

## 2025-07-25 PROBLEM — I50.43 ACUTE ON CHRONIC COMBINED SYSTOLIC AND DIASTOLIC CONGESTIVE HEART FAILURE: Status: RESOLVED | Noted: 2025-04-13 | Resolved: 2025-07-25

## 2025-07-25 PROBLEM — F33.0 MILD EPISODE OF RECURRENT MAJOR DEPRESSIVE DISORDER: Status: RESOLVED | Noted: 2024-01-22 | Resolved: 2025-07-25

## 2025-07-25 PROBLEM — I10 PRIMARY HYPERTENSION: Status: RESOLVED | Noted: 2025-04-13 | Resolved: 2025-07-25

## 2025-07-25 RX ORDER — GABAPENTIN 300 MG/1
300 CAPSULE ORAL NIGHTLY
Qty: 30 CAPSULE | Refills: 1 | Status: SHIPPED | OUTPATIENT
Start: 2025-07-25 | End: 2025-09-23

## 2025-07-25 NOTE — PATIENT INSTRUCTIONS
Counseling and Referral of Other Preventative  (Italic type indicates deductible and co-insurance are waived)    Patient Name: Stephanie Raza  Today's Date: 7/25/2025    Health Maintenance       Date Due Completion Date    Mammogram 10/25/2025 10/25/2024    Shingles Vaccine (1 of 2) 09/27/2025 (Originally 12/16/2007) ---    Pneumococcal Vaccines (Age 50+) (1 of 2 - PCV) 09/27/2025 (Originally 12/16/1976) ---    Influenza Vaccine (1) 09/01/2025 ---    Lipid Panel 10/25/2025 10/25/2024    Hemoglobin A1c 12/03/2025 6/3/2025    Foot Exam 12/06/2025 12/6/2024 (Done)    Override on 12/6/2024: Done (Dr. Davis)    Diabetic Eye Exam 12/18/2025 12/18/2024    Diabetes Urine Screening 06/03/2026 6/3/2025    DEXA Scan 10/09/2029 10/9/2024    Colorectal Cancer Screening 11/29/2031 10/23/2024    TETANUS VACCINE 05/02/2035 5/2/2025        No orders of the defined types were placed in this encounter.    The following information is provided to all patients.  This information is to help you find resources for any of the problems found today that may be affecting your health:                  Living healthy guide: www.[facility].[place].gov      Understanding Diabetes: www.diabetes.org      Eating healthy: www.cdc.gov/healthyweight      CDC home safety checklist: www.cdc.gov/steadi/patient.html      Agency on Aging: www.goea.[place].gov      Alcoholics anonymous (AA): www.aa.org      Physical Activity: www.marc.nih.gov/he8iytf      Tobacco use: www.quitwithusla.org

## 2025-07-25 NOTE — PROGRESS NOTES
"  Stephanie Raza presented for an initial Medicare AWV today. The following components were reviewed and updated:    Medical history  Family History  Social history  Allergies and Current Medications  Health Risk Assessment  Health Maintenance  Care Team    See Completed Assessments for Annual Wellness visit with in the encounter summary    The following assessments were completed:  Depression Screening  Cognitive function Screening  Timed Get Up Test  Whisper Test      Opioid documentation:      Patient does not have a current opioid prescription.          Vitals:    07/25/25 1207   BP: (!) 132/98   Pulse: 70   Resp: 18   Temp: 97.3 °F (36.3 °C)   SpO2: 99%   Weight: 112.9 kg (249 lb)   Height: 5' 2" (1.575 m)     Body mass index is 45.54 kg/m².       Physical Exam  Vitals reviewed.   Constitutional:       General: She is not in acute distress.     Appearance: She is obese. She is not ill-appearing.   HENT:      Head: Normocephalic and atraumatic.      Nose: Nose normal.      Mouth/Throat:      Mouth: Mucous membranes are moist.      Pharynx: Oropharynx is clear.   Eyes:      Pupils: Pupils are equal, round, and reactive to light.   Cardiovascular:      Rate and Rhythm: Normal rate and regular rhythm.      Pulses: Normal pulses.      Heart sounds: Normal heart sounds.   Pulmonary:      Breath sounds: Examination of the right-upper field reveals decreased breath sounds. Examination of the right-lower field reveals decreased breath sounds. Decreased breath sounds present.   Abdominal:      General: Bowel sounds are normal.      Palpations: Abdomen is soft.   Musculoskeletal:      Cervical back: Neck supple.      Right lower leg: No edema.      Left lower leg: Edema present.   Skin:     General: Skin is warm and dry.      Capillary Refill: Capillary refill takes less than 2 seconds.   Neurological:      Mental Status: She is alert and oriented to person, place, and time.   Psychiatric:         Mood and Affect: Mood normal.  "        Behavior: Behavior normal.           Diagnoses and health risks identified today and associated recommendations/orders:  1. Encounter for Medicare annual wellness exam  completed  - Referral to Enhanced Annual Wellness Visit (eAWV) M+6    2. Pain of right hip joint  Chronic and stable. Continue current management with gabapentin and meloxicam 7.5 mg PO QD.  Follow up with Pain Management as instructed.   - gabapentin (NEURONTIN) 300 MG capsule; Take 1 capsule (300 mg total) by mouth every evening. As needed  Dispense: 30 capsule; Refill: 1    3. Spinal stenosis of lumbar region with neurogenic claudication  Chronic and stable. Continue current management with meloxicam 7.5 mg PO QD. Follow up with Pain Management as instructed.     4. Lumbar spondylosis  Chronic and stable. Continue current management. Follow up with Pain Management as instructed.     5. Anxiety and depression  Chronic and stable. Continue current management with bupropion 150 mg PO QD. . Follow up with PCP/Psychiatry as instructed.     6. Diabetic cataract  Chronic and stable. Continue to monitor.  Follow up with Ophthalmology as instructed.     7. Mild intermittent asthma without complication  Chronic and stable. Continue current management with fluticasone-salmeterol 250-50 mcg 1 puff daily and albuterol rescue inhaler as needed. Follow up with Pulmonology as instructed.     8. Hypertension associated with diabetes  Chronic and stable. Continue current management with metoprolol 50 mg PO BID and olmesartan 40 mg PO QD. Follow up with PCP as instructed.     9. Hyperlipidemia, unspecified hyperlipidemia type  Chronic and stable. Continue current management with dietary restrictions. Patient is not on medication at this time. Follow up with PCP as instructed.   Component      Latest Ref Rng 11/2/2023 10/25/2024   Cholesterol Total      120 - 199 mg/dL 176  175    Triglycerides      30 - 150 mg/dL 114  96    HDL      40 - 75 mg/dL 47  44     LDL Cholesterol      63.0 - 159.0 mg/dL 106.2  111.8    HDL/Cholesterol Ratio      20.0 - 50.0 % 26.7  25.1    Total Cholesterol/HDL Ratio      2.0 - 5.0  3.7  4.0    Non-HDL Cholesterol      mg/dL 129  131        10. Chronic combined systolic and diastolic congestive heart failure  Chronic and stable. Continue current management with furosemide 80 mg PO QD.  Follow up with Cardiology as instructed.     11. Urinary incontinence, unspecified type  Chronic and stable. Continue current management with vibegron 75 mg PO QD.  Follow up with PCP/Urology as instructed.     12. Microalbuminuric diabetic nephropathy  Chronic and stable. Continue to monitor.  Follow up with PCP/Renal as instructed.   Component      Latest Ref Rng 11/2/2023 1/25/2024 4/17/2025 6/3/2025   Urine Microalbumin        ug/mL 47.0    247.0    Urine Creatinine      15.0 - 325.0 mg/dL 136.0  163.94 (E) 135.8  109.0    MICROALB/CREAT RATIO      <=30.0 ug/mg 34.6 (H)    226.6 (H)       13. Cellulitis of right lower extremity  Chronic and stable. Continue current management with in home wound care.  Follow up with PCP/Wound Care as instructed.     14. Iron deficiency anemia, unspecified iron deficiency anemia type  Chronic and stable. Continue current management. See med list above. Follow up with PCP.   Component      Latest Ref Rng 2/19/2024   Iron      30 - 160 ug/dL 36    Transferrin      200 - 375 mg/dL 215    TIBC      250 - 450 ug/dL 318    Saturated Iron      20 - 50 % 11 (L)         Component      Latest Ref Rng 2/19/2024   Ferritin      20.0 - 300.0 ng/mL 197      15. Type 2 diabetes mellitus with diabetic polyneuropathy, with long-term current use of insulin  Chronic and stable. Continue current management with gabapentin 300 mg PO QHS. Follow up with Pain Management as instructed.     16. Class 3 severe obesity due to excess calories with serious comorbidity and body mass index (BMI) of 45.0 to 49.9 in adult  Chronic and stable. Continue  current management with diet. Follow up with PCP as instructed.     17. Sciatica of left side  Chronic and stable. Continue current management with meloxicam 7.5 mg PO QD.  Follow up with Pain Management as instructed.     18. S/P revision of total hip  Patient reports that she still has some pain in the hip. Follow Up with Pain Management as instructed.     19. Pain in joint of left shoulder  Chronic and stable. Continue current management with meloxicam 7.5 mg PO QD.  Follow up with Pain Management as instructed.     20. Lymphedema of both lower extremities  Chronic and stable. Continue current management with furosemide 80 mg PO QD. Follow up with PCP as instructed.     21. Dependence on other enabling machines and devices  Chronic and stable. Continue to use cane and walker as needed and as instructed. Follow up with PCP as instructed.     22. Abnormality of gait and mobility  Chronic and stable. Continue current management with ambulatory aids.  Follow up with Pain Management as instructed.       Provided Stephanie with a 5-10 year written screening schedule and personal prevention plan. Recommendations were developed using the USPSTF age appropriate recommendations. Education, counseling, and referrals were provided as needed.  After Visit Summary printed and given to patient which includes a list of additional screenings\tests needed.    No follow-ups on file.      Katie Kwan NP  I offered to discuss advanced care planning, including how to pick a person who would make decisions for you if you were unable to make them for yourself, called a health care power of , and what kind of decisions you might make such as use of life sustaining treatments such as ventilators and tube feeding when faced with a life limiting illness recorded on a living will that they will need to know. (How you want to be cared for as you near the end of your natural life)     X  Patient is unwilling to engage in a discussion  regarding advance directives at this time.

## 2025-08-25 DIAGNOSIS — I50.42 CHRONIC COMBINED SYSTOLIC AND DIASTOLIC CONGESTIVE HEART FAILURE: Chronic | ICD-10-CM

## 2025-08-26 RX ORDER — POTASSIUM CHLORIDE 20 MEQ/1
40 TABLET, EXTENDED RELEASE ORAL 2 TIMES DAILY
Qty: 400 TABLET | Refills: 3 | Status: SHIPPED | OUTPATIENT
Start: 2025-08-26